# Patient Record
Sex: FEMALE | Race: WHITE | NOT HISPANIC OR LATINO
[De-identification: names, ages, dates, MRNs, and addresses within clinical notes are randomized per-mention and may not be internally consistent; named-entity substitution may affect disease eponyms.]

---

## 2017-03-12 ENCOUNTER — TRANSCRIPTION ENCOUNTER (OUTPATIENT)
Age: 62
End: 2017-03-12

## 2017-08-04 ENCOUNTER — CHART COPY (OUTPATIENT)
Age: 62
End: 2017-08-04

## 2017-10-31 ENCOUNTER — RESULT REVIEW (OUTPATIENT)
Age: 62
End: 2017-10-31

## 2018-03-16 ENCOUNTER — APPOINTMENT (OUTPATIENT)
Dept: ORTHOPEDIC SURGERY | Facility: CLINIC | Age: 63
End: 2018-03-16
Payer: COMMERCIAL

## 2018-03-16 VITALS
SYSTOLIC BLOOD PRESSURE: 106 MMHG | HEIGHT: 63.5 IN | HEART RATE: 76 BPM | WEIGHT: 120 LBS | DIASTOLIC BLOOD PRESSURE: 67 MMHG | BODY MASS INDEX: 21 KG/M2

## 2018-03-16 PROCEDURE — 99204 OFFICE O/P NEW MOD 45 MIN: CPT

## 2018-04-09 ENCOUNTER — APPOINTMENT (OUTPATIENT)
Dept: CT IMAGING | Facility: IMAGING CENTER | Age: 63
End: 2018-04-09

## 2018-04-16 ENCOUNTER — APPOINTMENT (OUTPATIENT)
Dept: ORTHOPEDIC SURGERY | Facility: CLINIC | Age: 63
End: 2018-04-16
Payer: COMMERCIAL

## 2018-04-16 PROCEDURE — 99214 OFFICE O/P EST MOD 30 MIN: CPT | Mod: 25

## 2018-04-16 PROCEDURE — 20552 NJX 1/MLT TRIGGER POINT 1/2: CPT

## 2018-05-25 ENCOUNTER — APPOINTMENT (OUTPATIENT)
Dept: ORTHOPEDIC SURGERY | Facility: CLINIC | Age: 63
End: 2018-05-25

## 2018-07-05 ENCOUNTER — APPOINTMENT (OUTPATIENT)
Dept: MAMMOGRAPHY | Facility: HOSPITAL | Age: 63
End: 2018-07-05
Payer: COMMERCIAL

## 2018-07-05 ENCOUNTER — OUTPATIENT (OUTPATIENT)
Dept: OUTPATIENT SERVICES | Facility: HOSPITAL | Age: 63
LOS: 1 days | End: 2018-07-05
Payer: COMMERCIAL

## 2018-07-05 ENCOUNTER — APPOINTMENT (OUTPATIENT)
Dept: ULTRASOUND IMAGING | Facility: HOSPITAL | Age: 63
End: 2018-07-05
Payer: COMMERCIAL

## 2018-07-05 DIAGNOSIS — Z98.89 OTHER SPECIFIED POSTPROCEDURAL STATES: Chronic | ICD-10-CM

## 2018-07-05 DIAGNOSIS — Z00.8 ENCOUNTER FOR OTHER GENERAL EXAMINATION: ICD-10-CM

## 2018-07-05 PROCEDURE — 76641 ULTRASOUND BREAST COMPLETE: CPT | Mod: 26

## 2018-07-05 PROCEDURE — 76641 ULTRASOUND BREAST COMPLETE: CPT

## 2018-07-05 PROCEDURE — 77063 BREAST TOMOSYNTHESIS BI: CPT | Mod: 26

## 2018-07-05 PROCEDURE — 77063 BREAST TOMOSYNTHESIS BI: CPT

## 2018-07-05 PROCEDURE — 77067 SCR MAMMO BI INCL CAD: CPT

## 2018-07-05 PROCEDURE — 77067 SCR MAMMO BI INCL CAD: CPT | Mod: 26

## 2018-08-01 ENCOUNTER — APPOINTMENT (OUTPATIENT)
Dept: ORTHOPEDIC SURGERY | Facility: CLINIC | Age: 63
End: 2018-08-01
Payer: COMMERCIAL

## 2018-08-01 VITALS — DIASTOLIC BLOOD PRESSURE: 66 MMHG | HEIGHT: 63.5 IN | HEART RATE: 88 BPM | SYSTOLIC BLOOD PRESSURE: 101 MMHG

## 2018-08-01 DIAGNOSIS — M54.5 LOW BACK PAIN: ICD-10-CM

## 2018-08-01 PROCEDURE — 99213 OFFICE O/P EST LOW 20 MIN: CPT

## 2018-08-28 ENCOUNTER — APPOINTMENT (OUTPATIENT)
Dept: ORTHOPEDIC SURGERY | Facility: CLINIC | Age: 63
End: 2018-08-28
Payer: COMMERCIAL

## 2018-08-28 VITALS
BODY MASS INDEX: 21 KG/M2 | HEIGHT: 63.5 IN | SYSTOLIC BLOOD PRESSURE: 111 MMHG | HEART RATE: 66 BPM | WEIGHT: 120 LBS | DIASTOLIC BLOOD PRESSURE: 77 MMHG

## 2018-08-28 PROCEDURE — 99214 OFFICE O/P EST MOD 30 MIN: CPT

## 2018-09-06 ENCOUNTER — APPOINTMENT (OUTPATIENT)
Dept: MRI IMAGING | Facility: HOSPITAL | Age: 63
End: 2018-09-06

## 2018-09-17 ENCOUNTER — OUTPATIENT (OUTPATIENT)
Dept: OUTPATIENT SERVICES | Facility: HOSPITAL | Age: 63
LOS: 1 days | End: 2018-09-17
Payer: COMMERCIAL

## 2018-09-17 ENCOUNTER — APPOINTMENT (OUTPATIENT)
Dept: MRI IMAGING | Facility: HOSPITAL | Age: 63
End: 2018-09-17
Payer: COMMERCIAL

## 2018-09-17 DIAGNOSIS — Z98.89 OTHER SPECIFIED POSTPROCEDURAL STATES: Chronic | ICD-10-CM

## 2018-09-17 DIAGNOSIS — Z00.8 ENCOUNTER FOR OTHER GENERAL EXAMINATION: ICD-10-CM

## 2018-09-17 PROCEDURE — 73721 MRI JNT OF LWR EXTRE W/O DYE: CPT | Mod: 26,RT

## 2018-09-17 PROCEDURE — 73721 MRI JNT OF LWR EXTRE W/O DYE: CPT

## 2018-09-26 ENCOUNTER — APPOINTMENT (OUTPATIENT)
Dept: ORTHOPEDIC SURGERY | Facility: CLINIC | Age: 63
End: 2018-09-26

## 2018-10-05 ENCOUNTER — OUTPATIENT (OUTPATIENT)
Dept: OUTPATIENT SERVICES | Facility: HOSPITAL | Age: 63
LOS: 1 days | End: 2018-10-05
Payer: COMMERCIAL

## 2018-10-05 DIAGNOSIS — J45.909 UNSPECIFIED ASTHMA, UNCOMPLICATED: ICD-10-CM

## 2018-10-05 DIAGNOSIS — Z98.89 OTHER SPECIFIED POSTPROCEDURAL STATES: Chronic | ICD-10-CM

## 2018-10-05 PROCEDURE — 94726 PLETHYSMOGRAPHY LUNG VOLUMES: CPT

## 2018-10-05 PROCEDURE — 94060 EVALUATION OF WHEEZING: CPT

## 2018-10-05 PROCEDURE — 94729 DIFFUSING CAPACITY: CPT

## 2018-10-05 PROCEDURE — 94729 DIFFUSING CAPACITY: CPT | Mod: 26

## 2018-10-05 PROCEDURE — 94726 PLETHYSMOGRAPHY LUNG VOLUMES: CPT | Mod: 26

## 2018-10-05 PROCEDURE — 94060 EVALUATION OF WHEEZING: CPT | Mod: 26

## 2018-10-10 ENCOUNTER — OUTPATIENT (OUTPATIENT)
Dept: OUTPATIENT SERVICES | Facility: HOSPITAL | Age: 63
LOS: 1 days | End: 2018-10-10
Payer: COMMERCIAL

## 2018-10-10 ENCOUNTER — APPOINTMENT (OUTPATIENT)
Dept: RADIOLOGY | Facility: HOSPITAL | Age: 63
End: 2018-10-10
Payer: COMMERCIAL

## 2018-10-10 DIAGNOSIS — Z98.89 OTHER SPECIFIED POSTPROCEDURAL STATES: Chronic | ICD-10-CM

## 2018-10-10 DIAGNOSIS — Z00.8 ENCOUNTER FOR OTHER GENERAL EXAMINATION: ICD-10-CM

## 2018-10-10 PROCEDURE — 71046 X-RAY EXAM CHEST 2 VIEWS: CPT | Mod: 26

## 2018-10-10 PROCEDURE — 71046 X-RAY EXAM CHEST 2 VIEWS: CPT

## 2018-10-15 ENCOUNTER — OUTPATIENT (OUTPATIENT)
Dept: OUTPATIENT SERVICES | Facility: HOSPITAL | Age: 63
LOS: 1 days | End: 2018-10-15
Payer: COMMERCIAL

## 2018-10-15 ENCOUNTER — APPOINTMENT (OUTPATIENT)
Dept: CT IMAGING | Facility: HOSPITAL | Age: 63
End: 2018-10-15
Payer: COMMERCIAL

## 2018-10-15 DIAGNOSIS — Z00.8 ENCOUNTER FOR OTHER GENERAL EXAMINATION: ICD-10-CM

## 2018-10-15 DIAGNOSIS — Z98.89 OTHER SPECIFIED POSTPROCEDURAL STATES: Chronic | ICD-10-CM

## 2018-10-15 PROCEDURE — 71250 CT THORAX DX C-: CPT

## 2018-10-15 PROCEDURE — 71250 CT THORAX DX C-: CPT | Mod: 26

## 2019-01-24 ENCOUNTER — APPOINTMENT (OUTPATIENT)
Dept: ORTHOPEDIC SURGERY | Facility: CLINIC | Age: 64
End: 2019-01-24
Payer: COMMERCIAL

## 2019-01-24 VITALS
WEIGHT: 120 LBS | HEIGHT: 63.5 IN | HEART RATE: 46 BPM | BODY MASS INDEX: 21 KG/M2 | DIASTOLIC BLOOD PRESSURE: 76 MMHG | SYSTOLIC BLOOD PRESSURE: 109 MMHG

## 2019-01-24 PROCEDURE — 99214 OFFICE O/P EST MOD 30 MIN: CPT

## 2019-01-24 NOTE — HISTORY OF PRESENT ILLNESS
[de-identified] : Patient is here for right hip pain for several years. She had history in early 2000's for a labral tear. She states that she did not receive relief from this. She has done multiple courses of PT without relief, the last one being in Summer 2018. She cannot take NSAIDs for her pain so instead she takes Black Cherry pills and she has also tried Voltaren gel. She denies any new symptoms but states that her condition has gradually worsened. She is still an active person and went skiing last weekend. Denies N/T/R. \par \par She is also dealing with chronic back and knee pain on her right side as well that is being treated by different physicians. Patient also states she has intermittent right knee pain for the last several years. She will occasionally feel this pain when skiing, it is primarily localized over the medial joint line. She describes an achy pain also when caring for her grandchildren. No numbness/tingling, no radiation of pain, no locking/buckling/giving way. She has had hyaluronic acid into the knee as before, and is wondering if she is a candidate for a repeat series at this time as she has not had any in the last 2 years.

## 2019-01-24 NOTE — DISCUSSION/SUMMARY
[de-identified] : HA R knee and R hip\par Discussed findings of today's exam and possible causes of patient's pain.  Educated patient on their most probable diagnosis of right hip osteoarthritis, and right knee osteoarthritis.  Reviewed possible courses of treatment, and we collaboratively decided best course of treatment at this time will include conservative management. Patient has chronic pain in her right knee and right hip, no sudden/acute change in her pain, we discussed various treatment options and she would like to proceed with insurance authorization for hyaluronic acid.  Patient appreciates and agrees with current plan.\par \par This note was generated using dragon medical dictation software.  A reasonable effort has been made for proofreading its contents, but typos may still remain.  If there are any questions or points of clarification needed please notify my office.\par \par Recommendation: Trial of Viscosupplementation. Will obtain preauthorization prior to injection therapy.\par \par Followup: Once approved for injection therapy.\par \par \par **NB: Medication was Issued under circumstances where the provider (Dr. Dalton Stewart) reasonably determined that it would be impractical for the patient to obtain substances prescribed by electronic prescription (e-prescribe) given need for pre-authorization, and such delay would adversely impact the patient's medical condition. An exception letter will be mailed to the [place] [employer] during the authorization process.**\par

## 2019-01-24 NOTE — PHYSICAL EXAM
[de-identified] : Constitutional: Well-nourished, well-developed, No acute distress\par Respiratory:  Good respiratory effort, no SOB\par Lymphatic: No regional lymphadenopathy, no lymphedema\par Psychiatric: Pleasant and normal affect, alert and oriented x3\par Skin: Clean dry and intact B/L UE/LE\par Musculoskeletal: normal except where as noted in regional exam\par \par \par Left Hip:\par \par APPEARANCE: no marked deformities, no swelling or malalignment\par POSITIVE TENDERNESS: none\par NONTENDER greater trochanter, TFL, gluteal region, ischium/proximal hamstring region, hip flexor region, sartorius and pubic symphysis. \par ROM full, painless all planes. \par RESISTIVE TESTING: painless resisted ER/IR/SLR/abduction/adduction. \par SPECIAL TESTS: painless loaded flexion & scouring\par PULSES: 2+ DP/PT pulses\par Neuro:  NL sensation of thigh and lower extremity, DTRs 2+/4 patella and achilles\par \par \par B/L Ankles: No asymmetry, malalignment, or swelling, Full ROM, 5/5 strength in DF/PF/Inv/Ev, Joints stable\par BIOMECHANICAL EXAM: no marked leg length discrepancy, mild hip abductor weakness b/l, no marked foot pronation, tight hams and ITB b/l.  Normal gait and station\par \par Right Hip:\par \par APPEARANCE: no marked deformities, no swelling or malalignment\par POSITIVE TENDERNESS: Hip flexor region, sartorius, proximal rectus femoris\par NONTENDER: greater trochanter, TFL, gluteal region, ischium/proximal hamstring region, and pubic symphysis. \par ROM: Limited in all planes due to pain. \par RESISTIVE TESTING: MMT 4+/5 and mild pain with hip flexion, painless resisted ER/IR/SLR/abduction/adduction. \par SPECIAL TESTS: + FADIR, neg CAREN, mild pain with loaded flexion & scouring\par NEURO: Normal sensation of LE, DTRs 2+/4 patella and achilles\par PULSES: 2+ DP/PT pulses\par \par Bilateral Knees:\par APPEARANCE: no marked deformities, no swelling or malalignment\par POSITIVE TENDERNESS:  + crepitus of the anterior knee, and tenderness of patellar retinaculum\par NONTENDER: jt lines b/l, patellar & quadriceps tendons, MCL/LCL, ITB at the lateral femoral condyle & Gerdy's tubercle, pes bursa. \par ROM: full & painless, although some discomfort in deep knee flexion\par RESISTIVE TESTING: + discomfort with knee ext from deep knee flexion (stretched position), painless knee flexion. \par SPECIAL TESTS: stable v/v stress. painless grind. neg Lachman's. neg ant/post drawer. neg Carl's. neg Thessaly test. neg Na's & Malacrae's\par NEURO: Normal sensation of LE, DTRs 2+/4 patella and achilles\par PULSES: 2+ DP/PT pulses\par \par  [de-identified] : I reviewed and clinically correlated the following outside imaging studies,\par EXAM: MR HIP RT \par PROCEDURE DATE: 09/17/2018 \par INTERPRETATION: EXAMINATION: MRI of the right hip without contrast \par \par CLINICAL INFORMATION: Right hip pain \par \par TECHNIQUE: Multiplanar, multisequential MR imaging was performed. \par \par Comparison is made to a prior right hip MRI from 6/26 2013. \par \par FINDINGS: There is no fracture or osteonecrosis about the right hip. There \par is a physiologic amount of right hip joint fluid. There is blunting and \par diminution of the anterosuperior acetabular labrum, consistent with prior \par labral debridement. There is mild degeneration of the superior labrum. The \par remainder of the labrum is intact without a well-defined/fluid-filled tear. \par There is mild chondral wear and chondral fissuring of the anterosuperior \par acetabulum (series 5, image 12). There is mild spurring of the lateral rim \par of the acetabulum. Articular cartilage is otherwise preserved. There is \par suspected to have been a right anterior femoral head-neck junction \par osteoplasty. There is a mildly shallow contour of the acetabulum with \par uncovering of the lateral aspect of the femoral head, consistent with \par acetabular dysplasia. \par \par There is chronic thinning/partial tearing of the right hamstring tendons at \par their origin from the right ischial tuberosity with adjacent bony productive \par change of the the ischial tuberosity. There is no full-thickness tendon tear \par about the right hip. There is a 9 mm ovoid focus of low signal along the \par bursal margin of the anterior gluteus medius tendon at its insertion (series \par 5, image 25), possibly a small focus of calcium hydroxyapetite. There is \par trace fluid in the overlying trochanteric bursa. There is no muscle atrophy \par or edema in the imaged field-of-view. Fat planes about the right sciatic \par nerve are preserved. \par \par IMPRESSION: Anterosuperior labral debridement. No acute/fluid-filled labral \par tear is demonstrated. \par Mild chondral wear along the anterosuperior acetabulum. \par Chronic thinning/partial tearing of the right hamstring tendons at their \par origin from the right ischial tuberosity. \par Possible small focus of calcium hydroxyapatite along the bursal margin of \par the gluteus medius tendon at its insertion with trace adjacent fluid in the \par trochanteric bursa. Correlate with x-ray. \par \par \par \par

## 2019-02-21 ENCOUNTER — RESULT REVIEW (OUTPATIENT)
Age: 64
End: 2019-02-21

## 2019-02-28 ENCOUNTER — RX RENEWAL (OUTPATIENT)
Age: 64
End: 2019-02-28

## 2019-04-04 ENCOUNTER — APPOINTMENT (OUTPATIENT)
Dept: ORTHOPEDIC SURGERY | Facility: CLINIC | Age: 64
End: 2019-04-04
Payer: COMMERCIAL

## 2019-04-04 VITALS — HEART RATE: 77 BPM | SYSTOLIC BLOOD PRESSURE: 112 MMHG | DIASTOLIC BLOOD PRESSURE: 76 MMHG

## 2019-04-04 PROCEDURE — 99213 OFFICE O/P EST LOW 20 MIN: CPT | Mod: 25

## 2019-04-04 PROCEDURE — 20610 DRAIN/INJ JOINT/BURSA W/O US: CPT | Mod: 59,RT

## 2019-04-04 PROCEDURE — 20611 DRAIN/INJ JOINT/BURSA W/US: CPT | Mod: RT

## 2019-04-04 NOTE — PROCEDURE
[de-identified] : Injection: Right  knee joint.\par Indication: Osteoarthritis.\par \par A discussion was had with the patient regarding this procedure and all questions were answered. All risks, benefits and alternatives were discussed. These included but were not limited to bleeding, infection, and allergic reaction. Alcohol was used to clean the skin, and betadine was used to sterilize and prep the area in the lateral joint line aspect of the knee. Ethyl chloride spray was then used as a topical anesthetic. A 22-gauge needle was used to inject 2 cc of Euflexxa into the knee with ease. A sterile bandage was then applied. The patient tolerated the procedure well and there were no complications. \par \par Lot #:  [id number]\par Exp:  3/2/20\par _______________________\par \par Ultrasound-Guided Diagnostic/Therapeutic Injection: Right Hip Intra-articular Injection.\par \par Indication for U/S Guidance: Ensure placement within the femoroacetabular joint for diagnostic purposes, while avoiding anterior neurovascular structures\par \par Indication for Injection: Osteoarthritis.\par \par A discussion was had with the patient regarding this procedure and all questions were answered. All risks, benefits and alternatives were discussed. These included but were not limited to bleeding, infection, and allergic reaction.  A timeout was done to ensure correct side and pt agreed to the procedure.   Betadine was used to sterilize and prep the area, and alcohol was used to clean the skin in the anterior aspect of the hip joint. The femoroacetabular joint was visualized utilizing the SonSurface Tensionte II Edge, the Curvilinear 30cm 5-2 MHz transducer, sterile probe cover and sterile ultrasound gel.  The joint was visualized in the longitudinal axis and an in-plane approach was used for the injection.  Ultrasound guidance was utilized to ensure accuracy of the intra-articular injection, and avoid the femoral neurovascular structures.  A 25-gauge 1.5" needle was first used to inject 3cc of 1% lidocaine without epi into the subcutaneous tissue and muscle for local anesthesia.  Following that a 22-gauge 3" needle was used to inject2.54cc of Visco 3 into the femoroacetabular joint. An image confirming the correct location of the needle placement and infusion of the steroid at the end of the injection was saved.  A sterile bandage was then applied. The patient tolerated the procedure well and there were no complications. \par \par Lot #: [id number]\par Exp: 6/30/21\par \par  \par  \par

## 2019-04-04 NOTE — HISTORY OF PRESENT ILLNESS
[de-identified] : Patient has a history of knee osteoarthritis.  We discussed treatment options and have obtained insurance authorization to proceed with a series of hyaluronic acid injections and patient would like to proceed at this time.  No significant interval change in knee pain since last evaluated. \par \par She also has a history of hip osteoarthritis. We discussed treatment options and patient would like to proceed with hyaluronic acid injection at this time. No significant interval change in knee pain since last evaluated.

## 2019-04-04 NOTE — PHYSICAL EXAM
[de-identified] : Constitutional: Well-nourished, well-developed, No acute distress\par Respiratory:  Good respiratory effort, no SOB\par Lymphatic: No regional lymphadenopathy, no lymphedema\par Psychiatric: Pleasant and normal affect, alert and oriented x3\par Skin: Clean dry and intact B/L UE/LE\par Musculoskeletal: normal except where as noted in regional exam\par \par \par Right Hip:\par \par APPEARANCE: no marked deformities, no swelling or malalignment\par POSITIVE TENDERNESS: Hip flexor region, sartorius, proximal rectus femoris\par NONTENDER: greater trochanter, TFL, gluteal region, ischium/proximal hamstring region, and pubic symphysis. \par ROM: Limited in all planes due to pain. \par RESISTIVE TESTING: MMT 4+/5 and mild pain with hip flexion, painless resisted ER/IR/SLR/abduction/adduction. \par SPECIAL TESTS: + FADIR, neg CAREN, mild pain with loaded flexion & scouring\par NEURO: Normal sensation of LE, DTRs 2+/4 patella and achilles\par PULSES: 2+ DP/PT pulses\par \par Right Knee:\par APPEARANCE: no marked deformities, no swelling or malalignment\par POSITIVE TENDERNESS:  + crepitus of the anterior knee, and tenderness of patellar retinaculum\par NONTENDER: jt lines b/l, patellar & quadriceps tendons, MCL/LCL, ITB at the lateral femoral condyle & Gerdy's tubercle, pes bursa. \par ROM: full & painless, although some discomfort in deep knee flexion\par RESISTIVE TESTING: + discomfort with knee ext from deep knee flexion (stretched position), painless knee flexion. \par SPECIAL TESTS: stable v/v stress. painless grind. neg Lachman's. neg ant/post drawer. neg Carl's. neg Thessaly test. neg Na's & Malacrae's\par NEURO: Normal sensation of LE, DTRs 2+/4 patella and achilles\par PULSES: 2+ DP/PT pulses\par \par

## 2019-04-04 NOTE — DISCUSSION/SUMMARY
[de-identified] : The first of three Euflexxa injections was given today under sterile conditions into the Right knee joint without complication (see procedure note). The first of three Visco-3 injections was given today under sterile conditions and ultrasound guidance into the Right hip joint without complication (see procedure note). The patient was instructed on modification of activities over the next 48-72 hours. I advised the patient to ice the knee as needed for control of local irritation from the injection. I advised that some patients have immediate benefit from the initial injection therapy, however it usually takes the medication a number of weeks (~8wks) to provide significant relief of osteoarthritic symptoms. \par \par I  will see the patient back for the second injection in approximately 1 week\par \par This note was generated using dragon medical dictation software.  A reasonable effort has been made for proofreading its contents, but typos may still remain.  If there are any questions or points of clarification needed please notify my office.\par

## 2019-04-10 PROBLEM — M25.551 RIGHT HIP PAIN: Status: ACTIVE | Noted: 2018-08-01

## 2019-04-11 ENCOUNTER — APPOINTMENT (OUTPATIENT)
Dept: ORTHOPEDIC SURGERY | Facility: CLINIC | Age: 64
End: 2019-04-11
Payer: COMMERCIAL

## 2019-04-11 DIAGNOSIS — M25.551 PAIN IN RIGHT HIP: ICD-10-CM

## 2019-04-11 PROCEDURE — 99213 OFFICE O/P EST LOW 20 MIN: CPT | Mod: 25

## 2019-04-11 PROCEDURE — 20611 DRAIN/INJ JOINT/BURSA W/US: CPT | Mod: RT

## 2019-04-11 PROCEDURE — 20610 DRAIN/INJ JOINT/BURSA W/O US: CPT | Mod: 59,RT

## 2019-04-11 NOTE — DISCUSSION/SUMMARY
[de-identified] : The second of three Euflexxa injections was given today under sterile conditions into the Right knee joint without complication (see procedure note). The second of three Visco-3 injections was given today under sterile conditions and ultrasound guidance into the Right hip joint without complication (see procedure note). The patient was instructed on modification of activities over the next 48-72 hours. I advised the patient to ice the knee as needed for control of local irritation from the injection. I advised that some patients have immediate benefit from the initial injection therapy, however it usually takes the medication a number of weeks (~8wks) to provide significant relief of osteoarthritic symptoms. \par \par I  will see the patient back for the final injection in approximately 1 week\par \par This note was generated using dragon medical dictation software.  A reasonable effort has been made for proofreading its contents, but typos may still remain.  If there are any questions or points of clarification needed please notify my office.\par

## 2019-04-11 NOTE — PHYSICAL EXAM
[de-identified] : Constitutional: Well-nourished, well-developed, No acute distress\par Respiratory:  Good respiratory effort, no SOB\par Lymphatic: No regional lymphadenopathy, no lymphedema\par Psychiatric: Pleasant and normal affect, alert and oriented x3\par Skin: Clean dry and intact B/L UE/LE\par Musculoskeletal: normal except where as noted in regional exam\par \par \par Right Hip:\par \par APPEARANCE: no marked deformities, no swelling or malalignment\par POSITIVE TENDERNESS: Hip flexor region, sartorius, proximal rectus femoris\par NONTENDER: greater trochanter, TFL, gluteal region, ischium/proximal hamstring region, and pubic symphysis. \par ROM: Limited in all planes due to pain. \par RESISTIVE TESTING: MMT 4+/5 and mild pain with hip flexion, painless resisted ER/IR/SLR/abduction/adduction. \par SPECIAL TESTS: + FADIR, neg CAREN, mild pain with loaded flexion & scouring\par NEURO: Normal sensation of LE, DTRs 2+/4 patella and achilles\par PULSES: 2+ DP/PT pulses\par \par Right Knee:\par APPEARANCE: no marked deformities, no swelling or malalignment\par POSITIVE TENDERNESS:  + crepitus of the anterior knee, and tenderness of patellar retinaculum\par NONTENDER: jt lines b/l, patellar & quadriceps tendons, MCL/LCL, ITB at the lateral femoral condyle & Gerdy's tubercle, pes bursa. \par ROM: full & painless, although some discomfort in deep knee flexion\par RESISTIVE TESTING: + discomfort with knee ext from deep knee flexion (stretched position), painless knee flexion. \par SPECIAL TESTS: stable v/v stress. painless grind. neg Lachman's. neg ant/post drawer. neg Carl's. neg Thessaly test. neg Na's & Malacrae's\par NEURO: Normal sensation of LE, DTRs 2+/4 patella and achilles\par PULSES: 2+ DP/PT pulses\par \par

## 2019-04-11 NOTE — HISTORY OF PRESENT ILLNESS
[de-identified] : Patient is here today to follow-up on knee pain.  There has been some mild improvement after the first hyaluronic acid injection performed at last visit.  No adverse reaction thus far.  Patient would like to proceed with the second injection in the series at this time. \par \par Patient is also here today to follow-up on hip pain.  There has been some mild improvement after the first hyaluronic acid injection performed at last visit.  No adverse reaction thus far.  Patient would like to proceed with the second injection in the series at this time.

## 2019-04-11 NOTE — PROCEDURE
[de-identified] : Injection: Right  knee joint.\par Indication: Osteoarthritis.\par \par A discussion was had with the patient regarding this procedure and all questions were answered. All risks, benefits and alternatives were discussed. These included but were not limited to bleeding, infection, and allergic reaction. Alcohol was used to clean the skin, and betadine was used to sterilize and prep the area in the lateral joint line aspect of the knee. Ethyl chloride spray was then used as a topical anesthetic. A 22-gauge needle was used to inject 2 cc of Euflexxa into the knee with ease. A sterile bandage was then applied. The patient tolerated the procedure well and there were no complications. \par \par Lot #:  [id number]\par Exp:  3/2/20\par \par ___________________\par Ultrasound-Guided Diagnostic/Therapeutic Injection: Right Hip Intra-articular Injection.\par \par Indication for U/S Guidance: Ensure placement within the femoroacetabular joint for diagnostic purposes, while avoiding anterior neurovascular structures\par \par Indication for Injection: Osteoarthritis.\par \par A discussion was had with the patient regarding this procedure and all questions were answered. All risks, benefits and alternatives were discussed. These included but were not limited to bleeding, infection, and allergic reaction.  A timeout was done to ensure correct side and pt agreed to the procedure.   Betadine was used to sterilize and prep the area, and alcohol was used to clean the skin in the anterior aspect of the hip joint. The femoroacetabular joint was visualized utilizing the SonFilip Technologieste II Edge, the Curvilinear 30cm 5-2 MHz transducer, sterile probe cover and sterile ultrasound gel.  The joint was visualized in the longitudinal axis and an in-plane approach was used for the injection.  Ultrasound guidance was utilized to ensure accuracy of the intra-articular injection, and avoid the femoral neurovascular structures.  A 25-gauge 1.5" needle was first used to inject 3cc of 1% lidocaine without epi into the subcutaneous tissue and muscle for local anesthesia.  Following that a 22-gauge 3" needle was used to inject2.54cc of Visco 3 into the femoroacetabular joint. An image confirming the correct location of the needle placement and infusion of the steroid at the end of the injection was saved.  A sterile bandage was then applied. The patient tolerated the procedure well and there were no complications. \par \par Lot #: [id number]\par Exp: 6/30/21\par \par  \par  \par

## 2019-04-17 PROBLEM — M16.11 OSTEOARTHRITIS OF RIGHT HIP: Status: ACTIVE | Noted: 2019-01-24

## 2019-04-18 ENCOUNTER — APPOINTMENT (OUTPATIENT)
Dept: ORTHOPEDIC SURGERY | Facility: CLINIC | Age: 64
End: 2019-04-18
Payer: COMMERCIAL

## 2019-04-18 VITALS — WEIGHT: 120 LBS | HEIGHT: 63.5 IN | BODY MASS INDEX: 21 KG/M2

## 2019-04-18 DIAGNOSIS — M16.11 UNILATERAL PRIMARY OSTEOARTHRITIS, RIGHT HIP: ICD-10-CM

## 2019-04-18 PROCEDURE — 20610 DRAIN/INJ JOINT/BURSA W/O US: CPT | Mod: 59,RT

## 2019-04-18 PROCEDURE — 99213 OFFICE O/P EST LOW 20 MIN: CPT | Mod: 25

## 2019-04-18 PROCEDURE — 20611 DRAIN/INJ JOINT/BURSA W/US: CPT | Mod: RT

## 2019-04-18 NOTE — PHYSICAL EXAM
[de-identified] : Constitutional: Well-nourished, well-developed, No acute distress\par Respiratory:  Good respiratory effort, no SOB\par Lymphatic: No regional lymphadenopathy, no lymphedema\par Psychiatric: Pleasant and normal affect, alert and oriented x3\par Skin: Clean dry and intact B/L UE/LE\par Musculoskeletal: normal except where as noted in regional exam\par \par \par Right Hip:\par APPEARANCE: no marked deformities, no swelling or malalignment\par POSITIVE TENDERNESS: Hip flexor region, sartorius, proximal rectus femoris\par NONTENDER: greater trochanter, TFL, gluteal region, ischium/proximal hamstring region, and pubic symphysis. \par ROM: Limited in all planes due to pain. \par RESISTIVE TESTING: MMT 4+/5 and mild pain with hip flexion, painless resisted ER/IR/SLR/abduction/adduction. \par SPECIAL TESTS: + FADIR, neg CAREN, mild pain with loaded flexion & scouring\par NEURO: Normal sensation of LE, DTRs 2+/4 patella and achilles\par PULSES: 2+ DP/PT pulses\par \par Right Knee:\par APPEARANCE: no marked deformities, no swelling or malalignment\par POSITIVE TENDERNESS:  + crepitus of the anterior knee, and tenderness of patellar retinaculum\par NONTENDER: jt lines b/l, patellar & quadriceps tendons, MCL/LCL, ITB at the lateral femoral condyle & Gerdy's tubercle, pes bursa. \par ROM: full & painless, although some discomfort in deep knee flexion\par RESISTIVE TESTING: + discomfort with knee ext from deep knee flexion (stretched position), painless knee flexion. \par SPECIAL TESTS: stable v/v stress. painless grind. neg Lachman's. neg ant/post drawer. neg Carl's. neg Thessaly test. neg Na's & Malacrae's\par NEURO: Normal sensation of LE, DTRs 2+/4 patella and achilles\par PULSES: 2+ DP/PT pulses\par \par

## 2019-04-18 NOTE — DISCUSSION/SUMMARY
[de-identified] : The third of three Euflexxa injections was given today under sterile conditions into the Right knee joint without complication (see procedure note). The third of three Visco-3 injections was given today under sterile conditions and ultrasound guidance into the Right hip joint without complication (see procedure note). The patient was instructed on modification of activities over the next 48-72 hours. I advised the patient to ice the knee as needed for control of local irritation from the injection. I advised that some patients have immediate benefit from the initial injection therapy, however it usually takes the medication a number of weeks (~8wks) to provide significant relief of osteoarthritic symptoms. \par \par I  will see the patient back for the second injection in approximately 1 week\par \par This note was generated using dragon medical dictation software.  A reasonable effort has been made for proofreading its contents, but typos may still remain.  If there are any questions or points of clarification needed please notify my office.\par

## 2019-04-18 NOTE — PROCEDURE
[de-identified] : Injection: Right  knee joint.\par Indication: Osteoarthritis.\par \par A discussion was had with the patient regarding this procedure and all questions were answered. All risks, benefits and alternatives were discussed. These included but were not limited to bleeding, infection, and allergic reaction. Alcohol was used to clean the skin, and betadine was used to sterilize and prep the area in the lateral joint line aspect of the knee. Ethyl chloride spray was then used as a topical anesthetic. A 22-gauge needle was used to inject 2 cc of Euflexxa into the knee with ease. A sterile bandage was then applied. The patient tolerated the procedure well and there were no complications. \par \par Lot #:  [id number]\par Exp:  3/2/20\par \par \par Ultrasound-Guided Diagnostic/Therapeutic Injection: Right Hip Intra-articular Injection.\par \par Indication for U/S Guidance: Ensure placement within the femoroacetabular joint for diagnostic purposes, while avoiding anterior neurovascular structures\par \par Indication for Injection: Osteoarthritis.\par \par A discussion was had with the patient regarding this procedure and all questions were answered. All risks, benefits and alternatives were discussed. These included but were not limited to bleeding, infection, and allergic reaction.  A timeout was done to ensure correct side and pt agreed to the procedure.   Betadine was used to sterilize and prep the area, and alcohol was used to clean the skin in the anterior aspect of the hip joint. The femoroacetabular joint was visualized utilizing the Sonosite II Edge, the Curvilinear 30cm 5-2 MHz transducer, sterile probe cover and sterile ultrasound gel.  The joint was visualized in the longitudinal axis and an in-plane approach was used for the injection.  Ultrasound guidance was utilized to ensure accuracy of the intra-articular injection, and avoid the femoral neurovascular structures.  A 25-gauge 1.5" needle was first used to inject 3cc of 1% lidocaine without epi into the subcutaneous tissue and muscle for local anesthesia.  Following that a 22-gauge 3" needle was used to inject2.54cc of Visco 3 into the femoroacetabular joint. An image confirming the correct location of the needle placement and infusion of the steroid at the end of the injection was saved.  A sterile bandage was then applied. The patient tolerated the procedure well and there were no complications. \par \par Lot #: [id number]\par Exp: 6/30/21\par \par  \par  \par

## 2019-04-18 NOTE — HISTORY OF PRESENT ILLNESS
[de-identified] : Patient is here today to follow-up on knee pain.  There has been some mild improvement after the second hyaluronic acid injection performed at last visit.  No adverse reaction thus far.  Patient would like to proceed with the final injection in the series at this time. \par \par Patient is also here today to follow-up on hip pain.  There has been some mild improvement after the second hyaluronic acid injection performed at last visit.  No adverse reaction thus far.  Patient would like to proceed with the final injection in the series at this time.

## 2019-05-24 ENCOUNTER — CLINICAL ADVICE (OUTPATIENT)
Age: 64
End: 2019-05-24

## 2019-05-24 ENCOUNTER — RX RENEWAL (OUTPATIENT)
Age: 64
End: 2019-05-24

## 2019-08-14 ENCOUNTER — APPOINTMENT (OUTPATIENT)
Dept: ORTHOPEDIC SURGERY | Facility: CLINIC | Age: 64
End: 2019-08-14
Payer: COMMERCIAL

## 2019-08-14 VITALS
DIASTOLIC BLOOD PRESSURE: 72 MMHG | BODY MASS INDEX: 21 KG/M2 | HEART RATE: 69 BPM | SYSTOLIC BLOOD PRESSURE: 104 MMHG | HEIGHT: 63.5 IN | WEIGHT: 120 LBS

## 2019-08-14 PROCEDURE — 99214 OFFICE O/P EST MOD 30 MIN: CPT

## 2019-12-23 ENCOUNTER — APPOINTMENT (OUTPATIENT)
Dept: RADIOLOGY | Facility: HOSPITAL | Age: 64
End: 2019-12-23

## 2019-12-23 ENCOUNTER — APPOINTMENT (OUTPATIENT)
Dept: MRI IMAGING | Facility: HOSPITAL | Age: 64
End: 2019-12-23

## 2020-06-16 ENCOUNTER — APPOINTMENT (OUTPATIENT)
Dept: ORTHOPEDIC SURGERY | Facility: CLINIC | Age: 65
End: 2020-06-16
Payer: COMMERCIAL

## 2020-06-16 DIAGNOSIS — M25.531 PAIN IN RIGHT WRIST: ICD-10-CM

## 2020-06-16 DIAGNOSIS — M77.8 OTHER ENTHESOPATHIES, NOT ELSEWHERE CLASSIFIED: ICD-10-CM

## 2020-06-16 PROCEDURE — 99214 OFFICE O/P EST MOD 30 MIN: CPT

## 2020-06-17 NOTE — ADDENDUM
[FreeTextEntry1] : I, Joyce Roy wrote this note acting as a scribe for Dr. Morris Persaud on Jun 16, 2020.

## 2020-06-17 NOTE — CONSULT LETTER
[Dear  ___] : Dear  [unfilled], [Consult Letter:] : I had the pleasure of evaluating your patient, [unfilled]. [Please see my note below.] : Please see my note below. [Consult Closing:] : Thank you very much for allowing me to participate in the care of this patient.  If you have any questions, please do not hesitate to contact me. [Sincerely,] : Sincerely, [FreeTextEntry3] : Morris Persaud MD  [FreeTextEntry2] : JEYSON WALL,GERA BLANK

## 2020-06-17 NOTE — DISCUSSION/SUMMARY
[FreeTextEntry1] : The underlying pathophysiology was reviewed with the patient. Treatment options were discussed including; observation, NSAIDS, analgesics, bracing, injection(s), physical therapy, and surgical intervention. \par \par The patient denied x-rays today. \par \par The patient wishes to proceed with hand therapy. A script was given. \par She was advised to continue with the copper wrist braces. \par Topical analgesics as needed. \par Tylenol, as tolerated, were advised for pain and symptom management. \par Follow up as needed.

## 2020-06-17 NOTE — END OF VISIT
[FreeTextEntry3] : I, Morris Persaud MD, ordering physician, have read and attest that all the information, medical decision making and discharge instructions within are true and accurate.

## 2020-06-17 NOTE — HISTORY OF PRESENT ILLNESS
[FreeTextEntry1] : Patient is a 64 year old female who presents with a history of bilateral wrist pain, R>L. Her symptoms developed sometime in 2016 and initially were only present in the right wrist, however she recently developed pain in the left as well. Her pain is localized to the dorsal and ulnar aspect of the right wrist, and the ulnar aspect of the left wrist. 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 Her pain is worse with increased activity. An MRI was performed in 2016 and revealed chronic degenerative thinning and near full-thickness central perforation of the intra-articular disc of the TFCC and moderate chondral thinning at the radiocarpal joint. She has previously been seen for this by Dr. Harrington and Dr. Serrano who both diagnosed her with osteoarthritis and advised that she wear wrist braces. She finds relief with copper compression braces. There has been no other treatment to date.

## 2020-06-17 NOTE — PHYSICAL EXAM
[Normal] : Alert and in no acute distress [de-identified] : MRI of the right wrist on 11/29/2016 revealed chronic degenerative thinning and near full-thickness central perforation of the intra-articular disc of the TFCC with fibrocystic changes. Mild to moderate chondral thinning at the radiocarpal joint.\par \par The patient is denying to have new x-rays taken today.  [de-identified] : Patient is WDWN, alert, and in no acute distress. Breathing is unlabored. She is grossly oriented to person, place, and time. \par \par Right Wrist: Tenderness to palpation along the SL interval space. No swelling or deformities. The ROM is full and painless in all planes with no crepitus. There is no joint instability on provocative testing. \par Strength: extension, flexion, ulnar deviation and radial deviation 5/5.

## 2020-07-16 ENCOUNTER — RESULT REVIEW (OUTPATIENT)
Age: 65
End: 2020-07-16

## 2020-09-29 ENCOUNTER — APPOINTMENT (OUTPATIENT)
Dept: PULMONOLOGY | Facility: CLINIC | Age: 65
End: 2020-09-29
Payer: COMMERCIAL

## 2020-09-29 VITALS
DIASTOLIC BLOOD PRESSURE: 64 MMHG | WEIGHT: 117 LBS | BODY MASS INDEX: 20.47 KG/M2 | HEART RATE: 73 BPM | SYSTOLIC BLOOD PRESSURE: 94 MMHG | RESPIRATION RATE: 16 BRPM | HEIGHT: 63.5 IN | TEMPERATURE: 97.8 F

## 2020-09-29 DIAGNOSIS — Z87.891 PERSONAL HISTORY OF NICOTINE DEPENDENCE: ICD-10-CM

## 2020-09-29 PROCEDURE — 99203 OFFICE O/P NEW LOW 30 MIN: CPT

## 2020-09-29 NOTE — PHYSICAL EXAM
[No Acute Distress] : no acute distress [No Deformities] : no deformities [Normal Oropharynx] : normal oropharynx [Normal Appearance] : normal appearance [No Neck Mass] : no neck mass [Normal Rate/Rhythm] : normal rate/rhythm [Normal S1, S2] : normal s1, s2 [No Resp Distress] : no resp distress [Clear to Auscultation Bilaterally] : clear to auscultation bilaterally [Normal Gait] : normal gait [No Clubbing] : no clubbing [No Edema] : no edema [Normal Color/ Pigmentation] : normal color/ pigmentation [No Focal Deficits] : no focal deficits [No Motor Deficits] : no motor deficits [Oriented x3] : oriented x3 [Normal Affect] : normal affect

## 2020-09-29 NOTE — HISTORY OF PRESENT ILLNESS
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 Patient had been following with Dr. Meza. She reports a history of recurrent respiratory infections with multiple courses of abx. She has been taking nebulized glycopyrrolate daily. She reports a chronic cough with occasional green mucous.She denies any respiratory infections in the past 8 months. PFTs from 9/2019 do not show any obstructive ventilatory defect. There is mild hypinflation and air trapping evident. CT from 2018 showed RML and lingula consolidations with TIB opacities. She denies fevers, weight loss, hemoptysis. Her main concerns are poor sleep and anxiety about catching COVID-19. She has chronic anxiety and follows with psychiatry. She reports her anxiety inhibits her sleeping. Her cough is worse when lying flat. She also reports acid reflux. \par

## 2020-09-29 NOTE — ASSESSMENT
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 Patient with mild bronchiectasis on imaging without evidence of obstruction on PFTs 9/2019. She has known IgA deficiency with multiple prior respiratory infections which is likely contributing. Her CT from 2018 is suspicious for RATNA disease but clinically she does not have significant disease and IgA deficiency is not associated with RATNA generally. She should continue her glycopyrolate nebulizer daily. She was counselled about potential complications of bronchiectasis and informed to call clinic if she develops evidence of infection. She will follow up in 6 months or if she develops symptoms. \par PFTs normal volumes, flows, mild reduction in diffusion

## 2020-10-20 ENCOUNTER — APPOINTMENT (OUTPATIENT)
Dept: RHEUMATOLOGY | Facility: CLINIC | Age: 65
End: 2020-10-20
Payer: COMMERCIAL

## 2020-10-20 VITALS
WEIGHT: 116 LBS | OXYGEN SATURATION: 96 % | SYSTOLIC BLOOD PRESSURE: 117 MMHG | HEART RATE: 84 BPM | DIASTOLIC BLOOD PRESSURE: 69 MMHG | BODY MASS INDEX: 20.3 KG/M2 | HEIGHT: 63.5 IN

## 2020-10-20 DIAGNOSIS — M25.541 PAIN IN JOINTS OF RIGHT HAND: ICD-10-CM

## 2020-10-20 DIAGNOSIS — M25.542 PAIN IN JOINTS OF RIGHT HAND: ICD-10-CM

## 2020-10-20 PROCEDURE — 99205 OFFICE O/P NEW HI 60 MIN: CPT | Mod: 25

## 2020-10-20 PROCEDURE — 99072 ADDL SUPL MATRL&STAF TM PHE: CPT

## 2020-10-22 ENCOUNTER — LABORATORY RESULT (OUTPATIENT)
Age: 65
End: 2020-10-22

## 2020-10-22 LAB — LUPUS ANTICOAGULANT CASCADE REFLEX: NORMAL

## 2020-10-23 LAB
ALBUMIN SERPL ELPH-MCNC: 4.9 G/DL
ALP BLD-CCNC: 66 U/L
ALT SERPL-CCNC: 17 U/L
ANION GAP SERPL CALC-SCNC: 17 MMOL/L
AST SERPL-CCNC: 21 U/L
BILIRUB SERPL-MCNC: 0.6 MG/DL
BUN SERPL-MCNC: 13 MG/DL
CALCIUM SERPL-MCNC: 9.8 MG/DL
CENTROMERE IGG SER-ACNC: <0.2 CD:130001892
CHLORIDE SERPL-SCNC: 100 MMOL/L
CO2 SERPL-SCNC: 22 MMOL/L
CREAT SERPL-MCNC: 0.66 MG/DL
CRP SERPL-MCNC: <0.1 MG/DL
ENA RNP AB SER IA-ACNC: <0.2 AL
ENA SM AB SER IA-ACNC: <0.2 AL
ENA SS-A AB SER IA-ACNC: <0.2 AL
ENA SS-B AB SER IA-ACNC: <0.2 AL
ERYTHROCYTE [SEDIMENTATION RATE] IN BLOOD BY WESTERGREN METHOD: 10 MM/HR
POTASSIUM SERPL-SCNC: 4.4 MMOL/L
PROT SERPL-MCNC: 6.8 G/DL
RHEUMATOID FACT SER QL: <10 IU/ML
SODIUM SERPL-SCNC: 139 MMOL/L

## 2020-10-23 NOTE — PHYSICAL EXAM
[General Appearance - Alert] : alert [General Appearance - In No Acute Distress] : in no acute distress [General Appearance - Well Nourished] : well nourished [General Appearance - Well Developed] : well developed [General Appearance - Well-Appearing] : healthy appearing [Sclera] : the sclera and conjunctiva were normal [Outer Ear] : the ears and nose were normal in appearance [Neck Appearance] : the appearance of the neck was normal [Neck Cervical Mass (___cm)] : no neck mass was observed [Jugular Venous Distention Increased] : there was no jugular-venous distention [Thyroid Diffuse Enlargement] : the thyroid was not enlarged [Thyroid Nodule] : there were no palpable thyroid nodules [Auscultation Breath Sounds / Voice Sounds] : lungs were clear to auscultation bilaterally [Heart Rate And Rhythm] : heart rate was normal and rhythm regular [Heart Sounds] : normal S1 and S2 [Heart Sounds Gallop] : no gallops [Murmurs] : no murmurs [Heart Sounds Pericardial Friction Rub] : no pericardial rub [Edema] : there was no peripheral edema [FreeTextEntry1] : no RP, no LR [Cervical Lymph Nodes Enlarged Posterior Bilaterally] : posterior cervical [Cervical Lymph Nodes Enlarged Anterior Bilaterally] : anterior cervical [Supraclavicular Lymph Nodes Enlarged Bilaterally] : supraclavicular [No CVA Tenderness] : no ~M costovertebral angle tenderness [No Spinal Tenderness] : no spinal tenderness [Abnormal Walk] : normal gait [Nail Clubbing] : no clubbing  or cyanosis of the fingernails [Musculoskeletal - Swelling] : no joint swelling seen [Motor Tone] : muscle strength and tone were normal [Skin Color & Pigmentation] : normal skin color and pigmentation [Skin Turgor] : normal skin turgor [] : no rash [Motor Exam] : the motor exam was normal [No Focal Deficits] : no focal deficits [Oriented To Time, Place, And Person] : oriented to person, place, and time [Impaired Insight] : insight and judgment were intact [Affect] : the affect was normal [Mood] : the mood was normal [Memory Recent] : recent memory was not impaired [Memory Remote] : remote memory was not impaired

## 2020-10-23 NOTE — HISTORY OF PRESENT ILLNESS
[FreeTextEntry1] : 64 yo PMHX bronchiectasis, IgA def, frequent infections, SLE, SS here for evaluations of polyarthralgias\par \par Has had a series of symptoms since her 20's and has seen multiple (>5 - chato, katelin barr, pauline, goldberg, esthela )  rheumatologists over the years.  She has received mixed autoimmune diagnosis and is here for further opinion/guidance.\par \par Dx. SLE age 40.  based on fever, polyarthralgias, weight loss, low wbc,anemia and positive markers at the time.   \par - currently no fever, weight changes, oral ulcers, RP, frothy urine, respiratory symptoms, AMS, swelling of joints\par \par DX OA.has been told in multiple joints, hands, knees, ankles, back, sp stenosis\par - current polyarthralgias are worse in the afternoon, in the same distribution of her OA,\par - has had VS in the knees\par - meloxicam prn - but results in severe gerd\par \par DX PIDD/IGA deficiency.  got sick 2018 and was told IgA deficient.\par - since that time gets chronic cough and fevers and lots of sinnopulmonary infections.  \par - non-smoking - \par - mass in right lung - 2x3 cm - dx bronchiectasis - IgA deficiency\par \par For all the above symptoms went to integrative doctor - bg roman - \par - began vitam supplements and markedly changed diet to improve her IgA levels\par - however he  and since then she is unsure if wants to pursue the holistic approach any longer\par \par Rheum ROS\par - unclear if RP.  used to go skiing and believes had some episodes of whtening of the fingertips.   [place] into the lodge and resolved. not blue or red. no ulcers or breakdown.  occurred when used to ski  \par - dry eyes cries with tears - gtt\par - mouth dry - drinks lots of water - able to swallow crackers \par - on meds to strengthen bladder\par - osteoporosis on actonel - saw pavan barraza\par - trouble sleeping - psychaitric nurse practitioner -  [Weight Loss] : no weight loss [Malaise] : no malaise [Fever] : no fever [Malar Facial Rash] : no malar facial rash [Skin Lesions] : no lesions [Oral Ulcers] : no oral ulcers [Dry Mouth] : dry mouth [Dysphonia] : no dysphonia [Dysphagia] : no dysphagia [Arthralgias] : arthralgias [Morning Stiffness] : no morning stiffness [Muscle Cramping] : no muscle cramping [Visual Changes] : no visual changes [Eye Pain] : no eye pain [Eye Redness] : no eye redness [Dry Eyes] : dry eyes

## 2020-10-23 NOTE — ASSESSMENT
[FreeTextEntry1] : 64 yo PMHX bronchiectasis, IgA def, frequent infections, SLE, SS here for evaluations of polyarthralgias\par \par #OA - likely etiology of polyarthralgias - non-inflammatory, chronic.  \par - hands, spinal stenosis, knees s/p VS\par - s/p euflexxa knees, spinal stenosis - patient to retrieve xrays for my review\par - tyleonol for now\par - no nsaid 2/2 severe GERD\par \par #UCTD/SS/. SLE age 40.  based on fever, polyarthralgias, weight loss, low wbc,anemia and positive markers at the time.   \par - currently no fever, weight changes, oral ulcers, RP, frothy urine, respiratory symptoms, AMS, swelling of joints\par \par # PIDD/IGA deficiency.  bronchiectasis  got sick October 2018 and was told IgA deficient.\par - since that time gets chronic cough and fevers and lots of sinnopulmonary infections.  \par - non-smoking - \par - mass in right lung - 2x3 cm - dx bronchiectasis - IgA deficiency\par - check cells, full ab and protective ab

## 2020-10-27 LAB
ALBUMIN MFR SERPL ELPH: 63 %
ALBUMIN SERPL-MCNC: 4.3 G/DL
ALBUMIN/GLOB SERPL: 1.7 RATIO
ALPHA1 GLOB MFR SERPL ELPH: 3.9 %
ALPHA1 GLOB SERPL ELPH-MCNC: 0.3 G/DL
ALPHA2 GLOB MFR SERPL ELPH: 9.6 %
ALPHA2 GLOB SERPL ELPH-MCNC: 0.7 G/DL
ANA SER IF-ACNC: NEGATIVE
B-GLOBULIN MFR SERPL ELPH: 8.8 %
B-GLOBULIN SERPL ELPH-MCNC: 0.6 G/DL
BASOPHILS # BLD AUTO: 0 K/UL
BASOPHILS NFR BLD AUTO: 0 %
C DIPHTHERIAE AB SER QL: <0.1 IU/ML
C TETANI IGG SER-ACNC: 0.2 IU/ML
C3 SERPL-MCNC: 115 MG/DL
C4 SERPL-MCNC: 16 MG/DL
CD16+CD56+ CELLS # BLD: 93 /UL
CD16+CD56+ CELLS NFR BLD: 9 %
CD19 CELLS NFR BLD: 99 /UL
CD3 CELLS # BLD: 781 /UL
CD3 CELLS NFR BLD: 80 %
CD3+CD4+ CELLS # BLD: 433 /UL
CD3+CD4+ CELLS NFR BLD: 44 %
CD3+CD4+ CELLS/CD3+CD8+ CLL SPEC: 1.41 RATIO
CD3+CD8+ CELLS # SPEC: 307 /UL
CD3+CD8+ CELLS NFR BLD: 32 %
CELLS.CD3-CD19+/CELLS IN BLOOD: 10 %
CH50 SERPL-MCNC: 66 U/ML
DEPRECATED KAPPA LC FREE/LAMBDA SER: 0.86 RATIO
DSDNA AB SER-ACNC: 22 IU/ML
ENA SCL70 IGG SER IA-ACNC: <0.2 AL
EOSINOPHIL # BLD AUTO: 0 K/UL
EOSINOPHIL NFR BLD AUTO: 0 %
GAMMA GLOB FLD ELPH-MCNC: 1 G/DL
GAMMA GLOB MFR SERPL ELPH: 14.7 %
HCT VFR BLD CALC: 39.1 %
HGB BLD-MCNC: 13.3 G/DL
IGA SER QL IEP: <2 MG/DL
IGG SER QL IEP: 1062 MG/DL
IGM SER QL IEP: 82 MG/DL
IMM GRANULOCYTES NFR BLD AUTO: 0 %
INTERPRETATION SERPL IEP-IMP: NORMAL
KAPPA LC CSF-MCNC: 1.08 MG/DL
KAPPA LC SERPL-MCNC: 0.93 MG/DL
LYMPHOCYTES # BLD AUTO: 1.06 K/UL
LYMPHOCYTES NFR BLD AUTO: 29.1 %
M PROTEIN SPEC IFE-MCNC: NORMAL
MAN DIFF?: NORMAL
MCHC RBC-ENTMCNC: 29.7 PG
MCHC RBC-ENTMCNC: 34 GM/DL
MCV RBC AUTO: 87.3 FL
MONOCYTES # BLD AUTO: 0.26 K/UL
MONOCYTES NFR BLD AUTO: 7.1 %
NEUTROPHILS # BLD AUTO: 2.32 K/UL
NEUTROPHILS NFR BLD AUTO: 63.8 %
PLATELET # BLD AUTO: 207 K/UL
PROT SERPL-MCNC: 6.8 G/DL
PROT SERPL-MCNC: 6.8 G/DL
RBC # BLD: 4.48 M/UL
RBC # FLD: 12.3 %
RNA POLYMERASE III IGG: 4 UNITS
WBC # FLD AUTO: 3.64 K/UL

## 2020-10-30 DIAGNOSIS — D72.819 DECREASED WHITE BLOOD CELL COUNT, UNSPECIFIED: ICD-10-CM

## 2020-10-30 LAB
DEPRECATED S PNEUM 1 IGG SER-MCNC: 10.6 MCG/ML
DEPRECATED S PNEUM12 AB SER-ACNC: 1.5 MCG/ML
DEPRECATED S PNEUM14 AB SER-ACNC: 5.9 MCG/ML
DEPRECATED S PNEUM17 IGG SER IA-MCNC: 35.1 MCG/ML
DEPRECATED S PNEUM18 IGG SER IA-MCNC: 1.1 MCG/ML
DEPRECATED S PNEUM19 IGG SER-MCNC: 10.3 MCG/ML
DEPRECATED S PNEUM19 IGG SER-MCNC: 9.4 MCG/ML
DEPRECATED S PNEUM2 IGG SER-MCNC: 2.9 MCG/ML
DEPRECATED S PNEUM20 IGG SER-MCNC: 6.4 MCG/ML
DEPRECATED S PNEUM22 IGG SER-MCNC: 33.9 MCG/ML
DEPRECATED S PNEUM23 AB SER-ACNC: 62.2 MCG/ML
DEPRECATED S PNEUM3 AB SER-ACNC: 8 MCG/ML
DEPRECATED S PNEUM34 IGG SER-MCNC: 21.9 MCG/ML
DEPRECATED S PNEUM4 AB SER-ACNC: 6.9 MCG/ML
DEPRECATED S PNEUM5 IGG SER-MCNC: 11.4 MCG/ML
DEPRECATED S PNEUM6 IGG SER-MCNC: 25.3 MCG/ML
DEPRECATED S PNEUM7 IGG SER-ACNC: 26.8 MCG/ML
DEPRECATED S PNEUM8 AB SER-ACNC: 7.6 MCG/ML
DEPRECATED S PNEUM9 AB SER-ACNC: NORMAL
DEPRECATED S PNEUM9 IGG SER-MCNC: 16.6 MCG/ML
STREPTOCOCCUS PNEUMONIAE SEROTYPE 11A: 2.9 MCG/ML
STREPTOCOCCUS PNEUMONIAE SEROTYPE 15B: 7.7 MCG/ML
STREPTOCOCCUS PNEUMONIAE SEROTYPE 33F: 7.9 MCG/ML

## 2020-11-17 ENCOUNTER — APPOINTMENT (OUTPATIENT)
Dept: RHEUMATOLOGY | Facility: CLINIC | Age: 65
End: 2020-11-17

## 2021-01-07 ENCOUNTER — TRANSCRIPTION ENCOUNTER (OUTPATIENT)
Age: 66
End: 2021-01-07

## 2021-01-11 ENCOUNTER — APPOINTMENT (OUTPATIENT)
Dept: RHEUMATOLOGY | Facility: CLINIC | Age: 66
End: 2021-01-11

## 2021-01-12 ENCOUNTER — NON-APPOINTMENT (OUTPATIENT)
Age: 66
End: 2021-01-12

## 2021-01-28 ENCOUNTER — APPOINTMENT (OUTPATIENT)
Dept: OPHTHALMOLOGY | Facility: CLINIC | Age: 66
End: 2021-01-28

## 2021-02-03 ENCOUNTER — NON-APPOINTMENT (OUTPATIENT)
Age: 66
End: 2021-02-03

## 2021-02-03 ENCOUNTER — APPOINTMENT (OUTPATIENT)
Dept: OPHTHALMOLOGY | Facility: CLINIC | Age: 66
End: 2021-02-03
Payer: COMMERCIAL

## 2021-02-03 PROCEDURE — 95060 OPH MUCOUS MEMBRANE TESTS: CPT | Mod: LT

## 2021-02-03 PROCEDURE — 99072 ADDL SUPL MATRL&STAF TM PHE: CPT

## 2021-02-03 PROCEDURE — 92004 COMPRE OPH EXAM NEW PT 1/>: CPT

## 2021-03-01 ENCOUNTER — APPOINTMENT (OUTPATIENT)
Dept: OPHTHALMOLOGY | Facility: CLINIC | Age: 66
End: 2021-03-01

## 2021-03-11 ENCOUNTER — APPOINTMENT (OUTPATIENT)
Dept: PSYCHIATRY | Facility: CLINIC | Age: 66
End: 2021-03-11
Payer: COMMERCIAL

## 2021-03-11 PROCEDURE — 99072 ADDL SUPL MATRL&STAF TM PHE: CPT

## 2021-03-11 PROCEDURE — 90792 PSYCH DIAG EVAL W/MED SRVCS: CPT

## 2021-03-22 ENCOUNTER — APPOINTMENT (OUTPATIENT)
Dept: OPHTHALMOLOGY | Facility: CLINIC | Age: 66
End: 2021-03-22
Payer: SELF-PAY

## 2021-03-22 ENCOUNTER — NON-APPOINTMENT (OUTPATIENT)
Age: 66
End: 2021-03-22

## 2021-03-22 PROCEDURE — 92310D: CUSTOM

## 2021-03-24 LAB — CANCER AG125 SERPL-ACNC: 40 U/ML

## 2021-03-25 ENCOUNTER — APPOINTMENT (OUTPATIENT)
Dept: OBGYN | Facility: CLINIC | Age: 66
End: 2021-03-25

## 2021-03-25 DIAGNOSIS — Z12.39 ENCOUNTER FOR OTHER SCREENING FOR MALIGNANT NEOPLASM OF BREAST: ICD-10-CM

## 2021-03-29 ENCOUNTER — APPOINTMENT (OUTPATIENT)
Dept: OPHTHALMOLOGY | Facility: CLINIC | Age: 66
End: 2021-03-29
Payer: COMMERCIAL

## 2021-03-29 ENCOUNTER — NON-APPOINTMENT (OUTPATIENT)
Age: 66
End: 2021-03-29

## 2021-03-29 PROCEDURE — 92012 INTRM OPH EXAM EST PATIENT: CPT

## 2021-03-29 PROCEDURE — 99072 ADDL SUPL MATRL&STAF TM PHE: CPT

## 2021-04-12 ENCOUNTER — OUTPATIENT (OUTPATIENT)
Dept: OUTPATIENT SERVICES | Facility: HOSPITAL | Age: 66
LOS: 1 days | End: 2021-04-12
Payer: COMMERCIAL

## 2021-04-12 ENCOUNTER — APPOINTMENT (OUTPATIENT)
Dept: ULTRASOUND IMAGING | Facility: HOSPITAL | Age: 66
End: 2021-04-12
Payer: COMMERCIAL

## 2021-04-12 ENCOUNTER — APPOINTMENT (OUTPATIENT)
Dept: MAMMOGRAPHY | Facility: HOSPITAL | Age: 66
End: 2021-04-12
Payer: COMMERCIAL

## 2021-04-12 ENCOUNTER — RESULT REVIEW (OUTPATIENT)
Age: 66
End: 2021-04-12

## 2021-04-12 DIAGNOSIS — Z00.8 ENCOUNTER FOR OTHER GENERAL EXAMINATION: ICD-10-CM

## 2021-04-12 DIAGNOSIS — Z98.89 OTHER SPECIFIED POSTPROCEDURAL STATES: Chronic | ICD-10-CM

## 2021-04-12 PROBLEM — Z12.39 BREAST CANCER SCREENING: Status: ACTIVE | Noted: 2021-04-12

## 2021-04-12 PROCEDURE — 76641 ULTRASOUND BREAST COMPLETE: CPT

## 2021-04-12 PROCEDURE — 76641 ULTRASOUND BREAST COMPLETE: CPT | Mod: 26,50

## 2021-04-19 ENCOUNTER — APPOINTMENT (OUTPATIENT)
Dept: PSYCHIATRY | Facility: CLINIC | Age: 66
End: 2021-04-19
Payer: COMMERCIAL

## 2021-04-19 PROCEDURE — 99072 ADDL SUPL MATRL&STAF TM PHE: CPT

## 2021-04-19 PROCEDURE — 99214 OFFICE O/P EST MOD 30 MIN: CPT

## 2021-04-19 RX ORDER — DOXEPIN HYDROCHLORIDE 10 MG/1
10 CAPSULE ORAL
Qty: 30 | Refills: 0 | Status: DISCONTINUED | COMMUNITY
Start: 2021-03-11 | End: 2021-04-19

## 2021-04-21 ENCOUNTER — APPOINTMENT (OUTPATIENT)
Dept: OPHTHALMOLOGY | Facility: CLINIC | Age: 66
End: 2021-04-21

## 2021-05-10 ENCOUNTER — APPOINTMENT (OUTPATIENT)
Dept: OPHTHALMOLOGY | Facility: CLINIC | Age: 66
End: 2021-05-10

## 2021-05-12 ENCOUNTER — APPOINTMENT (OUTPATIENT)
Dept: CT IMAGING | Facility: HOSPITAL | Age: 66
End: 2021-05-12

## 2021-05-19 ENCOUNTER — NON-APPOINTMENT (OUTPATIENT)
Age: 66
End: 2021-05-19

## 2021-05-19 ENCOUNTER — APPOINTMENT (OUTPATIENT)
Dept: OPHTHALMOLOGY | Facility: CLINIC | Age: 66
End: 2021-05-19
Payer: COMMERCIAL

## 2021-05-19 PROCEDURE — 00009N: CUSTOM | Mod: NC

## 2021-05-24 ENCOUNTER — APPOINTMENT (OUTPATIENT)
Dept: OPHTHALMOLOGY | Facility: CLINIC | Age: 66
End: 2021-05-24

## 2021-06-04 ENCOUNTER — APPOINTMENT (OUTPATIENT)
Dept: PSYCHIATRY | Facility: CLINIC | Age: 66
End: 2021-06-04
Payer: COMMERCIAL

## 2021-06-04 PROCEDURE — 99072 ADDL SUPL MATRL&STAF TM PHE: CPT

## 2021-06-04 PROCEDURE — 99214 OFFICE O/P EST MOD 30 MIN: CPT

## 2021-06-07 ENCOUNTER — APPOINTMENT (OUTPATIENT)
Dept: OPHTHALMOLOGY | Facility: CLINIC | Age: 66
End: 2021-06-07

## 2021-06-08 ENCOUNTER — RX CHANGE (OUTPATIENT)
Age: 66
End: 2021-06-08

## 2021-06-29 ENCOUNTER — NON-APPOINTMENT (OUTPATIENT)
Age: 66
End: 2021-06-29

## 2021-06-29 ENCOUNTER — APPOINTMENT (OUTPATIENT)
Dept: CARDIOLOGY | Facility: CLINIC | Age: 66
End: 2021-06-29
Payer: COMMERCIAL

## 2021-06-29 ENCOUNTER — APPOINTMENT (OUTPATIENT)
Dept: PSYCHIATRY | Facility: CLINIC | Age: 66
End: 2021-06-29
Payer: COMMERCIAL

## 2021-06-29 VITALS — SYSTOLIC BLOOD PRESSURE: 100 MMHG | HEART RATE: 72 BPM | DIASTOLIC BLOOD PRESSURE: 80 MMHG

## 2021-06-29 VITALS
TEMPERATURE: 97.1 F | RESPIRATION RATE: 16 BRPM | DIASTOLIC BLOOD PRESSURE: 86 MMHG | SYSTOLIC BLOOD PRESSURE: 127 MMHG | WEIGHT: 113 LBS | BODY MASS INDEX: 20.02 KG/M2 | HEART RATE: 84 BPM | HEIGHT: 63 IN | OXYGEN SATURATION: 100 %

## 2021-06-29 DIAGNOSIS — I31.3 PERICARDIAL EFFUSION (NONINFLAMMATORY): ICD-10-CM

## 2021-06-29 PROCEDURE — 99204 OFFICE O/P NEW MOD 45 MIN: CPT

## 2021-06-29 PROCEDURE — 93000 ELECTROCARDIOGRAM COMPLETE: CPT

## 2021-06-29 PROCEDURE — 99072 ADDL SUPL MATRL&STAF TM PHE: CPT

## 2021-06-29 PROCEDURE — 99214 OFFICE O/P EST MOD 30 MIN: CPT

## 2021-06-29 PROCEDURE — 93306 TTE W/DOPPLER COMPLETE: CPT

## 2021-06-29 NOTE — ASSESSMENT
[FreeTextEntry1] : The patient was referred to the echocardiography laboratory. Echocardiogram revealed normal left ventricular systolic function and no evidence of significant valvular stenosis or insufficiency.No pericardial effusion noted\par \par \par In summary, the patient is a 65-year-old woman with no known history of heart disease. She has no pericardial effusion by echo.\par \par I have explained to her that the trace pericardial effusion identified on CT scan is a normal variant.\par \par All questions answered to her satisfaction

## 2021-06-29 NOTE — REASON FOR VISIT
[Symptom and Test Evaluation] : symptom and test evaluation [FreeTextEntry1] : This is a 65-year-old woman with a history of bronchiectasis and hyperlipidemia. The patient was undergoing CT scan for her bronchiectasis and was found to have a trace pericardial effusion. She was referred for evaluation for this. The patient has a history of IgA protein deficiency. She also has a chronic cough and spinal stenosis. She may have occasional nonexertional chest pain which is momentary. The patient has no history of diabetes hypertension smoking or alcohol use. Family history significant for father who  from amyloid angiopathy her mother  from Alzheimer's disease. She has 2 sisters who she believes are in good health. The patient has no prior history of MI or CHF. There was no significant atherosclerosis noted on the CT scan.

## 2021-07-08 ENCOUNTER — RX RENEWAL (OUTPATIENT)
Age: 66
End: 2021-07-08

## 2021-08-24 ENCOUNTER — APPOINTMENT (OUTPATIENT)
Dept: PSYCHIATRY | Facility: CLINIC | Age: 66
End: 2021-08-24
Payer: COMMERCIAL

## 2021-08-24 PROCEDURE — 99214 OFFICE O/P EST MOD 30 MIN: CPT

## 2021-09-10 ENCOUNTER — APPOINTMENT (OUTPATIENT)
Dept: PSYCHIATRY | Facility: CLINIC | Age: 66
End: 2021-09-10
Payer: COMMERCIAL

## 2021-09-10 DIAGNOSIS — Z01.812 ENCOUNTER FOR PREPROCEDURAL LABORATORY EXAMINATION: ICD-10-CM

## 2021-09-10 PROCEDURE — 99214 OFFICE O/P EST MOD 30 MIN: CPT

## 2021-09-20 ENCOUNTER — APPOINTMENT (OUTPATIENT)
Dept: OPHTHALMOLOGY | Facility: CLINIC | Age: 66
End: 2021-09-20
Payer: COMMERCIAL

## 2021-09-20 ENCOUNTER — NON-APPOINTMENT (OUTPATIENT)
Age: 66
End: 2021-09-20

## 2021-09-20 LAB — SARS-COV-2 N GENE NPH QL NAA+PROBE: NORMAL

## 2021-09-20 PROCEDURE — 92012 INTRM OPH EXAM EST PATIENT: CPT | Mod: 25

## 2021-09-20 PROCEDURE — 68761 CLOSE TEAR DUCT OPENING: CPT | Mod: E2,E4

## 2021-09-21 ENCOUNTER — APPOINTMENT (OUTPATIENT)
Dept: PULMONOLOGY | Facility: CLINIC | Age: 66
End: 2021-09-21
Payer: COMMERCIAL

## 2021-09-21 VITALS
RESPIRATION RATE: 16 BRPM | DIASTOLIC BLOOD PRESSURE: 60 MMHG | HEART RATE: 86 BPM | TEMPERATURE: 97.1 F | HEIGHT: 63 IN | OXYGEN SATURATION: 98 % | WEIGHT: 112 LBS | SYSTOLIC BLOOD PRESSURE: 112 MMHG | BODY MASS INDEX: 19.84 KG/M2

## 2021-09-21 DIAGNOSIS — Z83.3 FAMILY HISTORY OF DIABETES MELLITUS: ICD-10-CM

## 2021-09-21 DIAGNOSIS — Z82.0 FAMILY HISTORY OF EPILEPSY AND OTHER DISEASES OF THE NERVOUS SYSTEM: ICD-10-CM

## 2021-09-21 DIAGNOSIS — Z83.2 FAMILY HISTORY OF DISEASES OF THE BLOOD AND BLOOD-FORMING ORGANS AND CERTAIN DISORDERS INVOLVING THE IMMUNE MECHANISM: ICD-10-CM

## 2021-09-21 DIAGNOSIS — Z82.3 FAMILY HISTORY OF STROKE: ICD-10-CM

## 2021-09-21 DIAGNOSIS — Z78.9 OTHER SPECIFIED HEALTH STATUS: ICD-10-CM

## 2021-09-21 PROCEDURE — 99214 OFFICE O/P EST MOD 30 MIN: CPT | Mod: 25

## 2021-09-21 PROCEDURE — 99204 OFFICE O/P NEW MOD 45 MIN: CPT | Mod: 25

## 2021-09-21 PROCEDURE — 94618 PULMONARY STRESS TESTING: CPT

## 2021-09-21 NOTE — ADDENDUM
[FreeTextEntry1] : Documented by Dexter Vicente acting as a scribe for Dr. Moisés Rosales on (09/21/2021).\par \par All medical record entries made by the Scribe were at my, Dr. Moisés Rosales's, direction and personally dictated by me on (09/21/2021). I have reviewed the chart and agree that the record accurately reflects my personal performance of the history, physical exam, assessment and plan. I have also personally directed, reviewed, and agree with the discharge instructions.\par

## 2021-09-21 NOTE — PROCEDURE
[FreeTextEntry1] : CT scan (6.4.2021) shows scattered foci of mucoid impaction, atelectasis, and tree in bud opacities inferiorly in the right upper lobe, laterally in the right middle lobe and in \par \par sleep study shows (2.2021) mild sleep apnea and heavy snoring with the low saturation of 87% \par \par \par prior pft (April 28.2021) fev 2.31 99%, lung volumes were normal, mild hyperinflation. diffusion was low normal 15.97 74% \par \par Patient refused Xray \par \par 6 minute walk test reveals a low saturation of 98% with moderate evidence of dyspnea or fatigue; walked 489 meters\par  \par \par -Images and procedures reviewed in detail and discussed with patient.

## 2021-09-21 NOTE — ASSESSMENT
[FreeTextEntry1] : Ms. DESAI is a 65 year female with a history of ?lupus,IgA deficiency, bronchiectasis, GERD, anxiety, spinal stenosis, arthritis, Raynaud's phenomenon, who now comes in for an initial pulmonary evaluation. Her chief complaint is sob/ bronchiectasis/ mild RADS/ allergies/ GERD/ courtney \par \par The patient's SOB is felt to be multifactorial:\par -poor mechanics of breathing\par -out of shape/overweight\par -Pulmonary\par -Cardiac\par \par Problem 1: Bronchiectasis (mild reactive airways/ mild asthma) ?RATNA \par -candidate for the Vest \par -Recommended getting Aerobika \par - Sputum AFB x3 evaluation ?RATNA \par -Complete strep pneumonia titers, quantitative immunoglobulins, cystic fibrosis panel, IgG subclasses \par - Asthma is believed to be caused by inherited (genetic) and environmental factor, but its exact cause is unknown. asthma may be triggered by allergens, lung infections, or irritants in the air. Asthma triggers are different for each person \par \par Problem 2: Mild RADS \par - Add Stiolto 2 Puffs BID \par -Lonhala nebulizer therapy \par - Inhaler technique reviewed as well as oral hygiene technique reviewed with patient. Avoidance of cold air, extremes of temperature, rescue inhaler should be used before exercise. Order of medication reviewed with patient. Recommended use of a cool mist humidifier in the bedroom. \par \par Problem 3:Allergies \par -complete blood work for allergens \par -Add Xlear 1 BID  \par -Environmental measures for allergies were encouraged including mattress and pillow cover, air purifier, and environmental controls. \par \par Problem 4:GERD\par -add Pepcid 40 mg QHS\par -Recommend DGL \par -Recommend Manuca honey \par -Rule of 2s: avoid eating too much, eating too late, eating too spicy, eating two hours before bed.\par - Things to avoid including overeating, spicy foods, tight clothing, eating within two hours of bed, this list is not all inclusive.\par - For treatments of reflux, possible options discussed including diet control, H2 blockers, PPIs, as well as coating motility agents discussed as treatment options. Timing of meals and proximity of last meal to sleep were discussed. If symptoms persist, a formal gastrointestinal evaluation is needed. \par \par Problem 5: COURTNEY\par - (Failed CPAP) \par -recommend Oxyaid, provent, AveoTSD \par -Sleep apnea is associated with adverse clinical consequences which can affect most organ systems. Cardiovascular disease risk includes arrhythmias, atrial fibrillation, hypertension, coronary artery disease, and stroke. Metabolic disorders include diabetes type 2, non-alcoholic fatty liver disease. Mood disorder especially depression; and cognitive decline especially in the elderly. Associations with chronic reflux/Florian’s esophagus some but not all inclusive. \par -Reasons include arousal consistent with hypopnea; respiratory events most prominent in REM sleep or supine position; therefore sleep staging and body position are important for accurate diagnosis and estimation of AHI. \par \par Problem 6: Abnormal CT \par -s/p CT in 6/2021; next CT 6/2022 \par -pt  refused xray at the current time \par - CAT scans are the only radiological modality to identify abnormality to identify abnormalities w/in the lings with regards to nodules/masses/lymph nodes. Risks, benefits were reviewed in detail. The guidelines for abnormalities include follow up CT scans at various intervals which could range from 6 weeks to 1 year intervals. If there is a change for the worse then considerations for a biopsy will be considered if you are a candidate. Second opinion evaluation with thoracic surgeon or an interventional radiologist could be offered, \par  \par Problem :Cardiac\par -Recommend cardiac follow up evaluation with cardiologist if needed \par \par Problem :overweight/out of shape\par - Weight loss, exercise and diet control were discussed and are highly encouraged. Treatment options were given such as aqua therapy, and contacting a nutritionist. Recommended to use the elliptical, stationary bike, less use of treadmill. Mindful eating was explained to the patient. Obesity is associated with worsening asthma, SOB, and potential for cardiac disease, diabetes, and other underlying medical conditions.\par \par Problem : Poor mechanics of breathing\par - Proper breathing techniques were reviewed with an emphasis on exhalation. Patient instructed to breath in for 1 second and out for four seconds. Patient was encouraged not to talk while walking.\par \par Problem : Health Maintenance\par -s/p flu shot\par -recommended strep pneumonia vaccines: Prevnar-13 vaccine, follow by Pneumo vaccine 23 one year following\par -recommended early intervention for URIs\par -recommended regular osteoporosis evaluations\par -recommended early dermatological evaluations\par -recommended after the age of 50 to the age of 70, colonoscopy every 5 years\par \par f/u in 6-8 weeks\par pt is encouraged to call or fax the office with any questions or concerns.\par

## 2021-09-21 NOTE — PHYSICAL EXAM
[No Acute Distress] : no acute distress [Normal Oropharynx] : normal oropharynx [III] : Mallampati Class: III [Normal Appearance] : normal appearance [No Neck Mass] : no neck mass [Normal Rate/Rhythm] : normal rate/rhythm [Normal S1, S2] : normal s1, s2 [No Murmurs] : no murmurs [No Resp Distress] : no resp distress [No Abnormalities] : no abnormalities [Clear to Auscultation Bilaterally] : clear to auscultation bilaterally [Benign] : benign [Normal Gait] : normal gait [No Clubbing] : no clubbing [No Cyanosis] : no cyanosis [No Edema] : no edema [FROM] : FROM [Normal Color/ Pigmentation] : normal color/ pigmentation [No Focal Deficits] : no focal deficits [Oriented x3] : oriented x3 [Normal Affect] : normal affect [TextBox_68] : I:E 1:3; Clear

## 2021-09-21 NOTE — REASON FOR VISIT
[Initial] : an initial visit [TextBox_44] : sob, bronchiectasis, RADS, allergy, abnormal CT chest, osas

## 2021-09-21 NOTE — HISTORY OF PRESENT ILLNESS
[TextBox_4] : Ms. DESAI is a 65 year female with a history of ?lupus,IgA deficiency, bronchiectasis, GERD, anxiety, spinal stenosis, arthritis, raynaud's phenomenon, who now comes in for an initial pulmonary evaluation. Her chief complaint is\par -she notes catching colds very easily \par -she notes getting sick in 2018 and was coughing very badly \par -she notes visiting a pulmonologist who told her she had a mass at 2.3 cm in her right lung \par -she notes visiting a doctor (Dr. Arie DON) told her she had a bronchiectasis\par -she notes she used to exercise and run \par -uses molahana inhaler  \par -she notes minimal chest pressure and pains \par -she notes PND \par -she notes taking hydraxazine \par -she notes getting heartburn and reflux \par -she notes she was previously diagnosed with sleep apnea \par - patient denies any headaches, nausea, vomiting, fever, chills, sweats, palpitations, coughing, wheezing, fatigue, diarrhea, constipation, dysphagia, myalgias, dizziness, leg swelling, leg pain, itchy eyes, itchy ears, heartburn, reflux or sour taste in the mouth

## 2021-09-27 ENCOUNTER — RX RENEWAL (OUTPATIENT)
Age: 66
End: 2021-09-27

## 2021-09-27 ENCOUNTER — LABORATORY RESULT (OUTPATIENT)
Age: 66
End: 2021-09-27

## 2021-09-27 LAB
BASOPHILS # BLD AUTO: 0 K/UL
BASOPHILS NFR BLD AUTO: 0 %
EOSINOPHIL # BLD AUTO: 0 K/UL
EOSINOPHIL NFR BLD AUTO: 0 %
HCT VFR BLD CALC: 44.4 %
HGB BLD-MCNC: 14.7 G/DL
IMM GRANULOCYTES NFR BLD AUTO: 0.2 %
LYMPHOCYTES # BLD AUTO: 1.5 K/UL
LYMPHOCYTES NFR BLD AUTO: 30.1 %
MAN DIFF?: NORMAL
MCHC RBC-ENTMCNC: 29.6 PG
MCHC RBC-ENTMCNC: 33.1 GM/DL
MCV RBC AUTO: 89.3 FL
MONOCYTES # BLD AUTO: 0.38 K/UL
MONOCYTES NFR BLD AUTO: 7.6 %
NEUTROPHILS # BLD AUTO: 3.09 K/UL
NEUTROPHILS NFR BLD AUTO: 62.1 %
PLATELET # BLD AUTO: 246 K/UL
RBC # BLD: 4.97 M/UL
RBC # FLD: 13.2 %
WBC # FLD AUTO: 4.98 K/UL

## 2021-09-28 ENCOUNTER — TRANSCRIPTION ENCOUNTER (OUTPATIENT)
Age: 66
End: 2021-09-28

## 2021-09-28 LAB
24R-OH-CALCIDIOL SERPL-MCNC: 67.6 PG/ML
25(OH)D3 SERPL-MCNC: 40 NG/ML
DEPRECATED KAPPA LC FREE/LAMBDA SER: 0.65 RATIO
IGA SER QL IEP: 20 MG/DL
IGG SER QL IEP: 1087 MG/DL
IGM SER QL IEP: 83 MG/DL
KAPPA LC CSF-MCNC: 1.13 MG/DL
KAPPA LC SERPL-MCNC: 0.74 MG/DL

## 2021-09-29 ENCOUNTER — RX RENEWAL (OUTPATIENT)
Age: 66
End: 2021-09-29

## 2021-09-29 LAB
A ALTERNATA IGE QN: <0.1 KUA/L
A FUMIGATUS IGE QN: <0.1 KUA/L
C ALBICANS IGE QN: <0.1 KUA/L
C HERBARUM IGE QN: <0.1 KUA/L
CAT DANDER IGE QN: <0.1 KUA/L
CLAM IGE QN: <0.1 KUA/L
CODFISH IGE QN: <0.1 KUA/L
COMMON RAGWEED IGE QN: <0.1 KUA/L
CORN IGE QN: <0.1 KUA/L
COW MILK IGE QN: <0.1 KUA/L
D FARINAE IGE QN: <0.1 KUA/L
D PTERONYSS IGE QN: <0.1 KUA/L
DEPRECATED A ALTERNATA IGE RAST QL: 0
DEPRECATED A FUMIGATUS IGE RAST QL: 0
DEPRECATED C ALBICANS IGE RAST QL: 0
DEPRECATED C HERBARUM IGE RAST QL: 0
DEPRECATED CAT DANDER IGE RAST QL: 0
DEPRECATED CLAM IGE RAST QL: 0
DEPRECATED CODFISH IGE RAST QL: 0
DEPRECATED COMMON RAGWEED IGE RAST QL: 0
DEPRECATED CORN IGE RAST QL: 0
DEPRECATED COW MILK IGE RAST QL: 0
DEPRECATED D FARINAE IGE RAST QL: 0
DEPRECATED D PTERONYSS IGE RAST QL: 0
DEPRECATED DOG DANDER IGE RAST QL: 0
DEPRECATED EGG WHITE IGE RAST QL: 0
DEPRECATED M RACEMOSUS IGE RAST QL: 0
DEPRECATED PEANUT IGE RAST QL: 0
DEPRECATED ROACH IGE RAST QL: 0
DEPRECATED SCALLOP IGE RAST QL: <0.1 KUA/L
DEPRECATED SESAME SEED IGE RAST QL: 0
DEPRECATED SHRIMP IGE RAST QL: 0
DEPRECATED SOYBEAN IGE RAST QL: 0
DEPRECATED TIMOTHY IGE RAST QL: 0
DEPRECATED WALNUT IGE RAST QL: 0
DEPRECATED WHEAT IGE RAST QL: 0
DEPRECATED WHITE OAK IGE RAST QL: 0
DOG DANDER IGE QN: <0.1 KUA/L
EGG WHITE IGE QN: <0.1 KUA/L
IGG SUBSET TOTAL IGG: 1035 MG/DL
IGG1 SER-MCNC: 472 MG/DL
IGG2 SER-MCNC: 447 MG/DL
IGG3 SER-MCNC: 42 MG/DL
IGG4 SER-MCNC: 0 MG/DL
M RACEMOSUS IGE QN: <0.1 KUA/L
PEANUT IGE QN: <0.1 KUA/L
ROACH IGE QN: <0.1 KUA/L
SCALLOP IGE QN: 0
SCALLOP IGE QN: <0.1 KUA/L
SESAME SEED IGE QN: <0.1 KUA/L
SOYBEAN IGE QN: <0.1 KUA/L
TIMOTHY IGE QN: <0.1 KUA/L
TOTAL IGE SMQN RAST: <2 KU/L
WALNUT IGE QN: <0.1 KUA/L
WHEAT IGE QN: <0.1 KUA/L
WHITE OAK IGE QN: <0.1 KUA/L

## 2021-10-04 LAB
CFTR MUT TESTED BLD/T: NEGATIVE
DEPRECATED S PNEUM 1 IGG SER-MCNC: 13.2 MCG/ML
DEPRECATED S PNEUM12 AB SER-ACNC: 0.9 MCG/ML
DEPRECATED S PNEUM14 AB SER-ACNC: 2.9 MCG/ML
DEPRECATED S PNEUM17 IGG SER IA-MCNC: 35.2 MCG/ML
DEPRECATED S PNEUM18 IGG SER IA-MCNC: 0.9 MCG/ML
DEPRECATED S PNEUM19 IGG SER-MCNC: 10 MCG/ML
DEPRECATED S PNEUM19 IGG SER-MCNC: 6.2 MCG/ML
DEPRECATED S PNEUM2 IGG SER-MCNC: 2 MCG/ML
DEPRECATED S PNEUM20 IGG SER-MCNC: 5.9 MCG/ML
DEPRECATED S PNEUM22 IGG SER-MCNC: 46.8 MCG/ML
DEPRECATED S PNEUM23 AB SER-ACNC: 58 MCG/ML
DEPRECATED S PNEUM3 AB SER-ACNC: 8 MCG/ML
DEPRECATED S PNEUM34 IGG SER-MCNC: 25.2 MCG/ML
DEPRECATED S PNEUM4 AB SER-ACNC: 7.1 MCG/ML
DEPRECATED S PNEUM5 IGG SER-MCNC: 7.8 MCG/ML
DEPRECATED S PNEUM6 IGG SER-MCNC: 17.6 MCG/ML
DEPRECATED S PNEUM7 IGG SER-ACNC: 25.3 MCG/ML
DEPRECATED S PNEUM8 AB SER-ACNC: 8.2 MCG/ML
DEPRECATED S PNEUM9 AB SER-ACNC: NORMAL MCG/ML
DEPRECATED S PNEUM9 IGG SER-MCNC: 20.2 MCG/ML
STREPTOCOCCUS PNEUMONIAE SEROTYPE 11A: 2.6 MCG/ML
STREPTOCOCCUS PNEUMONIAE SEROTYPE 15B: 7.1 MCG/ML
STREPTOCOCCUS PNEUMONIAE SEROTYPE 33F: 10.4 MCG/ML

## 2021-10-08 ENCOUNTER — NON-APPOINTMENT (OUTPATIENT)
Age: 66
End: 2021-10-08

## 2021-10-13 ENCOUNTER — APPOINTMENT (OUTPATIENT)
Dept: PSYCHIATRY | Facility: CLINIC | Age: 66
End: 2021-10-13
Payer: COMMERCIAL

## 2021-10-13 PROCEDURE — 99213 OFFICE O/P EST LOW 20 MIN: CPT

## 2021-10-18 ENCOUNTER — APPOINTMENT (OUTPATIENT)
Dept: ORTHOPEDIC SURGERY | Facility: CLINIC | Age: 66
End: 2021-10-18
Payer: COMMERCIAL

## 2021-10-18 VITALS
SYSTOLIC BLOOD PRESSURE: 101 MMHG | HEIGHT: 63.5 IN | BODY MASS INDEX: 19.6 KG/M2 | DIASTOLIC BLOOD PRESSURE: 67 MMHG | HEART RATE: 77 BPM | WEIGHT: 112 LBS

## 2021-10-18 DIAGNOSIS — M79.644 PAIN IN RIGHT FINGER(S): ICD-10-CM

## 2021-10-18 PROCEDURE — 99214 OFFICE O/P EST MOD 30 MIN: CPT

## 2021-10-20 ENCOUNTER — APPOINTMENT (OUTPATIENT)
Dept: PSYCHIATRY | Facility: CLINIC | Age: 66
End: 2021-10-20

## 2021-11-12 ENCOUNTER — APPOINTMENT (OUTPATIENT)
Dept: OBGYN | Facility: CLINIC | Age: 66
End: 2021-11-12
Payer: COMMERCIAL

## 2021-11-12 VITALS
SYSTOLIC BLOOD PRESSURE: 124 MMHG | BODY MASS INDEX: 19.6 KG/M2 | HEIGHT: 63.5 IN | WEIGHT: 112 LBS | DIASTOLIC BLOOD PRESSURE: 80 MMHG

## 2021-11-12 DIAGNOSIS — Z87.39 PERSONAL HISTORY OF OTHER DISEASES OF THE MUSCULOSKELETAL SYSTEM AND CONNECTIVE TISSUE: ICD-10-CM

## 2021-11-12 DIAGNOSIS — Z00.00 ENCOUNTER FOR GENERAL ADULT MEDICAL EXAMINATION W/OUT ABNORMAL FINDINGS: ICD-10-CM

## 2021-11-12 PROCEDURE — 99397 PER PM REEVAL EST PAT 65+ YR: CPT

## 2021-11-12 PROCEDURE — 82270 OCCULT BLOOD FECES: CPT

## 2021-11-15 LAB — HPV HIGH+LOW RISK DNA PNL CVX: NOT DETECTED

## 2021-11-16 ENCOUNTER — APPOINTMENT (OUTPATIENT)
Dept: OPHTHALMOLOGY | Facility: CLINIC | Age: 66
End: 2021-11-16
Payer: COMMERCIAL

## 2021-11-16 ENCOUNTER — NON-APPOINTMENT (OUTPATIENT)
Age: 66
End: 2021-11-16

## 2021-11-16 PROCEDURE — 68761 CLOSE TEAR DUCT OPENING: CPT | Mod: E2,E4

## 2021-11-17 LAB — CYTOLOGY CVX/VAG DOC THIN PREP: ABNORMAL

## 2021-12-03 LAB — SARS-COV-2 N GENE NPH QL NAA+PROBE: NOT DETECTED

## 2021-12-06 ENCOUNTER — APPOINTMENT (OUTPATIENT)
Dept: PULMONOLOGY | Facility: CLINIC | Age: 66
End: 2021-12-06
Payer: COMMERCIAL

## 2021-12-06 VITALS
WEIGHT: 110 LBS | RESPIRATION RATE: 16 BRPM | HEART RATE: 81 BPM | BODY MASS INDEX: 19.25 KG/M2 | SYSTOLIC BLOOD PRESSURE: 98 MMHG | DIASTOLIC BLOOD PRESSURE: 60 MMHG | OXYGEN SATURATION: 98 % | HEIGHT: 63.5 IN | TEMPERATURE: 97.2 F

## 2021-12-06 PROCEDURE — 94727 GAS DIL/WSHOT DETER LNG VOL: CPT

## 2021-12-06 PROCEDURE — 99214 OFFICE O/P EST MOD 30 MIN: CPT | Mod: 25

## 2021-12-06 PROCEDURE — 95012 NITRIC OXIDE EXP GAS DETER: CPT

## 2021-12-06 PROCEDURE — ZZZZZ: CPT

## 2021-12-06 PROCEDURE — 94729 DIFFUSING CAPACITY: CPT

## 2021-12-06 PROCEDURE — 94010 BREATHING CAPACITY TEST: CPT

## 2021-12-06 NOTE — PROCEDURE
[FreeTextEntry1] : Full PFT revealed normal flows, with a FEV1 of 2.55L,which is 113% of predicted,  normal lung volumes, and normal diffusion of 16.2 , which is 99% of predicted with a normal flow volume loop \par \par FENO was 10 ; a normal value being less than 25\par Fractional exhaled nitric oxide (FENO) is regarded as a simple, noninvasive method for assessing eosinophilic airway inflammation. Produced by a variety of cells within the lung, nitric oxide (NO) concentrations are generally low in healthy individuals. However, high concentrations of NO appear to be involved in nonspecific host defense mechanisms and chronic inflammatory diseases such as asthma. The American Thoracic Society (ATS) therefore has recommended using FENO to aid in the diagnosis and monitoring of eosinophilic airway inflammation and asthma, and for identifying steroid responsive individuals whose chronic respiratory symptoms may be caused by airway inflammation. \par

## 2021-12-06 NOTE — REASON FOR VISIT
[Follow-Up] : a follow-up visit [TextBox_44] : sob, bronchiectasis, RADS, allergy, abnormal CT chest, osas

## 2021-12-06 NOTE — HISTORY OF PRESENT ILLNESS
[TextBox_4] : Ms. DESAI is a 66 year female with a history of ?lupus,IgA deficiency, bronchiectasis, GERD, anxiety, spinal stenosis, arthritis, raynaud's phenomenon, who now comes in for an initial pulmonary evaluation. Her chief complaint is\par \par -she notes difficulty using Aerobika device \par -she notes using hydroxyzine for a cold and was fatigued \par -she notes trouble staying asleep \par -she notes use of pepcid for heartburn\par -she notes having chronic heartburn and stomach issues that is controlled with Levaquin, hydroxyzine, and hyoscyamine \par -she notes going to [facility] \par -she denies having her entire mouth piece \par -she notes weight is stable \par -she notes seeing Dr Oswald\par \par -denies any visual issues, headaches, nausea, vomiting, chills, sweats, chest pain, chest pressure, diarrhea, constipation, dysphagia, dizziness, leg swelling, leg pain, itchy eyes, itchy ears, reflux, or sour taste in the mouth.

## 2021-12-06 NOTE — ASSESSMENT
[FreeTextEntry1] : Ms. DESAI is a 66 year female with a history of ?lupus,IgA deficiency, bronchiectasis, GERD, anxiety, spinal stenosis, arthritis, Raynaud's phenomenon, who now comes in for an initial pulmonary evaluation. Her chief complaint is sob/ bronchiectasis/ mild RADS/ allergies/ GERD/ courtney- #1 issue is sleep \par \par The patient's SOB is felt to be multifactorial:\par -poor mechanics of breathing\par -out of shape/overweight\par -Pulmonary\par -Cardiac\par \par Problem 1A: immune deficiency\par -recommended Dr Howard for f/u for low IgA and IGG4\par -Complete blood work to include: quantitative immunoglobulins, IgG subsets, strep pneumonia titers\par -Due to the fact that this pt has had more infections than would be expected and immunological blood work is indicated this would include: IgG subclasses, quantitative immunoglobulins, Strep pneumoniae titers as well as Vitamin D levels. Based on this blood work we will be able to decide where the pt needs additional pneumococcal vaccine either Prevnar 13 or  pneumovax. Immunology evaluation will also be potentially indicated. \par \par Problem 1: Bronchiectasis (mild reactive airways/ mild asthma) ?RATNA -stable \par -candidate for the Vest \par -Recommended getting Aerobika \par - Sputum AFB x3 evaluation ?RATNA \par -Complete strep pneumonia titers, quantitative immunoglobulins, cystic fibrosis panel, IgG subclasses \par - Asthma is believed to be caused by inherited (genetic) and environmental factor, but its exact cause is unknown. asthma may be triggered by allergens, lung infections, or irritants in the air. Asthma triggers are different for each person \par \par Problem 2: Mild RADS-stable \par - continue Stiolto 2 Puffs QD\par - Inhaler technique reviewed as well as oral hygiene technique reviewed with patient. Avoidance of cold air, extremes of temperature, rescue inhaler should be used before exercise. Order of medication reviewed with patient. Recommended use of a cool mist humidifier in the bedroom. \par \par Problem 3:Allergies \par -s/p Blood work to include: asthma panel, food IgE panel, IgE level, eosinophil level, vitamin D level  (WNL)\par -continue Xlear 1 BID  \par -Environmental measures for allergies were encouraged including mattress and pillow cover, air purifier, and environmental controls. \par \par Problem 4:GERD\par -continue Pepcid 40 mg QHS\par -Recommend DGL \par -Recommend Manuka honey \par -Rule of 2s: avoid eating too much, eating too late, eating too spicy, eating two hours before bed.\par - Things to avoid including overeating, spicy foods, tight clothing, eating within two hours of bed, this list is not all inclusive.\par - For treatments of reflux, possible options discussed including diet control, H2 blockers, PPIs, as well as coating motility agents discussed as treatment options. Timing of meals and proximity of last meal to sleep were discussed. If symptoms persist, a formal gastrointestinal evaluation is needed. \par \par Problem 5: COURTNEY\par -recommend Kunal Lynn \par - (Failed CPAP) \par -recommend Oxyaid, provent, AveoTSD \par -Sleep apnea is associated with adverse clinical consequences which can affect most organ systems. Cardiovascular disease risk includes arrhythmias, atrial fibrillation, hypertension, coronary artery disease, and stroke. Metabolic disorders include diabetes type 2, non-alcoholic fatty liver disease. Mood disorder especially depression; and cognitive decline especially in the elderly. Associations with chronic reflux/Florian’s esophagus some but not all inclusive. \par -Reasons include arousal consistent with hypopnea; respiratory events most prominent in REM sleep or supine position; therefore sleep staging and body position are important for accurate diagnosis and estimation of AHI. \par \par Problem 6: Abnormal CT \par -s/p CT in 6/2021; next CT 6/2022 \par -pt  refused xray at the current time \par - CAT scans are the only radiological modality to identify abnormality to identify abnormalities w/in the lings with regards to nodules/masses/lymph nodes. Risks, benefits were reviewed in detail. The guidelines for abnormalities include follow up CT scans at various intervals which could range from 6 weeks to 1 year intervals. If there is a change for the worse then considerations for a biopsy will be considered if you are a candidate. Second opinion evaluation with thoracic surgeon or an interventional radiologist could be offered, \par  \par Problem 7:Cardiac\par -Recommend cardiac follow up evaluation with cardiologist if needed \par \par Problem 8: out of shape\par - Weight loss, exercise and diet control were discussed and are highly encouraged. Treatment options were given such as aqua therapy, and contacting a nutritionist. Recommended to use the elliptical, stationary bike, less use of treadmill. Mindful eating was explained to the patient. Obesity is associated with worsening asthma, SOB, and potential for cardiac disease, diabetes, and other underlying medical conditions.\par \par Problem 9: Poor mechanics of breathing\par - Proper breathing techniques were reviewed with an emphasis on exhalation. Patient instructed to breath in for 1 second and out for four seconds. Patient was encouraged not to talk while walking.\par \par Problem 10: Health Maintenance\par -s/p flu shot\par -recommended strep pneumonia vaccines: Prevnar-13 vaccine, follow by Pneumo vaccine 23 one year following\par -recommended early intervention for URIs\par -recommended regular osteoporosis evaluations\par -recommended early dermatological evaluations\par -recommended after the age of 50 to the age of 70, colonoscopy every 5 years\par \par f/u in 6-8 weeks\par pt is encouraged to call or fax the office with any questions or concerns.\par

## 2021-12-06 NOTE — ADDENDUM
[FreeTextEntry1] : Documented by Baron Small acting as a scribe for Dr. Moisés Rosales on 12/06/2021 \par \par All medical record entries made by the Scribe were at my, Dr. Moisés Rosales's, direction and personally dictated by me on 12/06/2021 . I have reviewed the chart and agree that the record accurately reflects my personal performance of the history, physical exam, assessment and plan. I have also personally directed, reviewed, and agree with the discharge instructions

## 2021-12-07 ENCOUNTER — RX RENEWAL (OUTPATIENT)
Age: 66
End: 2021-12-07

## 2021-12-07 ENCOUNTER — APPOINTMENT (OUTPATIENT)
Dept: PSYCHIATRY | Facility: CLINIC | Age: 66
End: 2021-12-07
Payer: COMMERCIAL

## 2021-12-07 PROCEDURE — 99214 OFFICE O/P EST MOD 30 MIN: CPT | Mod: 95

## 2021-12-08 ENCOUNTER — NON-APPOINTMENT (OUTPATIENT)
Age: 66
End: 2021-12-08

## 2022-01-07 ENCOUNTER — OUTPATIENT (OUTPATIENT)
Dept: OUTPATIENT SERVICES | Facility: HOSPITAL | Age: 67
LOS: 1 days | End: 2022-01-07
Payer: COMMERCIAL

## 2022-01-07 ENCOUNTER — APPOINTMENT (OUTPATIENT)
Dept: MRI IMAGING | Facility: HOSPITAL | Age: 67
End: 2022-01-07
Payer: COMMERCIAL

## 2022-01-07 DIAGNOSIS — Z98.89 OTHER SPECIFIED POSTPROCEDURAL STATES: Chronic | ICD-10-CM

## 2022-01-07 DIAGNOSIS — M17.11 UNILATERAL PRIMARY OSTEOARTHRITIS, RIGHT KNEE: ICD-10-CM

## 2022-01-07 PROCEDURE — 73721 MRI JNT OF LWR EXTRE W/O DYE: CPT

## 2022-01-07 PROCEDURE — 73721 MRI JNT OF LWR EXTRE W/O DYE: CPT | Mod: 26,RT

## 2022-01-18 ENCOUNTER — APPOINTMENT (OUTPATIENT)
Dept: OBGYN | Facility: CLINIC | Age: 67
End: 2022-01-18
Payer: COMMERCIAL

## 2022-01-18 ENCOUNTER — ASOB RESULT (OUTPATIENT)
Age: 67
End: 2022-01-18

## 2022-01-18 PROCEDURE — 76830 TRANSVAGINAL US NON-OB: CPT

## 2022-02-01 ENCOUNTER — APPOINTMENT (OUTPATIENT)
Dept: PSYCHIATRY | Facility: CLINIC | Age: 67
End: 2022-02-01
Payer: COMMERCIAL

## 2022-02-01 PROCEDURE — 99214 OFFICE O/P EST MOD 30 MIN: CPT | Mod: 95

## 2022-02-07 ENCOUNTER — APPOINTMENT (OUTPATIENT)
Dept: PLASTIC SURGERY | Facility: CLINIC | Age: 67
End: 2022-02-07
Payer: COMMERCIAL

## 2022-02-07 PROCEDURE — 99203 OFFICE O/P NEW LOW 30 MIN: CPT

## 2022-02-10 NOTE — REASON FOR VISIT
[Consultation] : a consultation visit [FreeTextEntry1] : Pateient present to the office with sleep apna. Sleep study taken 1 year ago with Dr. HODA Fuentes. Snoring, Patient has tried Z-Derrick last spring. Patient has also tried mouth wiring with rubber bands in stalled by a dentist.

## 2022-02-16 ENCOUNTER — APPOINTMENT (OUTPATIENT)
Dept: OPHTHALMOLOGY | Facility: CLINIC | Age: 67
End: 2022-02-16

## 2022-03-30 ENCOUNTER — APPOINTMENT (OUTPATIENT)
Dept: PSYCHIATRY | Facility: CLINIC | Age: 67
End: 2022-03-30
Payer: COMMERCIAL

## 2022-03-30 PROCEDURE — 99214 OFFICE O/P EST MOD 30 MIN: CPT

## 2022-04-11 ENCOUNTER — NON-APPOINTMENT (OUTPATIENT)
Age: 67
End: 2022-04-11

## 2022-04-11 ENCOUNTER — APPOINTMENT (OUTPATIENT)
Dept: PULMONOLOGY | Facility: CLINIC | Age: 67
End: 2022-04-11
Payer: COMMERCIAL

## 2022-04-11 VITALS
HEART RATE: 76 BPM | RESPIRATION RATE: 16 BRPM | WEIGHT: 110 LBS | OXYGEN SATURATION: 98 % | SYSTOLIC BLOOD PRESSURE: 101 MMHG | HEIGHT: 63.5 IN | TEMPERATURE: 97.3 F | DIASTOLIC BLOOD PRESSURE: 60 MMHG | BODY MASS INDEX: 19.25 KG/M2

## 2022-04-11 VITALS
DIASTOLIC BLOOD PRESSURE: 60 MMHG | WEIGHT: 110 LBS | OXYGEN SATURATION: 98 % | TEMPERATURE: 97.3 F | HEIGHT: 63.5 IN | SYSTOLIC BLOOD PRESSURE: 101 MMHG | HEART RATE: 76 BPM | RESPIRATION RATE: 16 BRPM | BODY MASS INDEX: 19.25 KG/M2

## 2022-04-11 PROCEDURE — 94010 BREATHING CAPACITY TEST: CPT

## 2022-04-11 PROCEDURE — 95012 NITRIC OXIDE EXP GAS DETER: CPT

## 2022-04-11 PROCEDURE — 99214 OFFICE O/P EST MOD 30 MIN: CPT | Mod: 25

## 2022-04-11 NOTE — PROCEDURE
[FreeTextEntry1] : PFT reveals normal flows, with an FEV1 of  2.53L, which is 109% of predicted, with a normal flow volume loop

## 2022-04-11 NOTE — ADDENDUM
[FreeTextEntry1] : Documented by Delonte Lantigua acting as a scribe for Dr. Moisés Rosales on (04/11/2022).\par \par All medical record record entries made by the Scribe were at my, Dr. Moisés Rosales's, direction and personally dictated by me on (04/11/2022). I have reviewed the chart and agree that the record accurately reflects my personal performance of the history, physical exam, assessment and plan. I have personally directed, reviewed, and agree with the discharge instructions.

## 2022-04-11 NOTE — ASSESSMENT
[FreeTextEntry1] : Ms. DESAI is a 66 year female with a history of ?lupus,IgA deficiency, bronchiectasis, GERD, anxiety, spinal stenosis, arthritis, Raynaud's phenomenon, who now comes in for a follow up pulmonary evaluation. Her chief complaint is sob/ bronchiectasis/ mild RADS/ allergies/ GERD/ courtney- #1 issue is reflux  \par \par The patient's SOB is felt to be multifactorial:\par -poor mechanics of breathing\par -out of shape/overweight\par -Pulmonary\par -Cardiac\par \par Problem 1A: immune deficiency\par -recommended Dr Howard for f/u for low IgA and IGG4\par -Complete blood work to include: quantitative immunoglobulins, IgG subsets, strep pneumonia titers\par -Due to the fact that this pt has had more infections than would be expected and immunological blood work is indicated this would include: IgG subclasses, quantitative immunoglobulins, Strep pneumoniae titers as well as Vitamin D levels. Based on this blood work we will be able to decide where the pt needs additional pneumococcal vaccine either Prevnar 13 or  pneumovax. Immunology evaluation will also be potentially indicated. \par \par Problem 1: Bronchiectasis (mild reactive airways/ mild asthma) ?RATNA -stable \par -candidate for the Vest \par -Recommended getting Aerobika \par - Sputum AFB x3 evaluation ?RATNA \par -Complete strep pneumonia titers, quantitative immunoglobulins, cystic fibrosis panel, IgG subclasses \par - Asthma is believed to be caused by inherited (genetic) and environmental factor, but its exact cause is unknown. asthma may be triggered by allergens, lung infections, or irritants in the air. Asthma triggers are different for each person \par \par Problem 2: Mild RADS-stable \par - continue Stiolto 2 Puffs QD\par - Inhaler technique reviewed as well as oral hygiene technique reviewed with patient. Avoidance of cold air, extremes of temperature, rescue inhaler should be used before exercise. Order of medication reviewed with patient. Recommended use of a cool mist humidifier in the bedroom. \par \par Problem 3:Allergies \par -s/p Blood work to include: asthma panel, food IgE panel, IgE level, eosinophil level, vitamin D level  (WNL)\par -continue Xlear 1 BID  \par -Environmental measures for allergies were encouraged including mattress and pillow cover, air purifier, and environmental controls. \par \par Problem 4:GERD\par -continue Pepcid 40 mg QHS upto BID\par -Recommend DGL \par -Recommend Manuka honey \par -Rule of 2s: avoid eating too much, eating too late, eating too spicy, eating two hours before bed.\par - Things to avoid including overeating, spicy foods, tight clothing, eating within two hours of bed, this list is not all inclusive.\par - For treatments of reflux, possible options discussed including diet control, H2 blockers, PPIs, as well as coating motility agents discussed as treatment options. Timing of meals and proximity of last meal to sleep were discussed. If symptoms persist, a formal gastrointestinal evaluation is needed. \par \par Problem 5: COURTNEY\par -recommend Kunal Lynn \par - (Failed CPAP) \par -recommend Oxyaid, provent, AveoTSD; "Excite" \par -Sleep apnea is associated with adverse clinical consequences which can affect most organ systems. Cardiovascular disease risk includes arrhythmias, atrial fibrillation, hypertension, coronary artery disease, and stroke. Metabolic disorders include diabetes type 2, non-alcoholic fatty liver disease. Mood disorder especially depression; and cognitive decline especially in the elderly. Associations with chronic reflux/Florian’s esophagus some but not all inclusive. \par -Reasons include arousal consistent with hypopnea; respiratory events most prominent in REM sleep or supine position; therefore sleep staging and body position are important for accurate diagnosis and estimation of AHI. \par \par Problem 6: Abnormal CT \par -s/p CT in 6/2021; next CT 6/2022 \par -pt  refused xray at the current time \par - CAT scans are the only radiological modality to identify abnormality to identify abnormalities w/in the lings with regards to nodules/masses/lymph nodes. Risks, benefits were reviewed in detail. The guidelines for abnormalities include follow up CT scans at various intervals which could range from 6 weeks to 1 year intervals. If there is a change for the worse then considerations for a biopsy will be considered if you are a candidate. Second opinion evaluation with thoracic surgeon or an interventional radiologist could be offered, \par  \par Problem 7:Cardiac\par -Recommend cardiac follow up evaluation with cardiologist if needed \par \par Problem 8: out of shape\par - Weight loss, exercise and diet control were discussed and are highly encouraged. Treatment options were given such as aqua therapy, and contacting a nutritionist. Recommended to use the elliptical, stationary bike, less use of treadmill. Mindful eating was explained to the patient. Obesity is associated with worsening asthma, SOB, and potential for cardiac disease, diabetes, and other underlying medical conditions.\par \par Problem 9: Poor mechanics of breathing\par - Recommended Wim Hof and Buteyko techniques \par - Proper breathing techniques were reviewed with an emphasis on exhalation. Patient instructed to breath in for 1 second and out for four seconds. Patient was encouraged not to talk while walking.\par \par Problem 10: Health Maintenance\par -s/p flu shot\par -recommended strep pneumonia vaccines: Prevnar-13 vaccine, follow by Pneumo vaccine 23 one year following\par -recommended early intervention for URIs\par -recommended regular osteoporosis evaluations\par -recommended early dermatological evaluations\par -recommended after the age of 50 to the age of 70, colonoscopy every 5 years\par \par f/u in 6-8 weeks\par pt is encouraged to call or fax the office with any questions or concerns.\par

## 2022-04-11 NOTE — HISTORY OF PRESENT ILLNESS
[TextBox_4] : Ms. DESAI is a 66 year female with a history of ?lupus,IgA deficiency, bronchiectasis, GERD, anxiety, spinal stenosis, arthritis, raynaud's phenomenon, who now comes in for a follow up pulmonary evaluation. Her chief complaint is\par - she notes feeling well in general\par - she notes having couple of exposure (her granddaughter)\par - she notes bringing up sputum \par - she notes going to ENT and was told she has GERD\par - she notes being on famotidine\par - she notes weight is stable\par - she notes bowels are regular \par - she denies any visual issues \par - she notes going to [place] with her family on Saturday \par \par \par - She denies any headaches, nausea, vomiting, fever, chills, sweats, chest pain, chest pressure, palpitations, SOB, coughing, wheezing, fatigue, diarrhea, constipation, dysphagia, myalgias, dizziness, leg swelling, leg pain, itchy eyes, itchy ears, heartburn, reflux, or sour taste in the mouth

## 2022-04-25 DIAGNOSIS — Z11.59 ENCOUNTER FOR SCREENING FOR OTHER VIRAL DISEASES: ICD-10-CM

## 2022-04-27 ENCOUNTER — NON-APPOINTMENT (OUTPATIENT)
Age: 67
End: 2022-04-27

## 2022-04-27 LAB
HMPV RNA SPEC QL NAA+PROBE: DETECTED
RAPID RVP RESULT: DETECTED
SARS-COV-2 RNA PNL RESP NAA+PROBE: NOT DETECTED

## 2022-05-02 ENCOUNTER — APPOINTMENT (OUTPATIENT)
Dept: PLASTIC SURGERY | Facility: CLINIC | Age: 67
End: 2022-05-02

## 2022-05-02 PROCEDURE — XXXXX: CPT | Mod: 1L

## 2022-05-06 ENCOUNTER — RESULT REVIEW (OUTPATIENT)
Age: 67
End: 2022-05-06

## 2022-05-06 ENCOUNTER — RX RENEWAL (OUTPATIENT)
Age: 67
End: 2022-05-06

## 2022-05-06 ENCOUNTER — APPOINTMENT (OUTPATIENT)
Dept: ULTRASOUND IMAGING | Facility: HOSPITAL | Age: 67
End: 2022-05-06
Payer: COMMERCIAL

## 2022-05-06 ENCOUNTER — APPOINTMENT (OUTPATIENT)
Dept: MAMMOGRAPHY | Facility: HOSPITAL | Age: 67
End: 2022-05-06
Payer: COMMERCIAL

## 2022-05-06 ENCOUNTER — OUTPATIENT (OUTPATIENT)
Dept: OUTPATIENT SERVICES | Facility: HOSPITAL | Age: 67
LOS: 1 days | End: 2022-05-06
Payer: COMMERCIAL

## 2022-05-06 DIAGNOSIS — Z98.89 OTHER SPECIFIED POSTPROCEDURAL STATES: Chronic | ICD-10-CM

## 2022-05-06 DIAGNOSIS — Z12.39 ENCOUNTER FOR OTHER SCREENING FOR MALIGNANT NEOPLASM OF BREAST: ICD-10-CM

## 2022-05-06 PROCEDURE — 77067 SCR MAMMO BI INCL CAD: CPT | Mod: 26

## 2022-05-06 PROCEDURE — 77063 BREAST TOMOSYNTHESIS BI: CPT | Mod: 26

## 2022-05-06 PROCEDURE — 76641 ULTRASOUND BREAST COMPLETE: CPT | Mod: 26,50

## 2022-05-06 PROCEDURE — 76641 ULTRASOUND BREAST COMPLETE: CPT

## 2022-05-06 PROCEDURE — 77067 SCR MAMMO BI INCL CAD: CPT

## 2022-05-06 PROCEDURE — 77063 BREAST TOMOSYNTHESIS BI: CPT

## 2022-05-11 ENCOUNTER — APPOINTMENT (OUTPATIENT)
Dept: PSYCHIATRY | Facility: CLINIC | Age: 67
End: 2022-05-11
Payer: COMMERCIAL

## 2022-05-11 PROCEDURE — 99214 OFFICE O/P EST MOD 30 MIN: CPT

## 2022-07-06 ENCOUNTER — APPOINTMENT (OUTPATIENT)
Dept: PSYCHIATRY | Facility: CLINIC | Age: 67
End: 2022-07-06

## 2022-07-06 PROCEDURE — 99213 OFFICE O/P EST LOW 20 MIN: CPT

## 2022-07-10 ENCOUNTER — RX RENEWAL (OUTPATIENT)
Age: 67
End: 2022-07-10

## 2022-07-14 ENCOUNTER — NON-APPOINTMENT (OUTPATIENT)
Age: 67
End: 2022-07-14

## 2022-07-25 ENCOUNTER — APPOINTMENT (OUTPATIENT)
Dept: PLASTIC SURGERY | Facility: CLINIC | Age: 67
End: 2022-07-25

## 2022-07-25 VITALS
WEIGHT: 110 LBS | DIASTOLIC BLOOD PRESSURE: 71 MMHG | HEART RATE: 79 BPM | BODY MASS INDEX: 19.25 KG/M2 | OXYGEN SATURATION: 98 % | SYSTOLIC BLOOD PRESSURE: 105 MMHG | HEIGHT: 63.5 IN | TEMPERATURE: 97.7 F

## 2022-07-25 PROCEDURE — 99213 OFFICE O/P EST LOW 20 MIN: CPT

## 2022-07-25 RX ORDER — CLOTRIMAZOLE AND BETAMETHASONE DIPROPIONATE 10; .5 MG/G; MG/G
1-0.05 CREAM TOPICAL
Qty: 15 | Refills: 0 | Status: COMPLETED | COMMUNITY
Start: 2022-02-15

## 2022-07-25 RX ORDER — AZELAIC ACID 0.15 G/G
15 AEROSOL, FOAM TOPICAL
Qty: 50 | Refills: 0 | Status: COMPLETED | COMMUNITY
Start: 2022-06-25

## 2022-07-25 RX ORDER — CLOBETASOL PROPIONATE 0.5 MG/G
0.05 OINTMENT TOPICAL
Qty: 15 | Refills: 0 | Status: COMPLETED | COMMUNITY
Start: 2022-05-13

## 2022-07-25 RX ORDER — TIOTROPIUM BROMIDE INHALATION SPRAY 3.12 UG/1
2.5 SPRAY, METERED RESPIRATORY (INHALATION)
Qty: 12 | Refills: 0 | Status: COMPLETED | COMMUNITY
Start: 2021-06-16

## 2022-07-25 RX ORDER — MOMETASONE FUROATE 1 MG/ML
0.1 SOLUTION TOPICAL
Qty: 30 | Refills: 0 | Status: COMPLETED | COMMUNITY
Start: 2022-01-25

## 2022-07-25 RX ORDER — ATORVASTATIN CALCIUM 10 MG/1
10 TABLET, FILM COATED ORAL
Qty: 30 | Refills: 0 | Status: COMPLETED | COMMUNITY
Start: 2022-01-19

## 2022-07-25 RX ORDER — TOBRAMYCIN AND DEXAMETHASONE 3; 1 MG/ML; MG/ML
0.3-0.1 SUSPENSION/ DROPS OPHTHALMIC
Qty: 5 | Refills: 0 | Status: COMPLETED | COMMUNITY
Start: 2022-05-09

## 2022-08-09 ENCOUNTER — OUTPATIENT (OUTPATIENT)
Dept: OUTPATIENT SERVICES | Facility: HOSPITAL | Age: 67
LOS: 1 days | End: 2022-08-09
Payer: COMMERCIAL

## 2022-08-09 ENCOUNTER — APPOINTMENT (OUTPATIENT)
Dept: ULTRASOUND IMAGING | Facility: HOSPITAL | Age: 67
End: 2022-08-09

## 2022-08-09 ENCOUNTER — RESULT REVIEW (OUTPATIENT)
Age: 67
End: 2022-08-09

## 2022-08-09 DIAGNOSIS — D49.89 NEOPLASM OF UNSPECIFIED BEHAVIOR OF OTHER SPECIFIED SITES: ICD-10-CM

## 2022-08-09 DIAGNOSIS — Z98.89 OTHER SPECIFIED POSTPROCEDURAL STATES: Chronic | ICD-10-CM

## 2022-08-09 PROCEDURE — 76882 US LMTD JT/FCL EVL NVASC XTR: CPT

## 2022-08-09 PROCEDURE — 76882 US LMTD JT/FCL EVL NVASC XTR: CPT | Mod: 26,RT

## 2022-08-11 ENCOUNTER — APPOINTMENT (OUTPATIENT)
Dept: PSYCHIATRY | Facility: CLINIC | Age: 67
End: 2022-08-11

## 2022-08-11 PROCEDURE — 99214 OFFICE O/P EST MOD 30 MIN: CPT

## 2022-09-15 ENCOUNTER — OUTPATIENT (OUTPATIENT)
Dept: OUTPATIENT SERVICES | Facility: HOSPITAL | Age: 67
LOS: 1 days | End: 2022-09-15
Payer: COMMERCIAL

## 2022-09-15 VITALS
SYSTOLIC BLOOD PRESSURE: 109 MMHG | HEART RATE: 74 BPM | HEIGHT: 64 IN | RESPIRATION RATE: 16 BRPM | WEIGHT: 110.67 LBS | OXYGEN SATURATION: 100 % | DIASTOLIC BLOOD PRESSURE: 73 MMHG | TEMPERATURE: 98 F

## 2022-09-15 DIAGNOSIS — Z98.89 OTHER SPECIFIED POSTPROCEDURAL STATES: Chronic | ICD-10-CM

## 2022-09-15 DIAGNOSIS — R22.2 LOCALIZED SWELLING, MASS AND LUMP, TRUNK: ICD-10-CM

## 2022-09-15 DIAGNOSIS — F41.9 ANXIETY DISORDER, UNSPECIFIED: ICD-10-CM

## 2022-09-15 DIAGNOSIS — E78.5 HYPERLIPIDEMIA, UNSPECIFIED: ICD-10-CM

## 2022-09-15 DIAGNOSIS — Z01.818 ENCOUNTER FOR OTHER PREPROCEDURAL EXAMINATION: ICD-10-CM

## 2022-09-15 LAB
ANION GAP SERPL CALC-SCNC: 5 MMOL/L — SIGNIFICANT CHANGE UP (ref 5–17)
BUN SERPL-MCNC: 13 MG/DL — SIGNIFICANT CHANGE UP (ref 7–23)
CALCIUM SERPL-MCNC: 9.6 MG/DL — SIGNIFICANT CHANGE UP (ref 8.4–10.5)
CHLORIDE SERPL-SCNC: 102 MMOL/L — SIGNIFICANT CHANGE UP (ref 96–108)
CO2 SERPL-SCNC: 29 MMOL/L — SIGNIFICANT CHANGE UP (ref 22–31)
CREAT SERPL-MCNC: 0.6 MG/DL — SIGNIFICANT CHANGE UP (ref 0.5–1.3)
EGFR: 100 ML/MIN/1.73M2 — SIGNIFICANT CHANGE UP
GLUCOSE SERPL-MCNC: 85 MG/DL — SIGNIFICANT CHANGE UP (ref 70–99)
HCT VFR BLD CALC: 43 % — SIGNIFICANT CHANGE UP (ref 34.5–45)
HGB BLD-MCNC: 14.1 G/DL — SIGNIFICANT CHANGE UP (ref 11.5–15.5)
MCHC RBC-ENTMCNC: 28.3 PG — SIGNIFICANT CHANGE UP (ref 27–34)
MCHC RBC-ENTMCNC: 32.8 GM/DL — SIGNIFICANT CHANGE UP (ref 32–36)
MCV RBC AUTO: 86.3 FL — SIGNIFICANT CHANGE UP (ref 80–100)
NRBC # BLD: 0 /100 WBCS — SIGNIFICANT CHANGE UP (ref 0–0)
PLATELET # BLD AUTO: 231 K/UL — SIGNIFICANT CHANGE UP (ref 150–400)
POTASSIUM SERPL-MCNC: 5.4 MMOL/L — HIGH (ref 3.5–5.3)
POTASSIUM SERPL-SCNC: 5.4 MMOL/L — HIGH (ref 3.5–5.3)
RBC # BLD: 4.98 M/UL — SIGNIFICANT CHANGE UP (ref 3.8–5.2)
RBC # FLD: 13.8 % — SIGNIFICANT CHANGE UP (ref 10.3–14.5)
SODIUM SERPL-SCNC: 136 MMOL/L — SIGNIFICANT CHANGE UP (ref 135–145)
WBC # BLD: 3.95 K/UL — SIGNIFICANT CHANGE UP (ref 3.8–10.5)
WBC # FLD AUTO: 3.95 K/UL — SIGNIFICANT CHANGE UP (ref 3.8–10.5)

## 2022-09-15 PROCEDURE — 93010 ELECTROCARDIOGRAM REPORT: CPT | Mod: NC

## 2022-09-15 NOTE — H&P PST ADULT - NSICDXPASTSURGICALHX_GEN_ALL_CORE_FT
PAST SURGICAL HISTORY:  Lower extremity tendon tear hip 2002    S/P trigger finger release right thumb

## 2022-09-15 NOTE — H&P PST ADULT - PROBLEM SELECTOR PLAN 1
Pre-op instructions given. Pt verbalized understanding  Chlorhexidine wash instructions given  Pending: M/C    Pending: Covid pcr test/results

## 2022-09-15 NOTE — H&P PST ADULT - HISTORY OF PRESENT ILLNESS
65yo female with medical h/o Bronchiectasia, HDL + Anxiety, reports today for PST for scheduled for Excision of Right Back Neoplasm on 9/21/2021

## 2022-09-15 NOTE — H&P PST ADULT - NSANTHOSAYNRD_GEN_A_CORE
neck 12.25inches/No. GERONIMO screening performed.  STOP BANG Legend: 0-2 = LOW Risk; 3-4 = INTERMEDIATE Risk; 5-8 = HIGH Risk

## 2022-09-15 NOTE — H&P PST ADULT - NSICDXPASTMEDICALHX_GEN_ALL_CORE_FT
PAST MEDICAL HISTORY:  ADHD (attention deficit hyperactivity disorder)     Anxiety     Bronchiectasis     Depression     Mass on back     Sprain rotator cuff right shoulder

## 2022-09-15 NOTE — H&P PST ADULT - FALL HARM RISK - UNIVERSAL INTERVENTIONS
Bed in lowest position, wheels locked, appropriate side rails in place/Call bell, personal items and telephone in reach/Instruct patient to call for assistance before getting out of bed or chair/Non-slip footwear when patient is out of bed/[place] to call system/Physically safe environment - no spills, clutter or unnecessary equipment/Purposeful Proactive Rounding/Room/bathroom lighting operational, light cord in reach

## 2022-09-17 PROCEDURE — 80048 BASIC METABOLIC PNL TOTAL CA: CPT

## 2022-09-17 PROCEDURE — 85027 COMPLETE CBC AUTOMATED: CPT

## 2022-09-17 PROCEDURE — G0463: CPT

## 2022-09-17 PROCEDURE — U0003: CPT

## 2022-09-17 PROCEDURE — U0005: CPT

## 2022-09-17 PROCEDURE — 93005 ELECTROCARDIOGRAM TRACING: CPT

## 2022-09-17 PROCEDURE — 36415 COLL VENOUS BLD VENIPUNCTURE: CPT

## 2022-09-20 ENCOUNTER — TRANSCRIPTION ENCOUNTER (OUTPATIENT)
Age: 67
End: 2022-09-20

## 2022-09-20 ENCOUNTER — APPOINTMENT (OUTPATIENT)
Dept: PULMONOLOGY | Facility: CLINIC | Age: 67
End: 2022-09-20

## 2022-09-20 VITALS
DIASTOLIC BLOOD PRESSURE: 64 MMHG | BODY MASS INDEX: 19.25 KG/M2 | HEIGHT: 63.5 IN | OXYGEN SATURATION: 95 % | WEIGHT: 110 LBS | HEART RATE: 89 BPM | SYSTOLIC BLOOD PRESSURE: 110 MMHG | RESPIRATION RATE: 16 BRPM | TEMPERATURE: 97.2 F

## 2022-09-20 DIAGNOSIS — Z01.811 ENCOUNTER FOR PREPROCEDURAL RESPIRATORY EXAMINATION: ICD-10-CM

## 2022-09-20 PROBLEM — R22.2 LOCALIZED SWELLING, MASS AND LUMP, TRUNK: Chronic | Status: ACTIVE | Noted: 2022-09-15

## 2022-09-20 PROBLEM — J47.9 BRONCHIECTASIS, UNCOMPLICATED: Chronic | Status: ACTIVE | Noted: 2022-09-15

## 2022-09-20 PROCEDURE — 94729 DIFFUSING CAPACITY: CPT

## 2022-09-20 PROCEDURE — 95012 NITRIC OXIDE EXP GAS DETER: CPT

## 2022-09-20 PROCEDURE — 94010 BREATHING CAPACITY TEST: CPT

## 2022-09-20 PROCEDURE — 94727 GAS DIL/WSHOT DETER LNG VOL: CPT

## 2022-09-20 PROCEDURE — 99214 OFFICE O/P EST MOD 30 MIN: CPT | Mod: 25

## 2022-09-20 PROCEDURE — 94618 PULMONARY STRESS TESTING: CPT

## 2022-09-20 NOTE — PROCEDURE
[FreeTextEntry1] : 6 minute walk test reveals a low saturation of 97% with slight dyspnea or fatigue; walked 423.8 meters\par \par FULL PFTs reveals normal flows; FEV1 was  2.33L which is 102% of predicted; normal lung volumes; normal diffusion of 16.4, which is 101% of predicted; normal flow volume loop \par \par FENO was 21; a normal value being less than 25\par Fractional exhaled nitric oxide (FENO) is regarded as a simple, noninvasive method for assessing eosinophilic airway inflammation. Produced by a variety of cells within the lung, nitric oxide (NO) concentrations are generally low in healthy individuals. However, high concentrations of NO appear to be involved in nonspecific host defense mechanisms and chronic inflammatory diseases such as asthma. The American Thoracic Society (ATS) therefore has recommended using FENO to aid in the diagnosis and monitoring of eosinophilic airway inflammation and asthma, and for identifying steroid responsive individuals whose chronic respiratory symptoms may be caused by airway inflammation.

## 2022-09-20 NOTE — HISTORY OF PRESENT ILLNESS
[TextBox_4] : Ms. DESAI is a 66 year female with a history of ?lupus, IgA deficiency, bronchiectasis, GERD, anxiety, spinal stenosis, arthritis, raynaud's phenomenon, who now comes in for a follow up pulmonary evaluation. Her chief complaint is\par \par -she notes awaiting lipoma surgery\par -she notes energy levels are good \par -she notes weight is stable \par -she notes bowels are good and regular \par -she notes increased fatigue from walking due to cold\par -she notes coughing up mucus this morning\par -she notes very little coughing\par -she denies exercising \par \par -she denies any headaches, nausea, vomiting, fever, chills, sweats, chest pain, chest pressure, wheezing, palpitations, constipation, diarrhea, dizziness, dysphagia, heartburn, reflux, itchy eyes, itchy ears, leg swelling, leg pain, arthralgias, myalgias, or sour taste in the mouth.

## 2022-09-20 NOTE — ADDENDUM
[FreeTextEntry1] : Documented by MADHAVI Peralta acting as a scribe for Dr. Moisés Rosales on 09/20/2022 .\par All medical record entries made by the Scribe were at my, Dr. Moisés Rosales's, direction and personally dictated by me on 09/20/2022. I have reviewed the chart and agree that the record accurately reflects my personal performance of the history, physical exam, assessment and plan. I have also personally directed, reviewed, and agree with the discharge instructions.

## 2022-09-20 NOTE — ASSESSMENT
[FreeTextEntry1] : Ms. DESAI is a 66 year female with a history of ?lupus,IgA deficiency, bronchiectasis, GERD, anxiety, spinal stenosis, arthritis, Raynaud's phenomenon, who now comes in for a follow up pulmonary evaluation. Her chief complaint is sob/ bronchiectasis/ mild RADS/ allergies/ GERD/ geronimo- preop for lipoma surgery\par \par **********************PRE-OP CLEARANCE for lipoma surgery (Shiela)***************************\par \par \par The patient's SOB is felt to be multifactorial:\par -poor mechanics of breathing\par -out of shape\par -Pulmonary\par -Cardiac\par \par Problem 1A: immune deficiency\par -recommended Dr Dallas for f/u for low IgA and IgG4- quiet\par -Complete blood work to include: quantitative immunoglobulins, IgG subsets, strep pneumonia titers\par -Due to the fact that this pt has had more infections than would be expected and immunological blood work is indicated this would include: IgG subclasses, quantitative immunoglobulins, Strep pneumoniae titers as well as Vitamin D levels. Based on this blood work we will be able to decide where the pt needs additional pneumococcal vaccine either Prevnar 13 or  pneumovax. Immunology evaluation will also be potentially indicated. \par \par Problem 1: Bronchiectasis (mild reactive airways/ mild asthma) ?ARTNA -stable - quiet\par -candidate for the Vest \par -Recommended getting Aerobika \par - Sputum AFB x3 evaluation ?RATNA \par -Complete strep pneumonia titers, quantitative immunoglobulins, cystic fibrosis panel, IgG subclasses \par - Asthma is believed to be caused by inherited (genetic) and environmental factor, but its exact cause is unknown. asthma may be triggered by allergens, lung infections, or irritants in the air. Asthma triggers are different for each person \par \par Probem 1B: PRE-OP CLEARANCE for lipoma surgery (Shiela)\par -at this point in time there are no absolute pulmonary contraindications to go forward with the planned procedure\par -at the time of surgery she should have optimal pain control, incentive spirometry, early ambulation, \par \par Problem 2: Mild RADS-stable \par - continue Stiolto 2 Puffs QD\par - Inhaler technique reviewed as well as oral hygiene technique reviewed with patient. Avoidance of cold air, extremes of temperature, rescue inhaler should be used before exercise. Order of medication reviewed with patient. Recommended use of a cool mist humidifier in the bedroom. \par \par Problem 3:Allergies \par -s/p Blood work to include: asthma panel, food IgE panel, IgE level, eosinophil level, vitamin D level  (WNL)\par -continue Xlear 1 BID  \par -Environmental measures for allergies were encouraged including mattress and pillow cover, air purifier, and environmental controls. \par \par Problem 4:GERD\par -continue Pepcid 40 mg QHS upto BID\par -Recommend DGL \par -Recommend Manuka honey \par -Rule of 2s: avoid eating too much, eating too late, eating too spicy, eating two hours before bed.\par - Things to avoid including overeating, spicy foods, tight clothing, eating within two hours of bed, this list is not all inclusive.\par - For treatments of reflux, possible options discussed including diet control, H2 blockers, PPIs, as well as coating motility agents discussed as treatment options. Timing of meals and proximity of last meal to sleep were discussed. If symptoms persist, a formal gastrointestinal evaluation is needed. \par \par Problem 5: GERONIMO\par -recommend Kunal Lynn \par - (Failed CPAP) \par -recommend Oxyaid, provent, AveoTSD; "Excite" \par -Sleep apnea is associated with adverse clinical consequences which can affect most organ systems. Cardiovascular disease risk includes arrhythmias, atrial fibrillation, hypertension, coronary artery disease, and stroke. Metabolic disorders include diabetes type 2, non-alcoholic fatty liver disease. Mood disorder especially depression; and cognitive decline especially in the elderly. Associations with chronic reflux/Florian’s esophagus some but not all inclusive. \par -Reasons include arousal consistent with hypopnea; respiratory events most prominent in REM sleep or supine position; therefore sleep staging and body position are important for accurate diagnosis and estimation of AHI. \par \par Problem 6: Abnormal CT \par -s/p CT in 6/2021; next CT 6/2022 (overdue)\par -pt  refused xray at the current time \par - CAT scans are the only radiological modality to identify abnormality to identify abnormalities w/in the lings with regards to nodules/masses/lymph nodes. Risks, benefits were reviewed in detail. The guidelines for abnormalities include follow up CT scans at various intervals which could range from 6 weeks to 1 year intervals. If there is a change for the worse then considerations for a biopsy will be considered if you are a candidate. Second opinion evaluation with thoracic surgeon or an interventional radiologist could be offered, \par  \par Problem 7:Cardiac\par -Recommend cardiac follow up evaluation with cardiologist if needed \par \par Problem 8: out of shape\par -recommended [employer] Stretches on YouTube \par -recommended "10-Day Detox Diet" by Dr. Gregorio Deshpande \par - Weight loss, exercise and diet control were discussed and are highly encouraged. Treatment options were given such as aqua therapy, and contacting a nutritionist. Recommended to use the elliptical, stationary bike, less use of treadmill. Mindful eating was explained to the patient. Obesity is associated with worsening asthma, SOB, and potential for cardiac disease, diabetes, and other underlying medical conditions.\par \par Problem 9: Poor mechanics of breathing\par - Recommended Wim Hof and Buteyko techniques \par - Proper breathing techniques were reviewed with an emphasis on exhalation. Patient instructed to breath in for 1 second and out for four seconds. Patient was encouraged not to talk while walking.\par \par Problem 10: Health Maintenance\par -s/p COVID-19 vaccine x3 - pending updated booster\par -recommended [employer] Stretches on YouTube \par -recommended solemender\par -s/p flu shot\par -recommended strep pneumonia vaccines: Prevnar-13 vaccine, follow by Pneumo vaccine 23 one year following (completed)\par -recommended early intervention for URIs\par -recommended regular osteoporosis evaluations\par -recommended early dermatological evaluations\par -recommended after the age of 50 to the age of 70, colonoscopy every 5 years\par \par f/u in 6-8 weeks\par pt is encouraged to call or fax the office with any questions or concerns.\par

## 2022-09-21 ENCOUNTER — RESULT REVIEW (OUTPATIENT)
Age: 67
End: 2022-09-21

## 2022-09-21 ENCOUNTER — APPOINTMENT (OUTPATIENT)
Dept: PLASTIC SURGERY | Facility: HOSPITAL | Age: 67
End: 2022-09-21

## 2022-09-21 ENCOUNTER — TRANSCRIPTION ENCOUNTER (OUTPATIENT)
Age: 67
End: 2022-09-21

## 2022-09-21 ENCOUNTER — OUTPATIENT (OUTPATIENT)
Dept: OUTPATIENT SERVICES | Facility: HOSPITAL | Age: 67
LOS: 1 days | End: 2022-09-21
Payer: COMMERCIAL

## 2022-09-21 VITALS
TEMPERATURE: 98 F | HEART RATE: 88 BPM | HEIGHT: 64 IN | SYSTOLIC BLOOD PRESSURE: 107 MMHG | OXYGEN SATURATION: 99 % | RESPIRATION RATE: 14 BRPM | WEIGHT: 110.67 LBS | DIASTOLIC BLOOD PRESSURE: 72 MMHG

## 2022-09-21 VITALS
SYSTOLIC BLOOD PRESSURE: 99 MMHG | TEMPERATURE: 98 F | RESPIRATION RATE: 16 BRPM | HEART RATE: 79 BPM | DIASTOLIC BLOOD PRESSURE: 64 MMHG | OXYGEN SATURATION: 95 %

## 2022-09-21 DIAGNOSIS — Z98.89 OTHER SPECIFIED POSTPROCEDURAL STATES: Chronic | ICD-10-CM

## 2022-09-21 DIAGNOSIS — R22.2 LOCALIZED SWELLING, MASS AND LUMP, TRUNK: ICD-10-CM

## 2022-09-21 PROCEDURE — 21932 EXC BACK TUM DEEP < 5 CM: CPT

## 2022-09-21 PROCEDURE — 88302 TISSUE EXAM BY PATHOLOGIST: CPT

## 2022-09-21 PROCEDURE — 21932 EXC BACK TUM DEEP < 5 CM: CPT | Mod: AS

## 2022-09-21 PROCEDURE — 14000 TIS TRNFR TRUNK 10 SQ CM/<: CPT

## 2022-09-21 PROCEDURE — 88302 TISSUE EXAM BY PATHOLOGIST: CPT | Mod: 26

## 2022-09-21 RX ORDER — FAMOTIDINE 10 MG/ML
1 INJECTION INTRAVENOUS
Qty: 0 | Refills: 0 | DISCHARGE

## 2022-09-21 RX ORDER — LEVOMILNACIPRAN HYDROCHLORIDE 40 MG/1
1 CAPSULE, EXTENDED RELEASE ORAL
Qty: 0 | Refills: 0 | DISCHARGE

## 2022-09-21 RX ORDER — ATORVASTATIN CALCIUM 80 MG/1
1 TABLET, FILM COATED ORAL
Qty: 0 | Refills: 0 | DISCHARGE

## 2022-09-21 RX ORDER — TIOTROPIUM BROMIDE 18 UG/1
2 CAPSULE ORAL; RESPIRATORY (INHALATION)
Qty: 0 | Refills: 0 | DISCHARGE

## 2022-09-21 RX ORDER — HYDROXYZINE HCL 10 MG
1 TABLET ORAL
Qty: 0 | Refills: 0 | DISCHARGE

## 2022-09-21 RX ORDER — SODIUM CHLORIDE 9 MG/ML
1000 INJECTION, SOLUTION INTRAVENOUS
Refills: 0 | Status: DISCONTINUED | OUTPATIENT
Start: 2022-09-21 | End: 2022-09-21

## 2022-09-21 RX ORDER — MULTIVIT-MIN/FERROUS GLUCONATE 9 MG/15 ML
1 LIQUID (ML) ORAL
Qty: 0 | Refills: 0 | DISCHARGE

## 2022-09-21 RX ORDER — OXYCODONE HYDROCHLORIDE 5 MG/1
5 TABLET ORAL EVERY 6 HOURS
Refills: 0 | Status: DISCONTINUED | OUTPATIENT
Start: 2022-09-21 | End: 2022-09-21

## 2022-09-21 RX ADMIN — SODIUM CHLORIDE 50 MILLILITER(S): 9 INJECTION, SOLUTION INTRAVENOUS at 06:33

## 2022-09-21 NOTE — ASU DISCHARGE PLAN (ADULT/PEDIATRIC) - NURSING INSTRUCTIONS
Empty NIKKO drain and record amount, wash hands before and after emptying drain.  Keep incision clean and dry. Follow up with Dr Kuo as instructed.

## 2022-09-21 NOTE — ASU DISCHARGE PLAN (ADULT/PEDIATRIC) - CARE PROVIDER_API CALL
Donavan Kuo; TORSTEN)  Otolaryngology; Plastic Surgery  [address], [address]  [place] [address]  Phone: [phone]  Fax: [phone]  Follow Up Time:

## 2022-09-21 NOTE — ASU DISCHARGE PLAN (ADULT/PEDIATRIC) - CALL YOUR DOCTOR IF YOU HAVE ANY OF THE FOLLOWING:
Bleeding that does not stop/Swelling that gets worse/Pain not relieved by Medications/Wound/Surgical Site with redness, or foul smelling discharge or pus Bleeding that does not stop/Swelling that gets worse/Pain not relieved by Medications/Fever greater than (need to indicate Fahrenheit or Celsius)/Wound/Surgical Site with redness, or foul smelling discharge or pus/Inability to tolerate liquids or foods

## 2022-09-21 NOTE — ASU DISCHARGE PLAN (ADULT/PEDIATRIC) - MEDICATION INSTRUCTIONS
Percocet pain  medication was sent to your [facility] if needed for severe pain.  Otherwise you may take over-the-counter pain medication (Tylenol)

## 2022-09-21 NOTE — ASU PATIENT PROFILE, ADULT - FALL HARM RISK - UNIVERSAL INTERVENTIONS
Bed in lowest position, wheels locked, appropriate side rails in place/Call bell, personal items and telephone in reach/Instruct patient to call for assistance before getting out of bed or chair/Non-slip footwear when patient is out of bed/[place] to call system/Physically safe environment - no spills, clutter or unnecessary equipment/Purposeful Proactive Rounding/Room/bathroom lighting operational, light cord in reach

## 2022-09-21 NOTE — ASU DISCHARGE PLAN (ADULT/PEDIATRIC) - NS MD DC FALL RISK RISK
For information on Fall & Injury Prevention, visit: [email or web address] OR  [email or web address] OR  https://www.cdc.gov/steadi/patient.html

## 2022-09-22 LAB — SARS-COV-2 N GENE NPH QL NAA+PROBE: NOT DETECTED

## 2022-09-23 ENCOUNTER — APPOINTMENT (OUTPATIENT)
Dept: PLASTIC SURGERY | Facility: CLINIC | Age: 67
End: 2022-09-23

## 2022-09-23 DIAGNOSIS — D49.89 NEOPLASM OF UNSPECIFIED BEHAVIOR OF OTHER SPECIFIED SITES: ICD-10-CM

## 2022-09-23 LAB — SURGICAL PATHOLOGY STUDY: SIGNIFICANT CHANGE UP

## 2022-09-23 PROCEDURE — 99024 POSTOP FOLLOW-UP VISIT: CPT

## 2022-09-28 ENCOUNTER — APPOINTMENT (OUTPATIENT)
Dept: PSYCHIATRY | Facility: CLINIC | Age: 67
End: 2022-09-28

## 2022-09-28 PROCEDURE — 99214 OFFICE O/P EST MOD 30 MIN: CPT

## 2022-09-29 ENCOUNTER — APPOINTMENT (OUTPATIENT)
Dept: PLASTIC SURGERY | Facility: CLINIC | Age: 67
End: 2022-09-29

## 2022-09-29 VITALS
TEMPERATURE: 97.2 F | HEIGHT: 63.5 IN | HEART RATE: 89 BPM | WEIGHT: 110 LBS | OXYGEN SATURATION: 97 % | DIASTOLIC BLOOD PRESSURE: 70 MMHG | BODY MASS INDEX: 19.25 KG/M2 | SYSTOLIC BLOOD PRESSURE: 111 MMHG

## 2022-09-29 PROCEDURE — 99024 POSTOP FOLLOW-UP VISIT: CPT

## 2022-09-29 NOTE — ASSESSMENT
[FreeTextEntry1] : All medical record entries were at my, Dr. Donavan Kuo, direction and personally dictated by me. I have reviewed the chart and agree that the record accurately reflects my personal performance of the history, physical exam, assessment and plan.\par

## 2022-10-03 ENCOUNTER — APPOINTMENT (OUTPATIENT)
Dept: PLASTIC SURGERY | Facility: CLINIC | Age: 67
End: 2022-10-03

## 2022-10-03 VITALS
SYSTOLIC BLOOD PRESSURE: 105 MMHG | HEIGHT: 63 IN | DIASTOLIC BLOOD PRESSURE: 69 MMHG | TEMPERATURE: 97 F | OXYGEN SATURATION: 99 % | WEIGHT: 110 LBS | HEART RATE: 73 BPM | BODY MASS INDEX: 19.49 KG/M2

## 2022-10-03 PROCEDURE — 99024 POSTOP FOLLOW-UP VISIT: CPT

## 2022-10-06 ENCOUNTER — APPOINTMENT (OUTPATIENT)
Dept: PLASTIC SURGERY | Facility: CLINIC | Age: 67
End: 2022-10-06

## 2022-10-06 VITALS
OXYGEN SATURATION: 99 % | BODY MASS INDEX: 19.49 KG/M2 | TEMPERATURE: 98.1 F | SYSTOLIC BLOOD PRESSURE: 108 MMHG | WEIGHT: 110 LBS | HEART RATE: 73 BPM | HEIGHT: 63 IN | DIASTOLIC BLOOD PRESSURE: 72 MMHG

## 2022-10-06 PROCEDURE — 99024 POSTOP FOLLOW-UP VISIT: CPT

## 2022-10-10 ENCOUNTER — APPOINTMENT (OUTPATIENT)
Dept: PLASTIC SURGERY | Facility: CLINIC | Age: 67
End: 2022-10-10

## 2022-10-10 VITALS
SYSTOLIC BLOOD PRESSURE: 113 MMHG | HEART RATE: 89 BPM | WEIGHT: 110 LBS | TEMPERATURE: 98.2 F | DIASTOLIC BLOOD PRESSURE: 75 MMHG | HEIGHT: 63 IN | BODY MASS INDEX: 19.49 KG/M2 | OXYGEN SATURATION: 99 %

## 2022-10-10 PROCEDURE — 99024 POSTOP FOLLOW-UP VISIT: CPT

## 2022-10-14 ENCOUNTER — APPOINTMENT (OUTPATIENT)
Dept: PLASTIC SURGERY | Facility: CLINIC | Age: 67
End: 2022-10-14

## 2022-10-14 PROCEDURE — 99024 POSTOP FOLLOW-UP VISIT: CPT

## 2022-10-20 LAB
HPIV1 RNA SPEC QL NAA+PROBE: DETECTED
RAPID RVP RESULT: DETECTED
RV+EV RNA SPEC QL NAA+PROBE: DETECTED
SARS-COV-2 RNA PNL RESP NAA+PROBE: NOT DETECTED

## 2022-10-21 ENCOUNTER — NON-APPOINTMENT (OUTPATIENT)
Age: 67
End: 2022-10-21

## 2022-10-27 ENCOUNTER — RX RENEWAL (OUTPATIENT)
Age: 67
End: 2022-10-27

## 2022-11-07 ENCOUNTER — APPOINTMENT (OUTPATIENT)
Dept: ORTHOPEDIC SURGERY | Facility: CLINIC | Age: 67
End: 2022-11-07

## 2022-11-07 ENCOUNTER — APPOINTMENT (OUTPATIENT)
Dept: PSYCHIATRY | Facility: CLINIC | Age: 67
End: 2022-11-07

## 2022-11-07 PROCEDURE — 99214 OFFICE O/P EST MOD 30 MIN: CPT

## 2022-11-09 ENCOUNTER — NON-APPOINTMENT (OUTPATIENT)
Age: 67
End: 2022-11-09

## 2022-11-09 ENCOUNTER — APPOINTMENT (OUTPATIENT)
Dept: PULMONOLOGY | Facility: CLINIC | Age: 67
End: 2022-11-09

## 2022-11-09 VITALS
HEART RATE: 75 BPM | SYSTOLIC BLOOD PRESSURE: 110 MMHG | BODY MASS INDEX: 19.49 KG/M2 | RESPIRATION RATE: 16 BRPM | WEIGHT: 110 LBS | HEIGHT: 63 IN | DIASTOLIC BLOOD PRESSURE: 65 MMHG | OXYGEN SATURATION: 97 % | TEMPERATURE: 98.1 F

## 2022-11-09 PROCEDURE — 94010 BREATHING CAPACITY TEST: CPT

## 2022-11-09 PROCEDURE — 95012 NITRIC OXIDE EXP GAS DETER: CPT

## 2022-11-09 PROCEDURE — 99214 OFFICE O/P EST MOD 30 MIN: CPT | Mod: 25

## 2022-11-09 NOTE — HISTORY OF PRESENT ILLNESS
[TextBox_4] : Ms. DESAI is a 67 year female with a history of ?lupus, IgA deficiency, bronchiectasis, GERD, anxiety, spinal stenosis, arthritis, raynaud's phenomenon, who now comes in for a follow up pulmonary evaluation. Her chief complaint is\par \par -she notes cough persists\par -she notes cough is intermittently productive (in morning)\par -she notes globus pharyngis\par -she notes PND\par -she notes bowels are regular \par -she notes her senses of smell and taste are stable \par -she notes exercising (Pilates)\par -she notes lumbago\par \par -she denies any headaches, nausea, vomiting, fever, chills, sweats, chest pain, chest pressure, wheezing, palpitations, constipation, diarrhea, dizziness, dysphagia, heartburn, reflux, itchy eyes, itchy ears, leg swelling, leg pain, arthralgias, myalgias, or sour taste in the mouth.

## 2022-11-09 NOTE — HISTORY OF PRESENT ILLNESS
[FreeTextEntry1] : Pt seen and evaluated today, and reports that she "hardly has " her grand daughter, per pt her grand child was "pulled" from her private school. \par Pt reports that her grandchild fell and injured her thumb, and had diarrhea on the day she had her. \par Son who is the father of her grandchild under minds her. ( pt was sued for custody for her grand daughter last year). \par Pt hopes to understand the pathology around her. \par Pt reports her grand child in her new school has been acting out , as well. \par Positive feedback given for not engaging in harmful behavior, pt reports she has been in [place] for over a decade. \par \par \par Pt reports having para influenza virus and some other non viral covid infection. Pt had used Nimisha seltzer when ill. Sleep hygiene reviewed. \par Sleep is fair, but will have middle insomnia wakes and cannot go back to sleep, she has not used her new rx of Alprazolam.

## 2022-11-09 NOTE — ASSESSMENT
[FreeTextEntry1] : Ms. DESAI is a 67 year female with a history of ?lupus,IgA deficiency, bronchiectasis, GERD, anxiety, spinal stenosis, arthritis, Raynaud's phenomenon, who now comes in for a follow up pulmonary evaluation. Her chief complaint is sob/ bronchiectasis/ mild RADS/ allergies/ GERD/ geronimo- s/p lipoma surgery 9/2022- mild mucus production- s/p rhino enterovirus/ paraflu infection\par \par \par The patient's SOB is felt to be multifactorial:\par -poor mechanics of breathing\par -out of shape\par -Pulmonary\par -Cardiac\par \par Problem 1A: immune deficiency\par -recommended Dr Dallas for f/u for low IgA and IgG4- quiet\par -Complete blood work to include: quantitative immunoglobulins, IgG subsets, strep pneumonia titers\par -Due to the fact that this pt has had more infections than would be expected and immunological blood work is indicated this would include: IgG subclasses, quantitative immunoglobulins, Strep pneumoniae titers as well as Vitamin D levels. Based on this blood work we will be able to decide where the pt needs additional pneumococcal vaccine either Prevnar 13 or  pneumovax. Immunology evaluation will also be potentially indicated. \par \par Problem 1: Bronchiectasis (mild reactive airways/ mild asthma) ?RATNA -stable - quiet\par -candidate for the Vest \par -Recommended getting Aerobika \par - Sputum AFB x3 evaluation ?RATNA \par -Complete strep pneumonia titers, quantitative immunoglobulins, cystic fibrosis panel, IgG subclasses \par - Asthma is believed to be caused by inherited (genetic) and environmental factor, but its exact cause is unknown. asthma may be triggered by allergens, lung infections, or irritants in the air. Asthma triggers are different for each person \par \par Problem 2: Mild RADS-flair \par -add Symbicort 160 2 inhalations BID \par - Inhaler technique reviewed as well as oral hygiene technique reviewed with patient. Avoidance of cold air, extremes of temperature, rescue inhaler should be used before exercise. Order of medication reviewed with patient. Recommended use of a cool mist humidifier in the bedroom. \par \par Problem 3:Allergies \par -s/p Blood work to include: asthma panel, food IgE panel, IgE level, eosinophil level, vitamin D level  (WNL)\par -continue Xlear 1 BID  \par -Environmental measures for allergies were encouraged including mattress and pillow cover, air purifier, and environmental controls. \par \par Problem 4:GERD\par -continue Pepcid 40 mg QHS upto BID\par -Recommend DGL \par -Recommend Manuka honey \par -Rule of 2s: avoid eating too much, eating too late, eating too spicy, eating two hours before bed.\par - Things to avoid including overeating, spicy foods, tight clothing, eating within two hours of bed, this list is not all inclusive.\par - For treatments of reflux, possible options discussed including diet control, H2 blockers, PPIs, as well as coating motility agents discussed as treatment options. Timing of meals and proximity of last meal to sleep were discussed. If symptoms persist, a formal gastrointestinal evaluation is needed. \par \par Problem 5: GERONIMO\par -recommend Kunal Lynn \par - (Failed CPAP) \par -recommend Oxyaid, provent, AveoTSD; "Excite" \par -Sleep apnea is associated with adverse clinical consequences which can affect most organ systems. Cardiovascular disease risk includes arrhythmias, atrial fibrillation, hypertension, coronary artery disease, and stroke. Metabolic disorders include diabetes type 2, non-alcoholic fatty liver disease. Mood disorder especially depression; and cognitive decline especially in the elderly. Associations with chronic reflux/Florian’s esophagus some but not all inclusive. \par -Reasons include arousal consistent with hypopnea; respiratory events most prominent in REM sleep or supine position; therefore sleep staging and body position are important for accurate diagnosis and estimation of AHI. \par \par Problem 6: Abnormal CT \par -s/p CT in 6/2021; next CT 6/2022 (overdue)\par -pt  refused xray at the current time \par - CAT scans are the only radiological modality to identify abnormality to identify abnormalities w/in the lings with regards to nodules/masses/lymph nodes. Risks, benefits were reviewed in detail. The guidelines for abnormalities include follow up CT scans at various intervals which could range from 6 weeks to 1 year intervals. If there is a change for the worse then considerations for a biopsy will be considered if you are a candidate. Second opinion evaluation with thoracic surgeon or an interventional radiologist could be offered, \par  \par Problem 7:Cardiac\par -Recommend cardiac follow up evaluation with cardiologist if needed \par \par Problem 8: out of shape\par -recommended [employer] Stretches on YouTube \par -recommended "10-Day Detox Diet" by Dr. Gregorio Deshpande \par - Weight loss, exercise and diet control were discussed and are highly encouraged. Treatment options were given such as aqua therapy, and contacting a nutritionist. Recommended to use the elliptical, stationary bike, less use of treadmill. Mindful eating was explained to the patient. Obesity is associated with worsening asthma, SOB, and potential for cardiac disease, diabetes, and other underlying medical conditions.\par \par Problem 9: Poor mechanics of breathing\par - Recommended Wim Hof and Buteyko techniques \par - Proper breathing techniques were reviewed with an emphasis on exhalation. Patient instructed to breath in for 1 second and out for four seconds. Patient was encouraged not to talk while walking.\par \par Problem 10: Health Maintenance\par -s/p COVID-19 vaccine x3 - pending updated booster\par -recommended [employer] Stretches on YouTube \par -recommended solemender\par -s/p flu shot\par -recommended strep pneumonia vaccines: Prevnar-13 vaccine, follow by Pneumo vaccine 23 one year following (completed)\par -recommended early intervention for URIs\par -recommended regular osteoporosis evaluations\par -recommended early dermatological evaluations\par -recommended after the age of 50 to the age of 70, colonoscopy every 5 years\par \par f/u in 6-8 weeks\par pt is encouraged to call or fax the office with any questions or concerns.\par

## 2022-11-09 NOTE — ADDENDUM
[FreeTextEntry1] : Documented by MADHAVI Peralta acting as a scribe for Dr. Moisés Rosales on 11/09/2022 .\par All medical record entries made by the Scribe were at my, Dr. Moisés Rosales's, direction and personally dictated by me on 11/09/2022. I have reviewed the chart and agree that the record accurately reflects my personal performance of the history, physical exam, assessment and plan. I have also personally directed, reviewed, and agree with the discharge instructions.

## 2022-11-09 NOTE — PROCEDURE
[FreeTextEntry1] : PFT reveals normal flows, with an FEV1 of  2.10L, which is 92% of predicted, with a normal flow volume loop. \par \par FENO was 13; a normal value being less than 25\par Fractional exhaled nitric oxide (FENO) is regarded as a simple, noninvasive method for assessing eosinophilic airway inflammation. Produced by a variety of cells within the lung, nitric oxide (NO) concentrations are generally low in healthy individuals. However, high concentrations of NO appear to be involved in nonspecific host defense mechanisms and chronic inflammatory diseases such as asthma. The American Thoracic Society (ATS) therefore has recommended using FENO to aid in the diagnosis and monitoring of eosinophilic airway inflammation and asthma, and for identifying steroid responsive individuals whose chronic respiratory symptoms may be caused by airway inflammation.

## 2022-11-09 NOTE — DISCUSSION/SUMMARY
[FreeTextEntry1] : Emotional support given regarding her psychosocial circumstances, and how to cope with the various personalities that affect her relationship with her grand daughter in particular.

## 2022-11-11 ENCOUNTER — APPOINTMENT (OUTPATIENT)
Dept: ORTHOPEDIC SURGERY | Facility: CLINIC | Age: 67
End: 2022-11-11

## 2022-11-11 PROCEDURE — 99214 OFFICE O/P EST MOD 30 MIN: CPT

## 2022-11-11 PROCEDURE — 73562 X-RAY EXAM OF KNEE 3: CPT | Mod: RT

## 2022-11-23 ENCOUNTER — APPOINTMENT (OUTPATIENT)
Dept: MRI IMAGING | Facility: HOSPITAL | Age: 67
End: 2022-11-23

## 2022-11-23 ENCOUNTER — NON-APPOINTMENT (OUTPATIENT)
Age: 67
End: 2022-11-23

## 2022-11-25 ENCOUNTER — NON-APPOINTMENT (OUTPATIENT)
Age: 67
End: 2022-11-25

## 2022-11-25 DIAGNOSIS — J11.1 INFLUENZA DUE TO UNIDENTIFIED INFLUENZA VIRUS WITH OTHER RESPIRATORY MANIFESTATIONS: ICD-10-CM

## 2022-11-28 ENCOUNTER — NON-APPOINTMENT (OUTPATIENT)
Age: 67
End: 2022-11-28

## 2022-11-28 ENCOUNTER — APPOINTMENT (OUTPATIENT)
Dept: PLASTIC SURGERY | Facility: CLINIC | Age: 67
End: 2022-11-28

## 2022-11-28 LAB
FLUAV H1 2009 PAND RNA SPEC QL NAA+PROBE: DETECTED
RAPID RVP RESULT: DETECTED
SARS-COV-2 RNA PNL RESP NAA+PROBE: NOT DETECTED

## 2022-11-30 ENCOUNTER — APPOINTMENT (OUTPATIENT)
Dept: MRI IMAGING | Facility: HOSPITAL | Age: 67
End: 2022-11-30

## 2022-11-30 ENCOUNTER — OUTPATIENT (OUTPATIENT)
Dept: OUTPATIENT SERVICES | Facility: HOSPITAL | Age: 67
LOS: 1 days | End: 2022-11-30
Payer: COMMERCIAL

## 2022-11-30 DIAGNOSIS — M51.36 OTHER INTERVERTEBRAL DISC DEGENERATION, LUMBAR REGION: ICD-10-CM
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
## 2022-11-30 DIAGNOSIS — M99.83 OTHER BIOMECHANICAL LESIONS OF LUMBAR REGION: ICD-10-CM
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
## 2022-11-30 DIAGNOSIS — Z98.89 OTHER SPECIFIED POSTPROCEDURAL STATES: Chronic | ICD-10-CM

## 2022-11-30 DIAGNOSIS — M48.061 SPINAL STENOSIS, LUMBAR REGION WITHOUT NEUROGENIC CLAUDICATION: ICD-10-CM

## 2022-11-30 DIAGNOSIS — S39.012A STRAIN OF MUSCLE, FASCIA AND TENDON OF LOWER BACK, INITIAL ENCOUNTER: ICD-10-CM

## 2022-11-30 DIAGNOSIS — Z00.8 ENCOUNTER FOR OTHER GENERAL EXAMINATION: ICD-10-CM

## 2022-11-30 PROCEDURE — 72148 MRI LUMBAR SPINE W/O DYE: CPT

## 2022-11-30 PROCEDURE — 72148 MRI LUMBAR SPINE W/O DYE: CPT | Mod: 26

## 2022-12-02 ENCOUNTER — APPOINTMENT (OUTPATIENT)
Dept: PLASTIC SURGERY | Facility: CLINIC | Age: 67
End: 2022-12-02

## 2022-12-02 VITALS
HEIGHT: 63 IN | OXYGEN SATURATION: 97 % | TEMPERATURE: 97.1 F | HEART RATE: 81 BPM | BODY MASS INDEX: 19.49 KG/M2 | WEIGHT: 110 LBS

## 2022-12-02 DIAGNOSIS — D17.1 BENIGN LIPOMATOUS NEOPLASM OF SKIN AND SUBCUTANEOUS TISSUE OF TRUNK: ICD-10-CM

## 2022-12-02 PROCEDURE — 99024 POSTOP FOLLOW-UP VISIT: CPT

## 2022-12-09 ENCOUNTER — APPOINTMENT (OUTPATIENT)
Dept: ORTHOPEDIC SURGERY | Facility: CLINIC | Age: 67
End: 2022-12-09

## 2022-12-09 PROCEDURE — 20611 DRAIN/INJ JOINT/BURSA W/US: CPT | Mod: RT

## 2022-12-09 PROCEDURE — 99214 OFFICE O/P EST MOD 30 MIN: CPT | Mod: 25

## 2022-12-13 ENCOUNTER — NON-APPOINTMENT (OUTPATIENT)
Age: 67
End: 2022-12-13

## 2022-12-14 ENCOUNTER — APPOINTMENT (OUTPATIENT)
Dept: ORTHOPEDIC SURGERY | Facility: CLINIC | Age: 67
End: 2022-12-14

## 2022-12-14 PROCEDURE — 99214 OFFICE O/P EST MOD 30 MIN: CPT

## 2022-12-15 ENCOUNTER — RX RENEWAL (OUTPATIENT)
Age: 67
End: 2022-12-15

## 2022-12-28 ENCOUNTER — APPOINTMENT (OUTPATIENT)
Dept: PULMONOLOGY | Facility: CLINIC | Age: 67
End: 2022-12-28

## 2022-12-28 PROCEDURE — 99214 OFFICE O/P EST MOD 30 MIN: CPT | Mod: 95

## 2022-12-28 NOTE — REASON FOR VISIT
[Home] : at home, [unfilled] , at the time of the visit. [Medical Office: ([facility])___] : at the medical office located in  [Patient] : the patient [Follow-Up] : a follow-up visit [Asthma] : asthma [Cough] : cough

## 2022-12-29 NOTE — HISTORY OF PRESENT ILLNESS
[TextBox_4] : Ms. DESAI is a 67 year female with a history of ?lupus, IgA deficiency, bronchiectasis, GERD, anxiety, spinal stenosis, arthritis, raynaud's phenomenon, who now presents via video for sick visit. \par \par Her chief complaint is sick visit. \par \par Patient reports she went to [place] on Sunday. She reports slight dry cough, nasal congestion, PND, runny nose since Sunday. Patient she is currently taking OTC vitamins, karly seltzer cold & flu and Symbicort inhaler. She reports she is feeling better today than yesterday. Patient states she doesn't have rescue inhaler. \par \par Patient denies fever, chills, SOB @ rest or exertion, wheezing.\par

## 2022-12-29 NOTE — REVIEW OF SYSTEMS
[Nasal Congestion] : nasal congestion [Postnasal Drip] : postnasal drip [Cough] : cough [Negative] : Psychiatric [Dry Eyes] : no dry eyes [Ear Disturbance] : no ear disturbance [Epistaxis] : no epistaxis [Sore Throat] : no sore throat [Eye Irritation] : no eye irritation [Dry Mouth] : no dry mouth [Sinus Problems] : no sinus problems [Mouth Ulcers] : no mouth ulcers [Poor Dentition] : no poor dentition [Edentulous] : no edentulous [Hemoptysis] : no hemoptysis [Chest Tightness] : no chest tightness [Frequent URIs] : no frequent URIs [Sputum] : no sputum [Dyspnea] : no dyspnea [Pleuritic Pain] : no pleuritic pain [Wheezing] : no wheezing [A.M. Dry Mouth] : no a.m. dry mouth [SOB on Exertion] : no sob on exertion

## 2022-12-29 NOTE — ASSESSMENT
[FreeTextEntry1] : Ms. DESAI is a 67 year female with a history of ?lupus, IgA deficiency, bronchiectasis, GERD, anxiety, spinal stenosis, arthritis, raynaud's phenomenon, who now presents via video for sick visit. \par \par Her chief complaint is sick visit. \par \par 1. Cough\par -r/t viral infection vs Asthma exacerbation\par -advised patient to go urgent care for RVP\par -add Benzonatate 200 mg TID\par \par 2. Asthma/RAD\par -add albuterol inhaler 2 puffs Q6H\par -continue Symbicort 160 2 puffs BID: rinse & gargle after use\par \par 3. Allergies \par -continue saline nasal spray\par \par Patient to follow up with Dr. Rosales as scheduled.\par Patient to call with further questions and concerns.\par Patient verbalizes understanding of care plan and is agreeable.\par

## 2023-01-03 RX ORDER — ALBUTEROL SULFATE 90 UG/1
108 (90 BASE) INHALANT RESPIRATORY (INHALATION)
Qty: 1 | Refills: 2 | Status: DISCONTINUED | COMMUNITY
Start: 2022-12-28 | End: 2023-01-03

## 2023-01-04 ENCOUNTER — APPOINTMENT (OUTPATIENT)
Dept: PULMONOLOGY | Facility: CLINIC | Age: 68
End: 2023-01-04
Payer: COMMERCIAL

## 2023-01-04 LAB
CORONAVIRUS (229E,HKU1,NL63,OC43): DETECTED
RAPID RVP RESULT: DETECTED
SARS-COV-2 RNA PNL RESP NAA+PROBE: NOT DETECTED

## 2023-01-04 PROCEDURE — 99441: CPT | Mod: 95

## 2023-01-18 ENCOUNTER — RX RENEWAL (OUTPATIENT)
Age: 68
End: 2023-01-18

## 2023-01-23 ENCOUNTER — NON-APPOINTMENT (OUTPATIENT)
Age: 68
End: 2023-01-23

## 2023-01-25 ENCOUNTER — OUTPATIENT (OUTPATIENT)
Dept: OUTPATIENT SERVICES | Facility: HOSPITAL | Age: 68
LOS: 1 days | End: 2023-01-25
Payer: COMMERCIAL

## 2023-01-25 ENCOUNTER — APPOINTMENT (OUTPATIENT)
Dept: SLEEP CENTER | Facility: CLINIC | Age: 68
End: 2023-01-25
Payer: COMMERCIAL

## 2023-01-25 DIAGNOSIS — Z98.89 OTHER SPECIFIED POSTPROCEDURAL STATES: Chronic | ICD-10-CM

## 2023-01-25 PROCEDURE — 95800 SLP STDY UNATTENDED: CPT | Mod: 26

## 2023-01-25 PROCEDURE — 95800 SLP STDY UNATTENDED: CPT

## 2023-01-27 ENCOUNTER — APPOINTMENT (OUTPATIENT)
Dept: PSYCHIATRY | Facility: CLINIC | Age: 68
End: 2023-01-27
Payer: COMMERCIAL

## 2023-01-27 PROCEDURE — 99214 OFFICE O/P EST MOD 30 MIN: CPT

## 2023-01-30 ENCOUNTER — NON-APPOINTMENT (OUTPATIENT)
Age: 68
End: 2023-01-30

## 2023-01-30 NOTE — DISCUSSION/SUMMARY
[FreeTextEntry1] : Pt has continued complicated family dynamics. Pt not consistent with bedtime "cocktail" \par Would continue current management. Issues not primarily psychopharmacological.

## 2023-01-30 NOTE — HISTORY OF PRESENT ILLNESS
[FreeTextEntry1] : Pt reports she had a sleep study, continues to have issues with poor sleep. \par Sleep study results pending. Pt reports that she has a sponsor in [place] and has a [employer] hannah on her phone, \par as well as working the steps. Pt does sleep better when she does not watch the news at night. Pt has tried to take Melatonin and while it still isn't 100%, she likes the midnight brand. She reports she had back pain , had epidural and Gabapentin.\par Pt reports that her grandchild she only sees twice per month, ice skating, swimming, and other activities are not going to happen. \par Pt grandchild not enrolled in [employer] and she is worried about her social skills suffering.

## 2023-01-31 ENCOUNTER — APPOINTMENT (OUTPATIENT)
Dept: PEDIATRIC ALLERGY IMMUNOLOGY | Facility: CLINIC | Age: 68
End: 2023-01-31

## 2023-01-31 ENCOUNTER — APPOINTMENT (OUTPATIENT)
Dept: PULMONOLOGY | Facility: CLINIC | Age: 68
End: 2023-01-31
Payer: COMMERCIAL

## 2023-01-31 PROCEDURE — 99443: CPT

## 2023-02-10 ENCOUNTER — RX RENEWAL (OUTPATIENT)
Age: 68
End: 2023-02-10

## 2023-02-27 ENCOUNTER — APPOINTMENT (OUTPATIENT)
Dept: ORTHOPEDIC SURGERY | Facility: CLINIC | Age: 68
End: 2023-02-27
Payer: COMMERCIAL

## 2023-02-27 ENCOUNTER — NON-APPOINTMENT (OUTPATIENT)
Age: 68
End: 2023-02-27

## 2023-02-27 VITALS
WEIGHT: 108 LBS | DIASTOLIC BLOOD PRESSURE: 78 MMHG | HEART RATE: 84 BPM | SYSTOLIC BLOOD PRESSURE: 134 MMHG | HEIGHT: 63.5 IN | BODY MASS INDEX: 18.9 KG/M2

## 2023-02-27 DIAGNOSIS — M51.36 OTHER INTERVERTEBRAL DISC DEGENERATION, LUMBAR REGION: ICD-10-CM

## 2023-02-27 PROCEDURE — 99203 OFFICE O/P NEW LOW 30 MIN: CPT

## 2023-02-28 NOTE — HISTORY OF PRESENT ILLNESS
[Pain Location] : pain [] : right & left leg [Lumbar] : lumbar region [Worsening] : worsening [5] : a current pain level of 5/10 [10] : a maximum pain level of 10/10 [Standing] : standing [Daily] : ~He/She~ states the symptoms seem to be occuring daily [Prolonged Standing] : worsened by prolonged standing [Walking] : worsened by walking [Exercise Regimen] : relieved by exercise regimen [Rest] : relieved by rest [de-identified] : Patient is a 66 yo female with complaints of long standing history of back pain with right leg numbness, and recently has been feeling intermittently some left leg numbness with long time standing. \par Patient has received an CHELLE on 12/16/2022 with temporary relief of pain for a couple of days. Patient is currently taking gabapentin 100 mg qhs.\par Patient has been referred to me by Dr Schwarz [NSAIDs] : not relieved by nonsteroidal anti-inflammatory drugs

## 2023-02-28 NOTE — DISCUSSION/SUMMARY
[Medication Risks Reviewed] : Medication risks reviewed [Surgical risks reviewed] : Surgical risks reviewed [PRN] : PRN [de-identified] : Discussion of decompressive laminectomy vs. laminectomy and TLIF.\par Maintaining her activities except for running/jogging.\par \par \par I, Dr. Glenroy Rankin, personally performed the evaluation and management (E/M) services for this new patient.  That E/M includes conducting the initial examination, assessing all conditions, and establishing a plan of care.  Today, my ACP, Shilpa Mckee, was here to observe my evaluation and management services for this patient to be followed going forward.

## 2023-02-28 NOTE — PHYSICAL EXAM
[LE] : 5/5 motor strength in bilateral lower extremities [Knee] : patellar 2+ and symmetric bilaterally [Ankle] : ankle 2+ and symmetric bilaterally [Normal RLE] : Right Lower Extremity: No scars, rashes, lesions, ulcers, skin intact [Normal LLE] : Left Lower Extremity: No scars, rashes, lesions, ulcers, skin intact [Normal] : Gait: normal [Poor Appearance] : well-appearing [Acute Distress] : not in acute distress [Obese] : not obese [de-identified] : Heel and toe walk is intact\par Negative tension sign of bilateral LE [de-identified] : MRI of the lumbar spine done on November 30, 2022\par 1.  Multilevel degenerative disc disease is increased from prior study.\par 2.  L4-5 demonstrates severe spinal canal stenosis.\par 3.  Variable neuroforaminal stenosis at multiple levels.

## 2023-03-07 ENCOUNTER — APPOINTMENT (OUTPATIENT)
Dept: PULMONOLOGY | Facility: CLINIC | Age: 68
End: 2023-03-07
Payer: COMMERCIAL

## 2023-03-07 ENCOUNTER — NON-APPOINTMENT (OUTPATIENT)
Age: 68
End: 2023-03-07

## 2023-03-07 VITALS
DIASTOLIC BLOOD PRESSURE: 65 MMHG | RESPIRATION RATE: 16 BRPM | TEMPERATURE: 96.9 F | HEIGHT: 63.5 IN | OXYGEN SATURATION: 98 % | WEIGHT: 108 LBS | SYSTOLIC BLOOD PRESSURE: 120 MMHG | HEART RATE: 81 BPM | BODY MASS INDEX: 18.9 KG/M2

## 2023-03-07 DIAGNOSIS — G47.33 OBSTRUCTIVE SLEEP APNEA (ADULT) (PEDIATRIC): ICD-10-CM

## 2023-03-07 PROCEDURE — 94010 BREATHING CAPACITY TEST: CPT

## 2023-03-07 PROCEDURE — 99214 OFFICE O/P EST MOD 30 MIN: CPT | Mod: 25

## 2023-03-07 PROCEDURE — 95012 NITRIC OXIDE EXP GAS DETER: CPT

## 2023-03-07 RX ORDER — AZITHROMYCIN 500 MG/1
500 TABLET, FILM COATED ORAL
Qty: 7 | Refills: 0 | Status: DISCONTINUED | COMMUNITY
Start: 2022-04-28 | End: 2023-03-07

## 2023-03-07 NOTE — ADDENDUM
[FreeTextEntry1] : Documented by MADHAVI Peralta acting as a scribe for Dr. Moisés Rosales on 03/07/2023 .\par \par All medical record entries made by the Scribe were at my, Dr. Moisés Rosales's, direction and personally dictated by me on 03/07/2023. I have reviewed the chart and agree that the record accurately reflects my personal performance of the history, physical exam, assessment and plan. I have also personally directed, reviewed, and agree with the discharge instructions.

## 2023-03-07 NOTE — HISTORY OF PRESENT ILLNESS
[TextBox_4] : Ms. DESAI is a 67 year female with a history of ?lupus, IgA deficiency, bronchiectasis, GERD, anxiety, spinal stenosis, arthritis, raynaud's phenomenon, who now comes in for a follow up pulmonary evaluation. Her chief complaint is\par \par -she notes illness around Jason time which turned into a sinus infection\par -she notes IgG deficiency according to Dr. Kendall's PA\par -she notes using mupirocin in her nostrils as per Dr. Barron (immunologist)\par -she notes quality of breathing can be improved\par -she notes exercising \par -she notes GERD is controlled with Pepcid\par \par \par -patient denies any headaches, nausea, vomiting, fever, chills, sweats, chest pain, chest pressure, palpitations, coughing, wheezing, fatigue, diarrhea, constipation, dysphagia, myalgias, dizziness, leg swelling, leg pain, itchy eyes, itchy ears, or sour taste in the mouth

## 2023-03-07 NOTE — REASON FOR VISIT
[Follow-Up] : a follow-up visit [TextBox_44] : sob, bronchiectasis, RADS, allergy, abnormal CT chest, osas, immunodeficiency

## 2023-03-07 NOTE — PROCEDURE
[FreeTextEntry1] : Sleep study (1.25.2023) revealed sleep apnea with an AHI/THO of 7.9, and a low oxygen saturation of 88%\par \par PFT revealed normal flows, with a FEV1 of 2.15L, which is 94% of predicted, with a normal flow volume loop\par \par FENO was 15; a normal value being less than 25\par Fractional exhaled nitric oxide (FENO) is regarded as a simple, noninvasive method for assessing eosinophilic airway inflammation. Produced by a variety of cells within the lung, nitric oxide (NO) concentrations are generally low in healthy individuals. However, high concentrations of NO appear to be involved in nonspecific host defense mechanisms and chronic inflammatory diseases such as asthma. The American Thoracic Society (ATS) therefore has recommended using FENO to aid in the diagnosis and monitoring of eosinophilic airway inflammation and asthma, and for identifying steroid responsive individuals whose chronic respiratory symptoms may be caused by airway inflammation.

## 2023-03-07 NOTE — ASSESSMENT
[FreeTextEntry1] : Ms. DESAI is a 67 year female with a history of ?lupus,IgA deficiency, bronchiectasis, GERD, anxiety, spinal stenosis, arthritis, Raynaud's phenomenon, who now comes in for a follow up pulmonary evaluation. Her chief complaint is sob/ bronchiectasis/ mild RADS/ allergies/ GERD/ geronimo- s/p lipoma surgery 9/2022- mild mucus production- s/p rhino enterovirus/ parainfluenza infection- IgG/ IgA deficiency; mild GERONIMO- awaiting Rx\par \par \par The patient's SOB is felt to be multifactorial:\par -poor mechanics of breathing\par -out of shape\par -Pulmonary\par -Cardiac\par \par Problem 1A: immunodeficiency\par -recommended Dr Barron for f/u for low IgA and IgG4- quiet\par -Complete blood work to include: quantitative immunoglobulins, IgG subsets, strep pneumonia titers\par -Due to the fact that this pt has had more infections than would be expected and immunological blood work is indicated this would include: IgG subclasses, quantitative immunoglobulins, Strep pneumoniae titers as well as Vitamin D levels. Based on this blood work we will be able to decide where the pt needs additional pneumococcal vaccine either Prevnar 13 or  pneumovax. Immunology evaluation will also be potentially indicated. \par \par Problem 1: Bronchiectasis (mild reactive airways/ mild asthma) ?RATNA -stable - quiet\par -candidate for the Vest \par -Recommended getting Aerobika \par - Sputum AFB x3 evaluation ?RATNA \par -Complete strep pneumonia titers, quantitative immunoglobulins, cystic fibrosis panel, IgG subclasses \par -Seen on the CT of the chest or chest X-ray signifies damaged bronchial tubes focal or diffuse which can be sites of recurrent infections. These areas can be colonized by various organisms including bacteria (Haemophilus influenzae, Pseudomonas species, etc.), as well as acid fast bacilli (mycobacterial disease inclusive of TB/RATNA etc.) Sputum either for bacteria culture/sensitivity or AFB culture and sensitivity will need to be sent if the patient has sputum- 3 specimens on consecutive days will need to be dropped at the laboratory- if the patient can produce sputum. \par \par Problem 2: Mild RADS- quiet\par -add Symbicort 160 2 inhalations BID \par - Asthma is believed to be caused by inherited (genetic) and environmental factor, but its exact cause is unknown. asthma may be triggered by allergens, lung infections, or irritants in the air. Asthma triggers are different for each person \par - Inhaler technique reviewed as well as oral hygiene technique reviewed with patient. Avoidance of cold air, extremes of temperature, rescue inhaler should be used before exercise. Order of medication reviewed with patient. Recommended use of a cool mist humidifier in the bedroom. \par \par Problem 3:Allergies \par -s/p Blood work to include: asthma panel, food IgE panel, IgE level, eosinophil level, vitamin D level  (WNL)\par -continue Xlear 1 BID \par -add Qnasl 1 sniff BID  \par -Environmental measures for allergies were encouraged including mattress and pillow cover, air purifier, and environmental controls. \par \par Problem 4:GERD\par -continue Pepcid 40 mg QHS up to BID\par -Recommend DGL \par -Recommend Manuka honey \par -Rule of 2s: avoid eating too much, eating too late, eating too spicy, eating two hours before bed.\par - Things to avoid including overeating, spicy foods, tight clothing, eating within two hours of bed, this list is not all inclusive.\par - For treatments of reflux, possible options discussed including diet control, H2 blockers, PPIs, as well as coating motility agents discussed as treatment options. Timing of meals and proximity of last meal to sleep were discussed. If symptoms persist, a formal gastrointestinal evaluation is needed. \par \par Problem 5: GERONIMO\par -recommend Kunal Lynn \par - (Failed CPAP) \par -recommend Oxyaid, provent, AveoTSD;\par -recommended eXciteOSA therapy device (3/2023)\par -Sleep apnea is associated with adverse clinical consequences which can affect most organ systems. Cardiovascular disease risk includes arrhythmias, atrial fibrillation, hypertension, coronary artery disease, and stroke. Metabolic disorders include diabetes type 2, non-alcoholic fatty liver disease. Mood disorder especially depression; and cognitive decline especially in the elderly. Associations with chronic reflux/Florian’s esophagus some but not all inclusive. \par -Reasons include arousal consistent with hypopnea; respiratory events most prominent in REM sleep or supine position; therefore sleep staging and body position are important for accurate diagnosis and estimation of AHI. \par \par Problem 6: Abnormal CT \par -s/p CT in 6/2021; next CT 6/2022 (overdue)- rescripted \par -pt  refused xray at the current time \par - CAT scans are the only radiological modality to identify abnormality to identify abnormalities w/in the lings with regards to nodules/masses/lymph nodes. Risks, benefits were reviewed in detail. The guidelines for abnormalities include follow up CT scans at various intervals which could range from 6 weeks to 1 year intervals. If there is a change for the worse then considerations for a biopsy will be considered if you are a candidate. Second opinion evaluation with thoracic surgeon or an interventional radiologist could be offered, \par  \par Problem 7:Cardiac\par -Recommend cardiac follow up evaluation with cardiologist if needed \par \par Problem 8: out of shape\par -recommended [employer] Stretches on YouTube \par -recommended "10-Day Detox Diet" by Dr. Gregorio Deshpande \par - Weight loss, exercise and diet control were discussed and are highly encouraged. Treatment options were given such as aqua therapy, and contacting a nutritionist. Recommended to use the elliptical, stationary bike, less use of treadmill. Mindful eating was explained to the patient. Obesity is associated with worsening asthma, SOB, and potential for cardiac disease, diabetes, and other underlying medical conditions.\par \par Problem 9: Poor mechanics of breathing\par - Recommended Wim Hof and Buteyko techniques \par - Proper breathing techniques were reviewed with an emphasis on exhalation. Patient instructed to breath in for 1 second and out for four seconds. Patient was encouraged not to talk while walking.\par \par Problem 10: Health Maintenance\par -s/p COVID-19 vaccine x3 - pending updated booster\par -recommended [employer] Stretches on YouTube \par -recommended solemender\par -s/p flu shot\par -recommended strep pneumonia vaccines: Prevnar-13 vaccine, follow by Pneumo vaccine 23 one year following (completed)\par -recommended early intervention for URIs\par -recommended regular osteoporosis evaluations\par -recommended early dermatological evaluations\par -recommended after the age of 50 to the age of 70, colonoscopy every 5 years\par \par f/u in 6-8 weeks\par pt is encouraged to call or fax the office with any questions or concerns.\par

## 2023-03-08 ENCOUNTER — APPOINTMENT (OUTPATIENT)
Dept: PSYCHIATRY | Facility: CLINIC | Age: 68
End: 2023-03-08
Payer: COMMERCIAL

## 2023-03-08 PROCEDURE — 99214 OFFICE O/P EST MOD 30 MIN: CPT

## 2023-03-08 NOTE — HISTORY OF PRESENT ILLNESS
[FreeTextEntry1] : Pt is anxious about her back, pt took 100 mg of Gabapentin, pt reports she took a half of Trazodone. \par Feels she felt "foggy", she typically gets 5 hours of sleep. ( pt better on 12.5 mg takes quarter of a pill). \par Pt with continued psychosocial stress about her grand child.\par Pt has her every other weekend, but generally fills in at the last minute. \par \par Pt discussed her boundaries with her acquaintances and cleaning staff. \par Pt does have issues with complicated family dynamics.

## 2023-03-08 NOTE — DISCUSSION/SUMMARY
[FreeTextEntry1] : Pt clinically at  baseline, has curiosity and is insightful, benefits from this level of care to problem solve practically. \par focus is on consistency of messaging and discipline with her grand daughter.

## 2023-04-04 ENCOUNTER — APPOINTMENT (OUTPATIENT)
Dept: PSYCHIATRY | Facility: CLINIC | Age: 68
End: 2023-04-04
Payer: COMMERCIAL

## 2023-04-04 PROCEDURE — 99214 OFFICE O/P EST MOD 30 MIN: CPT

## 2023-04-07 NOTE — HISTORY OF PRESENT ILLNESS
[FreeTextEntry1] : Discussed the potential of having a safe space. \par Pt has tendency to get distracted by other issues involving her family. \par She would like to have the ability to have her grand child for [employer] services and Friday night. \par Pt willing to acknowledge her role in a complex dysfunctional family.

## 2023-04-07 NOTE — PLAN
[No] : No [Medication education provided] : Medication education provided. [Rationale for medication choices, possible risks/precautions, benefits, alternative treatment choices, and consequences of non-treatment discussed] : Rationale for medication choices, possible risks/precautions, benefits, alternative treatment choices, and consequences of non-treatment discussed with patient/family/caregiver  [FreeTextEntry4] : Discussed follow through with sleep apnea.  [FreeTextEntry5] : Pt continues to struggle with family issues , which affects outcome. \par Pt discussed use of Trazodone consistently. \par

## 2023-04-17 ENCOUNTER — OUTPATIENT (OUTPATIENT)
Dept: OUTPATIENT SERVICES | Facility: HOSPITAL | Age: 68
LOS: 1 days | End: 2023-04-17
Payer: COMMERCIAL

## 2023-04-17 ENCOUNTER — APPOINTMENT (OUTPATIENT)
Dept: ULTRASOUND IMAGING | Facility: HOSPITAL | Age: 68
End: 2023-04-17
Payer: COMMERCIAL

## 2023-04-17 DIAGNOSIS — R10.31 RIGHT LOWER QUADRANT PAIN: ICD-10-CM

## 2023-04-17 DIAGNOSIS — Z98.89 OTHER SPECIFIED POSTPROCEDURAL STATES: Chronic | ICD-10-CM

## 2023-04-17 PROCEDURE — 76856 US EXAM PELVIC COMPLETE: CPT | Mod: 26

## 2023-04-17 PROCEDURE — 76856 US EXAM PELVIC COMPLETE: CPT

## 2023-05-04 ENCOUNTER — APPOINTMENT (OUTPATIENT)
Dept: NEUROSURGERY | Facility: CLINIC | Age: 68
End: 2023-05-04
Payer: COMMERCIAL

## 2023-05-04 ENCOUNTER — NON-APPOINTMENT (OUTPATIENT)
Age: 68
End: 2023-05-04

## 2023-05-04 VITALS
WEIGHT: 108 LBS | HEIGHT: 63.5 IN | DIASTOLIC BLOOD PRESSURE: 68 MMHG | BODY MASS INDEX: 18.9 KG/M2 | OXYGEN SATURATION: 82 % | SYSTOLIC BLOOD PRESSURE: 101 MMHG | HEART RATE: 85 BPM

## 2023-05-04 PROCEDURE — 99204 OFFICE O/P NEW MOD 45 MIN: CPT

## 2023-05-05 NOTE — HISTORY OF PRESENT ILLNESS
[> 3 months] : more  than 3 months [FreeTextEntry1] : back pain [de-identified] : This is a very pleasant 67 year old female who presents today for her 6th opinion regarding back pain. Her pain. She states that her back pain is primarily on her right side to her buttocks with numbness and tingling in her leg.\par She states that she used to be a runner and she first noticed the pain 28 years ago. During this time she has had physical therapy, epidurals, facet injections and cold laser therapy. The pain has never really gotten better. \par \par Lifting aggravates  this pain and the tingling. Walking or sitting excessively will also aggravate it.

## 2023-05-05 NOTE — REASON FOR VISIT
[New Patient Visit] : a new patient visit [Referred By: _________] : Patient was referred by PATY [FreeTextEntry1] : back pain

## 2023-05-05 NOTE — ASSESSMENT
[FreeTextEntry1] : Impression:\par \par Very pleasant 67 year old female Here today c/o low back pain that radiates down her left leg with numbness and tingling. She is here for consult regarding a surgical opinion.\par \par \par Discussion;\par \par Surgery recommendation was a result of shared decision making. The patient has tried conservative treatment and failed therapy. A long discussion occurred regarding all surgical and non-surgical options. \par \par After detailed imaging was reviewed and the patient was examined, surgical intervention was discussed with the patient to halt progression of symptoms. The potential risks and benefits of surgical intervention as well as alternative treatment options were outlined in great detail. Adequate time was allowed for the patient to ask questions. The patient stated an understanding to all answers given. \par \par At this time she has a lot going on. She will take care of these things and plan for surgery in the fall. She will call the office when she is ready to schedule.\par \par Plan:\par \par 1) Sx: L2-5 posterior decompression and instrumented fusion\par \par 2) Patient will call when she is ready to schedule

## 2023-05-05 NOTE — PHYSICAL EXAM
[General Appearance - Alert] : alert [General Appearance - In No Acute Distress] : in no acute distress [Oriented To Time, Place, And Person] : oriented to person, place, and time [Impaired Insight] : insight and judgment were intact [Affect] : the affect was normal [Person] : oriented to person [Place] : oriented to place [Time] : oriented to time [Short Term Intact] : short term memory intact [Remote Intact] : remote memory intact [Span Intact] : the attention span was normal [Concentration Intact] : normal concentrating ability [Fluency] : fluency intact [Comprehension] : comprehension intact [Current Events] : adequate knowledge of current events [Past History] : adequate knowledge of personal past history [Vocabulary] : adequate range of vocabulary [Cranial Nerves Optic (II)] : visual acuity intact bilaterally,  pupils equal round and reactive to light [Cranial Nerves Oculomotor (III)] : extraocular motion intact [Cranial Nerves Trigeminal (V)] : facial sensation intact symmetrically [Cranial Nerves Facial (VII)] : face symmetrical [Cranial Nerves Vestibulocochlear (VIII)] : hearing was intact bilaterally [Cranial Nerves Glossopharyngeal (IX)] : tongue and palate midline [Cranial Nerves Accessory (XI - Cranial And Spinal)] : head turning and shoulder shrug symmetric [Cranial Nerves Hypoglossal (XII)] : there was no tongue deviation with protrusion [Motor Strength] : muscle strength was normal in all four extremities [No Muscle Atrophy] : normal bulk in all four extremities [Sensation Tactile Decrease] : light touch was intact [Balance] : balance was intact [2+] : Patella left 2+ [No Visual Abnormalities] : no visible abnormailities [Normal] : normal [Sclera] : the sclera and conjunctiva were normal [PERRL With Normal Accommodation] : pupils were equal in size, round, reactive to light, with normal accommodation [Extraocular Movements] : extraocular movements were intact [Outer Ear] : the ears and nose were normal in appearance [Oropharynx] : the oropharynx was normal [Neck Appearance] : the appearance of the neck was normal [Neck Cervical Mass (___cm)] : no neck mass was observed [Jugular Venous Distention Increased] : there was no jugular-venous distention [Thyroid Diffuse Enlargement] : the thyroid was not enlarged [Thyroid Nodule] : there were no palpable thyroid nodules [Auscultation Breath Sounds / Voice Sounds] : lungs were clear to auscultation bilaterally [Heart Rate And Rhythm] : heart rate was normal and rhythm regular [Heart Sounds] : normal S1 and S2 [Heart Sounds Gallop] : no gallops [Murmurs] : no murmurs [Heart Sounds Pericardial Friction Rub] : no pericardial rub [No CVA Tenderness] : no ~M costovertebral angle tenderness [No Spinal Tenderness] : no spinal tenderness [Abnormal Walk] : normal gait [Nail Clubbing] : no clubbing  or cyanosis of the fingernails [Musculoskeletal - Swelling] : no joint swelling seen [Skin Color & Pigmentation] : normal skin color and pigmentation [Motor Tone] : muscle strength and tone were normal [Skin Turgor] : normal skin turgor [] : no rash [Past-pointing] : there was no past-pointing [Tremor] : no tremor present [Able to toe walk] : the patient was not able to toe walk [Able to heel walk] : the patient was not able to heel walk

## 2023-05-09 ENCOUNTER — APPOINTMENT (OUTPATIENT)
Dept: PSYCHIATRY | Facility: CLINIC | Age: 68
End: 2023-05-09

## 2023-05-10 LAB
RAPID RVP RESULT: NOT DETECTED
SARS-COV-2 RNA PNL RESP NAA+PROBE: NOT DETECTED

## 2023-05-17 ENCOUNTER — APPOINTMENT (OUTPATIENT)
Dept: PSYCHIATRY | Facility: CLINIC | Age: 68
End: 2023-05-17
Payer: COMMERCIAL

## 2023-05-17 PROCEDURE — 99214 OFFICE O/P EST MOD 30 MIN: CPT

## 2023-05-17 RX ORDER — TRAZODONE HYDROCHLORIDE 50 MG/1
50 TABLET ORAL
Qty: 15 | Refills: 1 | Status: DISCONTINUED | COMMUNITY
Start: 2021-04-19 | End: 2023-05-17

## 2023-05-17 RX ORDER — LORAZEPAM 0.5 MG/1
0.5 TABLET ORAL DAILY
Qty: 30 | Refills: 0 | Status: DISCONTINUED | COMMUNITY
Start: 2022-08-12 | End: 2023-05-17

## 2023-05-17 RX ORDER — HYDROXYZINE HYDROCHLORIDE 10 MG/1
10 TABLET ORAL
Qty: 30 | Refills: 0 | Status: DISCONTINUED | COMMUNITY
Start: 2022-09-16 | End: 2023-05-17

## 2023-05-17 NOTE — HISTORY OF PRESENT ILLNESS
[FreeTextEntry1] : Pt continues to state her grandchild is not having the social life she would want, the school life she would like, as well not being able to learn about her [employer] and other values. She is not included as well in her cousins lives. \par Pt reports that she is going to court about her grand child. \par She is feeling she is worried about reprisals from both her grandchild's mother as well as the paternal grandmother. \par Pt reports she is now suing for full custody of her grandchild, who may be going under precocious puberty which is another stress.

## 2023-05-17 NOTE — REASON FOR VISIT
[Patient] : Patient [FreeTextEntry1] : Pt seen and evaluated today for on going issues with grandparenting.

## 2023-05-17 NOTE — DISCUSSION/SUMMARY
[FreeTextEntry1] : Pt reports she would want to be prescribed Belsomra- writer today suggests that she follow up with her pulmonologist. \par Pt reports that she has interrupted sleep, will wake up after a few hours. \par \par Pt encouraged to comply with Gabapentin for neuropathy for spinal stenosis. \par Pt is fearful that she will not stay healthy enough for her grand daughter to have her.  [Potential impact of patient’s physical health conditions on psychiatric care?] : Potential impact of patient’s physical health conditions on psychiatric care: No [Does patient require any additional health services or referrals?] : Does patient require any additional health services or referrals: No

## 2023-05-21 ENCOUNTER — RX RENEWAL (OUTPATIENT)
Age: 68
End: 2023-05-21

## 2023-06-14 ENCOUNTER — APPOINTMENT (OUTPATIENT)
Dept: PSYCHIATRY | Facility: CLINIC | Age: 68
End: 2023-06-14
Payer: COMMERCIAL

## 2023-06-14 PROCEDURE — 99213 OFFICE O/P EST LOW 20 MIN: CPT

## 2023-06-14 RX ORDER — RAMELTEON 8 MG/1
8 TABLET ORAL
Qty: 30 | Refills: 1 | Status: DISCONTINUED | COMMUNITY
Start: 2023-05-17 | End: 2023-06-14

## 2023-06-15 NOTE — HISTORY OF PRESENT ILLNESS
[FreeTextEntry1] : Working on current legal issues, and writer is attempting to define what are reachable goals. \par 1). Pt wants full weekends. \par 2) Would want her daughter in law to not expose her grandchild to marijuana. \par 3). Feels that she can tolerate the toxic/codependent relationship with her spouse. \par 4). Pt would like boundaries between her son , grand daughter, and spouse. \par \par Pt wants essentially to "save" her grand daughter, encouraged to live a life outside of her grandchild.

## 2023-06-15 NOTE — PHYSICAL EXAM
[Well groomed] : well groomed [Cooperative] : cooperative [Euthymic] : euthymic [Full] : full [Clear] : clear [Linear/Goal Directed] : linear/goal directed [None] : none [None Reported] : none reported [Average] : average [WNL] : within normal limits [de-identified] : Pt advised not to take - THC, discussed what she can take for sleep, that will avoid vertigo.

## 2023-06-15 NOTE — PLAN
[No] : No [Medication education provided] : Medication education provided. [Rationale for medication choices, possible risks/precautions, benefits, alternative treatment choices, and consequences of non-treatment discussed] : Rationale for medication choices, possible risks/precautions, benefits, alternative treatment choices, and consequences of non-treatment discussed with patient/family/caregiver  [FreeTextEntry4] : Pt  to continue current medications, if stronger medication needed for sleep would refer for CBT-i.\par  [FreeTextEntry5] : Pt clinically stable, benefits from this level of care but remains perseverative about her psychosocial stresses, encouraged to travel and find enjoyment.

## 2023-06-20 ENCOUNTER — APPOINTMENT (OUTPATIENT)
Dept: PULMONOLOGY | Facility: CLINIC | Age: 68
End: 2023-06-20
Payer: COMMERCIAL

## 2023-06-20 VITALS
HEART RATE: 89 BPM | HEIGHT: 63.5 IN | TEMPERATURE: 97.1 F | BODY MASS INDEX: 18.9 KG/M2 | DIASTOLIC BLOOD PRESSURE: 72 MMHG | WEIGHT: 108 LBS | OXYGEN SATURATION: 97 % | SYSTOLIC BLOOD PRESSURE: 108 MMHG | RESPIRATION RATE: 16 BRPM

## 2023-06-20 PROCEDURE — 94727 GAS DIL/WSHOT DETER LNG VOL: CPT

## 2023-06-20 PROCEDURE — 94729 DIFFUSING CAPACITY: CPT

## 2023-06-20 PROCEDURE — 95012 NITRIC OXIDE EXP GAS DETER: CPT

## 2023-06-20 PROCEDURE — 99214 OFFICE O/P EST MOD 30 MIN: CPT | Mod: 25

## 2023-06-20 PROCEDURE — 94010 BREATHING CAPACITY TEST: CPT

## 2023-06-20 NOTE — ADDENDUM
[FreeTextEntry1] : Documented by Farzana Gould acting as a scribe for Dr. Moisés Rosales on 06/20/2023 \par \par All medical record entries made by the Scribe were at my, Dr. Moisés Rosales's, direction and personally dictated by me on 06/20/2023 . I have reviewed the chart and agree that the record accurately reflects my personal performance of the history, physical exam, assessment and plan. I have also personally directed, reviewed, and agree with the discharge instructions.

## 2023-06-20 NOTE — PROCEDURE
[FreeTextEntry1] : FULL PFTs reveals normal flows; FEV1 was 2.32 L which is 104% of predicted; normal lung volumes; normal diffusion of 14.8, which is 94% of predicted; normal flow volume loop ;PFT's were performed for the evaluation of Asthma and SOB\par \par FENO was 11; normal value being less than 25\par Fractional exhaled nitric oxide (FENO) is regarded as a simple, noninvasive method for assessing eosinophilic airway inflammation. Produced by a variety of cells within the lung, nitric oxide (NO) concentrations are generally low in healthy individuals. However, high concentrations of NO appear to be involved in nonspecific host defense mechanisms and chronic inflammatory diseases such as asthma. The American Thoracic Society (ATS) therefore has recommended using FENO to aid in the diagnosis and monitoring of eosinophilic airway inflammation and asthma, and for identifying steroid responsive individuals whose chronic respiratory symptoms may be airway inflammation.

## 2023-06-20 NOTE — ASSESSMENT
[FreeTextEntry1] : Ms. DESAI is a 67 year female with a history of ?lupus,IgA deficiency, bronchiectasis, GERD, anxiety, spinal stenosis, arthritis, Raynaud's phenomenon, who now comes in for a follow up pulmonary evaluation. Her chief complaint is sob/ bronchiectasis/ mild RADS/ allergies/ GERD/ geronimo- s/p lipoma surgery 9/2022- mild mucus production- s/p rhino enterovirus/ parainfluenza infection- IgG/ IgA deficiency; mild GERONIMO- awaiting Rx (still) poor immunity (frequent infection)\par \par The patient's SOB is felt to be multifactorial:\par -poor mechanics of breathing\par -out of shape\par -Pulmonary\par -Cardiac\par \par Problem 1A: immunodeficiency \par - recommended SaNOtize (anti-viral nasal spray) \par -recommended Dr Barron for f/u for low IgA and IgG4- quiet\par -Complete blood work to include: quantitative immunoglobulins, IgG subsets, strep pneumonia titers\par -Due to the fact that this pt has had more infections than would be expected and immunological blood work is indicated this would include: IgG subclasses, quantitative immunoglobulins, Strep pneumoniae titers as well as Vitamin D levels. Based on this blood work we will be able to decide where the pt needs additional pneumococcal vaccine either Prevnar 13 or pneumovax. Immunology evaluation will also be potentially indicated. \par \par Problem 1: Bronchiectasis (mild reactive airways/ mild asthma) ?RATNA -stable - quiet\par -candidate for the Vest \par -Recommended getting Aerobika \par - Sputum AFB x3 evaluation ?RATNA \par -Complete strep pneumonia titers, quantitative immunoglobulins, cystic fibrosis panel, IgG subclasses \par -Seen on the CT of the chest or chest X-ray signifies damaged bronchial tubes focal or diffuse which can be sites of recurrent infections. These areas can be colonized by various organisms including bacteria (Haemophilus influenzae, Pseudomonas species, etc.), as well as acid fast bacilli (mycobacterial disease inclusive of TB/RATNA etc.) Sputum either for bacteria culture/sensitivity or AFB culture and sensitivity will need to be sent if the patient has sputum- 3 specimens on consecutive days will need to be dropped at the laboratory- if the patient can produce sputum. \par \par Problem 2: Mild RADS- quiet\par -add Symbicort 160 2 inhalations BID \par - Asthma is believed to be caused by inherited (genetic) and environmental factor, but its exact cause is unknown. asthma may be triggered by allergens, lung infections, or irritants in the air. Asthma triggers are different for each person \par - Inhaler technique reviewed as well as oral hygiene technique reviewed with patient. Avoidance of cold air, extremes of temperature, rescue inhaler should be used before exercise. Order of medication reviewed with patient. Recommended use of a cool mist humidifier in the bedroom. \par \par Problem 3:Allergies \par -s/p Blood work to include: asthma panel, food IgE panel, IgE level, eosinophil level, vitamin D level  (WNL)\par -continue Xlear 1 BID \par -add Qnasl 1 sniff BID  \par -Environmental measures for allergies were encouraged including mattress and pillow cover, air purifier, and environmental controls. \par \par Problem 4:GERD\par -continue Pepcid 40 mg QHS up to BID\par -Recommend DGL \par -Recommend Manuka honey \par -Rule of 2s: avoid eating too much, eating too late, eating too spicy, eating two hours before bed.\par - Things to avoid including overeating, spicy foods, tight clothing, eating within two hours of bed, this list is not all inclusive.\par - For treatments of reflux, possible options discussed including diet control, H2 blockers, PPIs, as well as coating motility agents discussed as treatment options. Timing of meals and proximity of last meal to sleep were discussed. If symptoms persist, a formal gastrointestinal evaluation is needed. \par \par Problem 5: GERONIMO\par -recommend Kunal Lynn \par - (Failed CPAP) \par -recommend Oxyaid, provent, AveoTSD;\par -recommended eXciteOSA therapy device (3/2023) \par - recommended INAP\par -Sleep apnea is associated with adverse clinical consequences which can affect most organ systems. Cardiovascular disease risk includes arrhythmias, atrial fibrillation, hypertension, coronary artery disease, and stroke. Metabolic disorders include diabetes type 2, non-alcoholic fatty liver disease. Mood disorder especially depression; and cognitive decline especially in the elderly. Associations with chronic reflux/Florian’s esophagus some but not all inclusive. \par -Reasons include arousal consistent with hypopnea; respiratory events most prominent in REM sleep or supine position; therefore sleep staging and body position are important for accurate diagnosis and estimation of AHI. \par \par Problem 6: Abnormal CT \par -s/p CT in 6/2021; next CT 6/2022 (overdue)- rescripted (NC w/ script)\par -pt  refused xray at the current time \par - CAT scans are the only radiological modality to identify abnormality to identify abnormalities w/in the lings with regards to nodules/masses/lymph nodes. Risks, benefits were reviewed in detail. The guidelines for abnormalities include follow up CT scans at various intervals which could range from 6 weeks to 1 year intervals. If there is a change for the worse then considerations for a biopsy will be considered if you are a candidate. Second opinion evaluation with thoracic surgeon or an interventional radiologist could be offered, \par  \par Problem 7:Cardiac\par -Recommend cardiac follow up evaluation with cardiologist if needed \par \par Problem 8: out of shape\par -recommended [employer] Stretches on YouTube \par -recommended "10-Day Detox Diet" by Dr. Gregorio Deshpande \par - Weight loss, exercise and diet control were discussed and are highly encouraged. Treatment options were given such as aqua therapy, and contacting a nutritionist. Recommended to use the elliptical, stationary bike, less use of treadmill. Mindful eating was explained to the patient. Obesity is associated with worsening asthma, SOB, and potential for cardiac disease, diabetes, and other underlying medical conditions.\par \par Problem 9: Poor mechanics of breathing\par - Recommended Wim Hof and Buteyko techniques \par - Proper breathing techniques were reviewed with an emphasis on exhalation. Patient instructed to breath in for 1 second and out for four seconds. Patient was encouraged not to talk while walking.\par \par Problem 10: Health Maintenance\par - ortho - Yola and Anders\par -s/p COVID-19 vaccine x3 - pending updated booster\par -recommended [employer] Stretches on YouTube \par -recommended solemender\par -s/p flu shot\par -recommended strep pneumonia vaccines: Prevnar-13 vaccine, follow by Pneumo vaccine 23 one year following (completed)\par -recommended early intervention for URIs\par -recommended regular osteoporosis evaluations\par -recommended early dermatological evaluations\par -recommended after the age of 50 to the age of 70, colonoscopy every 5 years\par \par f/u in 6-8 weeks\par pt is encouraged to call or fax the office with any questions or concerns.\par

## 2023-06-20 NOTE — HISTORY OF PRESENT ILLNESS
[TextBox_4] : Ms. DESAI is a 67 year female with a history of ?lupus, IgA deficiency, bronchiectasis, GERD, anxiety, spinal stenosis, arthritis, raynaud's phenomenon, who now comes in for a follow up pulmonary evaluation. Her chief complaint is\par \par - she notes she is having GI issues (Dr. Sherman)\par - she notes taking Famotidine and Pepcid at night for her chronic reflux \par - she notes she hydrates a lot, results in nocturia\par - appetite is good\par - she notes after her bronchiectasis taking a deep breath is hard\par - she notes having 2 colds (URI) in the past couple months, but seems to be fighting it all year round \par - she notes she always has trouble sleeping\par - she notes her breathing has been good \par - she notes last November she had a great amount of back pain due to severe spinal stenosis (surgery recommended by ortho)\par \par -She denies any visual issues, headaches, nausea, vomiting, fever, chills, sweats, chest pains, chest pressure, diarrhea, constipation, dysphagia, myalgia, dizziness, leg swelling, leg pain, itchy eyes, itchy ears, heartburn, or sour taste in the mouth.

## 2023-06-29 ENCOUNTER — RX RENEWAL (OUTPATIENT)
Age: 68
End: 2023-06-29

## 2023-07-11 ENCOUNTER — APPOINTMENT (OUTPATIENT)
Dept: PULMONOLOGY | Facility: CLINIC | Age: 68
End: 2023-07-11

## 2023-07-19 ENCOUNTER — NON-APPOINTMENT (OUTPATIENT)
Age: 68
End: 2023-07-19

## 2023-07-21 ENCOUNTER — APPOINTMENT (OUTPATIENT)
Dept: PSYCHIATRY | Facility: CLINIC | Age: 68
End: 2023-07-21
Payer: COMMERCIAL

## 2023-07-21 PROCEDURE — 99214 OFFICE O/P EST MOD 30 MIN: CPT

## 2023-07-23 NOTE — HISTORY OF PRESENT ILLNESS
[FreeTextEntry1] : Pt did hear from her sponsor- and had gotten good advice about how to cope with her home life and chaos. \par Pt states she was advised to read the book- " I hate you . don't  leave me", which may help with borderline personality disorder. \par Pt reports that she has a lot of restrictions in terms of care of her grand daughter. Pt is worried that there will be reprisals for returning to court. \par She reports her middle child has had alcohol counseling.

## 2023-07-23 NOTE — PLAN
[No] : No [FreeTextEntry4] : Meeting treatment goals, comes in general to problem solve and ventilate regarding her family dynamics and complicated interpersonal relationships with nuclear family/ and now grand daughter.  [FreeTextEntry5] : Continue Fetzima encouraged to utilize Gabapentin given spinal  cord findings / sleep and anxiety sx.

## 2023-08-03 ENCOUNTER — APPOINTMENT (OUTPATIENT)
Dept: ENDOCRINOLOGY | Facility: CLINIC | Age: 68
End: 2023-08-03
Payer: COMMERCIAL

## 2023-08-03 VITALS
HEIGHT: 63.5 IN | HEART RATE: 84 BPM | DIASTOLIC BLOOD PRESSURE: 80 MMHG | OXYGEN SATURATION: 96 % | WEIGHT: 105 LBS | SYSTOLIC BLOOD PRESSURE: 110 MMHG | BODY MASS INDEX: 18.38 KG/M2

## 2023-08-03 DIAGNOSIS — Z84.89 FAMILY HISTORY OF OTHER SPECIFIED CONDITIONS: ICD-10-CM

## 2023-08-03 PROCEDURE — 99204 OFFICE O/P NEW MOD 45 MIN: CPT

## 2023-08-04 PROBLEM — Z84.89 FAMILY HISTORY OF HIP FRACTURE: Status: ACTIVE | Noted: 2023-08-04

## 2023-08-04 RX ORDER — DICLOFENAC SODIUM AND MISOPROSTOL 50; 200 MG/1; UG/1
50-0.2 TABLET, DELAYED RELEASE ORAL
Qty: 60 | Refills: 3 | Status: DISCONTINUED | COMMUNITY
Start: 2022-12-09 | End: 2023-08-04

## 2023-08-04 RX ORDER — LEVOCETIRIZINE DIHYDROCHLORIDE 5 MG/1
5 TABLET ORAL
Qty: 1 | Refills: 1 | Status: DISCONTINUED | COMMUNITY
Start: 2022-04-25 | End: 2023-08-04

## 2023-08-04 RX ORDER — IPRATROPIUM BROMIDE 42 UG/1
0.06 SPRAY NASAL
Qty: 15 | Refills: 0 | Status: DISCONTINUED | COMMUNITY
Start: 2023-01-30 | End: 2023-08-04

## 2023-08-04 RX ORDER — HYALURONATE SODIUM, STABILIZED 88 MG/4 ML
88 SYRINGE (ML) INTRAARTICULAR
Qty: 1 | Refills: 0 | Status: DISCONTINUED | OUTPATIENT
Start: 2019-01-24 | End: 2023-08-04

## 2023-08-04 RX ORDER — GLYCOPYRROLATE 0.2 MG/ML
INJECTION INTRAMUSCULAR; INTRAVENOUS
Refills: 0 | Status: DISCONTINUED | COMMUNITY
End: 2023-08-04

## 2023-08-04 RX ORDER — HYALURONATE SODIUM, STABILIZED 88 MG/4 ML
88 SYRINGE (ML) INTRAARTICULAR
Qty: 1 | Refills: 0 | Status: DISCONTINUED | COMMUNITY
Start: 2022-11-11 | End: 2023-08-04

## 2023-08-04 RX ORDER — MUPIROCIN 20 MG/G
2 OINTMENT TOPICAL
Qty: 22 | Refills: 0 | Status: DISCONTINUED | COMMUNITY
Start: 2021-10-12 | End: 2023-08-04

## 2023-08-04 RX ORDER — HYDROCODONE BITARTRATE AND ACETAMINOPHEN 5; 325 MG/1; MG/1
5-325 TABLET ORAL
Qty: 20 | Refills: 0 | Status: DISCONTINUED | COMMUNITY
Start: 2021-10-12 | End: 2023-08-04

## 2023-08-04 RX ORDER — BENZONATATE 200 MG/1
200 CAPSULE ORAL
Qty: 90 | Refills: 1 | Status: DISCONTINUED | COMMUNITY
Start: 2022-04-25 | End: 2023-08-04

## 2023-08-04 RX ORDER — CHROMIUM 200 MCG
TABLET ORAL
Refills: 0 | Status: ACTIVE | COMMUNITY

## 2023-08-04 RX ORDER — ALBUTEROL SULFATE 90 UG/1
108 (90 BASE) POWDER, METERED RESPIRATORY (INHALATION)
Qty: 1 | Refills: 3 | Status: DISCONTINUED | COMMUNITY
Start: 2023-01-03 | End: 2023-08-04

## 2023-08-04 RX ORDER — OSELTAMIVIR PHOSPHATE 75 MG/1
75 CAPSULE ORAL TWICE DAILY
Qty: 1 | Refills: 0 | Status: DISCONTINUED | COMMUNITY
Start: 2022-11-25 | End: 2023-08-04

## 2023-08-04 RX ORDER — DIAZEPAM 5 MG/1
5 TABLET ORAL
Qty: 4 | Refills: 0 | Status: DISCONTINUED | COMMUNITY
Start: 2021-10-12 | End: 2023-08-04

## 2023-08-04 RX ORDER — HYALURONATE SODIUM 20 MG/2 ML
20 SYRINGE (ML) INTRAARTICULAR
Qty: 1 | Refills: 0 | Status: DISCONTINUED | OUTPATIENT
Start: 2019-01-24 | End: 2023-08-04

## 2023-08-04 RX ORDER — UBIDECARENONE 100 MG
CAPSULE ORAL
Refills: 0 | Status: ACTIVE | COMMUNITY

## 2023-08-04 RX ORDER — BECLOMETHASONE DIPROPIONATE 80 UG/1
80 AEROSOL, METERED NASAL
Qty: 3 | Refills: 1 | Status: DISCONTINUED | COMMUNITY
Start: 2023-03-07 | End: 2023-08-04

## 2023-08-04 RX ORDER — SUCRALFATE 1 G/10ML
1 SUSPENSION ORAL
Qty: 10 | Refills: 0 | Status: DISCONTINUED | COMMUNITY
Start: 2022-04-06 | End: 2023-08-04

## 2023-08-04 RX ORDER — ONDANSETRON 4 MG/1
4 TABLET, ORALLY DISINTEGRATING ORAL
Qty: 20 | Refills: 0 | Status: DISCONTINUED | COMMUNITY
Start: 2021-10-12 | End: 2023-08-04

## 2023-08-04 RX ORDER — OXYCODONE AND ACETAMINOPHEN 5; 325 MG/1; MG/1
5-325 TABLET ORAL
Qty: 15 | Refills: 0 | Status: DISCONTINUED | COMMUNITY
Start: 2022-09-18 | End: 2023-08-04

## 2023-08-04 RX ORDER — AZELASTINE HYDROCHLORIDE 137 UG/1
0.1 SPRAY, METERED NASAL TWICE DAILY
Qty: 1 | Refills: 3 | Status: DISCONTINUED | COMMUNITY
Start: 2023-01-04 | End: 2023-08-04

## 2023-08-04 RX ORDER — GLYCOPYRROLATE 25 UG/ML
25 SOLUTION RESPIRATORY (INHALATION)
Qty: 1 | Refills: 3 | Status: DISCONTINUED | COMMUNITY
Start: 2020-10-14 | End: 2023-08-04

## 2023-08-04 RX ORDER — MUCUS CLEARING DEVICE
EACH MISCELLANEOUS
Qty: 1 | Refills: 0 | Status: DISCONTINUED | OUTPATIENT
Start: 2021-09-21 | End: 2023-08-04

## 2023-08-04 RX ORDER — OMEGA-3S/DHA/EPA/FISH OIL 300-1000MG
CAPSULE ORAL
Refills: 0 | Status: ACTIVE | COMMUNITY

## 2023-08-04 RX ORDER — AZITHROMYCIN 500 MG/1
500 TABLET, FILM COATED ORAL
Qty: 5 | Refills: 0 | Status: DISCONTINUED | COMMUNITY
Start: 2023-05-10 | End: 2023-08-04

## 2023-08-04 RX ORDER — ACYCLOVIR 400 MG/1
400 TABLET ORAL
Qty: 21 | Refills: 0 | Status: DISCONTINUED | COMMUNITY
Start: 2021-10-12 | End: 2023-08-04

## 2023-08-04 RX ORDER — CEPHALEXIN 500 MG/1
500 CAPSULE ORAL
Qty: 21 | Refills: 0 | Status: DISCONTINUED | COMMUNITY
Start: 2021-10-12 | End: 2023-08-04

## 2023-08-04 RX ORDER — ATORVASTATIN CALCIUM 10 MG/1
10 TABLET, FILM COATED ORAL
Refills: 0 | Status: ACTIVE | COMMUNITY

## 2023-08-04 RX ORDER — GABAPENTIN 100 MG/1
100 CAPSULE ORAL
Qty: 30 | Refills: 1 | Status: DISCONTINUED | COMMUNITY
Start: 2023-07-21 | End: 2023-08-04

## 2023-08-04 RX ORDER — BUDESONIDE AND FORMOTEROL FUMARATE DIHYDRATE 160; 4.5 UG/1; UG/1
160-4.5 AEROSOL RESPIRATORY (INHALATION) TWICE DAILY
Qty: 30.6 | Refills: 1 | Status: DISCONTINUED | COMMUNITY
Start: 2022-06-24 | End: 2023-08-04

## 2023-08-04 RX ORDER — TIOTROPIUM BROMIDE AND OLODATEROL 3.124; 2.736 UG/1; UG/1
2.5-2.5 SPRAY, METERED RESPIRATORY (INHALATION) DAILY
Qty: 1 | Refills: 5 | Status: DISCONTINUED | COMMUNITY
Start: 2021-09-21 | End: 2023-08-04

## 2023-08-04 RX ORDER — HYOSCYAMINE SULFATE 0.12 MG/1
0.12 TABLET SUBLINGUAL
Refills: 0 | Status: DISCONTINUED | COMMUNITY
Start: 2021-10-13 | End: 2023-08-04

## 2023-08-04 RX ORDER — ASCORBIC ACID 500 MG
TABLET ORAL
Refills: 0 | Status: ACTIVE | COMMUNITY

## 2023-08-04 RX ORDER — HYALURONATE SODIUM 10 MG/ML
25 SYRINGE (ML) INTRAARTICULAR
Qty: 1 | Refills: 0 | Status: DISCONTINUED | COMMUNITY
Start: 2019-02-28 | End: 2023-08-04

## 2023-08-08 LAB
COLLAGEN NTX UR-SCNC: 28 NMOL BCE
COLLAGEN NTX/CREAT UR-SRTO: 11
CREAT UR-MCNC: 28.3 MG/DL
INTERPRETIVE GUIDE:: NORMAL

## 2023-08-09 ENCOUNTER — APPOINTMENT (OUTPATIENT)
Dept: PSYCHIATRY | Facility: CLINIC | Age: 68
End: 2023-08-09
Payer: COMMERCIAL

## 2023-08-09 PROCEDURE — 99214 OFFICE O/P EST MOD 30 MIN: CPT

## 2023-08-09 NOTE — HISTORY OF PRESENT ILLNESS
[No Known Substance Use] : no known substance use [No] : no [de-identified] : Pt reports that she continues to have interpersonal conflict with her granddaughter's family.  She has been looking for a therapist for the family - as per pt it is court mandated.  Pt reports that she had 6 sessions with a therapist and has a financial dispute with her.  Pt reports she is supposed to get back surgery for spinal stenosis- but pt is ambivalent about it, she also has osteoporosis, and  Pt has limited visitation with her granddaughter, pt wants to be able to broach topic of new therapist with her granddaughter's mother.  Grand daughter was ill during her time with her. Pt reports that she has fears of traveling and worries about dying young.  Pt states she took half a Belsomra- and per pt it did not work. Encouraged to take Zolpidem- for traveling and to try it before going overseas.

## 2023-08-09 NOTE — DISCUSSION/SUMMARY
[FreeTextEntry1] : Pt reports she has a lot of medications for sleep, but generally is afraid to use anything other than Alprazolam.  Pt has a brief trial of Zolpidem, Gabapentin Valium for back spasm and spinal stenosis.

## 2023-08-09 NOTE — PHYSICAL EXAM
[Well groomed] : well groomed [Average] : average [WNL] : within normal limits [Accelerated] : accelerated [Cooperative] : cooperative [Full] : full

## 2023-08-09 NOTE — CURRENT PSYCHIATRIC SYMPTOMS
[Anhedonia] : no anhedonia [Guilt] : not feeling guilty [Decreased Concentration] : decreased concentrating ability [Hyperphagia] : no hyperphagia [Insomnia] : insomnia [Hypersomnia] : no ~T hypersomnia [Psychomotor Agitation] : no agitation [Psychomotor Retardation] : no psychomotor retardation [Anorexia] : no anorexia [Euphoria] : no euphoria [Highly Irritable] : no high irritability [Increased Activity] : increased activity [Distractibility] : distractibility [Talkativeness] : talkativeness [Grandeur] : no feelings of grandeur [Buying Sprees] : no buying sprees [Hypersexuality] : denied hypersexuality [Dec Need For Sleep] : no decreased need for sleep [Delusions] : no ~T delusions [Hallucination Visual] : no visual hallucinations [Hallucination Auditory] : no auditory hallucinations [Hallucination Tactile] : no tactile hallucinations [Thought Disorder] : ~T a thought disorder was not noted [Excessive Worry] : excessive worry [Ruminations] : no rumination disorder [Obsessions] : no obsessions [Re-experiencing] : re-experiencing [Restlessness] : no restlessness [Hypochondriasis] : no hypochondriasis [Panic] : panic  [Recent Onset] : no recent onset of confusion [Fluctuating Course] : no fluctuating course [Reversed sleep-wake] : no reversed sleep- wake [Inattentive] : not nattentive [de-identified] : n/a [de-identified] : n/a

## 2023-08-10 NOTE — REASON FOR VISIT
[Consultation] : a consultation visit [Osteoporosis] : osteoporosis [FreeTextEntry2] : Deloris Cordero NP/ Dr Del Toro

## 2023-08-10 NOTE — ASSESSMENT
[FreeTextEntry1] : 67-year-old female with mildly low bone density referred for management of osteoporosis.  The patient may need spine surgery.  She is currently on medication for osteoporosis Prolia with questionable decrease in single vertebral body L1.  Patient advised that use of single vertebral bodies is statistically less reliable.  Hip bone density appears stable and moderate. Family history of hip fracture in mother but no other unusual risk factors for osteoporosis. I requested prior medical records be sent for review for better comparison of bone density over time.2.5 osteoporosis prior report -2.5 Check collagen cross-links to assess efficacy of anti-resorptive Rx. Pt advised that this may not be covered by insurance.  I have reviewed the current data concerning need for use of anabolic therapy in preparation for spinal surgery.  The patient appears to have only borderline low bone mass and anabolic therapy 3 standard for that purpose.  With if we do consider anabolic therapy Evenity would be the most likely choice.  Previous studies of PTH analog Forteo may not be considered after Prolia was not effective.  The patient has no specific contraindications to Evenity such as cardiovascular disease.  I have deferred any formal recommendations pending review of prior evaluation by Dr. Null as well as current biochemical marker of bone turnover. Recommended calcium 500 mg per day, vitamin D. 1000 units per day, in addition to dietary intake.   Addendum Ca 9.9 PTH 81

## 2023-08-10 NOTE — CONSULT LETTER
[Consult Letter:] : I had the pleasure of evaluating your patient, [unfilled]. [FreeTextEntry2] : Janie Cordero NP

## 2023-08-10 NOTE — HISTORY OF PRESENT ILLNESS
[FreeTextEntry1] : 67-year-old female referred for management of osteoporosis.  The patient may need future cervical spine surgery and was referred for management of low bone mass.  Patient has been told of low bone density osteoporosis or osteopenia for many years.  She had been treated in the past by Dr. Mandy Null.  She had tried Actonel in the past and did not tolerate due to upper GI symptoms.  She has received 2 doses of Prolia which she appears to be tolerating.  Submitted bone density June 6, 2023  from Dr. Null spine only -2.5.  This report is decreased versus 2022.  L2-3 and 4 excluded due to arthritis.   Femoral neck -2.2  Total hip -1.8  Proximal radius not performed She has had a fracture of the foot but no classic osteoporosis related fractures. Prior medical history is notable for bronchiectasis. History of maternal hip fracture. Pt has no Hx of kidney stones or calcium issues. No Hx of anorexia nervosa, premature menopause, amenorrhea during reproductive years. No h/o celiac disease, malabsorption, nutritional deficiencies. . No ulcers or bleeding. No other  unusual risks for osteoporosis such as   OI. No Hx of RT. No chronic prednisone use. No Hx of Paget's disease. No Hx of DVT or PE. Up to date with routine care.

## 2023-08-24 ENCOUNTER — APPOINTMENT (OUTPATIENT)
Dept: PULMONOLOGY | Facility: CLINIC | Age: 68
End: 2023-08-24
Payer: COMMERCIAL

## 2023-08-24 PROCEDURE — 99213 OFFICE O/P EST LOW 20 MIN: CPT | Mod: 95

## 2023-08-29 NOTE — REASON FOR VISIT
[Home] : at home, [unfilled] , at the time of the visit. [Medical Office: ([facility])___] : at the medical office located in  [Patient] : the patient [Follow-Up] : a follow-up visit [Bronchiectasis] : bronchiectasis [TextBox_44] : RAD,

## 2023-08-29 NOTE — HISTORY OF PRESENT ILLNESS
[TextBox_4] : Ms. DESAI is a 67 year female with a history of ?lupus, IgA deficiency, bronchiectasis, GERD, anxiety, spinal stenosis, arthritis, Raynaud's phenomenon, who presents via video for an acute visit.   Her chief complaint is URI.   Patient reports last Friday she experienced cold like symptoms. She states she took Mucinex with benefit. Patient states she feels better today. She had COVID test home which was negative. Patient states she is complaint with levalbuterol and Symbicort inhaler.  Patient states she is experiencing PND but is not using any nasal spray.  She states she has Xhance and azelastine nasal spray.   Patient denies fever, chills, SOB @ rest or exertion, cough, wheezing, or heartburn/reflux.

## 2023-08-29 NOTE — ASSESSMENT
[FreeTextEntry1] : Ms. DESAI is a 67 year female with a history of ?lupus, IgA deficiency, bronchiectasis, GERD, anxiety, spinal stenosis, arthritis, Raynaud's phenomenon, who presents via video for an acute visit.   1. PND -restart Xhance 1 spray BID -restart Azelastine nasal spray 2 spray BID  2. Mild RADS -add levalbuterol inhaler 2 puffs Q6H -continue Symbicort 160 2 inhalations BID  3.GERD -continue Pepcid 40 mg QHS up to BID  RX for COVID test and RVP in chart. Recommended patient get RVP done at [facility] urgent care if any other symptoms develop.   Patient to follow up with Dr. Rosales in 10/17/2023. Patient to call with further questions and concerns. Patient verbalizes understanding of care plan and is agreeable.

## 2023-09-04 LAB — BACTERIA SPT CULT: NORMAL

## 2023-09-05 ENCOUNTER — APPOINTMENT (OUTPATIENT)
Dept: PSYCHIATRY | Facility: CLINIC | Age: 68
End: 2023-09-05
Payer: COMMERCIAL

## 2023-09-05 LAB
M PNEUMO IGG SER IA-ACNC: POSITIVE
M PNEUMO IGG SER QL IA: 1.19 INDEX
M PNEUMO IGM SER QL IA: 0.53 INDEX
MYCOPLASMA AG SPEC QL: NEGATIVE

## 2023-09-05 PROCEDURE — 99214 OFFICE O/P EST MOD 30 MIN: CPT

## 2023-09-05 RX ORDER — FAMOTIDINE 40 MG/1
40 TABLET, FILM COATED ORAL
Qty: 90 | Refills: 0 | Status: ACTIVE | COMMUNITY
Start: 2021-09-21 | End: 1900-01-01

## 2023-09-06 NOTE — HISTORY OF PRESENT ILLNESS
[FreeTextEntry1] : Pt continues to have conflict as the grandmother of an elementary aged child, pt grandchild unfortunately in the middle of significant family dysfunction. Pt comes into office primarily to review her interactions and to try and follow advice regarding limit setting and boundaries.  Pt states she has now found an individual who therapist who is working on her doctorate. Pt upset about cost of previous family therapist- as a lot of money as per pt was charged with little change in terms of outcome. Pt denied mood symptoms other than anxiety and some sleep, and somatic complaints related to that with limited sx panic attacks, fevers, GI upset. Positive feedback given to pt to getting literature on personality disorders and slowly reading it, pt has tendency to seek advice from too many sources, pod Manish banegas, friends, and in part pt insightful and able to see this as part of an intergenerational pattern of conflict and complicated family dynamics.

## 2023-09-06 NOTE — DISCUSSION/SUMMARY
[FreeTextEntry1] : Discussed use of two benzodiazepines, as per pt she takes Valium sparingly and only for back spasms.   I stop checked. A	N	N	B	07/17/2023	07/17/2023	diazepam 5 mg tablet	30	30	GreenSloan MD	[id number]	Insurance	[facility] [id number] A	N	N	B	05/15/2023	05/16/2023	diazepam 5 mg tablet	30	30	Sloan Schaefer MD	[id number]	Insurance	[facility] [id number]

## 2023-09-06 NOTE — PHYSICAL EXAM
[Well groomed] : well groomed [Appears younger than age] : appears younger than age [Cooperative] : cooperative [Euthymic] : euthymic [Full] : full [Clear] : clear [Circumstantial] : circumstantial [None] : none [None Reported] : none reported [Average] : average [WNL] : within normal limits [FreeTextEntry1] : Thin female dressed casually. [FreeTextEntry7] : Has catastrophic thoughts ( was upset about ear rings her grand daughter wore to school as they "be pulled and rip her ear lobe") and that she wore the sneakers she bought her but without socks.  [de-identified] : Pt advised not to take - THC, discussed what she can take for sleep, that will avoid vertigo.

## 2023-09-07 ENCOUNTER — APPOINTMENT (OUTPATIENT)
Dept: NEUROLOGY | Facility: CLINIC | Age: 68
End: 2023-09-07

## 2023-09-08 LAB
B PERT IGG SER-ACNC: <0.95 INDEX
B PERT IGM SER-ACNC: <1 INDEX

## 2023-09-29 ENCOUNTER — APPOINTMENT (OUTPATIENT)
Dept: PULMONOLOGY | Facility: CLINIC | Age: 68
End: 2023-09-29
Payer: COMMERCIAL

## 2023-09-29 VITALS
RESPIRATION RATE: 16 BRPM | SYSTOLIC BLOOD PRESSURE: 106 MMHG | OXYGEN SATURATION: 92 % | WEIGHT: 105 LBS | HEIGHT: 63.5 IN | BODY MASS INDEX: 18.38 KG/M2 | HEART RATE: 57 BPM | TEMPERATURE: 97.1 F | DIASTOLIC BLOOD PRESSURE: 70 MMHG

## 2023-09-29 PROCEDURE — 99214 OFFICE O/P EST MOD 30 MIN: CPT

## 2023-10-02 ENCOUNTER — APPOINTMENT (OUTPATIENT)
Dept: PSYCHIATRY | Facility: CLINIC | Age: 68
End: 2023-10-02
Payer: COMMERCIAL

## 2023-10-02 PROCEDURE — 99214 OFFICE O/P EST MOD 30 MIN: CPT

## 2023-10-12 ENCOUNTER — APPOINTMENT (OUTPATIENT)
Dept: MRI IMAGING | Facility: HOSPITAL | Age: 68
End: 2023-10-12
Payer: COMMERCIAL

## 2023-10-12 ENCOUNTER — OUTPATIENT (OUTPATIENT)
Dept: OUTPATIENT SERVICES | Facility: HOSPITAL | Age: 68
LOS: 1 days | End: 2023-10-12
Payer: COMMERCIAL

## 2023-10-12 DIAGNOSIS — Z00.8 ENCOUNTER FOR OTHER GENERAL EXAMINATION: ICD-10-CM

## 2023-10-12 DIAGNOSIS — Z98.89 OTHER SPECIFIED POSTPROCEDURAL STATES: Chronic | ICD-10-CM

## 2023-10-12 PROCEDURE — 73721 MRI JNT OF LWR EXTRE W/O DYE: CPT

## 2023-10-12 PROCEDURE — 73721 MRI JNT OF LWR EXTRE W/O DYE: CPT | Mod: 26,RT

## 2023-10-13 RX ORDER — AZITHROMYCIN 500 MG/1
500 TABLET, FILM COATED ORAL DAILY
Qty: 5 | Refills: 0 | Status: DISCONTINUED | COMMUNITY
Start: 2023-09-01 | End: 2023-10-13

## 2023-10-17 ENCOUNTER — APPOINTMENT (OUTPATIENT)
Dept: PULMONOLOGY | Facility: CLINIC | Age: 68
End: 2023-10-17
Payer: COMMERCIAL

## 2023-10-17 VITALS
WEIGHT: 107 LBS | RESPIRATION RATE: 16 BRPM | TEMPERATURE: 97.2 F | HEART RATE: 80 BPM | DIASTOLIC BLOOD PRESSURE: 70 MMHG | BODY MASS INDEX: 18.72 KG/M2 | OXYGEN SATURATION: 95 % | HEIGHT: 63.5 IN | SYSTOLIC BLOOD PRESSURE: 110 MMHG

## 2023-10-17 PROCEDURE — 95012 NITRIC OXIDE EXP GAS DETER: CPT

## 2023-10-17 PROCEDURE — 94640 AIRWAY INHALATION TREATMENT: CPT | Mod: 59

## 2023-10-17 PROCEDURE — 99214 OFFICE O/P EST MOD 30 MIN: CPT | Mod: 25

## 2023-10-17 PROCEDURE — 94010 BREATHING CAPACITY TEST: CPT

## 2023-10-19 ENCOUNTER — APPOINTMENT (OUTPATIENT)
Dept: PULMONOLOGY | Facility: CLINIC | Age: 68
End: 2023-10-19

## 2023-10-24 RX ORDER — DENOSUMAB 60 MG/ML
60 INJECTION SUBCUTANEOUS
Qty: 1 | Refills: 1 | Status: ACTIVE | COMMUNITY
Start: 2023-10-24

## 2023-11-06 ENCOUNTER — APPOINTMENT (OUTPATIENT)
Dept: PSYCHIATRY | Facility: CLINIC | Age: 68
End: 2023-11-06
Payer: COMMERCIAL

## 2023-11-06 PROCEDURE — 99214 OFFICE O/P EST MOD 30 MIN: CPT

## 2023-11-20 ENCOUNTER — APPOINTMENT (OUTPATIENT)
Dept: ENDOCRINOLOGY | Facility: CLINIC | Age: 68
End: 2023-11-20
Payer: COMMERCIAL

## 2023-11-20 PROCEDURE — 96401 CHEMO ANTI-NEOPL SQ/IM: CPT

## 2023-11-20 RX ORDER — DENOSUMAB 60 MG/ML
60 INJECTION SUBCUTANEOUS
Qty: 1 | Refills: 0 | Status: COMPLETED | OUTPATIENT
Start: 2023-11-20

## 2023-11-20 RX ADMIN — DENOSUMAB 60 MG/ML: 60 INJECTION SUBCUTANEOUS at 00:00

## 2023-11-21 ENCOUNTER — APPOINTMENT (OUTPATIENT)
Dept: OBGYN | Facility: CLINIC | Age: 68
End: 2023-11-21
Payer: COMMERCIAL

## 2023-11-21 VITALS
SYSTOLIC BLOOD PRESSURE: 119 MMHG | BODY MASS INDEX: 19.14 KG/M2 | WEIGHT: 108 LBS | DIASTOLIC BLOOD PRESSURE: 75 MMHG | HEIGHT: 63 IN

## 2023-11-21 DIAGNOSIS — R92.2 INCONCLUSIVE MAMMOGRAM: ICD-10-CM

## 2023-11-21 DIAGNOSIS — Z01.411 ENCOUNTER FOR GYNECOLOGICAL EXAMINATION (GENERAL) (ROUTINE) WITH ABNORMAL FINDINGS: ICD-10-CM

## 2023-11-21 DIAGNOSIS — N90.5 ATROPHY OF VULVA: ICD-10-CM

## 2023-11-21 DIAGNOSIS — Z87.42 PERSONAL HISTORY OF OTHER DISEASES OF THE FEMALE GENITAL TRACT: ICD-10-CM

## 2023-11-21 DIAGNOSIS — R92.30 INCONCLUSIVE MAMMOGRAM: ICD-10-CM

## 2023-11-21 DIAGNOSIS — Z86.19 PERSONAL HISTORY OF OTHER INFECTIOUS AND PARASITIC DISEASES: ICD-10-CM

## 2023-11-21 PROCEDURE — 99214 OFFICE O/P EST MOD 30 MIN: CPT | Mod: 25

## 2023-11-21 PROCEDURE — 99397 PER PM REEVAL EST PAT 65+ YR: CPT

## 2023-11-21 PROCEDURE — 82270 OCCULT BLOOD FECES: CPT

## 2023-11-21 RX ORDER — VALACYCLOVIR 500 MG/1
500 TABLET, FILM COATED ORAL
Qty: 90 | Refills: 2 | Status: ACTIVE | COMMUNITY
Start: 2019-02-21 | End: 1900-01-01

## 2023-11-26 LAB
CYTOLOGY CVX/VAG DOC THIN PREP: ABNORMAL
HPV HIGH+LOW RISK DNA PNL CVX: NOT DETECTED

## 2023-11-27 ENCOUNTER — NON-APPOINTMENT (OUTPATIENT)
Age: 68
End: 2023-11-27

## 2023-11-30 ENCOUNTER — TRANSCRIPTION ENCOUNTER (OUTPATIENT)
Age: 68
End: 2023-11-30

## 2023-11-30 ENCOUNTER — APPOINTMENT (OUTPATIENT)
Dept: PULMONOLOGY | Facility: CLINIC | Age: 68
End: 2023-11-30
Payer: COMMERCIAL

## 2023-11-30 VITALS
WEIGHT: 108 LBS | SYSTOLIC BLOOD PRESSURE: 116 MMHG | DIASTOLIC BLOOD PRESSURE: 62 MMHG | RESPIRATION RATE: 16 BRPM | BODY MASS INDEX: 19.14 KG/M2 | HEIGHT: 63 IN | OXYGEN SATURATION: 94 % | TEMPERATURE: 97.3 F | HEART RATE: 109 BPM

## 2023-11-30 DIAGNOSIS — R06.02 SHORTNESS OF BREATH: ICD-10-CM

## 2023-11-30 DIAGNOSIS — R09.82 POSTNASAL DRIP: ICD-10-CM

## 2023-11-30 DIAGNOSIS — J30.9 ALLERGIC RHINITIS, UNSPECIFIED: ICD-10-CM

## 2023-11-30 DIAGNOSIS — K21.9 GASTRO-ESOPHAGEAL REFLUX DISEASE W/OUT ESOPHAGITIS: ICD-10-CM

## 2023-11-30 LAB
DEPRECATED KAPPA LC FREE/LAMBDA SER: 0.81 RATIO
IGA SER QL IEP: <2 MG/DL
IGG SER QL IEP: 987 MG/DL
IGG SER QL IEP: 987 MG/DL
IGG1 SER-MCNC: 514 MG/DL
IGG2 SER-MCNC: 340 MG/DL
IGG3 SER-MCNC: 39.8 MG/DL
IGG4 SER-MCNC: 0.6 MG/DL
IGM SER QL IEP: 129 MG/DL
KAPPA LC CSF-MCNC: 1.06 MG/DL
KAPPA LC SERPL-MCNC: 0.86 MG/DL
M PNEUMO IGM SER QL IA: 1.35 INDEX
MYCOPLASMA AG SPEC QL: POSITIVE

## 2023-11-30 PROCEDURE — 99214 OFFICE O/P EST MOD 30 MIN: CPT | Mod: 25

## 2023-11-30 PROCEDURE — 95012 NITRIC OXIDE EXP GAS DETER: CPT

## 2023-11-30 PROCEDURE — 94727 GAS DIL/WSHOT DETER LNG VOL: CPT

## 2023-11-30 PROCEDURE — 94729 DIFFUSING CAPACITY: CPT

## 2023-11-30 PROCEDURE — 94010 BREATHING CAPACITY TEST: CPT

## 2023-12-01 ENCOUNTER — EMERGENCY (EMERGENCY)
Facility: HOSPITAL | Age: 68
LOS: 1 days | Discharge: HOME | End: 2023-12-01
Attending: STUDENT IN AN ORGANIZED HEALTH CARE EDUCATION/TRAINING PROGRAM | Admitting: STUDENT IN AN ORGANIZED HEALTH CARE EDUCATION/TRAINING PROGRAM
Payer: COMMERCIAL

## 2023-12-01 VITALS
WEIGHT: 98.99 LBS | DIASTOLIC BLOOD PRESSURE: 74 MMHG | HEART RATE: 114 BPM | TEMPERATURE: 101 F | RESPIRATION RATE: 18 BRPM | SYSTOLIC BLOOD PRESSURE: 110 MMHG | HEIGHT: 63 IN | OXYGEN SATURATION: 95 %

## 2023-12-01 VITALS
TEMPERATURE: 99 F | DIASTOLIC BLOOD PRESSURE: 69 MMHG | HEART RATE: 95 BPM | OXYGEN SATURATION: 96 % | RESPIRATION RATE: 15 BRPM | SYSTOLIC BLOOD PRESSURE: 113 MMHG

## 2023-12-01 DIAGNOSIS — Z98.89 OTHER SPECIFIED POSTPROCEDURAL STATES: Chronic | ICD-10-CM

## 2023-12-01 LAB
ALBUMIN SERPL ELPH-MCNC: 3 G/DL — LOW (ref 3.3–5)
ALBUMIN SERPL ELPH-MCNC: 3 G/DL — LOW (ref 3.3–5)
ALP SERPL-CCNC: 84 U/L — SIGNIFICANT CHANGE UP (ref 40–120)
ALP SERPL-CCNC: 84 U/L — SIGNIFICANT CHANGE UP (ref 40–120)
ALT FLD-CCNC: 28 U/L — SIGNIFICANT CHANGE UP (ref 10–45)
ALT FLD-CCNC: 28 U/L — SIGNIFICANT CHANGE UP (ref 10–45)
ANION GAP SERPL CALC-SCNC: 7 MMOL/L — SIGNIFICANT CHANGE UP (ref 5–17)
ANION GAP SERPL CALC-SCNC: 7 MMOL/L — SIGNIFICANT CHANGE UP (ref 5–17)
APPEARANCE UR: CLEAR — SIGNIFICANT CHANGE UP
APPEARANCE UR: CLEAR — SIGNIFICANT CHANGE UP
APTT BLD: 33.7 SEC — SIGNIFICANT CHANGE UP (ref 24.5–35.6)
APTT BLD: 33.7 SEC — SIGNIFICANT CHANGE UP (ref 24.5–35.6)
AST SERPL-CCNC: 22 U/L — SIGNIFICANT CHANGE UP (ref 10–40)
AST SERPL-CCNC: 22 U/L — SIGNIFICANT CHANGE UP (ref 10–40)
BACTERIA # UR AUTO: ABNORMAL /HPF
BACTERIA # UR AUTO: ABNORMAL /HPF
BASOPHILS # BLD AUTO: 0.01 K/UL — SIGNIFICANT CHANGE UP (ref 0–0.2)
BASOPHILS # BLD AUTO: 0.01 K/UL — SIGNIFICANT CHANGE UP (ref 0–0.2)
BASOPHILS NFR BLD AUTO: 0.1 % — SIGNIFICANT CHANGE UP (ref 0–2)
BASOPHILS NFR BLD AUTO: 0.1 % — SIGNIFICANT CHANGE UP (ref 0–2)
BILIRUB SERPL-MCNC: 1 MG/DL — SIGNIFICANT CHANGE UP (ref 0.2–1.2)
BILIRUB SERPL-MCNC: 1 MG/DL — SIGNIFICANT CHANGE UP (ref 0.2–1.2)
BILIRUB UR-MCNC: NEGATIVE — SIGNIFICANT CHANGE UP
BILIRUB UR-MCNC: NEGATIVE — SIGNIFICANT CHANGE UP
BUN SERPL-MCNC: 10 MG/DL — SIGNIFICANT CHANGE UP (ref 7–23)
BUN SERPL-MCNC: 10 MG/DL — SIGNIFICANT CHANGE UP (ref 7–23)
CALCIUM SERPL-MCNC: 8.8 MG/DL — SIGNIFICANT CHANGE UP (ref 8.4–10.5)
CALCIUM SERPL-MCNC: 8.8 MG/DL — SIGNIFICANT CHANGE UP (ref 8.4–10.5)
CHLORIDE SERPL-SCNC: 98 MMOL/L — SIGNIFICANT CHANGE UP (ref 96–108)
CHLORIDE SERPL-SCNC: 98 MMOL/L — SIGNIFICANT CHANGE UP (ref 96–108)
CO2 SERPL-SCNC: 26 MMOL/L — SIGNIFICANT CHANGE UP (ref 22–31)
CO2 SERPL-SCNC: 26 MMOL/L — SIGNIFICANT CHANGE UP (ref 22–31)
COLOR SPEC: SIGNIFICANT CHANGE UP
COLOR SPEC: SIGNIFICANT CHANGE UP
CREAT SERPL-MCNC: 0.71 MG/DL — SIGNIFICANT CHANGE UP (ref 0.5–1.3)
CREAT SERPL-MCNC: 0.71 MG/DL — SIGNIFICANT CHANGE UP (ref 0.5–1.3)
DIFF PNL FLD: NEGATIVE — SIGNIFICANT CHANGE UP
DIFF PNL FLD: NEGATIVE — SIGNIFICANT CHANGE UP
EGFR: 94 ML/MIN/1.73M2 — SIGNIFICANT CHANGE UP
EGFR: 94 ML/MIN/1.73M2 — SIGNIFICANT CHANGE UP
EOSINOPHIL # BLD AUTO: 0 K/UL — SIGNIFICANT CHANGE UP (ref 0–0.5)
EOSINOPHIL # BLD AUTO: 0 K/UL — SIGNIFICANT CHANGE UP (ref 0–0.5)
EOSINOPHIL NFR BLD AUTO: 0 % — SIGNIFICANT CHANGE UP (ref 0–6)
EOSINOPHIL NFR BLD AUTO: 0 % — SIGNIFICANT CHANGE UP (ref 0–6)
EPI CELLS # UR: 3 — SIGNIFICANT CHANGE UP
EPI CELLS # UR: 3 — SIGNIFICANT CHANGE UP
GLUCOSE SERPL-MCNC: 111 MG/DL — HIGH (ref 70–99)
GLUCOSE SERPL-MCNC: 111 MG/DL — HIGH (ref 70–99)
GLUCOSE UR QL: NEGATIVE MG/DL — SIGNIFICANT CHANGE UP
GLUCOSE UR QL: NEGATIVE MG/DL — SIGNIFICANT CHANGE UP
GRAN CASTS # UR COMP ASSIST: PRESENT
GRAN CASTS # UR COMP ASSIST: PRESENT
HCT VFR BLD CALC: 33.7 % — LOW (ref 34.5–45)
HCT VFR BLD CALC: 33.7 % — LOW (ref 34.5–45)
HGB BLD-MCNC: 11 G/DL — LOW (ref 11.5–15.5)
HGB BLD-MCNC: 11 G/DL — LOW (ref 11.5–15.5)
IMM GRANULOCYTES NFR BLD AUTO: 0.5 % — SIGNIFICANT CHANGE UP (ref 0–0.9)
IMM GRANULOCYTES NFR BLD AUTO: 0.5 % — SIGNIFICANT CHANGE UP (ref 0–0.9)
INR BLD: 1.12 RATIO — SIGNIFICANT CHANGE UP (ref 0.85–1.18)
INR BLD: 1.12 RATIO — SIGNIFICANT CHANGE UP (ref 0.85–1.18)
KETONES UR-MCNC: ABNORMAL MG/DL
KETONES UR-MCNC: ABNORMAL MG/DL
LACTATE SERPL-SCNC: 0.8 MMOL/L — SIGNIFICANT CHANGE UP (ref 0.7–2)
LACTATE SERPL-SCNC: 0.8 MMOL/L — SIGNIFICANT CHANGE UP (ref 0.7–2)
LEUKOCYTE ESTERASE UR-ACNC: ABNORMAL
LEUKOCYTE ESTERASE UR-ACNC: ABNORMAL
LIDOCAIN IGE QN: 23 U/L — SIGNIFICANT CHANGE UP (ref 16–77)
LIDOCAIN IGE QN: 23 U/L — SIGNIFICANT CHANGE UP (ref 16–77)
LYMPHOCYTES # BLD AUTO: 0.84 K/UL — LOW (ref 1–3.3)
LYMPHOCYTES # BLD AUTO: 0.84 K/UL — LOW (ref 1–3.3)
LYMPHOCYTES # BLD AUTO: 6.8 % — LOW (ref 13–44)
LYMPHOCYTES # BLD AUTO: 6.8 % — LOW (ref 13–44)
MCHC RBC-ENTMCNC: 27.5 PG — SIGNIFICANT CHANGE UP (ref 27–34)
MCHC RBC-ENTMCNC: 27.5 PG — SIGNIFICANT CHANGE UP (ref 27–34)
MCHC RBC-ENTMCNC: 32.6 GM/DL — SIGNIFICANT CHANGE UP (ref 32–36)
MCHC RBC-ENTMCNC: 32.6 GM/DL — SIGNIFICANT CHANGE UP (ref 32–36)
MCV RBC AUTO: 84.3 FL — SIGNIFICANT CHANGE UP (ref 80–100)
MCV RBC AUTO: 84.3 FL — SIGNIFICANT CHANGE UP (ref 80–100)
MONOCYTES # BLD AUTO: 0.48 K/UL — SIGNIFICANT CHANGE UP (ref 0–0.9)
MONOCYTES # BLD AUTO: 0.48 K/UL — SIGNIFICANT CHANGE UP (ref 0–0.9)
MONOCYTES NFR BLD AUTO: 3.9 % — SIGNIFICANT CHANGE UP (ref 2–14)
MONOCYTES NFR BLD AUTO: 3.9 % — SIGNIFICANT CHANGE UP (ref 2–14)
NEUTROPHILS # BLD AUTO: 10.98 K/UL — HIGH (ref 1.8–7.4)
NEUTROPHILS # BLD AUTO: 10.98 K/UL — HIGH (ref 1.8–7.4)
NEUTROPHILS NFR BLD AUTO: 88.7 % — HIGH (ref 43–77)
NEUTROPHILS NFR BLD AUTO: 88.7 % — HIGH (ref 43–77)
NITRITE UR-MCNC: NEGATIVE — SIGNIFICANT CHANGE UP
NITRITE UR-MCNC: NEGATIVE — SIGNIFICANT CHANGE UP
NRBC # BLD: 0 /100 WBCS — SIGNIFICANT CHANGE UP (ref 0–0)
NRBC # BLD: 0 /100 WBCS — SIGNIFICANT CHANGE UP (ref 0–0)
PH UR: 8 — SIGNIFICANT CHANGE UP (ref 5–8)
PH UR: 8 — SIGNIFICANT CHANGE UP (ref 5–8)
PLATELET # BLD AUTO: 260 K/UL — SIGNIFICANT CHANGE UP (ref 150–400)
PLATELET # BLD AUTO: 260 K/UL — SIGNIFICANT CHANGE UP (ref 150–400)
POTASSIUM SERPL-MCNC: 4.1 MMOL/L — SIGNIFICANT CHANGE UP (ref 3.5–5.3)
POTASSIUM SERPL-MCNC: 4.1 MMOL/L — SIGNIFICANT CHANGE UP (ref 3.5–5.3)
POTASSIUM SERPL-SCNC: 4.1 MMOL/L — SIGNIFICANT CHANGE UP (ref 3.5–5.3)
POTASSIUM SERPL-SCNC: 4.1 MMOL/L — SIGNIFICANT CHANGE UP (ref 3.5–5.3)
PROT SERPL-MCNC: 7.2 G/DL — SIGNIFICANT CHANGE UP (ref 6–8.3)
PROT SERPL-MCNC: 7.2 G/DL — SIGNIFICANT CHANGE UP (ref 6–8.3)
PROT UR-MCNC: 30 MG/DL
PROT UR-MCNC: 30 MG/DL
PROTHROM AB SERPL-ACNC: 13.1 SEC — HIGH (ref 9.5–13)
PROTHROM AB SERPL-ACNC: 13.1 SEC — HIGH (ref 9.5–13)
RAPID RVP RESULT: DETECTED
RAPID RVP RESULT: DETECTED
RBC # BLD: 4 M/UL — SIGNIFICANT CHANGE UP (ref 3.8–5.2)
RBC # BLD: 4 M/UL — SIGNIFICANT CHANGE UP (ref 3.8–5.2)
RBC # FLD: 14.6 % — HIGH (ref 10.3–14.5)
RBC # FLD: 14.6 % — HIGH (ref 10.3–14.5)
RBC CASTS # UR COMP ASSIST: 2 /HPF — SIGNIFICANT CHANGE UP (ref 0–4)
RBC CASTS # UR COMP ASSIST: 2 /HPF — SIGNIFICANT CHANGE UP (ref 0–4)
RV+EV RNA SPEC QL NAA+PROBE: DETECTED
RV+EV RNA SPEC QL NAA+PROBE: DETECTED
SARS-COV-2 RNA SPEC QL NAA+PROBE: SIGNIFICANT CHANGE UP
SARS-COV-2 RNA SPEC QL NAA+PROBE: SIGNIFICANT CHANGE UP
SODIUM SERPL-SCNC: 131 MMOL/L — LOW (ref 135–145)
SODIUM SERPL-SCNC: 131 MMOL/L — LOW (ref 135–145)
SP GR SPEC: 1.01 — SIGNIFICANT CHANGE UP (ref 1–1.03)
SP GR SPEC: 1.01 — SIGNIFICANT CHANGE UP (ref 1–1.03)
TROPONIN I, HIGH SENSITIVITY RESULT: 4.1 NG/L — SIGNIFICANT CHANGE UP
TROPONIN I, HIGH SENSITIVITY RESULT: 4.1 NG/L — SIGNIFICANT CHANGE UP
UROBILINOGEN FLD QL: 0.2 MG/DL — SIGNIFICANT CHANGE UP (ref 0.2–1)
UROBILINOGEN FLD QL: 0.2 MG/DL — SIGNIFICANT CHANGE UP (ref 0.2–1)
WBC # BLD: 12.37 K/UL — HIGH (ref 3.8–10.5)
WBC # BLD: 12.37 K/UL — HIGH (ref 3.8–10.5)
WBC # FLD AUTO: 12.37 K/UL — HIGH (ref 3.8–10.5)
WBC # FLD AUTO: 12.37 K/UL — HIGH (ref 3.8–10.5)
WBC UR QL: 4 /HPF — SIGNIFICANT CHANGE UP (ref 0–5)
WBC UR QL: 4 /HPF — SIGNIFICANT CHANGE UP (ref 0–5)

## 2023-12-01 PROCEDURE — 84484 ASSAY OF TROPONIN QUANT: CPT

## 2023-12-01 PROCEDURE — 99284 EMERGENCY DEPT VISIT MOD MDM: CPT | Mod: 25

## 2023-12-01 PROCEDURE — 85730 THROMBOPLASTIN TIME PARTIAL: CPT

## 2023-12-01 PROCEDURE — 71250 CT THORAX DX C-: CPT | Mod: MA

## 2023-12-01 PROCEDURE — 94640 AIRWAY INHALATION TREATMENT: CPT

## 2023-12-01 PROCEDURE — 87040 BLOOD CULTURE FOR BACTERIA: CPT

## 2023-12-01 PROCEDURE — 99285 EMERGENCY DEPT VISIT HI MDM: CPT

## 2023-12-01 PROCEDURE — 83690 ASSAY OF LIPASE: CPT

## 2023-12-01 PROCEDURE — 96374 THER/PROPH/DIAG INJ IV PUSH: CPT

## 2023-12-01 PROCEDURE — 80053 COMPREHEN METABOLIC PANEL: CPT

## 2023-12-01 PROCEDURE — 85025 COMPLETE CBC W/AUTO DIFF WBC: CPT

## 2023-12-01 PROCEDURE — 36415 COLL VENOUS BLD VENIPUNCTURE: CPT

## 2023-12-01 PROCEDURE — 96361 HYDRATE IV INFUSION ADD-ON: CPT

## 2023-12-01 PROCEDURE — 0225U NFCT DS DNA&RNA 21 SARSCOV2: CPT

## 2023-12-01 PROCEDURE — 87086 URINE CULTURE/COLONY COUNT: CPT

## 2023-12-01 PROCEDURE — 83605 ASSAY OF LACTIC ACID: CPT

## 2023-12-01 PROCEDURE — 71250 CT THORAX DX C-: CPT | Mod: 26,MA

## 2023-12-01 PROCEDURE — 81001 URINALYSIS AUTO W/SCOPE: CPT

## 2023-12-01 PROCEDURE — 85610 PROTHROMBIN TIME: CPT

## 2023-12-01 PROCEDURE — 96375 TX/PRO/DX INJ NEW DRUG ADDON: CPT

## 2023-12-01 RX ORDER — DEXAMETHASONE 0.5 MG/5ML
10 ELIXIR ORAL ONCE
Refills: 0 | Status: COMPLETED | OUTPATIENT
Start: 2023-12-01 | End: 2023-12-01

## 2023-12-01 RX ORDER — SODIUM CHLORIDE 9 MG/ML
1000 INJECTION INTRAMUSCULAR; INTRAVENOUS; SUBCUTANEOUS ONCE
Refills: 0 | Status: COMPLETED | OUTPATIENT
Start: 2023-12-01 | End: 2023-12-01

## 2023-12-01 RX ORDER — CEFEPIME 1 G/1
1000 INJECTION, POWDER, FOR SOLUTION INTRAMUSCULAR; INTRAVENOUS ONCE
Refills: 0 | Status: DISCONTINUED | OUTPATIENT
Start: 2023-12-01 | End: 2023-12-01

## 2023-12-01 RX ORDER — VANCOMYCIN HCL 1 G
1000 VIAL (EA) INTRAVENOUS ONCE
Refills: 0 | Status: DISCONTINUED | OUTPATIENT
Start: 2023-12-01 | End: 2023-12-01

## 2023-12-01 RX ORDER — ACETAMINOPHEN 500 MG
650 TABLET ORAL ONCE
Refills: 0 | Status: COMPLETED | OUTPATIENT
Start: 2023-12-01 | End: 2023-12-01

## 2023-12-01 RX ORDER — LEVOFLOXACIN 5 MG/ML
1 INJECTION, SOLUTION INTRAVENOUS
Qty: 7 | Refills: 0
Start: 2023-12-01 | End: 2023-12-07

## 2023-12-01 RX ORDER — SODIUM CHLORIDE 9 MG/ML
1600 INJECTION INTRAMUSCULAR; INTRAVENOUS; SUBCUTANEOUS ONCE
Refills: 0 | Status: DISCONTINUED | OUTPATIENT
Start: 2023-12-01 | End: 2023-12-01

## 2023-12-01 RX ORDER — IPRATROPIUM/ALBUTEROL SULFATE 18-103MCG
3 AEROSOL WITH ADAPTER (GRAM) INHALATION ONCE
Refills: 0 | Status: COMPLETED | OUTPATIENT
Start: 2023-12-01 | End: 2023-12-01

## 2023-12-01 RX ADMIN — Medication 3 MILLILITER(S): at 17:35

## 2023-12-01 RX ADMIN — SODIUM CHLORIDE 1000 MILLILITER(S): 9 INJECTION INTRAMUSCULAR; INTRAVENOUS; SUBCUTANEOUS at 14:17

## 2023-12-01 RX ADMIN — Medication 100 MILLIGRAM(S): at 17:36

## 2023-12-01 RX ADMIN — Medication 650 MILLIGRAM(S): at 14:24

## 2023-12-01 RX ADMIN — Medication 102 MILLIGRAM(S): at 17:35

## 2023-12-01 RX ADMIN — Medication 650 MILLIGRAM(S): at 14:54

## 2023-12-01 RX ADMIN — SODIUM CHLORIDE 1000 MILLILITER(S): 9 INJECTION INTRAMUSCULAR; INTRAVENOUS; SUBCUTANEOUS at 15:30

## 2023-12-01 RX ADMIN — Medication 0.5 MILLIGRAM(S): at 16:49

## 2023-12-01 NOTE — ED PROVIDER NOTE - PHYSICAL EXAMINATION
VITAL SIGNS: I have reviewed nursing notes and confirm.   GEN: Well-developed; well-nourished; in no acute distress. Speaking full sentences.  SKIN: Warm, pink, no rash, no diaphoresis, no cyanosis, well perfused.   HEAD: Normocephalic; atraumatic. No scalp lacerations, no abrasions.  NECK: Supple; non tender.   EYES: Pupils 3mm equal, round, reactive to light and accomodation, conjunctiva and sclera clear. Extra-ocular movements intact bilaterally.  ENT: No nasal discharge; airway clear. Trachea is midline. Normal dentition.  CV: RRR. S1, S2 normal; no murmurs, gallops, or rubs. Capillary refill < 2 seconds throughout. Distal pulses intact 2+ throughout.  RESP: CTA bilaterally. No wheezes, rales, or rhonchi.   ABD: Normal bowel sounds, soft, non-distended, non-tender, no rebound, no guarding, no rigidity, no hepatosplenomegaly.  MSK: Normal range of motion and movement of all 4 extremities. No apparent joint or muscular pain throughout.    NEURO: Alert & oriented x 3,  Gait: Fluid. Normal speech and coordination.

## 2023-12-01 NOTE — ED PROVIDER NOTE - NSFOLLOWUPINSTRUCTIONS_ED_ALL_ED_FT
Pneumonia    Pneumonia is an infection of the lungs. Pneumonia may be caused by bacteria, viruses, or funguses. Symptoms include coughing, fever, chest pain when breathing deeply or coughing, shortness of breath, fatigue, or muscle aches. Pneumonia can be diagnosed with a medical history and physical exam, as well as other tests which may include a chest X-ray. If you were prescribed an antibiotic medicine, take it as told by your health care provider and do not stop taking the antibiotic even if you start to feel better. Do not use tobacco products, including cigarettes, chewing tobacco, and e-cigarettes.    SEEK IMMEDIATE MEDICAL CARE IF YOU HAVE ANY OF THE FOLLOWING SYMPTOMS: worsening shortness of breath, worsening chest pain, coughing up blood, change in mental status, lightheadedness/dizziness.     Take acetaminophen 650 mg orally every 6-8 hours for pain control as needed. Please do not exceed 4,000 mg of acetaminophen during a 24 hours period. Acetaminophen can be found in many over-the-counter cold medications as well as opioid medications that may be given for pain.    Take ibuprofen (also known as MOTRIN or ADVIL) 400 mg orally every 6-8 hours for pain control as needed with food to avoid an upset stomach. Ibuprofen can be found in many over-the-counter medications. Please do not take ibuprofen if you have a bleeding disorder, stomach or gastrointestinal ulcer, or liver disease.    If needed, you can alternate these medications so that you can take one medication every 3 hours. For example, at noon take ibuprofen, then at 3PM take acetaminophen, then at 6PM take ibuprofen.

## 2023-12-01 NOTE — ED PROVIDER NOTE - PATIENT PORTAL LINK FT
You can access the FollowMyHealth Patient Portal offered by [facility] by registering at the following website: [email or web address]. By joining [facility]’s FollowMyHealth portal, you will also be able to view your health information using other applications (apps) compatible with our system.

## 2023-12-01 NOTE — ED ADULT NURSE NOTE - NSICDXPASTMEDICALHX_GEN_ALL_CORE_FT
PAST MEDICAL HISTORY:  ADHD (attention deficit hyperactivity disorder)     Anxiety     Bronchiectasis     Depression     Mass on back     Spinal stenosis     Sprain rotator cuff right shoulder

## 2023-12-01 NOTE — ED PROVIDER NOTE - CLINICAL SUMMARY MEDICAL DECISION MAKING FREE TEXT BOX
68F PMHx of bronchiectasis, anxiety presenting with cough x 3 months. Describes chronic ongoing nonproductive cough over the past 3 months. Treated w/ 3 different antibiotics, last cefdinir finished yesterday for PNA. Prescribed by her immunologist/pulmonologist. Is due to follow up with hematology for IgA deficiency workup. Otherwise, a/w fevers. Patient is clinically well appearing, saturating >95%, speaking full sentences.   - CBC, CMP, CXR, RVP  - Supplemental oxygen as needed  - CT showing consolidation, entero/rhinovirus (+).   - DC home with Abx levofloxacin and f/u heme/pulm/immuno.

## 2023-12-01 NOTE — ED PROVIDER NOTE - OBJECTIVE STATEMENT
68F PMHx of bronchiectasis, anxiety presenting with cough x 3 months. Describes chronic ongoing nonproductive cough over the past 3 months. Treated w/ 3 different antibiotics, last cefdinir finished yesterday for PNA. Prescribed by her immunologist/pulmonologist. Is due to follow up with hematology for IgA deficiency workup. Otherwise, a/w fevers.

## 2023-12-01 NOTE — ED ADULT NURSE NOTE - NSFALLUNIVINTERV_ED_ALL_ED
Bed/Stretcher in lowest position, wheels locked, appropriate side rails in place/Call bell, personal items and telephone in reach/Instruct patient to call for assistance before getting out of bed/chair/stretcher/Non-slip footwear applied when patient is off stretcher/[place] to call system/Physically safe environment - no spills, clutter or unnecessary equipment/Purposeful proactive rounding/Room/bathroom lighting operational, light cord in reach

## 2023-12-02 LAB
B PERT IGG SER-ACNC: <0.95 INDEX
B PERT IGM SER-ACNC: 1.4 INDEX

## 2023-12-03 LAB
CULTURE RESULTS: SIGNIFICANT CHANGE UP
CULTURE RESULTS: SIGNIFICANT CHANGE UP
SPECIMEN SOURCE: SIGNIFICANT CHANGE UP
SPECIMEN SOURCE: SIGNIFICANT CHANGE UP

## 2023-12-04 ENCOUNTER — APPOINTMENT (OUTPATIENT)
Dept: PSYCHIATRY | Facility: CLINIC | Age: 68
End: 2023-12-04

## 2023-12-04 DIAGNOSIS — J18.9 PNEUMONIA, UNSPECIFIED ORGANISM: ICD-10-CM

## 2023-12-04 PROBLEM — M48.00 SPINAL STENOSIS, SITE UNSPECIFIED: Chronic | Status: ACTIVE | Noted: 2023-12-01

## 2023-12-04 NOTE — CHART NOTE - NSCHARTNOTEFT_GEN_A_CORE
68-year-old female presenting to the ED on 12/1 for pneumonia. SW made a courtesy call and left message with patient. Contact info was provided for further assistance if needed. As per [facility], pt has a scheduled Pulm appt on 12/6 with Dr. Harvey Madera. 68-year-old female presenting to the ED on 12/1 for pneumonia. SW made a courtesy call and left message with patient. Contact info was provided for further assistance if needed. As per [facility], pt has a scheduled Pulm appt on 12/6 with Dr. Harvey Madera.

## 2023-12-05 LAB
DEPRECATED S PNEUM 1 IGG SER-MCNC: 0.5 MCG/ML
DEPRECATED S PNEUM12 AB SER-ACNC: 0.5 MCG/ML
DEPRECATED S PNEUM14 AB SER-ACNC: 1.1 MCG/ML
DEPRECATED S PNEUM17 IGG SER IA-MCNC: 2.1 MCG/ML
DEPRECATED S PNEUM18 IGG SER IA-MCNC: 0.5 MCG/ML
DEPRECATED S PNEUM19 IGG SER-MCNC: 2.1 MCG/ML
DEPRECATED S PNEUM19 IGG SER-MCNC: 2.3 MCG/ML
DEPRECATED S PNEUM2 IGG SER-MCNC: 1 MCG/ML
DEPRECATED S PNEUM20 IGG SER-MCNC: 4 MCG/ML
DEPRECATED S PNEUM22 IGG SER-MCNC: 2.5 MCG/ML
DEPRECATED S PNEUM23 AB SER-ACNC: 1.1 MCG/ML
DEPRECATED S PNEUM3 AB SER-ACNC: 0.5 MCG/ML
DEPRECATED S PNEUM34 IGG SER-MCNC: 4.6 MCG/ML
DEPRECATED S PNEUM4 AB SER-ACNC: 1.2 MCG/ML
DEPRECATED S PNEUM5 IGG SER-MCNC: 0.4 MCG/ML
DEPRECATED S PNEUM6 IGG SER-MCNC: 0.4 MCG/ML
DEPRECATED S PNEUM7 IGG SER-ACNC: 1.4 MCG/ML
DEPRECATED S PNEUM8 AB SER-ACNC: 2.1 MCG/ML
DEPRECATED S PNEUM9 AB SER-ACNC: 1.4 MCG/ML
DEPRECATED S PNEUM9 IGG SER-MCNC: 0.7 MCG/ML
IMMUNOLOGIST REVIEW: NORMAL
M PNEUMO IGG SER IA-ACNC: ABNORMAL
M PNEUMO IGG SER QL IA: 0.99 INDEX
STREPTOCOCCUS PNEUMONIAE SEROTYPE 11A: 0.6 MCG/ML
STREPTOCOCCUS PNEUMONIAE SEROTYPE 15B: 3.9 MCG/ML
STREPTOCOCCUS PNEUMONIAE SEROTYPE 33F: 6.4 MCG/ML

## 2023-12-06 ENCOUNTER — NON-APPOINTMENT (OUTPATIENT)
Age: 68
End: 2023-12-06

## 2023-12-06 ENCOUNTER — APPOINTMENT (OUTPATIENT)
Dept: PULMONOLOGY | Facility: CLINIC | Age: 68
End: 2023-12-06
Payer: COMMERCIAL

## 2023-12-06 ENCOUNTER — RX RENEWAL (OUTPATIENT)
Age: 68
End: 2023-12-06

## 2023-12-06 VITALS
DIASTOLIC BLOOD PRESSURE: 63 MMHG | HEART RATE: 87 BPM | SYSTOLIC BLOOD PRESSURE: 89 MMHG | OXYGEN SATURATION: 96 % | RESPIRATION RATE: 16 BRPM

## 2023-12-06 DIAGNOSIS — G47.33 OBSTRUCTIVE SLEEP APNEA (ADULT) (PEDIATRIC): ICD-10-CM

## 2023-12-06 DIAGNOSIS — I73.00 RAYNAUD'S SYNDROME W/OUT GANGRENE: ICD-10-CM

## 2023-12-06 DIAGNOSIS — J45.909 UNSPECIFIED ASTHMA, UNCOMPLICATED: ICD-10-CM

## 2023-12-06 LAB
CULTURE RESULTS: SIGNIFICANT CHANGE UP
SPECIMEN SOURCE: SIGNIFICANT CHANGE UP

## 2023-12-06 PROCEDURE — 99204 OFFICE O/P NEW MOD 45 MIN: CPT

## 2023-12-08 ENCOUNTER — APPOINTMENT (OUTPATIENT)
Dept: ORTHOPEDIC SURGERY | Facility: CLINIC | Age: 68
End: 2023-12-08
Payer: COMMERCIAL

## 2023-12-08 DIAGNOSIS — M48.07 SPINAL STENOSIS, LUMBOSACRAL REGION: ICD-10-CM

## 2023-12-08 DIAGNOSIS — M17.11 UNILATERAL PRIMARY OSTEOARTHRITIS, RIGHT KNEE: ICD-10-CM

## 2023-12-08 DIAGNOSIS — S92.911A UNSPECIFIED FRACTURE OF RIGHT TOE(S), INITIAL ENCOUNTER FOR CLOSED FRACTURE: ICD-10-CM

## 2023-12-08 PROCEDURE — 99214 OFFICE O/P EST MOD 30 MIN: CPT

## 2023-12-21 ENCOUNTER — APPOINTMENT (OUTPATIENT)
Dept: PSYCHIATRY | Facility: CLINIC | Age: 68
End: 2023-12-21
Payer: COMMERCIAL

## 2023-12-21 PROCEDURE — 99214 OFFICE O/P EST MOD 30 MIN: CPT

## 2023-12-21 NOTE — PHYSICAL EXAM
[Well groomed] : well groomed [Appears younger than age] : appears younger than age [Cooperative] : cooperative [Euthymic] : euthymic [Full] : full [Clear] : clear [Circumstantial] : circumstantial [None] : none [None Reported] : none reported [Average] : average [WNL] : within normal limits [FreeTextEntry1] : Thin female dressed casually. [de-identified] : Pt advised not to take - THC, discussed what she can take for sleep, that will avoid vertigo.

## 2023-12-21 NOTE — HISTORY OF PRESENT ILLNESS
[FreeTextEntry1] : Pt had several trials of antibiotics, and she is concerned about getting ill.  Pt has IGA deficiency and pt reports taking acidophilus. Pt reports being ill for the past 3 months.  Pt with seasonal holiday stress. Pt reports her mother was depressed for the most part for her childhood.  Pt reports continued stress with her nuclear family. Pt has another son who is also in conflict with her.

## 2023-12-21 NOTE — PLAN
[No] : No [Medication education provided] : Medication education provided. [Rationale for medication choices, possible risks/precautions, benefits, alternative treatment choices, and consequences of non-treatment discussed] : Rationale for medication choices, possible risks/precautions, benefits, alternative treatment choices, and consequences of non-treatment discussed with patient/family/caregiver  [FreeTextEntry4] : Meeting treatment goals- but remains very much affected by her family of origin and her nuclear family as well as intergenerational patterns repeating in her grandchild.  [FreeTextEntry5] : Continue Fetzima and alprazolam, will consider an EMDR referral.  Referred to " This is your brain on food".

## 2024-01-08 ENCOUNTER — APPOINTMENT (OUTPATIENT)
Dept: ORTHOPEDIC SURGERY | Facility: CLINIC | Age: 69
End: 2024-01-08

## 2024-01-09 ENCOUNTER — APPOINTMENT (OUTPATIENT)
Dept: PULMONOLOGY | Facility: CLINIC | Age: 69
End: 2024-01-09
Payer: COMMERCIAL

## 2024-01-09 VITALS — DIASTOLIC BLOOD PRESSURE: 80 MMHG | OXYGEN SATURATION: 100 % | HEART RATE: 82 BPM | SYSTOLIC BLOOD PRESSURE: 125 MMHG

## 2024-01-09 DIAGNOSIS — D84.9 IMMUNODEFICIENCY, UNSPECIFIED: ICD-10-CM

## 2024-01-09 DIAGNOSIS — J47.9 BRONCHIECTASIS, UNCOMPLICATED: ICD-10-CM

## 2024-01-09 PROCEDURE — 99204 OFFICE O/P NEW MOD 45 MIN: CPT | Mod: 25

## 2024-01-09 PROCEDURE — 94010 BREATHING CAPACITY TEST: CPT

## 2024-01-09 PROCEDURE — 99214 OFFICE O/P EST MOD 30 MIN: CPT | Mod: 25

## 2024-01-09 PROCEDURE — 71046 X-RAY EXAM CHEST 2 VIEWS: CPT

## 2024-01-09 PROCEDURE — 95012 NITRIC OXIDE EXP GAS DETER: CPT

## 2024-01-11 PROBLEM — J47.9 BRONCHIECTASIS: Status: ACTIVE | Noted: 2020-09-29

## 2024-01-11 PROBLEM — D84.9 IMMUNODEFICIENCY: Status: ACTIVE | Noted: 2023-11-30

## 2024-01-11 NOTE — ASSESSMENT
[FreeTextEntry1] : Bronchiectasis with immune deficiency.  Recovering from recent pneumonia with radiographic resolution.  I had a very zahida discussion with the patient regarding my approach of bronchiectasis especially in a patient with immune deficiency.  The patient raise concern regarding minimizing radiology.  She is concerned about x-ray exposure.  I gave her my perspective that I am concerned about under diagnosis.  She was ill for several months and no chest x-ray was done I do not know who is the decision that was but a lower threshold for radiology is probably reasonable considering her problems.  Will need to coordinate with immunology whether any specific treatment for her immune deficiency is possible  Also need to be concerned about an underlying sinopulmonary syndrome and whether more aggressive therapy regarding her sinuses would be appropriate  Finally, need to sort out relative components of asthma versus bronchiectasis.  If the patient does not clearly have asthma I am not sure that inhaled steroids would be beneficial to her

## 2024-01-11 NOTE — HISTORY OF PRESENT ILLNESS
[Never] : never [TextBox_4] : Hx of bronchiectasis  2018 antibiotics several times a year sinus infections also  Gets frequent colds.  In August of this year was sick with sinus issues and was coughing for several months now she is feeling better.  She has a history of bronchiectasis reportedly both IgA and IgG deficient. Recently had prolonged illness and was found to have pneumonia by chest CT.

## 2024-01-31 ENCOUNTER — APPOINTMENT (OUTPATIENT)
Dept: PULMONOLOGY | Facility: CLINIC | Age: 69
End: 2024-01-31
Payer: COMMERCIAL

## 2024-01-31 DIAGNOSIS — U07.1 COVID-19: ICD-10-CM

## 2024-01-31 PROCEDURE — 99213 OFFICE O/P EST LOW 20 MIN: CPT

## 2024-01-31 NOTE — PLAN
[FreeTextEntry1] : Advised to stop atorvastatin for 7 days and to start Paxlovid reviewed medication and dosing.  Patient concerned about potential issues with side effects and I advised her to contact me should she develop side effects on the medication.

## 2024-01-31 NOTE — HISTORY OF PRESENT ILLNESS
[Home] : at home, [unfilled] , at the time of the visit. [Medical Office: ([facility])___] : at the medical office located in  [Verbal consent obtained from patient] : the patient, [unfilled] [FreeTextEntry1] : Telehealth visit for COVID management tested positive for COVID experiencing cough upper airway congestion.  Currently not reporting lower respiratory symptoms does have history of IgA deficiency and bronchiectasis.  Reviewed medications.  Currently not taking amitriptyline.  Taking small dose of gabapentin.

## 2024-02-06 ENCOUNTER — NON-APPOINTMENT (OUTPATIENT)
Age: 69
End: 2024-02-06

## 2024-02-07 ENCOUNTER — NON-APPOINTMENT (OUTPATIENT)
Age: 69
End: 2024-02-07

## 2024-02-12 ENCOUNTER — APPOINTMENT (OUTPATIENT)
Dept: PSYCHIATRY | Facility: CLINIC | Age: 69
End: 2024-02-12
Payer: COMMERCIAL

## 2024-02-12 ENCOUNTER — APPOINTMENT (OUTPATIENT)
Dept: PULMONOLOGY | Facility: CLINIC | Age: 69
End: 2024-02-12

## 2024-02-12 ENCOUNTER — ASOB RESULT (OUTPATIENT)
Age: 69
End: 2024-02-12

## 2024-02-12 ENCOUNTER — APPOINTMENT (OUTPATIENT)
Dept: OBGYN | Facility: CLINIC | Age: 69
End: 2024-02-12
Payer: COMMERCIAL

## 2024-02-12 PROCEDURE — 76830 TRANSVAGINAL US NON-OB: CPT

## 2024-02-12 PROCEDURE — 99215 OFFICE O/P EST HI 40 MIN: CPT

## 2024-02-12 NOTE — PHYSICAL EXAM
[Well groomed] : well groomed [Appears younger than age] : appears younger than age [Cooperative] : cooperative [Euthymic] : euthymic [Full] : full [Clear] : clear [Circumstantial] : circumstantial [None] : none [None Reported] : none reported [Average] : average [WNL] : within normal limits [FreeTextEntry1] : Thin female dressed casually. [de-identified] : Pt advised not to take - THC, discussed what she can take for sleep, that will avoid vertigo.

## 2024-02-12 NOTE — PLAN
[No] : No [Medication education provided] : Medication education provided. [Rationale for medication choices, possible risks/precautions, benefits, alternative treatment choices, and consequences of non-treatment discussed] : Rationale for medication choices, possible risks/precautions, benefits, alternative treatment choices, and consequences of non-treatment discussed with patient/family/caregiver  [FreeTextEntry4] : Meeting some goals or care and is cognizant of her ADHD.  [FreeTextEntry5] : Encouraged to keep her goals simple.  Encouraged to better communicate knowledge.  Pt reports chronic insomnia due to worry. Pt also reports taking Gabapentin.  Pt still at times takes Hydroxyzine which may help more with sleep but causes dry eye.  Pt at times toggles between medication, in the past took Belsomra, which she states helps the most.

## 2024-02-12 NOTE — HISTORY OF PRESENT ILLNESS
[FreeTextEntry1] : Pt is happy about the potential of her granddaughter having friends, things are less tense in her home since the OOP and her son is out of the country. She is getting along better with her spouse. Pt reports that her granddaughter now has glasses. Pt has taken some classes on sleep hygiene.

## 2024-02-14 ENCOUNTER — APPOINTMENT (OUTPATIENT)
Dept: PULMONOLOGY | Facility: CLINIC | Age: 69
End: 2024-02-14

## 2024-03-11 ENCOUNTER — APPOINTMENT (OUTPATIENT)
Dept: PSYCHIATRY | Facility: CLINIC | Age: 69
End: 2024-03-11
Payer: COMMERCIAL

## 2024-03-11 DIAGNOSIS — F39 UNSPECIFIED MOOD [AFFECTIVE] DISORDER: ICD-10-CM

## 2024-03-11 PROCEDURE — 99214 OFFICE O/P EST MOD 30 MIN: CPT

## 2024-03-12 NOTE — PHYSICAL EXAM
[Well groomed] : well groomed [Appears younger than age] : appears younger than age [Cooperative] : cooperative [Euthymic] : euthymic [Full] : full [Clear] : clear [Circumstantial] : circumstantial [None] : none [None Reported] : none reported [Average] : average [WNL] : within normal limits [FreeTextEntry1] : Thin female dressed casually. [de-identified] : Pt advised not to take - THC, discussed what she can take for sleep, that will avoid vertigo.

## 2024-03-12 NOTE — PLAN
[Medication education provided] : Medication education provided. [No] : No [Rationale for medication choices, possible risks/precautions, benefits, alternative treatment choices, and consequences of non-treatment discussed] : Rationale for medication choices, possible risks/precautions, benefits, alternative treatment choices, and consequences of non-treatment discussed with patient/family/caregiver  [FreeTextEntry4] : Benefits from this level of care. Discussed issues stemming from returning to court in August, encouraged finding common ground.  [FreeTextEntry5] : Continue Fetzima Start Trazodone  Monitor effects of sleep. Encourage consistency in mood, behavior, problem solving and diplomatic approach to life stresses.

## 2024-03-12 NOTE — HISTORY OF PRESENT ILLNESS
[FreeTextEntry1] : Pt willing to consider and try Trazodone as an alternative.  Was advised not to try multiple agents, and to give one course of action a reasonable time of onset and consistency in dosing and to avoid combination of several medications due to interactions and difficulty then distinguishing what is actually of benefit. Her primary concern remains her children and her shared custody of her grandchild, which is fraught with significant cultural and Church differences, as well as SES being part of the difficult factors to negotiate, emotional support given, encouragement to travel and attend to her self care.  Pt very active in [employer].

## 2024-04-08 ENCOUNTER — NON-APPOINTMENT (OUTPATIENT)
Age: 69
End: 2024-04-08

## 2024-04-08 ENCOUNTER — APPOINTMENT (OUTPATIENT)
Dept: PSYCHIATRY | Facility: CLINIC | Age: 69
End: 2024-04-08
Payer: COMMERCIAL

## 2024-04-08 PROCEDURE — 99214 OFFICE O/P EST MOD 30 MIN: CPT

## 2024-04-08 NOTE — HISTORY OF PRESENT ILLNESS
[FreeTextEntry1] : Pt attempted to have her granddaughter treated for myopia with contact lenses- but as per pt she continues to get threatened with court proceedings which have made her anxious, she had started with Trazodone and felt she could not sleep because she had heart palpitations.  She was concerned she was going into a fib, she also tried passionflower, lemon balm, pt reports that Vistaril causes too much eye dryness.  The only positive is that she was able to have a sleep over for her granddaughter as well as working on her relationship with her daughter.  Pt reports having a panic attack before court dates, and pt reports she is working on herself through individual therapy, [employer], and podcasts, and meditation. Pt used to enjoy skiing, she will be traveling to [place].

## 2024-04-08 NOTE — PLAN
[No] : No [Medication education provided] : Medication education provided. [Rationale for medication choices, possible risks/precautions, benefits, alternative treatment choices, and consequences of non-treatment discussed] : Rationale for medication choices, possible risks/precautions, benefits, alternative treatment choices, and consequences of non-treatment discussed with patient/family/caregiver  [FreeTextEntry4] : Meeting goals of self-care but has chronic on-going stress and insomnia and panic sx related to family conflicts.  [FreeTextEntry5] : Continue Fetzima 20 mg.  Trazodone 50 mg, pt has the ability to titrate up if necessary.  Stop Vistaril secondary to contributing to eye dryness.

## 2024-04-08 NOTE — DISCUSSION/SUMMARY
[FreeTextEntry1] : Pt states she can fall asleep for 4 hours but does not stay asleep.  Should pt not be able to sleep- advised to take Belsomra all by itself.  Pt taking the full dose of Trazodone. Discontinue Vistaril.  Reiterated her hopes for herself and granddaughter's childhood.

## 2024-04-08 NOTE — PHYSICAL EXAM
[Well groomed] : well groomed [Appears younger than age] : appears younger than age [Cooperative] : cooperative [Euthymic] : euthymic [Full] : full [Clear] : clear [Circumstantial] : circumstantial [None] : none [None Reported] : none reported [Average] : average [WNL] : within normal limits [FreeTextEntry1] : Thin female dressed casually. [de-identified] : Pt advised not to take - THC, discussed what she can take for sleep, that will avoid vertigo.

## 2024-04-09 ENCOUNTER — APPOINTMENT (OUTPATIENT)
Dept: ORTHOPEDIC SURGERY | Facility: CLINIC | Age: 69
End: 2024-04-09

## 2024-04-10 ENCOUNTER — APPOINTMENT (OUTPATIENT)
Dept: PULMONOLOGY | Facility: CLINIC | Age: 69
End: 2024-04-10
Payer: COMMERCIAL

## 2024-04-10 VITALS
HEART RATE: 91 BPM | BODY MASS INDEX: 19.14 KG/M2 | SYSTOLIC BLOOD PRESSURE: 110 MMHG | HEIGHT: 63 IN | OXYGEN SATURATION: 97 % | WEIGHT: 108 LBS | DIASTOLIC BLOOD PRESSURE: 70 MMHG

## 2024-04-10 DIAGNOSIS — D80.2 SELECTIVE DEFICIENCY OF IMMUNOGLOBULIN A [IGA]: ICD-10-CM

## 2024-04-10 DIAGNOSIS — J45.909 UNSPECIFIED ASTHMA, UNCOMPLICATED: ICD-10-CM

## 2024-04-10 DIAGNOSIS — R05.9 COUGH, UNSPECIFIED: ICD-10-CM

## 2024-04-10 PROCEDURE — 99213 OFFICE O/P EST LOW 20 MIN: CPT | Mod: 25

## 2024-04-10 PROCEDURE — 94060 EVALUATION OF WHEEZING: CPT

## 2024-04-10 PROCEDURE — 94010 BREATHING CAPACITY TEST: CPT

## 2024-04-10 NOTE — ASSESSMENT
[FreeTextEntry1] : Ongoing URI symptoms likely secondary to sinus disease.  Does have asthmatic component.  May use albuterol or lev albuterol to relieve symptoms do not think patient needs maintenance inhaler at this time Restart Xhance

## 2024-04-10 NOTE — HISTORY OF PRESENT ILLNESS
[TextBox_4] : Increased cough and congestion noted sinus symptoms.  Seen by physician recommended decongestants as she is planning to fly to [place].  Antibiotics on standby.  History of IgA deficiency.

## 2024-05-13 ENCOUNTER — APPOINTMENT (OUTPATIENT)
Dept: PSYCHIATRY | Facility: CLINIC | Age: 69
End: 2024-05-13
Payer: COMMERCIAL

## 2024-05-13 PROCEDURE — 99214 OFFICE O/P EST MOD 30 MIN: CPT

## 2024-05-13 RX ORDER — ZOLPIDEM TARTRATE 5 MG/1
5 TABLET ORAL
Qty: 30 | Refills: 0 | Status: DISCONTINUED | COMMUNITY
Start: 2022-09-16 | End: 2024-05-13

## 2024-05-13 NOTE — SOCIAL HISTORY
[FreeTextEntry1] : Pt is estranged from a sister who is substance involved. Daughter has type I diabetes.  Pt reports she is active in [employer]. Father was a salesman- but also alcohol involved.

## 2024-05-13 NOTE — PLAN
[No] : No [Medication education provided] : Medication education provided. [Rationale for medication choices, possible risks/precautions, benefits, alternative treatment choices, and consequences of non-treatment discussed] : Rationale for medication choices, possible risks/precautions, benefits, alternative treatment choices, and consequences of non-treatment discussed with patient/family/caregiver  [FreeTextEntry4] : Meeting goals of care.  [FreeTextEntry5] : Pt reports she wakes every three hours.  Belvannessa - discussed that her internist prescribed it.  Pt does not take it when she has her granddaughter, and reports that she takes Melatonin, Gabapentin 100 mg for neuropathy.  Pt will take Alprazolam as it doesn't have daytime sedation when babysitting. Pt will take Trazodone and monitor.

## 2024-05-13 NOTE — PHYSICAL EXAM
[Well groomed] : well groomed [Appears younger than age] : appears younger than age [Cooperative] : cooperative [Euthymic] : euthymic [Full] : full [Clear] : clear [Circumstantial] : circumstantial [None] : none [None Reported] : none reported [Average] : average [WNL] : within normal limits [FreeTextEntry1] : Thin female dressed casually. [de-identified] : Pt advised not to take - THC, discussed what she can take for sleep, that will avoid vertigo.

## 2024-05-21 ENCOUNTER — NON-APPOINTMENT (OUTPATIENT)
Age: 69
End: 2024-05-21

## 2024-05-21 ENCOUNTER — RESULT REVIEW (OUTPATIENT)
Age: 69
End: 2024-05-21

## 2024-05-21 ENCOUNTER — OUTPATIENT (OUTPATIENT)
Dept: OUTPATIENT SERVICES | Facility: HOSPITAL | Age: 69
LOS: 1 days | End: 2024-05-21
Payer: COMMERCIAL

## 2024-05-21 ENCOUNTER — APPOINTMENT (OUTPATIENT)
Dept: MAMMOGRAPHY | Facility: HOSPITAL | Age: 69
End: 2024-05-21
Payer: COMMERCIAL

## 2024-05-21 ENCOUNTER — APPOINTMENT (OUTPATIENT)
Dept: ULTRASOUND IMAGING | Facility: HOSPITAL | Age: 69
End: 2024-05-21
Payer: COMMERCIAL

## 2024-05-21 DIAGNOSIS — Z98.89 OTHER SPECIFIED POSTPROCEDURAL STATES: Chronic | ICD-10-CM

## 2024-05-21 DIAGNOSIS — R92.30 DENSE BREASTS, UNSPECIFIED: ICD-10-CM

## 2024-05-21 PROCEDURE — 77063 BREAST TOMOSYNTHESIS BI: CPT | Mod: 26

## 2024-05-21 PROCEDURE — 76641 ULTRASOUND BREAST COMPLETE: CPT

## 2024-05-21 PROCEDURE — 77067 SCR MAMMO BI INCL CAD: CPT | Mod: 26

## 2024-05-21 PROCEDURE — 76641 ULTRASOUND BREAST COMPLETE: CPT | Mod: 26,50

## 2024-05-21 PROCEDURE — 77067 SCR MAMMO BI INCL CAD: CPT

## 2024-05-21 PROCEDURE — 77063 BREAST TOMOSYNTHESIS BI: CPT

## 2024-05-22 ENCOUNTER — APPOINTMENT (OUTPATIENT)
Dept: NEUROLOGY | Facility: CLINIC | Age: 69
End: 2024-05-22
Payer: COMMERCIAL

## 2024-05-22 VITALS
SYSTOLIC BLOOD PRESSURE: 103 MMHG | HEART RATE: 77 BPM | HEIGHT: 63 IN | BODY MASS INDEX: 19.14 KG/M2 | OXYGEN SATURATION: 98 % | TEMPERATURE: 97.2 F | WEIGHT: 108 LBS | DIASTOLIC BLOOD PRESSURE: 62 MMHG

## 2024-05-22 VITALS
SYSTOLIC BLOOD PRESSURE: 103 MMHG | WEIGHT: 108 LBS | TEMPERATURE: 97.2 F | HEIGHT: 63 IN | DIASTOLIC BLOOD PRESSURE: 62 MMHG | HEART RATE: 77 BPM | OXYGEN SATURATION: 98 % | BODY MASS INDEX: 19.14 KG/M2

## 2024-05-22 PROCEDURE — G2211 COMPLEX E/M VISIT ADD ON: CPT

## 2024-05-22 PROCEDURE — 99204 OFFICE O/P NEW MOD 45 MIN: CPT

## 2024-05-22 RX ORDER — LEVALBUTEROL TARTRATE 45 UG/1
45 AEROSOL, METERED ORAL
Qty: 1 | Refills: 4 | Status: COMPLETED | COMMUNITY
Start: 2024-04-10 | End: 2024-05-22

## 2024-05-22 RX ORDER — ALBUTEROL SULFATE 90 UG/1
108 (90 BASE) INHALANT RESPIRATORY (INHALATION)
Qty: 1 | Refills: 3 | Status: COMPLETED | COMMUNITY
Start: 2024-01-09 | End: 2024-05-22

## 2024-05-22 RX ORDER — ESTRADIOL 0.1 MG/G
0.1 CREAM VAGINAL
Qty: 1 | Refills: 1 | Status: COMPLETED | COMMUNITY
Start: 2023-11-21 | End: 2024-05-22

## 2024-05-22 RX ORDER — LEVALBUTEROL TARTRATE 45 UG/1
45 AEROSOL, METERED ORAL EVERY 6 HOURS
Qty: 1 | Refills: 2 | Status: COMPLETED | COMMUNITY
Start: 2023-08-24 | End: 2024-05-22

## 2024-05-22 RX ORDER — PROMETHAZINE HYDROCHLORIDE AND DEXTROMETHORPHAN HYDROBROMIDE ORAL SOLUTION 15; 6.25 MG/5ML; MG/5ML
6.25-15 SOLUTION ORAL
Qty: 1 | Refills: 0 | Status: COMPLETED | COMMUNITY
Start: 2023-09-29 | End: 2024-05-22

## 2024-05-22 RX ORDER — CEFUROXIME AXETIL 500 MG/1
500 TABLET ORAL
Qty: 14 | Refills: 0 | Status: COMPLETED | COMMUNITY
Start: 2023-12-04 | End: 2024-05-22

## 2024-05-22 RX ORDER — SODIUM CHLORIDE FOR INHALATION 3 %
3 VIAL, NEBULIZER (ML) INHALATION
Qty: 1 | Refills: 0 | Status: COMPLETED | COMMUNITY
Start: 2024-01-09 | End: 2024-05-22

## 2024-05-22 RX ORDER — AMITRIPTYLINE HYDROCHLORIDE 10 MG/1
10 TABLET, FILM COATED ORAL 3 TIMES DAILY
Qty: 90 | Refills: 1 | Status: COMPLETED | COMMUNITY
Start: 2023-11-30 | End: 2024-05-22

## 2024-05-22 RX ORDER — AZITHROMYCIN 500 MG/1
500 TABLET, FILM COATED ORAL DAILY
Qty: 7 | Refills: 0 | Status: COMPLETED | COMMUNITY
Start: 2023-12-04 | End: 2024-05-22

## 2024-05-22 RX ORDER — FLUTICASONE PROPIONATE 93 UG/1
93 SPRAY, METERED NASAL
Qty: 1 | Refills: 0 | Status: COMPLETED | COMMUNITY
Start: 2024-04-10 | End: 2024-05-22

## 2024-05-22 RX ORDER — AZELASTINE HYDROCHLORIDE 137 UG/1
0.1 SPRAY, METERED NASAL TWICE DAILY
Qty: 1 | Refills: 1 | Status: COMPLETED | COMMUNITY
Start: 2023-09-29 | End: 2024-05-22

## 2024-05-22 RX ORDER — ESTRADIOL 10 UG/1
10 TABLET, FILM COATED VAGINAL
Qty: 24 | Refills: 2 | Status: COMPLETED | COMMUNITY
Start: 2023-11-21 | End: 2024-05-22

## 2024-05-22 RX ORDER — TRAZODONE HYDROCHLORIDE 50 MG/1
50 TABLET ORAL
Qty: 15 | Refills: 1 | Status: COMPLETED | COMMUNITY
Start: 2024-03-11 | End: 2024-05-22

## 2024-05-22 RX ORDER — LEVALBUTEROL HYDROCHLORIDE 0.63 MG/3ML
0.63 SOLUTION RESPIRATORY (INHALATION)
Qty: 120 | Refills: 2 | Status: COMPLETED | COMMUNITY
Start: 2023-10-17 | End: 2024-05-22

## 2024-05-22 NOTE — PHYSICAL EXAM
[FreeTextEntry1] : Head:  Normocephalic Neck: Supple nontender.  Spine: Nontender negative straight leg raising.  Mental Status:  Alert Oriented X3 Speech normal and no aphasia or dysarthria.  Cranial Nerves:  PERRL, Visual Fields full  EOMI no diplopia no ptosis no nystagmus, V through XII intact.  Motor:  No drift, normal strength tone and coordination and no focal atrophy. No abnormal movements. No dysmetria.  Normal rapid alternating movements.   DTRs: Symmetric and 2+.  Plantars flexor.  No Clonus.  Sensory:  Normal testing with pin light touch and vibration and  Joint position sense.   Gait:  Normal including tandem walking heel toe walking and Rhomberg.  Negative Tinel's sign and Phalen's left wrist.

## 2024-05-22 NOTE — HISTORY OF PRESENT ILLNESS
[FreeTextEntry1] : This patient is seen for an office consultation.  She is a 68-year-old right-handed female with past history of lumbar spinal stenosis at L4-5 osteoporosis anxiety insomnia hyperlipidemia and multiple orthopedic issues.  In this setting she is had paresthesia over the left temple occurring intermittently for approximately the last 1 year.  She also describes paresthesia radiating throughout the left upper extremity predominantly to the left thumb and index finger occurring intermittently also for approximately the last 1 year.  The temporal paresthesia has basically resolved.  She has chronic multifocal pain.  She occasionally has left-sided neck pain.  She denies motor weakness or pain in the left upper extremity.  She has followed with chiropractic.  She is using an inversion table at home.  She follows with neurosurgery occasionally for her lumbar stenosis which has been in a partial remission and at this juncture she is being treated conservatively.  She takes chronic gabapentin 100 mg 1 capsule at bedtime.  She also receives atorvastatin Fetzima Prolia Xanax and trazodone more recently Belsomra.

## 2024-05-22 NOTE — REASON FOR VISIT
[Initial Evaluation] : an initial evaluation [FreeTextEntry1] : Paresthesia left upper extremity and left temple

## 2024-05-29 ENCOUNTER — APPOINTMENT (OUTPATIENT)
Dept: NEUROLOGY | Facility: CLINIC | Age: 69
End: 2024-05-29
Payer: COMMERCIAL

## 2024-05-29 VITALS
TEMPERATURE: 97.3 F | HEART RATE: 65 BPM | SYSTOLIC BLOOD PRESSURE: 109 MMHG | DIASTOLIC BLOOD PRESSURE: 75 MMHG | WEIGHT: 108 LBS | HEIGHT: 63 IN | OXYGEN SATURATION: 98 % | BODY MASS INDEX: 19.14 KG/M2

## 2024-05-29 VITALS
DIASTOLIC BLOOD PRESSURE: 75 MMHG | HEART RATE: 65 BPM | OXYGEN SATURATION: 98 % | HEIGHT: 63 IN | TEMPERATURE: 97.3 F | BODY MASS INDEX: 19.14 KG/M2 | WEIGHT: 108 LBS | SYSTOLIC BLOOD PRESSURE: 109 MMHG

## 2024-05-29 DIAGNOSIS — M54.12 RADICULOPATHY, CERVICAL REGION: ICD-10-CM

## 2024-05-29 DIAGNOSIS — R20.2 PARESTHESIA OF SKIN: ICD-10-CM

## 2024-05-29 PROCEDURE — 95886 MUSC TEST DONE W/N TEST COMP: CPT

## 2024-05-29 PROCEDURE — 95910 NRV CNDJ TEST 7-8 STUDIES: CPT

## 2024-05-29 PROCEDURE — 99214 OFFICE O/P EST MOD 30 MIN: CPT | Mod: 25

## 2024-05-29 NOTE — PROCEDURE
[FreeTextEntry1] : Electrodiagnostic examination was performed of the left upper extremity including cervical paraspinal muscles including EMG and nerve conduction testing.  The study is essentially normal other than an isolated finding of a decreased left median motor nerve amplitude reduced 2.0 V.  The remainder of the testing is intact and there is no evidence of cervical radiculopathy, and the study is not consistent with carpal tunnel syndrome.  Please see the accompanying report.  Suggest clinical correlation.

## 2024-05-29 NOTE — ASSESSMENT
[FreeTextEntry1] : Impression: This 68-year-old female patient with anxiety lumbar spinal stenosis insomnia hyperlipidemia osteoporosis presents with a 1 year history of intermittent paresthesia localizing to the left temporal forehead which has moderated and also left upper extremity as detailed above.  Patient also has chronic multifocal pain including left cervical pain.  Neurological exam is been unremarkable.  Today's electrodiagnostic examination of the left upper extremity and cervical paraspinal muscles is essentially negative for cervical radiculopathy and carpal tunnel syndrome.  Recommendations: Conservative management.  Await cervical MRI.  Office follow-up as needed.

## 2024-05-29 NOTE — REASON FOR VISIT
[Follow-Up: _____] : a [unfilled] follow-up visit [FreeTextEntry1] : Office follow-up and electrodiagnostic exam for left upper extremity paresthesia and also left temporal intermittent paresthesia

## 2024-05-29 NOTE — HISTORY OF PRESENT ILLNESS
[FreeTextEntry1] : Patient is seen for an office visit and electrodiagnostic exam.  She has a past history of lumbar spinal stenosis osteoporosis anxiety insomnia hyperlipidemia and multiple orthopedic issues.  She has a history for 1 year of intermittent paresthesia of the left temple which has moderated.  She also has paresthesia left upper extremity predominating left thumb and index finger occurring intermittently.  She has left-sided neck pain.  She denies motor weakness or any other cranial cervical or left upper extremity complaints.  She takes chronic gabapentin 100 mg 1 capsule at bedtime and her other medications have been reviewed.

## 2024-06-07 ENCOUNTER — APPOINTMENT (OUTPATIENT)
Dept: ENDOCRINOLOGY | Facility: CLINIC | Age: 69
End: 2024-06-07
Payer: COMMERCIAL

## 2024-06-07 VITALS
HEART RATE: 82 BPM | WEIGHT: 105 LBS | DIASTOLIC BLOOD PRESSURE: 72 MMHG | OXYGEN SATURATION: 97 % | BODY MASS INDEX: 17.49 KG/M2 | SYSTOLIC BLOOD PRESSURE: 110 MMHG | HEIGHT: 65 IN

## 2024-06-07 DIAGNOSIS — M81.0 AGE-RELATED OSTEOPOROSIS W/OUT CURRENT PATHOLOGICAL FRACTURE: ICD-10-CM

## 2024-06-07 PROCEDURE — 96401 CHEMO ANTI-NEOPL SQ/IM: CPT

## 2024-06-07 PROCEDURE — 99213 OFFICE O/P EST LOW 20 MIN: CPT | Mod: 25

## 2024-06-07 RX ORDER — DENOSUMAB 60 MG/ML
60 INJECTION SUBCUTANEOUS
Qty: 1 | Refills: 0 | Status: COMPLETED | OUTPATIENT
Start: 2024-06-07

## 2024-06-07 RX ADMIN — DENOSUMAB 60 MG/ML: 60 INJECTION SUBCUTANEOUS at 00:00

## 2024-06-08 NOTE — HISTORY OF PRESENT ILLNESS
[FreeTextEntry1] :  Pt returns for a follow-up visit for osteoporosis. Pt had a stress fracture along the right toe. No major surgeries, hospitalizations, fractures or changes in medication. Up to date with dentist. Pt is going to need a tooth pulled but doesn't know when. Pt started Prolia 11/2023, tolerating well.   The patient may need future cervical spine surgery and was referred for management of low bone mass.  Patient has been told of low bone density osteoporosis or osteopenia for many years.  She had been treated in the past by Dr. Mandy Null.  She had tried Actonel in the past and did not tolerate due to upper GI symptoms.  She has received 2 doses of Prolia which she appears to be tolerating.  Submitted bone density June 6, 2023 from Dr. Null  Spine only -2.5.  This report is decreased versus 2022.  L2-3 and 4 excluded due to arthritis.   Femoral neck -2.2, osteopenia Total hip -1.8, osteopenia Proximal radius not performed  She has had a fracture of the foot but no classic osteoporosis related fractures. Prior medical history is notable for bronchiectasis. History of maternal hip fracture. Pt has no Hx of kidney stones or calcium issues. No Hx of anorexia nervosa, premature menopause, amenorrhea during reproductive years. No h/o celiac disease, malabsorption, nutritional deficiencies. . No ulcers or bleeding. No other  unusual risks for osteoporosis such as   OI. No Hx of RT. No chronic prednisone use. No Hx of Paget's disease. No Hx of DVT or PE. Up to date with routine care.

## 2024-06-08 NOTE — ASSESSMENT
[Denosumab Therapy] : Risks  and benefits of denosumab therapy were discussed with the patient including eczema, cellulitis, osteonecrosis of the jaw and atypical femur fractures [FreeTextEntry1] : 67 y/o female with mildly low bone density referred for management of osteoporosis.   The patient may need spine surgery.  She is currently on medication for osteoporosis Prolia with questionable decrease in single vertebral body L1. Pt started Prolia 11/2023, tolerating wellPatient advised that use of single vertebral bodies is statistically less reliable.  Hip bone density appears stable and moderate. Family history of hip fracture in mother but no other unusual risk factors for osteoporosis. I requested prior medical records be sent for review for better comparison of bone density over time.2.5 osteoporosis prior report -2.5. Continue Prolia from [facility].   I have reviewed the current data concerning need for use of anabolic therapy in preparation for spinal surgery. The patient appears to have only borderline low bone mass and anabolic therapy 3 standard for that purpose. With if we do consider anabolic therapy Evenity would be the most likely choice. Previous studies of PTH analog Forteo may not be considered after Prolia was not effective. The patient has no specific contraindications to Evenity such as cardiovascular disease. This was discussed but pt will consider surgery after having the next Bone Mineral Density (BMD) test to assess if therapy is effective.  Recommended calcium 500 mg per day, vitamin D. 1000 units per day, in addition to dietary intake.   F/u in 6 months and repeat BMD.

## 2024-06-08 NOTE — END OF VISIT
[FreeTextEntry3] :  This note was written by Shantel Castellanos on (June 07,2024) acting as a medical scribe for Dr. Jhaveri This note was authored by the medical scribe for me. I have reviewed, edited, and revised the note as needed. I am in agreement with the exam findings, imaging findings, and treatment plan.  Girma Jhaveri MD

## 2024-06-10 ENCOUNTER — APPOINTMENT (OUTPATIENT)
Dept: PSYCHIATRY | Facility: CLINIC | Age: 69
End: 2024-06-10
Payer: COMMERCIAL

## 2024-06-10 DIAGNOSIS — F41.9 ANXIETY DISORDER, UNSPECIFIED: ICD-10-CM

## 2024-06-10 DIAGNOSIS — F41.1 GENERALIZED ANXIETY DISORDER: ICD-10-CM

## 2024-06-10 DIAGNOSIS — F51.05 ANXIETY DISORDER, UNSPECIFIED: ICD-10-CM

## 2024-06-10 PROCEDURE — 99214 OFFICE O/P EST MOD 30 MIN: CPT

## 2024-06-10 RX ORDER — LEVOMILNACIPRAN HYDROCHLORIDE 20 MG/1
20 CAPSULE, EXTENDED RELEASE ORAL
Qty: 90 | Refills: 1 | Status: ACTIVE | COMMUNITY
Start: 2021-06-04

## 2024-06-10 RX ORDER — GABAPENTIN 100 MG/1
100 CAPSULE ORAL 3 TIMES DAILY
Qty: 90 | Refills: 1 | Status: ACTIVE | COMMUNITY
Start: 2023-08-09

## 2024-06-10 RX ORDER — ALPRAZOLAM 0.25 MG/1
0.25 TABLET ORAL DAILY
Qty: 30 | Refills: 0 | Status: ACTIVE | COMMUNITY
Start: 2021-03-11

## 2024-06-10 NOTE — RESULTS/DATA
[FreeTextEntry1] : GERD Osteoporosis Back pain  Pt reports she requires surgery but that her osteoporosis affects dental issues and back surgery.  Pt has taken Prolia.

## 2024-06-10 NOTE — HISTORY OF PRESENT ILLNESS
[FreeTextEntry1] : Pt reports that her granddaughter was awarded therapy.  Pt reports that she does take her granddaughter to gymnastics class.  Pt reports this causes her stress comparing one set of grandchildren to her granddaughter who has so much controversy surrounding her parenting. Pt reports she listens to [employer] online, pt reports she is also taking meditation as well.  Pt working on listening and managing very complicated family dynamics.  Positive feedback given regarding her being able to set limits and set boundaries. Rest of psychiatric ROS unremarkable or as documented below.

## 2024-06-10 NOTE — PLAN
[No] : No [Medication education provided] : Medication education provided. [Rationale for medication choices, possible risks/precautions, benefits, alternative treatment choices, and consequences of non-treatment discussed] : Rationale for medication choices, possible risks/precautions, benefits, alternative treatment choices, and consequences of non-treatment discussed with patient/family/caregiver  [FreeTextEntry4] : Pt insight is improving and will continue current management.  [FreeTextEntry5] : Pt to continue Fetzima 20 mg Gabapentin 100 mg melatonin 1 mg at bedtime Alprazolam 0.125 mg (pt may even take 1/4 of the tablet).

## 2024-06-10 NOTE — PHYSICAL EXAM
[FreeTextEntry5] : wearing a boot, for an ankle fracture.  [Well groomed] : well groomed [Appears younger than age] : appears younger than age [Cooperative] : cooperative [Euthymic] : euthymic [Full] : full [Clear] : clear [Circumstantial] : circumstantial [None] : none [None Reported] : none reported [Average] : average [WNL] : within normal limits [FreeTextEntry1] : Thin female dressed casually. [de-identified] : Pt advised not to take - THC, discussed what she can take for sleep, that will avoid vertigo.

## 2024-06-11 ENCOUNTER — APPOINTMENT (OUTPATIENT)
Dept: MRI IMAGING | Facility: CLINIC | Age: 69
End: 2024-06-11

## 2024-06-22 ENCOUNTER — OUTPATIENT (OUTPATIENT)
Dept: OUTPATIENT SERVICES | Facility: HOSPITAL | Age: 69
LOS: 1 days | End: 2024-06-22
Payer: COMMERCIAL

## 2024-06-22 ENCOUNTER — APPOINTMENT (OUTPATIENT)
Dept: MRI IMAGING | Facility: CLINIC | Age: 69
End: 2024-06-22
Payer: COMMERCIAL

## 2024-06-22 DIAGNOSIS — M54.12 RADICULOPATHY, CERVICAL REGION: ICD-10-CM

## 2024-06-22 DIAGNOSIS — Z98.89 OTHER SPECIFIED POSTPROCEDURAL STATES: Chronic | ICD-10-CM

## 2024-06-22 PROCEDURE — 72141 MRI NECK SPINE W/O DYE: CPT

## 2024-06-22 PROCEDURE — 72141 MRI NECK SPINE W/O DYE: CPT | Mod: 26

## 2024-06-27 ENCOUNTER — NON-APPOINTMENT (OUTPATIENT)
Age: 69
End: 2024-06-27

## 2024-06-27 DIAGNOSIS — M47.812 SPONDYLOSIS W/OUT MYELOPATHY OR RADICULOPATHY, CERVICAL REGION: ICD-10-CM

## 2024-07-10 ENCOUNTER — APPOINTMENT (OUTPATIENT)
Dept: PSYCHIATRY | Facility: CLINIC | Age: 69
End: 2024-07-10
Payer: COMMERCIAL

## 2024-07-10 DIAGNOSIS — F51.05 ANXIETY DISORDER, UNSPECIFIED: ICD-10-CM

## 2024-07-10 DIAGNOSIS — F41.9 ANXIETY DISORDER, UNSPECIFIED: ICD-10-CM

## 2024-07-10 PROCEDURE — 99214 OFFICE O/P EST MOD 30 MIN: CPT

## 2024-07-10 RX ORDER — TRAZODONE HYDROCHLORIDE 50 MG/1
50 TABLET ORAL
Qty: 30 | Refills: 1 | Status: ACTIVE | COMMUNITY
Start: 2024-07-10 | End: 1900-01-01

## 2024-07-19 ENCOUNTER — APPOINTMENT (OUTPATIENT)
Dept: INTERNAL MEDICINE | Facility: CLINIC | Age: 69
End: 2024-07-19

## 2024-07-19 VITALS
WEIGHT: 106 LBS | DIASTOLIC BLOOD PRESSURE: 70 MMHG | HEART RATE: 88 BPM | TEMPERATURE: 97 F | OXYGEN SATURATION: 99 % | HEIGHT: 63.5 IN | RESPIRATION RATE: 16 BRPM | SYSTOLIC BLOOD PRESSURE: 112 MMHG | BODY MASS INDEX: 18.55 KG/M2

## 2024-07-19 DIAGNOSIS — D84.9 IMMUNODEFICIENCY, UNSPECIFIED: ICD-10-CM

## 2024-07-19 DIAGNOSIS — J47.9 BRONCHIECTASIS, UNCOMPLICATED: ICD-10-CM

## 2024-07-19 DIAGNOSIS — Z87.19 PERSONAL HISTORY OF OTHER DISEASES OF THE DIGESTIVE SYSTEM: ICD-10-CM

## 2024-07-19 DIAGNOSIS — F41.1 GENERALIZED ANXIETY DISORDER: ICD-10-CM

## 2024-07-19 DIAGNOSIS — M16.11 UNILATERAL PRIMARY OSTEOARTHRITIS, RIGHT HIP: ICD-10-CM

## 2024-07-19 DIAGNOSIS — M48.07 SPINAL STENOSIS, LUMBOSACRAL REGION: ICD-10-CM

## 2024-07-19 DIAGNOSIS — M81.0 AGE-RELATED OSTEOPOROSIS W/OUT CURRENT PATHOLOGICAL FRACTURE: ICD-10-CM

## 2024-07-19 PROCEDURE — 99204 OFFICE O/P NEW MOD 45 MIN: CPT

## 2024-07-19 RX ORDER — SCOPOLAMINE 1.5 MG/1
1 PATCH, EXTENDED RELEASE TRANSDERMAL
Qty: 1 | Refills: 3 | Status: ACTIVE | COMMUNITY
Start: 2024-07-19 | End: 1900-01-01

## 2024-07-25 LAB
25(OH)D3 SERPL-MCNC: 44.4 NG/ML
ALBUMIN SERPL ELPH-MCNC: 4.6 G/DL
ALP BLD-CCNC: 41 U/L
ALT SERPL-CCNC: 20 U/L
ANION GAP SERPL CALC-SCNC: 12 MMOL/L
APPEARANCE: CLEAR
AST SERPL-CCNC: 25 U/L
BACTERIA: NEGATIVE /HPF
BASOPHILS # BLD AUTO: 0.01 K/UL
BASOPHILS NFR BLD AUTO: 0.3 %
BILIRUB SERPL-MCNC: 1 MG/DL
BILIRUBIN URINE: NEGATIVE
BLOOD URINE: NEGATIVE
BUN SERPL-MCNC: 20 MG/DL
CALCIUM SERPL-MCNC: 9 MG/DL
CAST: 0 /LPF
CHLORIDE SERPL-SCNC: 104 MMOL/L
CHOLEST SERPL-MCNC: 153 MG/DL
CO2 SERPL-SCNC: 24 MMOL/L
COLOR: YELLOW
CREAT SERPL-MCNC: 0.7 MG/DL
EGFR: 95 ML/MIN/1.73M2
EOSINOPHIL # BLD AUTO: 0 K/UL
EOSINOPHIL NFR BLD AUTO: 0 %
EPITHELIAL CELLS: 2 /HPF
ESTIMATED AVERAGE GLUCOSE: 100 MG/DL
FERRITIN SERPL-MCNC: 89 NG/ML
GLUCOSE QUALITATIVE U: NEGATIVE MG/DL
GLUCOSE SERPL-MCNC: 88 MG/DL
HBA1C MFR BLD HPLC: 5.1 %
HCT VFR BLD CALC: 37.6 %
HDLC SERPL-MCNC: 78 MG/DL
HGB BLD-MCNC: 12.4 G/DL
IMM GRANULOCYTES NFR BLD AUTO: 0 %
IRON SERPL-MCNC: 58 UG/DL
KETONES URINE: NEGATIVE MG/DL
LDLC SERPL CALC-MCNC: 65 MG/DL
LEUKOCYTE ESTERASE URINE: ABNORMAL
LYMPHOCYTES # BLD AUTO: 1.07 K/UL
LYMPHOCYTES NFR BLD AUTO: 35 %
MAGNESIUM SERPL-MCNC: 2.1 MG/DL
MAN DIFF?: NORMAL
MCHC RBC-ENTMCNC: 28.7 PG
MCHC RBC-ENTMCNC: 33 GM/DL
MCV RBC AUTO: 87 FL
MICROSCOPIC-UA: NORMAL
MONOCYTES # BLD AUTO: 0.36 K/UL
MONOCYTES NFR BLD AUTO: 11.8 %
NEUTROPHILS # BLD AUTO: 1.62 K/UL
NEUTROPHILS NFR BLD AUTO: 52.9 %
NITRITE URINE: NEGATIVE
NONHDLC SERPL-MCNC: 75 MG/DL
PH URINE: 7
PHOSPHATE SERPL-MCNC: 3.4 MG/DL
PLATELET # BLD AUTO: 170 K/UL
POTASSIUM SERPL-SCNC: 4.3 MMOL/L
PROT SERPL-MCNC: 6.6 G/DL
PROTEIN URINE: NEGATIVE MG/DL
RBC # BLD: 4.32 M/UL
RBC # FLD: 13.3 %
RED BLOOD CELLS URINE: 1 /HPF
REVIEW: NORMAL
SODIUM SERPL-SCNC: 140 MMOL/L
SPECIFIC GRAVITY URINE: 1.01
TRIGL SERPL-MCNC: 46 MG/DL
TSH SERPL-ACNC: 3.18 UIU/ML
UROBILINOGEN URINE: 0.2 MG/DL
WBC # FLD AUTO: 3.06 K/UL
WHITE BLOOD CELLS URINE: 1 /HPF

## 2024-08-20 RX ORDER — NIRMATRELVIR AND RITONAVIR 300-100 MG
20 X 150 MG & KIT ORAL
Qty: 1 | Refills: 0 | Status: ACTIVE | COMMUNITY
Start: 2024-08-20 | End: 1900-01-01

## 2024-09-03 ENCOUNTER — APPOINTMENT (OUTPATIENT)
Dept: INTERNAL MEDICINE | Facility: CLINIC | Age: 69
End: 2024-09-03

## 2024-09-03 VITALS
TEMPERATURE: 97.3 F | DIASTOLIC BLOOD PRESSURE: 70 MMHG | OXYGEN SATURATION: 95 % | BODY MASS INDEX: 18.38 KG/M2 | WEIGHT: 105 LBS | SYSTOLIC BLOOD PRESSURE: 100 MMHG | HEART RATE: 88 BPM | HEIGHT: 63.5 IN | RESPIRATION RATE: 16 BRPM

## 2024-09-03 DIAGNOSIS — S91.311A LACERATION W/OUT FOREIGN BODY, RIGHT FOOT, INITIAL ENCOUNTER: ICD-10-CM

## 2024-09-03 NOTE — HISTORY OF PRESENT ILLNESS
[FreeTextEntry8] : Most pleasant 68-year-old white female with history of spinal stenosis osteoporosis immunoglobulin deficiency complicated by bronchiectasis who unfortunately had her foot caught on a door corner on her cruise to [facility] on August 27.  The ship doctor put in for stitches but left an open area posteriorly which she has been applying a Band-Aid to.  She continues to walk and she has been getting the foot wet regularly.  No redness, no purulent drainage, the bruising and swelling is gradually subsiding.

## 2024-09-03 NOTE — ASSESSMENT
[FreeTextEntry1] : *Right dorsal lateral foot laceration.  I am not sure the skin flap is viable.  She is 7 days out from suture, needs 10 to 14 days.  For the open area I discouraged bathing, recommended Mepilex dressing.  Recommended follow-up with podiatry she already has appointment, and potentially plastics if she is going to need a skin graft versus letting the open area heal by secondary intention.  Time 20 minutes

## 2024-09-03 NOTE — PHYSICAL EXAM
[de-identified] : Dorsum of the right foot overlying the cuboid bone is an irregular laceration measuring 2 cm x 1 cm that is distally stitch with a gray skin flap more posteriorly and open area measuring roughly 1 x 1 cm with clear drainage without surrounding erythema or crepitus.  The skin flap looks gray.  Left foot shows ecchymosis that is tracked to the heads of the metatarsal.  There is also evidence of a peroneus longus and brevis partial tendon rupture.

## 2024-09-29 ENCOUNTER — NON-APPOINTMENT (OUTPATIENT)
Age: 69
End: 2024-09-29

## 2024-09-30 ENCOUNTER — EMERGENCY (EMERGENCY)
Facility: HOSPITAL | Age: 69
LOS: 1 days | Discharge: HOME | End: 2024-09-30
Attending: STUDENT IN AN ORGANIZED HEALTH CARE EDUCATION/TRAINING PROGRAM | Admitting: STUDENT IN AN ORGANIZED HEALTH CARE EDUCATION/TRAINING PROGRAM
Payer: COMMERCIAL

## 2024-09-30 VITALS
OXYGEN SATURATION: 99 % | HEART RATE: 76 BPM | WEIGHT: 104.06 LBS | TEMPERATURE: 98 F | HEIGHT: 63 IN | DIASTOLIC BLOOD PRESSURE: 59 MMHG | SYSTOLIC BLOOD PRESSURE: 96 MMHG | RESPIRATION RATE: 18 BRPM

## 2024-09-30 DIAGNOSIS — Z98.89 OTHER SPECIFIED POSTPROCEDURAL STATES: Chronic | ICD-10-CM

## 2024-09-30 LAB
ALBUMIN SERPL ELPH-MCNC: 3.8 G/DL — SIGNIFICANT CHANGE UP (ref 3.3–5)
ALP SERPL-CCNC: 58 U/L — SIGNIFICANT CHANGE UP (ref 40–120)
ALT FLD-CCNC: 30 U/L — SIGNIFICANT CHANGE UP (ref 10–45)
ANION GAP SERPL CALC-SCNC: 7 MMOL/L — SIGNIFICANT CHANGE UP (ref 5–17)
AST SERPL-CCNC: 25 U/L — SIGNIFICANT CHANGE UP (ref 10–40)
BASOPHILS # BLD AUTO: 0.01 K/UL — SIGNIFICANT CHANGE UP (ref 0–0.2)
BASOPHILS NFR BLD AUTO: 0.1 % — SIGNIFICANT CHANGE UP (ref 0–2)
BILIRUB SERPL-MCNC: 1.8 MG/DL — HIGH (ref 0.2–1.2)
BUN SERPL-MCNC: 12 MG/DL — SIGNIFICANT CHANGE UP (ref 7–23)
CALCIUM SERPL-MCNC: 8.6 MG/DL — SIGNIFICANT CHANGE UP (ref 8.4–10.5)
CHLORIDE SERPL-SCNC: 99 MMOL/L — SIGNIFICANT CHANGE UP (ref 96–108)
CO2 SERPL-SCNC: 27 MMOL/L — SIGNIFICANT CHANGE UP (ref 22–31)
CREAT SERPL-MCNC: 0.62 MG/DL — SIGNIFICANT CHANGE UP (ref 0.5–1.3)
EGFR: 97 ML/MIN/1.73M2 — SIGNIFICANT CHANGE UP
EOSINOPHIL # BLD AUTO: 0 K/UL — SIGNIFICANT CHANGE UP (ref 0–0.5)
EOSINOPHIL NFR BLD AUTO: 0 % — SIGNIFICANT CHANGE UP (ref 0–6)
GLUCOSE SERPL-MCNC: 110 MG/DL — HIGH (ref 70–99)
HCT VFR BLD CALC: 32.8 % — LOW (ref 34.5–45)
HGB BLD-MCNC: 11.4 G/DL — LOW (ref 11.5–15.5)
IMM GRANULOCYTES NFR BLD AUTO: 0.8 % — SIGNIFICANT CHANGE UP (ref 0–0.9)
LYMPHOCYTES # BLD AUTO: 0.61 K/UL — LOW (ref 1–3.3)
LYMPHOCYTES # BLD AUTO: 6.9 % — LOW (ref 13–44)
MCHC RBC-ENTMCNC: 30.1 PG — SIGNIFICANT CHANGE UP (ref 27–34)
MCHC RBC-ENTMCNC: 34.8 GM/DL — SIGNIFICANT CHANGE UP (ref 32–36)
MCV RBC AUTO: 86.5 FL — SIGNIFICANT CHANGE UP (ref 80–100)
MONOCYTES # BLD AUTO: 0.65 K/UL — SIGNIFICANT CHANGE UP (ref 0–0.9)
MONOCYTES NFR BLD AUTO: 7.3 % — SIGNIFICANT CHANGE UP (ref 2–14)
NEUTROPHILS # BLD AUTO: 7.52 K/UL — HIGH (ref 1.8–7.4)
NEUTROPHILS NFR BLD AUTO: 84.9 % — HIGH (ref 43–77)
NRBC # BLD: 0 /100 WBCS — SIGNIFICANT CHANGE UP (ref 0–0)
PLATELET # BLD AUTO: 168 K/UL — SIGNIFICANT CHANGE UP (ref 150–400)
POTASSIUM SERPL-MCNC: 4.5 MMOL/L — SIGNIFICANT CHANGE UP (ref 3.5–5.3)
POTASSIUM SERPL-SCNC: 4.5 MMOL/L — SIGNIFICANT CHANGE UP (ref 3.5–5.3)
PROT SERPL-MCNC: 6.9 G/DL — SIGNIFICANT CHANGE UP (ref 6–8.3)
RBC # BLD: 3.79 M/UL — LOW (ref 3.8–5.2)
RBC # FLD: 14 % — SIGNIFICANT CHANGE UP (ref 10.3–14.5)
SODIUM SERPL-SCNC: 133 MMOL/L — LOW (ref 135–145)
WBC # BLD: 8.86 K/UL — SIGNIFICANT CHANGE UP (ref 3.8–10.5)
WBC # FLD AUTO: 8.86 K/UL — SIGNIFICANT CHANGE UP (ref 3.8–10.5)

## 2024-09-30 PROCEDURE — 99284 EMERGENCY DEPT VISIT MOD MDM: CPT

## 2024-09-30 RX ORDER — HYDROMORPHONE HYDROCHLORIDE 2 MG/1
1 TABLET ORAL ONCE
Refills: 0 | Status: DISCONTINUED | OUTPATIENT
Start: 2024-09-30 | End: 2024-09-30

## 2024-09-30 RX ORDER — SODIUM CHLORIDE 9 MG/ML
500 INJECTION INTRAMUSCULAR; INTRAVENOUS; SUBCUTANEOUS ONCE
Refills: 0 | Status: COMPLETED | OUTPATIENT
Start: 2024-09-30 | End: 2024-09-30

## 2024-09-30 RX ORDER — SODIUM CHLORIDE 9 MG/ML
1000 INJECTION INTRAMUSCULAR; INTRAVENOUS; SUBCUTANEOUS ONCE
Refills: 0 | Status: COMPLETED | OUTPATIENT
Start: 2024-09-30 | End: 2024-09-30

## 2024-09-30 RX ORDER — DIAZEPAM 10 MG
1 TABLET ORAL
Qty: 9 | Refills: 0
Start: 2024-09-30 | End: 2024-10-02

## 2024-09-30 RX ORDER — DIAZEPAM 10 MG
2 TABLET ORAL ONCE
Refills: 0 | Status: DISCONTINUED | OUTPATIENT
Start: 2024-09-30 | End: 2024-09-30

## 2024-09-30 RX ORDER — PREDNISONE 10 MG
2 TABLET, DOSE PACK ORAL
Qty: 8 | Refills: 0
Start: 2024-09-30 | End: 2024-10-03

## 2024-09-30 RX ORDER — METHYLPREDNISOLONE 4 MG
125 TABLET ORAL ONCE
Refills: 0 | Status: COMPLETED | OUTPATIENT
Start: 2024-09-30 | End: 2024-09-30

## 2024-09-30 RX ORDER — ONDANSETRON 2 MG/ML
4 INJECTION, SOLUTION INTRAMUSCULAR; INTRAVENOUS ONCE
Refills: 0 | Status: COMPLETED | OUTPATIENT
Start: 2024-09-30 | End: 2024-09-30

## 2024-09-30 RX ORDER — SODIUM CHLORIDE 9 MG/ML
1000 INJECTION INTRAMUSCULAR; INTRAVENOUS; SUBCUTANEOUS ONCE
Refills: 0 | Status: DISCONTINUED | OUTPATIENT
Start: 2024-09-30 | End: 2024-09-30

## 2024-09-30 RX ORDER — KETOROLAC TROMETHAMINE 30 MG/ML
15 INJECTION, SOLUTION INTRAMUSCULAR ONCE
Refills: 0 | Status: DISCONTINUED | OUTPATIENT
Start: 2024-09-30 | End: 2024-09-30

## 2024-09-30 RX ADMIN — Medication 125 MILLIGRAM(S): at 19:27

## 2024-09-30 RX ADMIN — SODIUM CHLORIDE 1000 MILLILITER(S): 9 INJECTION INTRAMUSCULAR; INTRAVENOUS; SUBCUTANEOUS at 19:21

## 2024-09-30 RX ADMIN — ONDANSETRON 4 MILLIGRAM(S): 2 INJECTION, SOLUTION INTRAMUSCULAR; INTRAVENOUS at 21:55

## 2024-09-30 RX ADMIN — Medication 2 MILLIGRAM(S): at 20:40

## 2024-09-30 RX ADMIN — SODIUM CHLORIDE 500 MILLILITER(S): 9 INJECTION INTRAMUSCULAR; INTRAVENOUS; SUBCUTANEOUS at 22:33

## 2024-09-30 RX ADMIN — HYDROMORPHONE HYDROCHLORIDE 1 MILLIGRAM(S): 2 TABLET ORAL at 19:24

## 2024-09-30 RX ADMIN — HYDROMORPHONE HYDROCHLORIDE 1 MILLIGRAM(S): 2 TABLET ORAL at 22:33

## 2024-09-30 RX ADMIN — KETOROLAC TROMETHAMINE 15 MILLIGRAM(S): 30 INJECTION, SOLUTION INTRAMUSCULAR at 19:26

## 2024-09-30 NOTE — ED PROVIDER NOTE - CLINICAL SUMMARY MEDICAL DECISION MAKING FREE TEXT BOX
68-year-old female with a past medical history of bronchiectasis, spinal stenosis, sciatica presenting with acute on chronic lower back pain since yesterday.  Describes right more than the left side lower back pain that radiates down to the leg.  Was unable to ambulate secondary to the pain.  Denies any recent trauma or injuries or falls.      Otherwise, the patient denies any neck pain, headaches, chest pain, shortness of breath, n/v/d, abdominal pain, recent illness, fevers, chills, recent spinal/back surgeries or procedures, bowel or bladder incontinence, bowel or bladder retention, constipation, IV drug use, known cancer history, recent weight loss,  weakness, other subjective neurological deficits,  dysuria, hematuria, rashes.   Of note she reports a  forehead temperature fever yesterday that has since resolved and she has no fever today.    Pt p/w atraumatic low back pain with (-) fevers, chills, (-) saddle anesthesia or perianal numbness,(-) rash, (-) IVDU hx, (-) urinary or bowel incontinence/retention, or focal neurological deficits. DDx: Likely musculoskeletal back pain vs disc herniation/radiculopathy. DDX includes but is not limited to: caudia equina syndrome, vertebral compression fracture, epidural abscess, discitis, vertebral osteomyelitis, cord compression, or bone malignancy; but these are unlikely due to the HPI and exam.  - Pain control: steroids, lidocaine patch, NSAIDs, opioid PRN  -  patient had recent MRI of the lumbar spine about 3 weeks ago from her primary  pain management doctor.  She is currently on gabapentin, lidocaine patches, and Valium for pain control.  - Obtain UA/UCx to rule out pyelo urolithiasis   - CBC/CMP and IVF and pain control w/ dilaudid, toradol, solumedrol.   - re-evaluation with ambulation. 68-year-old female with a past medical history of bronchiectasis, spinal stenosis, sciatica presenting with acute on chronic lower back pain since yesterday.  Describes right more than the left side lower back pain that radiates down to the leg.  Was unable to ambulate secondary to the pain.  Denies any recent trauma or injuries or falls.      Otherwise, the patient denies any neck pain, headaches, chest pain, shortness of breath, n/v/d, abdominal pain, recent illness, fevers, chills, recent spinal/back surgeries or procedures, bowel or bladder incontinence, bowel or bladder retention, constipation, IV drug use, known cancer history, recent weight loss,  weakness, other subjective neurological deficits,  dysuria, hematuria, rashes.   Of note she reports a  forehead temperature fever yesterday that has since resolved and she has no fever today.    Pt p/w atraumatic low back pain with (-) fevers, chills, (-) saddle anesthesia or perianal numbness,(-) rash, (-) IVDU hx, (-) urinary or bowel incontinence/retention, or focal neurological deficits. DDx: Likely musculoskeletal back pain vs disc herniation/radiculopathy. DDX includes but is not limited to: caudia equina syndrome, vertebral compression fracture, epidural abscess, discitis, vertebral osteomyelitis, cord compression, or bone malignancy; but these are unlikely due to the HPI and exam.  - Pain control: steroids, lidocaine patch, NSAIDs, opioid PRN  -  patient had recent MRI of the lumbar spine about 3 weeks ago from her primary  pain management doctor.  She is currently on gabapentin, lidocaine patches, and Valium for pain control.  - Obtain UA/UCx to rule out pyelo urolithiasis   - CBC/CMP and IVF and pain control w/ Dilaudid, Toradol, solumedrol.   - re-evaluation with ambulation.    9:56 PM the patient is ambulatory with mild pain without assistance after additional Valium.  I offered her observation/admission for pain control with PT oral versus observation at home with oral pain medication.  She would like to go home.  I educated her on return precautions.  Will prescribe her Valium and prednisone for 4 days.  Otherwise given Zofran for mild nausea here in the emergency room. 68-year-old female with a past medical history of bronchiectasis, spinal stenosis, sciatica presenting with acute on chronic lower back pain since yesterday.  Describes right more than the left side lower back pain that radiates down to the leg.  Was unable to ambulate secondary to the pain.  Denies any recent trauma or injuries or falls.      Otherwise, the patient denies any neck pain, headaches, chest pain, shortness of breath, n/v/d, abdominal pain, recent illness, fevers, chills, recent spinal/back surgeries or procedures, bowel or bladder incontinence, bowel or bladder retention, constipation, IV drug use, known cancer history, recent weight loss,  weakness, other subjective neurological deficits,  dysuria, hematuria, rashes.   Of note she reports a  forehead temperature fever yesterday that has since resolved and she has no fever today.    Pt p/w atraumatic low back pain with (-) fevers, chills, (-) saddle anesthesia or perianal numbness,(-) rash, (-) IVDU hx, (-) urinary or bowel incontinence/retention, or focal neurological deficits. DDx: Likely musculoskeletal back pain vs disc herniation/radiculopathy. DDX includes but is not limited to: caudia equina syndrome, vertebral compression fracture, epidural abscess, discitis, vertebral osteomyelitis, cord compression, or bone malignancy; but these are unlikely due to the HPI and exam.  - Pain control: steroids, lidocaine patch, NSAIDs, opioid PRN  -  patient had recent MRI of the lumbar spine about 3 weeks ago from her primary  pain management doctor.  She is currently on gabapentin, lidocaine patches, and Valium for pain control.  - Obtain UA/UCx to rule out pyelo urolithiasis   - CBC/CMP and IVF and pain control w/ Dilaudid, Toradol, solumedrol.   - re-evaluation with ambulation.    9:56 PM the patient is ambulatory with mild pain without assistance after additional Valium.  I offered her observation/admission for pain control with PT oral versus observation at home with oral pain medication.  She would like to go home.  I educated her on return precautions.  Will prescribe her Valium and prednisone for 4 days.  Otherwise given Zofran for mild nausea here in the emergency room.    10:21 PM   the patient would like to get another dose of pain medication and observe for improvement before deciding on discharge versus admission.  Giving another dose of Dilaudid and fluids. 68-year-old female with a past medical history of bronchiectasis, spinal stenosis, sciatica presenting with acute on chronic lower back pain since yesterday.  Describes right more than the left side lower back pain that radiates down to the leg.  Was unable to ambulate secondary to the pain.  Denies any recent trauma or injuries or falls.      Otherwise, the patient denies any neck pain, headaches, chest pain, shortness of breath, n/v/d, abdominal pain, recent illness, fevers, chills, recent spinal/back surgeries or procedures, bowel or bladder incontinence, bowel or bladder retention, constipation, IV drug use, known cancer history, recent weight loss,  weakness, other subjective neurological deficits,  dysuria, hematuria, rashes.   Of note she reports a  forehead temperature fever yesterday that has since resolved and she has no fever today.    Pt p/w atraumatic low back pain with (-) fevers, chills, (-) saddle anesthesia or perianal numbness,(-) rash, (-) IVDU hx, (-) urinary or bowel incontinence/retention, or focal neurological deficits. DDx: Likely musculoskeletal back pain vs disc herniation/radiculopathy. DDX includes but is not limited to: caudia equina syndrome, vertebral compression fracture, epidural abscess, discitis, vertebral osteomyelitis, cord compression, or bone malignancy; but these are unlikely due to the HPI and exam.  - Pain control: steroids, lidocaine patch, NSAIDs, opioid PRN  -  patient had recent MRI of the lumbar spine about 3 weeks ago from her primary  pain management doctor.  She is currently on gabapentin, lidocaine patches, and Valium for pain control.  - Obtain UA/UCx to rule out pyelo urolithiasis   - CBC/CMP and IVF and pain control w/ Dilaudid, Toradol, solumedrol.   - re-evaluation with ambulation.    9:56 PM the patient is ambulatory with mild pain without assistance after additional Valium.  I offered her observation/admission for pain control with PT oral versus observation at home with oral pain medication.  She would like to go home.  I educated her on return precautions.  Will prescribe her Valium and prednisone for 4 days.  Otherwise given Zofran for mild nausea here in the emergency room.    10:21 PM   the patient would like to get another dose of pain medication and observe for improvement before deciding on discharge versus admission.  Giving another dose of Dilaudid and fluids.    0005 Update: Patient states she is feeling much better.  She states she was resting very comfortably.  Still has some pain but moving better now.  Wants to go home.   will pick her up.  Dr. Zepeda.

## 2024-09-30 NOTE — ED PROVIDER NOTE - PHYSICAL EXAMINATION
VITAL SIGNS: I have reviewed nursing notes and confirm.   GEN: Well-developed; well-nourished; in no acute distress. Speaking full sentences.  SKIN: Warm, pink, no rash, no diaphoresis, no cyanosis, well perfused.   HEAD: Normocephalic; atraumatic.    NECK: Supple; non tender.   EYES: Pupils 3mm equal, round, reactive to light and accomodation, conjunctiva and sclera clear. Extra-ocular movements intact bilaterally.  ENT: No nasal discharge; airway clear.    CV: RRR. S1, S2 normal; no murmurs, gallops, or rubs. Capillary refill < 2 seconds throughout. Distal pulses intact 2+ throughout. No LE pitting edema b/l  RESP: CTA bilaterally. No wheezes, rales, or rhonchi.   ABD: Normal bowel sounds, soft, non-distended, non-tender, no rebound, no guarding   MSK: Normal range of motion and movement of all 4 extremities. No apparent joint or muscular pain throughout.    BACK: No thoracolumbar midline (+) b/l R > L paralumbar ttp mild, no parathoracic ttp. No rashes or vesicles or swelling or lesions. Straight leg test (+) b/l.  NEURO: Alert & oriented x 3, Grossly unremarkable. Sensory and motor intact throughout. No focal deficits. Normal speech and coordination.

## 2024-09-30 NOTE — ED PROVIDER NOTE - PATIENT PORTAL LINK FT
You can access the FollowMyHealth Patient Portal offered by [facility] by registering at the following website: [email or web address]. By joining [facility]’s FollowMyHealth portal, you will also be able to view your health information using other applications (apps) compatible with our system. You can access the FollowMyHealth Patient Portal offered by [facility] by registering at the following website: [email or web address]. By joining [facility]’s FollowMyHealth portal, you will also be able to view your health information using other applications (apps) compatible with our system.

## 2024-09-30 NOTE — ED PROVIDER NOTE - NSFOLLOWUPINSTRUCTIONS_ED_ALL_ED_FT
Back Pain    Back pain is very common in adults. The cause of back pain is rarely dangerous and the pain often gets better over time. The cause of your back pain may not be known and may include strain of muscles or ligaments, degeneration of the spinal disks, or arthritis. Occasionally the pain may radiate down your leg(s). Over-the-counter medicines to reduce pain and inflammation are often the most helpful. Stretching and remaining active frequently helps the healing process.     SEEK IMMEDIATE MEDICAL CARE IF YOU HAVE ANY OF THE FOLLOWING SYMPTOMS: bowel or bladder control problems, unusual weakness or numbness in your arms or legs, nausea or vomiting, abdominal pain, fever, dizziness/lightheadedness.     Take acetaminophen 650 mg orally every 6-8 hours for pain control as needed. Please do not exceed 4,000 mg of acetaminophen during a 24 hours period. Acetaminophen can be found in many over-the-counter cold medications as well as opioid medications that may be given for pain.

## 2024-09-30 NOTE — ED PROVIDER NOTE - OBJECTIVE STATEMENT
68-year-old female with a past medical history of bronchiectasis, spinal stenosis, sciatica presenting with acute on chronic lower back pain since yesterday.  Describes right more than the left side lower back pain that radiates down to the leg.  Was unable to ambulate secondary to the pain.  Denies any recent trauma or injuries or falls.      Otherwise, the patient denies any neck pain, headaches, chest pain, shortness of breath, n/v/d, abdominal pain, recent illness, fevers, chills, recent spinal/back surgeries or procedures, bowel or bladder incontinence, bowel or bladder retention, constipation, IV drug use, known cancer history, recent weight loss,  weakness, other subjective neurological deficits,  dysuria, hematuria, rashes.

## 2024-09-30 NOTE — ED ADULT TRIAGE NOTE - CHIEF COMPLAINT QUOTE
Patient presents to ED with complaint of lower back pain that started this morning. patient also states she had chills and fever of 101.7 yesterday. History of spinal stenosis. Alert and oriented x 4.

## 2024-10-01 VITALS
RESPIRATION RATE: 18 BRPM | HEART RATE: 70 BPM | OXYGEN SATURATION: 99 % | DIASTOLIC BLOOD PRESSURE: 60 MMHG | TEMPERATURE: 98 F | SYSTOLIC BLOOD PRESSURE: 110 MMHG

## 2024-10-01 PROCEDURE — 96374 THER/PROPH/DIAG INJ IV PUSH: CPT

## 2024-10-01 PROCEDURE — 99284 EMERGENCY DEPT VISIT MOD MDM: CPT | Mod: 25

## 2024-10-01 PROCEDURE — 80053 COMPREHEN METABOLIC PANEL: CPT

## 2024-10-01 PROCEDURE — 96375 TX/PRO/DX INJ NEW DRUG ADDON: CPT

## 2024-10-01 PROCEDURE — 36415 COLL VENOUS BLD VENIPUNCTURE: CPT

## 2024-10-01 PROCEDURE — 96376 TX/PRO/DX INJ SAME DRUG ADON: CPT

## 2024-10-01 PROCEDURE — 85025 COMPLETE CBC W/AUTO DIFF WBC: CPT

## 2024-10-01 RX ORDER — ONDANSETRON 2 MG/ML
4 INJECTION, SOLUTION INTRAMUSCULAR; INTRAVENOUS ONCE
Refills: 0 | Status: COMPLETED | OUTPATIENT
Start: 2024-10-01 | End: 2024-10-01

## 2024-10-01 RX ORDER — HYDROMORPHONE HYDROCHLORIDE 2 MG/1
1 TABLET ORAL
Qty: 9 | Refills: 0
Start: 2024-10-01 | End: 2024-10-03

## 2024-10-01 RX ORDER — ONDANSETRON 2 MG/ML
1 INJECTION, SOLUTION INTRAMUSCULAR; INTRAVENOUS
Qty: 9 | Refills: 0
Start: 2024-10-01 | End: 2024-10-03

## 2024-10-01 RX ADMIN — ONDANSETRON 4 MILLIGRAM(S): 2 INJECTION, SOLUTION INTRAMUSCULAR; INTRAVENOUS at 00:33

## 2024-10-02 ENCOUNTER — INPATIENT (INPATIENT)
Facility: HOSPITAL | Age: 69
LOS: 8 days | Discharge: NURSING, REHAB OR LONG-TERM CARE | DRG: 552 | End: 2024-10-11
Attending: STUDENT IN AN ORGANIZED HEALTH CARE EDUCATION/TRAINING PROGRAM | Admitting: STUDENT IN AN ORGANIZED HEALTH CARE EDUCATION/TRAINING PROGRAM
Payer: COMMERCIAL

## 2024-10-02 VITALS
SYSTOLIC BLOOD PRESSURE: 140 MMHG | TEMPERATURE: 97 F | DIASTOLIC BLOOD PRESSURE: 109 MMHG | OXYGEN SATURATION: 100 % | HEIGHT: 63 IN | RESPIRATION RATE: 16 BRPM | HEART RATE: 90 BPM | WEIGHT: 100.97 LBS

## 2024-10-02 DIAGNOSIS — Z98.89 OTHER SPECIFIED POSTPROCEDURAL STATES: Chronic | ICD-10-CM

## 2024-10-02 DIAGNOSIS — M54.16 RADICULOPATHY, LUMBAR REGION: ICD-10-CM

## 2024-10-02 LAB
ALBUMIN SERPL ELPH-MCNC: 3.2 G/DL — LOW (ref 3.3–5)
ALP SERPL-CCNC: 60 U/L — SIGNIFICANT CHANGE UP (ref 40–120)
ALT FLD-CCNC: 27 U/L — SIGNIFICANT CHANGE UP (ref 10–45)
ANION GAP SERPL CALC-SCNC: 8 MMOL/L — SIGNIFICANT CHANGE UP (ref 5–17)
APPEARANCE UR: ABNORMAL
APTT BLD: 19.8 SEC — LOW (ref 24.5–35.6)
AST SERPL-CCNC: 29 U/L — SIGNIFICANT CHANGE UP (ref 10–40)
BACTERIA # UR AUTO: ABNORMAL /HPF
BASOPHILS # BLD AUTO: 0.01 K/UL — SIGNIFICANT CHANGE UP (ref 0–0.2)
BASOPHILS NFR BLD AUTO: 0.1 % — SIGNIFICANT CHANGE UP (ref 0–2)
BILIRUB SERPL-MCNC: 1 MG/DL — SIGNIFICANT CHANGE UP (ref 0.2–1.2)
BILIRUB UR-MCNC: NEGATIVE — SIGNIFICANT CHANGE UP
BUN SERPL-MCNC: 14 MG/DL — SIGNIFICANT CHANGE UP (ref 7–23)
CALCIUM SERPL-MCNC: 8.7 MG/DL — SIGNIFICANT CHANGE UP (ref 8.4–10.5)
CHLORIDE SERPL-SCNC: 105 MMOL/L — SIGNIFICANT CHANGE UP (ref 96–108)
CO2 SERPL-SCNC: 25 MMOL/L — SIGNIFICANT CHANGE UP (ref 22–31)
COLOR SPEC: YELLOW — SIGNIFICANT CHANGE UP
CREAT SERPL-MCNC: 0.64 MG/DL — SIGNIFICANT CHANGE UP (ref 0.5–1.3)
DIFF PNL FLD: NEGATIVE — SIGNIFICANT CHANGE UP
EGFR: 97 ML/MIN/1.73M2 — SIGNIFICANT CHANGE UP
EOSINOPHIL # BLD AUTO: 0 K/UL — SIGNIFICANT CHANGE UP (ref 0–0.5)
EOSINOPHIL NFR BLD AUTO: 0 % — SIGNIFICANT CHANGE UP (ref 0–6)
EPI CELLS # UR: 1 — SIGNIFICANT CHANGE UP
FLUAV AG NPH QL: SIGNIFICANT CHANGE UP
FLUBV AG NPH QL: SIGNIFICANT CHANGE UP
GLUCOSE SERPL-MCNC: 97 MG/DL — SIGNIFICANT CHANGE UP (ref 70–99)
GLUCOSE UR QL: NEGATIVE MG/DL — SIGNIFICANT CHANGE UP
HCT VFR BLD CALC: 35.2 % — SIGNIFICANT CHANGE UP (ref 34.5–45)
HGB BLD-MCNC: 12.1 G/DL — SIGNIFICANT CHANGE UP (ref 11.5–15.5)
IMM GRANULOCYTES NFR BLD AUTO: 1 % — HIGH (ref 0–0.9)
INR BLD: 0.84 RATIO — LOW (ref 0.85–1.16)
KETONES UR-MCNC: 15 MG/DL
LACTATE SERPL-SCNC: 1.4 MMOL/L — SIGNIFICANT CHANGE UP (ref 0.7–2)
LEUKOCYTE ESTERASE UR-ACNC: ABNORMAL
LYMPHOCYTES # BLD AUTO: 0.42 K/UL — LOW (ref 1–3.3)
LYMPHOCYTES # BLD AUTO: 4.6 % — LOW (ref 13–44)
MCHC RBC-ENTMCNC: 30 PG — SIGNIFICANT CHANGE UP (ref 27–34)
MCHC RBC-ENTMCNC: 34.4 GM/DL — SIGNIFICANT CHANGE UP (ref 32–36)
MCV RBC AUTO: 87.3 FL — SIGNIFICANT CHANGE UP (ref 80–100)
MONOCYTES # BLD AUTO: 0.42 K/UL — SIGNIFICANT CHANGE UP (ref 0–0.9)
MONOCYTES NFR BLD AUTO: 4.6 % — SIGNIFICANT CHANGE UP (ref 2–14)
NEUTROPHILS # BLD AUTO: 8.19 K/UL — HIGH (ref 1.8–7.4)
NEUTROPHILS NFR BLD AUTO: 89.7 % — HIGH (ref 43–77)
NITRITE UR-MCNC: NEGATIVE — SIGNIFICANT CHANGE UP
NRBC # BLD: 0 /100 WBCS — SIGNIFICANT CHANGE UP (ref 0–0)
PH UR: 7 — SIGNIFICANT CHANGE UP (ref 5–8)
PLATELET # BLD AUTO: 175 K/UL — SIGNIFICANT CHANGE UP (ref 150–400)
POTASSIUM SERPL-MCNC: 4.7 MMOL/L — SIGNIFICANT CHANGE UP (ref 3.5–5.3)
POTASSIUM SERPL-SCNC: 4.7 MMOL/L — SIGNIFICANT CHANGE UP (ref 3.5–5.3)
PROT SERPL-MCNC: 7 G/DL — SIGNIFICANT CHANGE UP (ref 6–8.3)
PROT UR-MCNC: 30 MG/DL
PROTHROM AB SERPL-ACNC: 9.9 SEC — SIGNIFICANT CHANGE UP (ref 9.9–13.4)
RBC # BLD: 4.03 M/UL — SIGNIFICANT CHANGE UP (ref 3.8–5.2)
RBC # FLD: 14 % — SIGNIFICANT CHANGE UP (ref 10.3–14.5)
RBC CASTS # UR COMP ASSIST: 1 /HPF — SIGNIFICANT CHANGE UP (ref 0–4)
RSV RNA NPH QL NAA+NON-PROBE: SIGNIFICANT CHANGE UP
SARS-COV-2 RNA SPEC QL NAA+PROBE: SIGNIFICANT CHANGE UP
SODIUM SERPL-SCNC: 138 MMOL/L — SIGNIFICANT CHANGE UP (ref 135–145)
SP GR SPEC: 1.02 — SIGNIFICANT CHANGE UP (ref 1–1.03)
UROBILINOGEN FLD QL: 0.2 MG/DL — SIGNIFICANT CHANGE UP (ref 0.2–1)
WBC # BLD: 9.13 K/UL — SIGNIFICANT CHANGE UP (ref 3.8–10.5)
WBC # FLD AUTO: 9.13 K/UL — SIGNIFICANT CHANGE UP (ref 3.8–10.5)
WBC UR QL: 10 /HPF — HIGH (ref 0–5)

## 2024-10-02 PROCEDURE — 93010 ELECTROCARDIOGRAM REPORT: CPT

## 2024-10-02 PROCEDURE — 72131 CT LUMBAR SPINE W/O DYE: CPT | Mod: 26,MC

## 2024-10-02 PROCEDURE — 99234 HOSP IP/OBS SM DT SF/LOW 45: CPT

## 2024-10-02 PROCEDURE — 99285 EMERGENCY DEPT VISIT HI MDM: CPT

## 2024-10-02 RX ORDER — BUDESONIDE, MICRONIZED 100 %
0.5 POWDER (GRAM) MISCELLANEOUS ONCE
Refills: 0 | Status: COMPLETED | OUTPATIENT
Start: 2024-10-02 | End: 2024-10-02

## 2024-10-02 RX ORDER — ONDANSETRON HCL/PF 4 MG/2 ML
4 VIAL (ML) INJECTION ONCE
Refills: 0 | Status: COMPLETED | OUTPATIENT
Start: 2024-10-02 | End: 2024-10-02

## 2024-10-02 RX ORDER — IPRATROPIUM BROMIDE AND ALBUTEROL SULFATE .5; 3 MG/3ML; MG/3ML
3 SOLUTION RESPIRATORY (INHALATION) EVERY 6 HOURS
Refills: 0 | Status: DISCONTINUED | OUTPATIENT
Start: 2024-10-02 | End: 2024-10-11

## 2024-10-02 RX ORDER — HYDROMORPHONE HYDROCHLORIDE 1 MG/ML
0.25 INJECTION, SOLUTION INTRAMUSCULAR; INTRAVENOUS; SUBCUTANEOUS EVERY 4 HOURS
Refills: 0 | Status: DISCONTINUED | OUTPATIENT
Start: 2024-10-02 | End: 2024-10-08

## 2024-10-02 RX ORDER — DIAZEPAM 10 MG/1
2 TABLET ORAL ONCE
Refills: 0 | Status: DISCONTINUED | OUTPATIENT
Start: 2024-10-02 | End: 2024-10-02

## 2024-10-02 RX ORDER — INFLUENZA VIRUS VACCINE 15; 15; 15; 15 UG/.5ML; UG/.5ML; UG/.5ML; UG/.5ML
0.5 SUSPENSION INTRAMUSCULAR ONCE
Refills: 0 | Status: DISCONTINUED | OUTPATIENT
Start: 2024-10-02 | End: 2024-10-11

## 2024-10-02 RX ORDER — HYDROMORPHONE HYDROCHLORIDE 1 MG/ML
0.5 INJECTION, SOLUTION INTRAMUSCULAR; INTRAVENOUS; SUBCUTANEOUS EVERY 4 HOURS
Refills: 0 | Status: DISCONTINUED | OUTPATIENT
Start: 2024-10-02 | End: 2024-10-02

## 2024-10-02 RX ORDER — MAG HYDROX/ALUMINUM HYD/SIMETH 200-200-20
30 SUSPENSION, ORAL (FINAL DOSE FORM) ORAL EVERY 4 HOURS
Refills: 0 | Status: DISCONTINUED | OUTPATIENT
Start: 2024-10-02 | End: 2024-10-11

## 2024-10-02 RX ORDER — SENNOSIDES 8.6 MG
2 TABLET ORAL AT BEDTIME
Refills: 0 | Status: DISCONTINUED | OUTPATIENT
Start: 2024-10-02 | End: 2024-10-11

## 2024-10-02 RX ORDER — ACETAMINOPHEN 325 MG
675 TABLET ORAL ONCE
Refills: 0 | Status: COMPLETED | OUTPATIENT
Start: 2024-10-02 | End: 2024-10-02

## 2024-10-02 RX ORDER — ACETAMINOPHEN 325 MG
650 TABLET ORAL EVERY 6 HOURS
Refills: 0 | Status: DISCONTINUED | OUTPATIENT
Start: 2024-10-02 | End: 2024-10-03

## 2024-10-02 RX ORDER — IPRATROPIUM BROMIDE AND ALBUTEROL SULFATE .5; 3 MG/3ML; MG/3ML
3 SOLUTION RESPIRATORY (INHALATION)
Refills: 0 | Status: COMPLETED | OUTPATIENT
Start: 2024-10-02 | End: 2024-10-02

## 2024-10-02 RX ORDER — CEFPODOXIME PROXETIL 50 MG/5 ML
200 SUSPENSION, RECONSTITUTED, ORAL (ML) ORAL EVERY 12 HOURS
Refills: 0 | Status: DISCONTINUED | OUTPATIENT
Start: 2024-10-02 | End: 2024-10-02

## 2024-10-02 RX ORDER — SODIUM CHLORIDE 0.9 % (FLUSH) 0.9 %
1600 SYRINGE (ML) INJECTION ONCE
Refills: 0 | Status: COMPLETED | OUTPATIENT
Start: 2024-10-02 | End: 2024-10-02

## 2024-10-02 RX ORDER — HYDROMORPHONE HYDROCHLORIDE 1 MG/ML
0.5 INJECTION, SOLUTION INTRAMUSCULAR; INTRAVENOUS; SUBCUTANEOUS EVERY 4 HOURS
Refills: 0 | Status: DISCONTINUED | OUTPATIENT
Start: 2024-10-02 | End: 2024-10-08

## 2024-10-02 RX ORDER — HYDROMORPHONE HYDROCHLORIDE 1 MG/ML
1 INJECTION, SOLUTION INTRAMUSCULAR; INTRAVENOUS; SUBCUTANEOUS EVERY 4 HOURS
Refills: 0 | Status: DISCONTINUED | OUTPATIENT
Start: 2024-10-02 | End: 2024-10-02

## 2024-10-02 RX ORDER — ENOXAPARIN SODIUM 150 MG/ML
40 INJECTION SUBCUTANEOUS EVERY 24 HOURS
Refills: 0 | Status: DISCONTINUED | OUTPATIENT
Start: 2024-10-02 | End: 2024-10-11

## 2024-10-02 RX ORDER — SODIUM CHLORIDE 0.9 % (FLUSH) 0.9 %
1000 SYRINGE (ML) INJECTION ONCE
Refills: 0 | Status: COMPLETED | OUTPATIENT
Start: 2024-10-02 | End: 2024-10-02

## 2024-10-02 RX ORDER — ONDANSETRON HCL/PF 4 MG/2 ML
4 VIAL (ML) INJECTION EVERY 8 HOURS
Refills: 0 | Status: DISCONTINUED | OUTPATIENT
Start: 2024-10-02 | End: 2024-10-11

## 2024-10-02 RX ORDER — 5-HYDROXYTRYPTOPHAN (5-HTP) 100 MG
3 TABLET,DISINTEGRATING ORAL AT BEDTIME
Refills: 0 | Status: DISCONTINUED | OUTPATIENT
Start: 2024-10-02 | End: 2024-10-11

## 2024-10-02 RX ORDER — HYDROMORPHONE HYDROCHLORIDE 1 MG/ML
0.5 INJECTION, SOLUTION INTRAMUSCULAR; INTRAVENOUS; SUBCUTANEOUS ONCE
Refills: 0 | Status: DISCONTINUED | OUTPATIENT
Start: 2024-10-02 | End: 2024-10-02

## 2024-10-02 RX ADMIN — HYDROMORPHONE HYDROCHLORIDE 0.25 MILLIGRAM(S): 1 INJECTION, SOLUTION INTRAMUSCULAR; INTRAVENOUS; SUBCUTANEOUS at 16:35

## 2024-10-02 RX ADMIN — Medication 675 MILLIGRAM(S): at 11:15

## 2024-10-02 RX ADMIN — HYDROMORPHONE HYDROCHLORIDE 0.5 MILLIGRAM(S): 1 INJECTION, SOLUTION INTRAMUSCULAR; INTRAVENOUS; SUBCUTANEOUS at 20:24

## 2024-10-02 RX ADMIN — Medication 270 MILLIGRAM(S): at 10:59

## 2024-10-02 RX ADMIN — Medication 1000 MILLILITER(S): at 14:16

## 2024-10-02 RX ADMIN — HYDROMORPHONE HYDROCHLORIDE 0.5 MILLIGRAM(S): 1 INJECTION, SOLUTION INTRAMUSCULAR; INTRAVENOUS; SUBCUTANEOUS at 21:19

## 2024-10-02 RX ADMIN — Medication 4 MILLIGRAM(S): at 21:15

## 2024-10-02 RX ADMIN — Medication 675 MILLIGRAM(S): at 11:59

## 2024-10-02 RX ADMIN — HYDROMORPHONE HYDROCHLORIDE 0.25 MILLIGRAM(S): 1 INJECTION, SOLUTION INTRAMUSCULAR; INTRAVENOUS; SUBCUTANEOUS at 16:20

## 2024-10-02 RX ADMIN — IPRATROPIUM BROMIDE AND ALBUTEROL SULFATE 3 MILLILITER(S): .5; 3 SOLUTION RESPIRATORY (INHALATION) at 14:17

## 2024-10-02 RX ADMIN — Medication 17 GRAM(S): at 21:10

## 2024-10-02 RX ADMIN — HYDROMORPHONE HYDROCHLORIDE 0.5 MILLIGRAM(S): 1 INJECTION, SOLUTION INTRAMUSCULAR; INTRAVENOUS; SUBCUTANEOUS at 11:46

## 2024-10-02 RX ADMIN — Medication 2 TABLET(S): at 21:10

## 2024-10-02 RX ADMIN — DIAZEPAM 2 MILLIGRAM(S): 10 TABLET ORAL at 10:59

## 2024-10-02 RX ADMIN — Medication 1600 MILLILITER(S): at 10:59

## 2024-10-02 RX ADMIN — Medication 4 MILLIGRAM(S): at 11:46

## 2024-10-02 RX ADMIN — IPRATROPIUM BROMIDE AND ALBUTEROL SULFATE 3 MILLILITER(S): .5; 3 SOLUTION RESPIRATORY (INHALATION) at 12:44

## 2024-10-02 RX ADMIN — HYDROMORPHONE HYDROCHLORIDE 0.5 MILLIGRAM(S): 1 INJECTION, SOLUTION INTRAMUSCULAR; INTRAVENOUS; SUBCUTANEOUS at 12:50

## 2024-10-02 RX ADMIN — DIAZEPAM 2 MILLIGRAM(S): 10 TABLET ORAL at 12:45

## 2024-10-02 RX ADMIN — IPRATROPIUM BROMIDE AND ALBUTEROL SULFATE 3 MILLILITER(S): .5; 3 SOLUTION RESPIRATORY (INHALATION) at 11:46

## 2024-10-02 RX ADMIN — Medication 4 MILLIGRAM(S): at 16:24

## 2024-10-02 RX ADMIN — Medication 1600 MILLILITER(S): at 12:00

## 2024-10-02 RX ADMIN — Medication 6 MILLIGRAM(S): at 11:46

## 2024-10-02 RX ADMIN — Medication 0.5 MILLIGRAM(S): at 11:46

## 2024-10-02 NOTE — ED ADULT NURSE NOTE - OBJECTIVE STATEMENT
Pt axo3, c/o back pain x2 days. Hx spinal stenosis. endorses persistent cough and congestion. safety maintained.

## 2024-10-02 NOTE — ED PROVIDER NOTE - CARE PLAN
Goal:	Low back pain, cough, shaking chills,   Principal Discharge DX:	Acute lumbar radiculopathy  Goal:	Low back pain, cough, shaking chills,  Secondary Diagnosis:	Ambulatory dysfunction   1

## 2024-10-02 NOTE — ED PROVIDER NOTE - CLINICAL SUMMARY MEDICAL DECISION MAKING FREE TEXT BOX
68-year-old female with a past medical history of spinal stenosis chronic low back pain patient has been treated by pain management doctor also has a history of osteoporosis she is came to the emergency room with a chief complaint of severe lower back pain patient was also have coughing with the phlegm and is shaking chills patient stated that 2 days ago she had a fever which broke off Patient stated that she has been doing aggressive yoga training and had the MRI 2 weeks ago which showed significant degenerative disc disease spinal stenosis no fracture denies any fall any accidents   Patient has paraspinal lumbar tenderness patient is coughing phlegm sepsis workup was initiated IV fluids Dilaudid Valium IV Tylenol DuoNebs and steroid inhalers also CT of the lumbar spine to rule out spine fracture Chest x-ray 68-year-old female with a past medical history of spinal stenosis chronic low back pain patient has been treated by pain management doctor also has a history of osteoporosis she is came to the emergency room with a chief complaint of severe lower back pain patient was also have coughing with the phlegm and is shaking chills patient stated that 2 days ago she had a fever which broke off Patient stated that she has been doing aggressive yoga training and had the MRI 2 weeks ago which showed significant degenerative disc disease spinal stenosis no fracture denies any fall any accidents   Patient has paraspinal lumbar tenderness patient is coughing phlegm sepsis workup was initiated IV fluids Dilaudid Valium IV Tylenol DuoNebs and steroid inhalers also CT of the lumbar spine to rule out spine fracture Chest x-ray  Patient declined chest x-ray CT lumbar spine shows states unchanged findings since 2022 November, CBC CMP within normal limits patient was able to ambulate with a walker but because of the severity of the pain and unable to take care of her self at home patient decided to be admitted to the hospital patient was recommended short-term rehab for her pain management and physical therapy which the patient agreed upon

## 2024-10-02 NOTE — ED ADULT NURSE NOTE - NSFALLLASTSIX_ED_ALL_ED
[facility]  Progress Note    Sunni Mcneil  10/6/2023    Pre-op Diagnosis:   Thrombosed right AV graft       Post-Op Diagnosis Codes:   same    Procedure/CPT® Codes:        Procedure(s):  RIGHT AV GRAFT WITH ANGIOPLASTY AND CENTROVENOUS ANGIOPLASTY              Surgeon(s):  Phu Gaviria MD    Anesthesia: Monitored Anesthesia Care    Staff:   Circulator: Anum Martines RN  Radiology Technologist: Ct Zavala  Scrub Person: Dannielle Gonzalez; Audi Bojorquez Hannah         Estimated Blood Loss: 100ml    Urine Voided: * No values recorded between 10/6/2023  2:23 PM and 10/6/2023  3:40 PM *    Specimens:                None          Drains: * No LDAs found *    Findings: See dictation        Complications: None          Phu aGviria MD     Date: 10/6/2023  Time: 15:44 EDT        
No.

## 2024-10-02 NOTE — CHART NOTE - NSCHARTNOTEFT_GEN_A_CORE
This report was requested by: Jazzy Marx | Reference #: [id number]    Patient Demographic Information (PDI)       PDI	First Name	Last Name	Birth Date	Gender	Street Address	[facility]	Zip Code  HECTOR Whitmore	1955	Female	[address]	[place] [facility]	[id number]    Prescription Information      PDI Filter:    PDI	Current Rx	Drug Type	Rx Written	Rx Dispensed	Drug	Quantity	Days Supply	Prescriber Name	Prescriber MARIANNE #	Payment Method	Dispenser  A	Y	B	09/30/2024	10/01/2024	diazepam 5 mg tablet	9	3	Glenroy Parker)	[id number]	Insurance	[facility] #[id number]  A	Y	O	10/01/2024	10/01/2024	hydromorphone 2 mg tablet	9	3	Treasure Jaquez	[id number]	Insurance	[facility] #[id number]  A	N	B	08/13/2024	08/14/2024	diazepam 5 mg tablet	30	30	Sloan Schaefer MD	[id number]	Insurance	[facility] #[id number]  A	N	B	07/10/2024	07/11/2024	alprazolam 0.25 mg tablet	30	30	Amelie Wheatley MD	[id number]	Insurance	[id number]  A	N	B	12/21/2023	12/22/2023	alprazolam 0.25 mg tablet	30	30	Amelie Wheatley MD	[id number]	Insurance	[facility] #[id number]

## 2024-10-02 NOTE — PATIENT PROFILE ADULT - FALL HARM RISK - HARM RISK INTERVENTIONS

## 2024-10-02 NOTE — ED PROVIDER NOTE - PHYSICAL EXAMINATION
General:     NAD, well-nourished, shaking chills slim white female   Head:     NC/AT, EOMI, oral mucosa dry   Neck:     trachea midline  Lungs:     CTA b/l, no w/r/r  CVS:     S1S2, RRR, no m/g/r  Abd:     +BS, s/nt/nd, no organomegaly  Ext:    2+ radial and pedal pulses, no c/c/e  Neuro: AAOx3, no sensory/motor deficits  MSK–positive paraspinal lumbar tenderness positive straight leg raising test on the right neurovascular bilateral lower extremities intact

## 2024-10-02 NOTE — ED ADULT NURSE NOTE - SINCE THE ILLNESS OR INJURY
[facility] Discharge Instructions post BRONCHOSCOPY        8/23/2022           INSTRUCTIONS AFTER YOUR BRONCHOSCOPY    During your exam, the physician:       A biopsy and/or other specimen was obtained today. You may restart Eliquis tomorrow.    Follow up with Dr. Dorys Atkins [phone] as needed.    A light diet is recommended for today unless otherwise directed by your physician.    Your physician advises you to refrain from the following for 24 hours.  Do not drive   Do not return to work  Do not consume alcohol   Do not operate any machinery    AVOID strenuous or heavy activity for the next 24 hours.    You may experience a slight sore throat, coughing, hoarseness, a very small amount of bleeding, occasional chest discomfort or a feeling of sleepiness.    Please notify your physician or come to the emergency room if you experience any of the following:  Sudden or severe chest pain  Coughing up a large amount of bright red blood  Shortness of breath  Fever and/or chills      > If you have any questions or concerns, contact Dr. Dorys Atkins [phone]    You may experience some localized pain, tenderness or light bruising at the IV medication site.  However, if the area becomes reddened and swollen, contact your physician within 24 hours.    [facility][note 3] will be calling you within 3-5 business days to follow up after your procedure.     ADDITIONAL INFORMATION:     No

## 2024-10-02 NOTE — H&P ADULT - NSHPREVIEWOFSYSTEMS_GEN_ALL_CORE
REVIEW OF SYSTEMS:  CONSTITUTIONAL: No fever, weight loss, or fatigue  EYES: No eye pain, visual disturbances, or discharge  ENMT:  No difficulty hearing, tinnitus, vertigo; No sinus or throat pain  RESPIRATORY: No cough, wheezing, chills or hemoptysis; No shortness of breath  CARDIOVASCULAR: No chest pain, palpitations, dizziness, or leg swelling  GASTROINTESTINAL: No abdominal or epigastric pain. No nausea, vomiting, or hematemesis; No diarrhea or constipation.   GENITOURINARY: No dysuria, frequency, hematuria, or incontinence  NEUROLOGICAL: No headaches, memory loss, loss of strength  MUSCULOSKELETAL: No joint pain or swelling; + back and extremity pain  PSYCHIATRIC:+ anxiety  HEME/LYMPH: No easy bruising, or bleeding gums      ALL ROS REVIEWED AND NORMAL EXCEPT AS STATED ABOVE

## 2024-10-02 NOTE — CHART NOTE - NSCHARTNOTEFT_GEN_A_CORE
68 y o female presenting to the ED on 9/30/24 for general back pain as per chart.  The patient returned to the ED on 10/2/24 and was admitted.

## 2024-10-02 NOTE — H&P ADULT - NSHPPHYSICALEXAM_GEN_ALL_CORE
PHYSICAL EXAM:  GENERAL: mild distress, well-groomed, well-developed  HEAD:  Atraumatic, Normocephalic  EYES: EOMI, conjunctiva and sclera clear  ENMT:  Moist mucous membranes,  NECK: Supple, No JVD  NERVOUS SYSTEM:  Alert & Oriented X3, Good concentration; Motor Strength 5/5 B/L upper and lower extremities  CHEST/LUNG: Clear to percussion bilaterally; No rales, rhonchi, wheezing  HEART: Regular rate and rhythm; No murmurs, rubs, or gallops  ABDOMEN: Soft, Nontender, Nondistended; Bowel sounds present  EXTREMITIES:  2+ Peripheral Pulses, No clubbing, cyanosis, or edema

## 2024-10-02 NOTE — H&P ADULT - NSHPLABSRESULTS_GEN_ALL_CORE
12.1   9.13  )-----------( 175      ( 02 Oct 2024 11:00 )             35.2     10    138  |  105  |  14  ----------------------------<  97  4.7   |  25  |  0.64    Ca    8.7      02 Oct 2024 11:00    TPro  7.0  /  Alb  3.2[L]  /  TBili  1.0  /  DBili  x   /  AST  29  /  ALT  27  /  AlkPhos  60  1002    LIVER FUNCTIONS - ( 02 Oct 2024 11:00 )  Alb: 3.2 g/dL / Pro: 7.0 g/dL / ALK PHOS: 60 U/L / ALT: 27 U/L / AST: 29 U/L / GGT: x           PT/INR - ( 02 Oct 2024 11:00 )   PT: 9.9 sec;   INR: 0.84 ratio         PTT - ( 02 Oct 2024 11:00 )  PTT:19.8 sec  Urinalysis Basic - ( 02 Oct 2024 13:48 )    Color: Yellow / Appearance: Cloudy / S.017 / pH: x  Gluc: x / Ketone: 15 mg/dL  / Bili: Negative / Urobili: 0.2 mg/dL   Blood: x / Protein: 30 mg/dL / Nitrite: Negative   Leuk Esterase: Large / RBC: 1 /HPF / WBC 10 /HPF   Sq Epi: x / Non Sq Epi: x / Bacteria: Occasional /HPF      RADIOLOGY    < from: CT Lumbar Spine No Cont (10.02.24 @ 12:59) >    IMPRESSION:  No evidence of an acute lumbar spine fracture. Multilevel degenerative   changes greatest at L4-5 with severe spinal canal narrowing is seen.    < end of copied text >

## 2024-10-02 NOTE — ED PROVIDER NOTE - OBJECTIVE STATEMENT
68-year-old female with a past medical history of spinal stenosis chronic low back pain patient has been treated by pain management doctor also has a history of osteoporosis she is came to the emergency room with a chief complaint of severe lower back pain patient was also have coughing with the phlegm and is shaking chills patient stated that 2 days ago she had a fever which broke off Patient stated that she has been doing aggressive yoga training and had the MRI 2 weeks ago which showed significant degenerative disc disease spinal stenosis no fracture denies any fall any accidents

## 2024-10-02 NOTE — H&P ADULT - HISTORY OF PRESENT ILLNESS
68 yr F w pmhx of spinal stenosis, osteoporosis, HLD, IgA deficiency presents with intractable low back pain. Decrease the pain as sharp low back pain radiating to her legs, worse to her right. Reports had a MRI outpt about 2 weeks which showed significant degenerative disc disease and spinal stenosis, no acute fracture. Reports has been doing aggressive yoga training lately and incorporated spine twisting moves that may have exacerbated her symptoms. Also reports currently in alot social stress. Denies urinary or bowel incontinence. Pt was seen in Ed 2 days ago with similar symptoms. Reports pain improvement after receiving regimen in ED, was discharge on pain medication and prednisone. Reports started to worsen after the ED medications wore off. Pt has been following with specialists in regards to spinal stenosis. It was recommend patient would benefit from surgery.

## 2024-10-02 NOTE — H&P ADULT - ASSESSMENT
68 yr F w pmhx of spinal stenosis, osteoporosis, HLD, IgA deficiency presents with intractable low back pain      #Intractable back pain  - hx of spinal stenosis and osteoporosis  - CT lumbar: No evidence of an acute lumbar spine fracture. Multilevel degenerative changes greatest at L4-5 with severe spinal canal narrowing  - pt afebrile, no leukocytosis, lactate wnl  - UA positive for wbc and LE  - s/p dilaudid, dexamethasone and valium in ED with improvement in symptoms  - Admit to medicine  - C/w dilaudid for pain  - C/w zofran PRN  - Start on vantin. f/u Ucx  - PT and PM&R consulted      #IgG and IgA deficiency  #hx of bronchietasis  - satting well on RA  - C/w duo-neb PRN    #Depression/Anxiety  -on Fetzima, non-fomulary     68 yr F w pmhx of spinal stenosis, osteoporosis, HLD, IgA deficiency presents with intractable low back pain      #Intractable back pain  - hx of spinal stenosis and osteoporosis  - CT lumbar: No evidence of an acute lumbar spine fracture. Multilevel degenerative changes greatest at L4-5 with severe spinal canal narrowing  - pt afebrile, no leukocytosis, lactate wnl  - UA positive for wbc and LE. Pt denies urinary sxms. Discussed urinalysis results. In agreement with starting abx if Ucx positive  - s/p dilaudid, dexamethasone and valium in ED with improvement in symptoms  - Admit to medicine  - C/w dilaudid for pain  - C/w zofran PRN  - f/u Ucx  - PT and PM&R consulted      #IgG and IgA deficiency  #hx of bronchietasis  - satting well on RA  - C/w duo-neb PRN    #Depression/Anxiety  -on Fetzima, non-fomulary

## 2024-10-03 ENCOUNTER — RESULT REVIEW (OUTPATIENT)
Age: 69
End: 2024-10-03

## 2024-10-03 DIAGNOSIS — F51.04 PSYCHOPHYSIOLOGIC INSOMNIA: ICD-10-CM

## 2024-10-03 LAB
ABO RH CONFIRMATION: SIGNIFICANT CHANGE UP
ALBUMIN SERPL ELPH-MCNC: 2.5 G/DL — LOW (ref 3.3–5)
ALP SERPL-CCNC: 50 U/L — SIGNIFICANT CHANGE UP (ref 40–120)
ALT FLD-CCNC: 21 U/L — SIGNIFICANT CHANGE UP (ref 10–45)
ANION GAP SERPL CALC-SCNC: 7 MMOL/L — SIGNIFICANT CHANGE UP (ref 5–17)
AST SERPL-CCNC: 14 U/L — SIGNIFICANT CHANGE UP (ref 10–40)
BILIRUB SERPL-MCNC: 0.5 MG/DL — SIGNIFICANT CHANGE UP (ref 0.2–1.2)
BLD GP AB SCN SERPL QL: SIGNIFICANT CHANGE UP
BUN SERPL-MCNC: 12 MG/DL — SIGNIFICANT CHANGE UP (ref 7–23)
CALCIUM SERPL-MCNC: 7.7 MG/DL — LOW (ref 8.4–10.5)
CHLORIDE SERPL-SCNC: 108 MMOL/L — SIGNIFICANT CHANGE UP (ref 96–108)
CO2 SERPL-SCNC: 25 MMOL/L — SIGNIFICANT CHANGE UP (ref 22–31)
CREAT SERPL-MCNC: 0.59 MG/DL — SIGNIFICANT CHANGE UP (ref 0.5–1.3)
DIR ANTIGLOB POLYSPECIFIC INTERPRETATION: SIGNIFICANT CHANGE UP
EGFR: 99 ML/MIN/1.73M2 — SIGNIFICANT CHANGE UP
GLUCOSE SERPL-MCNC: 82 MG/DL — SIGNIFICANT CHANGE UP (ref 70–99)
GP B STREP DNA BLD POS QL NAA+NON-PROBE: SIGNIFICANT CHANGE UP
GRAM STN FLD: ABNORMAL
GRAM STN FLD: ABNORMAL
HCT VFR BLD CALC: 26.2 % — LOW (ref 34.5–45)
HGB BLD-MCNC: 9 G/DL — LOW (ref 11.5–15.5)
MCHC RBC-ENTMCNC: 30.7 PG — SIGNIFICANT CHANGE UP (ref 27–34)
MCHC RBC-ENTMCNC: 34.4 GM/DL — SIGNIFICANT CHANGE UP (ref 32–36)
MCV RBC AUTO: 89.4 FL — SIGNIFICANT CHANGE UP (ref 80–100)
METHOD TYPE: SIGNIFICANT CHANGE UP
NRBC # BLD: 0 /100 WBCS — SIGNIFICANT CHANGE UP (ref 0–0)
PLATELET # BLD AUTO: 168 K/UL — SIGNIFICANT CHANGE UP (ref 150–400)
POTASSIUM SERPL-MCNC: 4.8 MMOL/L — SIGNIFICANT CHANGE UP (ref 3.5–5.3)
POTASSIUM SERPL-SCNC: 4.8 MMOL/L — SIGNIFICANT CHANGE UP (ref 3.5–5.3)
PROT SERPL-MCNC: 5.9 G/DL — LOW (ref 6–8.3)
RBC # BLD: 2.93 M/UL — LOW (ref 3.8–5.2)
RBC # FLD: 13.9 % — SIGNIFICANT CHANGE UP (ref 10.3–14.5)
SODIUM SERPL-SCNC: 140 MMOL/L — SIGNIFICANT CHANGE UP (ref 135–145)
SPECIMEN SOURCE: SIGNIFICANT CHANGE UP
WBC # BLD: 5.73 K/UL — SIGNIFICANT CHANGE UP (ref 3.8–10.5)
WBC # FLD AUTO: 5.73 K/UL — SIGNIFICANT CHANGE UP (ref 3.8–10.5)

## 2024-10-03 PROCEDURE — 99233 SBSQ HOSP IP/OBS HIGH 50: CPT

## 2024-10-03 PROCEDURE — 93306 TTE W/DOPPLER COMPLETE: CPT | Mod: 26

## 2024-10-03 PROCEDURE — 86077 PHYS BLOOD BANK SERV XMATCH: CPT

## 2024-10-03 PROCEDURE — 90792 PSYCH DIAG EVAL W/MED SRVCS: CPT

## 2024-10-03 RX ORDER — SODIUM CHLORIDE IRRIG SOLUTION 0.9 %
500 SOLUTION, IRRIGATION IRRIGATION ONCE
Refills: 0 | Status: DISCONTINUED | OUTPATIENT
Start: 2024-10-03 | End: 2024-10-03

## 2024-10-03 RX ORDER — HYDROMORPHONE HYDROCHLORIDE 1 MG/ML
0.5 INJECTION, SOLUTION INTRAMUSCULAR; INTRAVENOUS; SUBCUTANEOUS ONCE
Refills: 0 | Status: DISCONTINUED | OUTPATIENT
Start: 2024-10-03 | End: 2024-10-03

## 2024-10-03 RX ORDER — DIAZEPAM 10 MG/1
5 TABLET ORAL EVERY 8 HOURS
Refills: 0 | Status: DISCONTINUED | OUTPATIENT
Start: 2024-10-03 | End: 2024-10-07

## 2024-10-03 RX ORDER — CEFTRIAXONE SODIUM 1 G
2000 VIAL (EA) INJECTION ONCE
Refills: 0 | Status: COMPLETED | OUTPATIENT
Start: 2024-10-03 | End: 2024-10-03

## 2024-10-03 RX ORDER — BISACODYL 5 MG/1
5 TABLET, COATED ORAL AT BEDTIME
Refills: 0 | Status: DISCONTINUED | OUTPATIENT
Start: 2024-10-03 | End: 2024-10-11

## 2024-10-03 RX ORDER — SODIUM CHLORIDE IRRIG SOLUTION 0.9 %
500 SOLUTION, IRRIGATION IRRIGATION ONCE
Refills: 0 | Status: COMPLETED | OUTPATIENT
Start: 2024-10-03 | End: 2024-10-03

## 2024-10-03 RX ORDER — ATORVASTATIN CALCIUM 10 MG/1
10 TABLET, FILM COATED ORAL AT BEDTIME
Refills: 0 | Status: DISCONTINUED | OUTPATIENT
Start: 2024-10-03 | End: 2024-10-11

## 2024-10-03 RX ORDER — MORPHINE SULFATE 30 MG/1
15 TABLET, FILM COATED, EXTENDED RELEASE ORAL ONCE
Refills: 0 | Status: DISCONTINUED | OUTPATIENT
Start: 2024-10-03 | End: 2024-10-03

## 2024-10-03 RX ORDER — SODIUM CHLORIDE IRRIG SOLUTION 0.9 %
1000 SOLUTION, IRRIGATION IRRIGATION ONCE
Refills: 0 | Status: COMPLETED | OUTPATIENT
Start: 2024-10-03 | End: 2024-10-03

## 2024-10-03 RX ORDER — MORPHINE SULFATE 30 MG/1
15 TABLET, FILM COATED, EXTENDED RELEASE ORAL EVERY 12 HOURS
Refills: 0 | Status: COMPLETED | OUTPATIENT
Start: 2024-10-04 | End: 2024-10-11

## 2024-10-03 RX ORDER — CEFTRIAXONE SODIUM 1 G
1000 VIAL (EA) INJECTION ONCE
Refills: 0 | Status: DISCONTINUED | OUTPATIENT
Start: 2024-10-03 | End: 2024-10-03

## 2024-10-03 RX ORDER — CEFTRIAXONE SODIUM 1 G
VIAL (EA) INJECTION
Refills: 0 | Status: DISCONTINUED | OUTPATIENT
Start: 2024-10-03 | End: 2024-10-03

## 2024-10-03 RX ORDER — GABAPENTIN 800 MG/1
100 TABLET, FILM COATED ORAL THREE TIMES A DAY
Refills: 0 | Status: DISCONTINUED | OUTPATIENT
Start: 2024-10-03 | End: 2024-10-06

## 2024-10-03 RX ORDER — DIAZEPAM 10 MG/1
5 TABLET ORAL ONCE
Refills: 0 | Status: DISCONTINUED | OUTPATIENT
Start: 2024-10-03 | End: 2024-10-03

## 2024-10-03 RX ORDER — DULOXETINE HCL 20 MG
20 CAPSULE,DELAYED RELEASE (ENTERIC COATED) ORAL
Refills: 0 | Status: DISCONTINUED | OUTPATIENT
Start: 2024-10-03 | End: 2024-10-11

## 2024-10-03 RX ORDER — CEFTRIAXONE SODIUM 1 G
2000 VIAL (EA) INJECTION EVERY 24 HOURS
Refills: 0 | Status: COMPLETED | OUTPATIENT
Start: 2024-10-04 | End: 2024-10-11

## 2024-10-03 RX ORDER — FAMOTIDINE 40 MG
20 TABLET ORAL DAILY
Refills: 0 | Status: DISCONTINUED | OUTPATIENT
Start: 2024-10-03 | End: 2024-10-11

## 2024-10-03 RX ADMIN — HYDROMORPHONE HYDROCHLORIDE 0.25 MILLIGRAM(S): 1 INJECTION, SOLUTION INTRAMUSCULAR; INTRAVENOUS; SUBCUTANEOUS at 00:06

## 2024-10-03 RX ADMIN — DIAZEPAM 5 MILLIGRAM(S): 10 TABLET ORAL at 11:11

## 2024-10-03 RX ADMIN — HYDROMORPHONE HYDROCHLORIDE 0.25 MILLIGRAM(S): 1 INJECTION, SOLUTION INTRAMUSCULAR; INTRAVENOUS; SUBCUTANEOUS at 19:13

## 2024-10-03 RX ADMIN — HYDROMORPHONE HYDROCHLORIDE 0.5 MILLIGRAM(S): 1 INJECTION, SOLUTION INTRAMUSCULAR; INTRAVENOUS; SUBCUTANEOUS at 02:26

## 2024-10-03 RX ADMIN — ENOXAPARIN SODIUM 40 MILLIGRAM(S): 150 INJECTION SUBCUTANEOUS at 11:15

## 2024-10-03 RX ADMIN — HYDROMORPHONE HYDROCHLORIDE 0.5 MILLIGRAM(S): 1 INJECTION, SOLUTION INTRAMUSCULAR; INTRAVENOUS; SUBCUTANEOUS at 12:03

## 2024-10-03 RX ADMIN — GABAPENTIN 100 MILLIGRAM(S): 800 TABLET, FILM COATED ORAL at 21:02

## 2024-10-03 RX ADMIN — HYDROMORPHONE HYDROCHLORIDE 0.5 MILLIGRAM(S): 1 INJECTION, SOLUTION INTRAMUSCULAR; INTRAVENOUS; SUBCUTANEOUS at 16:51

## 2024-10-03 RX ADMIN — MORPHINE SULFATE 15 MILLIGRAM(S): 30 TABLET, FILM COATED, EXTENDED RELEASE ORAL at 22:00

## 2024-10-03 RX ADMIN — Medication 1000 MILLILITER(S): at 22:20

## 2024-10-03 RX ADMIN — HYDROMORPHONE HYDROCHLORIDE 0.25 MILLIGRAM(S): 1 INJECTION, SOLUTION INTRAMUSCULAR; INTRAVENOUS; SUBCUTANEOUS at 01:04

## 2024-10-03 RX ADMIN — HYDROMORPHONE HYDROCHLORIDE 0.5 MILLIGRAM(S): 1 INJECTION, SOLUTION INTRAMUSCULAR; INTRAVENOUS; SUBCUTANEOUS at 01:24

## 2024-10-03 RX ADMIN — HYDROMORPHONE HYDROCHLORIDE 0.25 MILLIGRAM(S): 1 INJECTION, SOLUTION INTRAMUSCULAR; INTRAVENOUS; SUBCUTANEOUS at 19:43

## 2024-10-03 RX ADMIN — Medication 500 MILLILITER(S): at 20:22

## 2024-10-03 RX ADMIN — HYDROMORPHONE HYDROCHLORIDE 0.25 MILLIGRAM(S): 1 INJECTION, SOLUTION INTRAMUSCULAR; INTRAVENOUS; SUBCUTANEOUS at 04:25

## 2024-10-03 RX ADMIN — IPRATROPIUM BROMIDE AND ALBUTEROL SULFATE 3 MILLILITER(S): .5; 3 SOLUTION RESPIRATORY (INHALATION) at 17:30

## 2024-10-03 RX ADMIN — ATORVASTATIN CALCIUM 10 MILLIGRAM(S): 10 TABLET, FILM COATED ORAL at 21:15

## 2024-10-03 RX ADMIN — Medication 4 MILLIGRAM(S): at 18:10

## 2024-10-03 RX ADMIN — HYDROMORPHONE HYDROCHLORIDE 0.5 MILLIGRAM(S): 1 INJECTION, SOLUTION INTRAMUSCULAR; INTRAVENOUS; SUBCUTANEOUS at 09:00

## 2024-10-03 RX ADMIN — HYDROMORPHONE HYDROCHLORIDE 0.5 MILLIGRAM(S): 1 INJECTION, SOLUTION INTRAMUSCULAR; INTRAVENOUS; SUBCUTANEOUS at 16:21

## 2024-10-03 RX ADMIN — Medication 20 MILLIGRAM(S): at 17:31

## 2024-10-03 RX ADMIN — Medication 100 MILLIGRAM(S): at 15:20

## 2024-10-03 RX ADMIN — HYDROMORPHONE HYDROCHLORIDE 0.5 MILLIGRAM(S): 1 INJECTION, SOLUTION INTRAMUSCULAR; INTRAVENOUS; SUBCUTANEOUS at 22:49

## 2024-10-03 RX ADMIN — HYDROMORPHONE HYDROCHLORIDE 0.25 MILLIGRAM(S): 1 INJECTION, SOLUTION INTRAMUSCULAR; INTRAVENOUS; SUBCUTANEOUS at 05:16

## 2024-10-03 RX ADMIN — Medication 4 MILLIGRAM(S): at 08:39

## 2024-10-03 RX ADMIN — MORPHINE SULFATE 15 MILLIGRAM(S): 30 TABLET, FILM COATED, EXTENDED RELEASE ORAL at 21:02

## 2024-10-03 RX ADMIN — HYDROMORPHONE HYDROCHLORIDE 0.5 MILLIGRAM(S): 1 INJECTION, SOLUTION INTRAMUSCULAR; INTRAVENOUS; SUBCUTANEOUS at 22:19

## 2024-10-03 RX ADMIN — GABAPENTIN 100 MILLIGRAM(S): 800 TABLET, FILM COATED ORAL at 11:12

## 2024-10-03 RX ADMIN — HYDROMORPHONE HYDROCHLORIDE 0.5 MILLIGRAM(S): 1 INJECTION, SOLUTION INTRAMUSCULAR; INTRAVENOUS; SUBCUTANEOUS at 12:33

## 2024-10-03 RX ADMIN — Medication 2 TABLET(S): at 21:02

## 2024-10-03 RX ADMIN — DIAZEPAM 5 MILLIGRAM(S): 10 TABLET ORAL at 17:31

## 2024-10-03 RX ADMIN — HYDROMORPHONE HYDROCHLORIDE 0.5 MILLIGRAM(S): 1 INJECTION, SOLUTION INTRAMUSCULAR; INTRAVENOUS; SUBCUTANEOUS at 08:19

## 2024-10-03 NOTE — CHART NOTE - NSCHARTNOTEFT_GEN_A_CORE
Patient with persistent back pain. Dilaudid q4PRN, added MS contin 15mg q12hr   given 1.5L IV fluids for BP support   placed on telemetry Patient with persistent back pain. Dilaudid q4PRN, added MS contin 15mg q12hr   given 1.5L IV fluids for BP support   O2 93%, RR 18 - placed on 2L NC  placed on telemetry

## 2024-10-03 NOTE — PHYSICAL THERAPY INITIAL EVALUATION ADULT - ADDITIONAL COMMENTS
pt lives w/spouse in private house, 3 nicole w/rail, 1 flt to br. Elevator in house which pt does not use per spouse. pt is independent w/ambulation and ADL's, pt states she is very active, does yoga and drives

## 2024-10-03 NOTE — BH CONSULTATION LIAISON ASSESSMENT NOTE - NSBHCONSULTRECOMMENDOTHER_PSY_A_CORE FT
Will stop Fetzima while hospitalized ( non formulary) , pt agreed to a trial of Duloxetine, 20 mg which may help with neuropathic pain aspects as a benefit.   Agree with titrating up Gabapentin if tolerated.

## 2024-10-03 NOTE — BH CONSULTATION LIAISON ASSESSMENT NOTE - RISK ASSESSMENT
Pt with no history of self or other harm, is involved in treatment and seeks out several sources of support and enlightenment.   May have inadequate concrete supports in terms of active confidants.

## 2024-10-03 NOTE — BH CONSULTATION LIAISON ASSESSMENT NOTE - CURRENT MEDICATION
MEDICATIONS  (STANDING):  atorvastatin 10 milliGRAM(s) Oral at bedtime  cefTRIAXone   IVPB 2000 milliGRAM(s) IV Intermittent once  enoxaparin Injectable 40 milliGRAM(s) SubCutaneous every 24 hours  gabapentin 100 milliGRAM(s) Oral three times a day  influenza  Vaccine (HIGH DOSE) 0.5 milliLiter(s) IntraMuscular once  polyethylene glycol 3350 17 Gram(s) Oral daily  senna 2 Tablet(s) Oral at bedtime    MEDICATIONS  (PRN):  albuterol/ipratropium for Nebulization 3 milliLiter(s) Nebulizer every 6 hours PRN Shortness of Breath  aluminum hydroxide/magnesium hydroxide/simethicone Suspension 30 milliLiter(s) Oral every 4 hours PRN Dyspepsia  famotidine    Tablet 20 milliGRAM(s) Oral daily PRN GERD  HYDROmorphone  Injectable 0.25 milliGRAM(s) IV Push every 4 hours PRN Moderate Pain (4 - 6)  HYDROmorphone  Injectable 0.5 milliGRAM(s) IV Push every 4 hours PRN Severe Pain (7 - 10)  melatonin 3 milliGRAM(s) Oral at bedtime PRN Insomnia  ondansetron Injectable 4 milliGRAM(s) IV Push every 8 hours PRN Nausea and/or Vomiting

## 2024-10-03 NOTE — PROGRESS NOTE ADULT - ASSESSMENT
68 yr F w pmhx of spinal stenosis, osteoporosis, HLD, IgA deficiency presents with intractable low back pain      #Intractable back pain - Chronic  - Hx of spinal stenosis and osteoporosis  - CT lumbar: No evidence of an acute lumbar spine fracture. Multilevel degenerative changes greatest at L4-5 with severe spinal canal narrowing  - pt afebrile, no leukocytosis, lactate wnl  - UA positive for wbc and LE. Pt denies urinary sxms. Discussed urinalysis results. In agreement with starting abx if Ucx positive  - s/p dilaudid, dexamethasone and valium in ED with improvement in symptoms  - C/w dilaudid for pain  - Add gabapentin 100 TID  - C/w zofran PRN  - f/u Ucx  - PT and PM&R consulted    #Blood cultures +ve - Likely contaminant  #+ve UA, asymptomatic  - Patient informed about results, denied Abx, wants to wait for Ucx and final Bcx results  - 1x positive for group B strep  - F/u final culture results  - F/u ID    #IgG and IgA deficiency  #hx of bronchietasis  - satting well on RA  - C/w duo-neb PRN    #Depression/Anxiety  -on Fetzima at home, non-fomulary  - Given valium 5mg dose once    #DVT prophylaxis  - Lovenox    #GOC  - Full code      D/w Dr Buckley     68 yr F w pmhx of spinal stenosis, osteoporosis, HLD, bronchiectasis, IgA/IgG deficiency presents with worsening of chronic low back pain      #Intractable back pain - Chronic  - Patient established care with specialist, surgery recommended but delayed due to osteoporosis  - CT lumbar: No evidence of an acute lumbar spine fracture. Multilevel degenerative changes greatest at L4-5 with severe spinal canal narrowing  - pt afebrile, no leukocytosis, lactate wnl  - UA positive for wbc and LE. Pt denies urinary sxms. Discussed urinalysis results. In agreement with starting abx if Ucx positive  - s/p dilaudid, dexamethasone and valium in ED with improvement in symptoms  - C/w dilaudid for pain  - Add gabapentin 100 TID  - C/w zofran PRN  - f/u Ucx  - PT and PM&R consulted  - F/u MRI lumbar spine (MRI form in chart)  - Sp[facility] Sx [facility]    #Blood cultures +ve - Contaminant vs Bacteremia  #+ve UA, asymptomatic  - Patient informed about results, denied Abx, wants to wait for Ucx and final Bcx results  - 1x positive for group B strep  - F/u final culture results  - ID recs appreciated, start CTX, f/u Bcx and Ucx    #Anemia  - Patient reports anemia at baseline  - Hgb 9 today (12.1 on admission)  - Monitor  - Keep Type and screen uptodate    #IgG and IgA deficiency  #hx of bronchietasis  - satting well on RA  - C/w duo-neb PRN    #Depression/Anxiety  - on Fetzima at home, non-fomulary  - Given valium 5mg dose once    #DVT prophylaxis  - Lovenox    #GOC  - Full code      D/w Dr Buckley     68 yr F w pmhx of spinal stenosis, osteoporosis, HLD, bronchiectasis, IgA/IgG deficiency presents with worsening of chronic low back pain      #Intractable back pain - Chronic  - Patient established care with specialist, surgery recommended but delayed due to osteoporosis  - CT lumbar: No evidence of an acute lumbar spine fracture. Multilevel degenerative changes greatest at L4-5 with severe spinal canal narrowing  - pt afebrile, no leukocytosis, lactate wnl  - UA positive for wbc and LE. Pt denies urinary sxms. Discussed urinalysis results  - s/p dilaudid, dexamethasone and valium in ED with improvement in symptoms  - C/w dilaudid for pain  - Add gabapentin 100 TID  - C/w zofran PRN  - f/u Ucx  - PT and PM&R consulted  - F/u MRI lumbar spine (MRI form in chart)  - Sp[facility] Sx consult [facility]  - C/w pain control, dilaudid, gabapentin, valium, duloxetine    #Blood cultures +ve - Contaminant vs Bacteremia  #+ve UA, asymptomatic  - Patient informed about results, denied Abx, wants to wait for Ucx and final Bcx results  - 1x positive for group B strep  - F/u final culture results  - ID recs appreciated, start CTX, f/u Bcx and Ucx    #Anemia  - Patient reports anemia at baseline  - Hgb 9 today (12.1 on admission)  - Monitor  - Keep Type and screen uptodate    #IgG and IgA deficiency  #hx of bronchietasis  - satting well on RA  - C/w duo-neb PRN    #Depression/Anxiety  - on Fetzima at home, non-fomulary  - Given valium 5mg dose once    #DVT prophylaxis  - Lovenox    #GOC  - Full code      D/w Dr Buckley     68 yr F w pmhx of spinal stenosis, osteoporosis, HLD, bronchiectasis, IgA/IgG deficiency presents with worsening of chronic low back pain      #Intractable back pain - Chronic  - Patient established care with specialist, surgery recommended but delayed due to osteoporosis  - CT lumbar: No evidence of an acute lumbar spine fracture. Multilevel degenerative changes greatest at L4-5 with severe spinal canal narrowing  - pt afebrile, no leukocytosis, lactate wnl  - UA positive for wbc and LE. Pt denies urinary sxms. Discussed urinalysis results  - s/p dilaudid, dexamethasone and valium in ED with improvement in symptoms  - C/w dilaudid for pain  - Add gabapentin 100 TID  - C/w zofran PRN  - f/u Ucx  - PT and PM&R consulted  - F/u MRI lumbar spine (MRI form in chart)  - C/w pain control, dilaudid, gabapentin, valium, duloxetine  - discussed with spine surg Dr. Lucas Garcia at [facility]; recommended MRI with contrast, IV abx. Patient not considered suitable candidate for spinal surgery at this time and advised to follow up with outpatient spine surgery.    #Blood cultures +ve - Contaminant vs Bacteremia  #+ve UA, asymptomatic  - Patient informed about results, denied Abx, wants to wait for Ucx and final Bcx results  - 1x positive for group B strep  - F/u final culture results  - ID recs appreciated, start CTX, f/u Bcx and Ucx    #Anemia  - Patient reports anemia at baseline  - Hgb 9 today (12.1 on admission)  - Monitor  - Keep Type and screen uptodate    #IgG and IgA deficiency  #hx of bronchietasis  - satting well on RA  - C/w duo-neb PRN    #Depression/Anxiety  - on Fetzima at home, non-fomulary  - Given valium 5mg dose once    #DVT prophylaxis  - Lovenox    #GOC  - Full code      D/w Dr Buckley

## 2024-10-03 NOTE — PROVIDER CONTACT NOTE (CRITICAL VALUE NOTIFICATION) - TEST AND RESULT REPORTED:
Blood culture from 10/2/24: Growth in aerobic bottle gram positive in pairs
blood cultures from 10/2

## 2024-10-03 NOTE — BH CONSULTATION LIAISON ASSESSMENT NOTE - DETAILS
Pt with  one son who has alcohol use disorder  Pt other son with mood disorder, potential ADHD, substance use disorder Pt will report heart palpitations, medications are typically very low dose - either not tolerated or inadequate trial.  Due to sensitive nature will not document here.

## 2024-10-03 NOTE — OCCUPATIONAL THERAPY INITIAL EVALUATION ADULT - NSOTDISCHREC_GEN_A_CORE
Pt refusing OOB and unable to give accurate recommendation at this time, Collaborating with PT pt does not require assistance for ambulation or transfers- OT to f/u and update recommendation when pt agreeable for OOB transfers

## 2024-10-03 NOTE — OCCUPATIONAL THERAPY INITIAL EVALUATION ADULT - GENERAL OBSERVATIONS, REHAB EVAL
Pt received supine in bed +PIV. Alert and oriented x4. Anxiety, pain, and tangential impacting OT evaluation on this date. Pt left supine in bed with call bell in reach and lines intact

## 2024-10-03 NOTE — OCCUPATIONAL THERAPY INITIAL EVALUATION ADULT - PERTINENT HX OF CURRENT PROBLEM, REHAB EVAL
68 yr F w pmhx of spinal stenosis, osteoporosis, HLD, IgA deficiency presents with intractable low back pain    Overnight Events: None  Interval HPI: Patient seen and examined at bedside. Patient reports worsening pain after trying a new yoga routine. Also mentions a lot of recent stressors in her life. Patient reports being physically active all her life, chronic pain for which she tried multiple modalities and visited many specialists, but has become worse recently. Recent MRI with recommendation for potential surgery by her specialist. Patient was informed of Bcx and urine analysis results. Patient asked for her anxiety home medications.

## 2024-10-03 NOTE — BH CONSULTATION LIAISON ASSESSMENT NOTE - SUMMARY
68 yr F w pmhx of spinal stenosis, osteoporosis, HLD, IgA deficiency presents with intractable low back pain.  Psychiatry consulted for medication management.

## 2024-10-03 NOTE — PROVIDER CONTACT NOTE (CRITICAL VALUE NOTIFICATION) - ASSESSMENT
Pt is A&Ox4. VS are stable with exception of soft bp of 96/61.  Pt has complaints of severe back pain Being managed with Dilaudid.

## 2024-10-03 NOTE — PROVIDER CONTACT NOTE (CRITICAL VALUE NOTIFICATION) - SITUATION
aerobic bottle - streptococcus agalactiae group B  anaerobic bottle - gram positive cocci in pairs
Pt admitted from intractable back pain secondary to ambulatory dysfunction

## 2024-10-03 NOTE — PHARMACOTHERAPY INTERVENTION NOTE - COMMENTS
Recommended initiating ceftriaxone IV 2g q24hrs in the setting of Streptococcus agalactiae (Group B) bacteremia.    Kelly Reed, PharmD  Clinical Pharmacy Specialist, Infectious Diseases  Tele-Antimicrobial Stewardship Program (Tele-ASP)  Tele-ASP Phone: [phone]

## 2024-10-03 NOTE — BH CONSULTATION LIAISON ASSESSMENT NOTE - NSBHCHARTREVIEWVS_PSY_A_CORE FT
Vital Signs Last 24 Hrs  T(C): 36.5 (03 Oct 2024 11:53), Max: 36.9 (03 Oct 2024 04:34)  T(F): 97.7 (03 Oct 2024 11:53), Max: 98.5 (03 Oct 2024 04:34)  HR: 92 (03 Oct 2024 11:53) (87 - 99)  BP: 109/70 (03 Oct 2024 11:53) (94/53 - 111/55)  BP(mean): --  RR: 17 (03 Oct 2024 11:53) (16 - 17)  SpO2: 98% (03 Oct 2024 11:53) (95% - 98%)    Parameters below as of 03 Oct 2024 11:53  Patient On (Oxygen Delivery Method): nasal cannula

## 2024-10-03 NOTE — BH CONSULTATION LIAISON ASSESSMENT NOTE - HPI (INCLUDE ILLNESS QUALITY, SEVERITY, DURATION, TIMING, CONTEXT, MODIFYING FACTORS, ASSOCIATED SIGNS AND SYMPTOMS)
HPI:  68 yr F w pmhx of spinal stenosis, osteoporosis, HLD, IgA deficiency presents with intractable low back pain. Decrease the pain as sharp low back pain radiating to her legs, worse to her right. Reports had a MRI outpt about 2 weeks which showed significant degenerative disc disease and spinal stenosis, no acute fracture. Reports has been doing aggressive yoga training lately and incorporated spine twisting moves that may have exacerbated her symptoms. Also reports currently in alot social stress. Denies urinary or bowel incontinence. Pt was seen in Ed 2 days ago with similar symptoms. Reports pain improvement after receiving regimen in ED, was discharge on pain medication and prednisone. Reports started to worsen after the ED medications wore off. Pt has been following with specialists in regards to spinal stenosis. It was recommend patient would benefit from surgery.      (02 Oct 2024 15:03)   HPI:  68 yr F w pmhx of spinal stenosis, osteoporosis, HLD, IgA deficiency presents with intractable low back pain. Decrease the pain as sharp low back pain radiating to her legs, worse to her right. Reports had a MRI outpt about 2 weeks which showed significant degenerative disc disease and spinal stenosis, no acute fracture. Reports has been doing aggressive yoga training lately and incorporated spine twisting moves that may have exacerbated her symptoms. Also reports currently in alot social stress. Denies urinary or bowel incontinence. Pt was seen in Ed 2 days ago with similar symptoms. Reports pain improvement after receiving regimen in ED, was discharge on pain medication and prednisone. Reports started to worsen after the ED medications wore off. Pt has been following with specialists in regards to spinal stenosis. It was recommend patient would benefit from surgery.    (02 Oct 2024 15:03)    Psychiatry C/L Note: Pt known to the undersigned, pt reports having back pain which suddenly worsened. Pt reports burning and "lightning" like feelings from her waist down. Was seen in the ED, got temporary relief but then reports waking up the next day with continued pain. Writer discussed her psychosocial stresses and complicated family dynamics, which primarily contribute to sleep and anxiety- not pain. Pt has been adherent with visits, denied significant depression, not hopeless or helpless, does feel more forgetful and scattered, but meditates, listens to pod casts. She reports mild increase in anxiety due to attempting to find a psychologist for her grand daughter as part of a legal stipulation, and has found her intentions often either misinterpreted or ignored outright. Pt is insightful, no history of pepe, illicit substances, or psychosis. Does not appear delirious , tends to use benzodiazepines sparingly as an outpatient. No gross cognitive deficits noted or elicited on interview.

## 2024-10-03 NOTE — OCCUPATIONAL THERAPY INITIAL EVALUATION ADULT - MD ORDER
MD order received. Chart reviewed and pt cleared for OT. PT collaborated with. IE complete- refer below

## 2024-10-03 NOTE — CHART NOTE - NSCHARTNOTEFT_GEN_A_CORE
Notified by GINNY Nixon that patient was requesting to speak with provider regarding taking ceftriaxone. Blood cultures came back positive for gram+ cocci in pairs. Ordered 1 time dose of ceftriaxone 2g for bacteremia. Patient was seen and examined at bedside. States that she was unaware that blood cultures were drawn and is hesitant to take antibiotics until final results come back due to underlying IgA deficiency. Will hold off on antibiotics for now and sign out to day team.

## 2024-10-03 NOTE — BH CONSULTATION LIAISON ASSESSMENT NOTE - NSSUICPROTFACT_PSY_ALL_CORE
Responsibility to children, family, or others/Identifies reasons for living/Cultural, spiritual and/or moral attitudes against suicide/Frustration tolerance

## 2024-10-03 NOTE — BH CONSULTATION LIAISON ASSESSMENT NOTE - OTHER PAST PSYCHIATRIC HISTORY (INCLUDE DETAILS REGARDING ONSET, COURSE OF ILLNESS, INPATIENT/OUTPATIENT TREATMENT)
Pt seen by the undersigned in [facility] outpatient practice.   Pt has primarily been seen for sleep disturbance, anxiety and supportive counseling.   Pt prescribed Fetzima/ takes occasional Alprazolam

## 2024-10-03 NOTE — BH CONSULTATION LIAISON ASSESSMENT NOTE - VIOLENCE PROTECTIVE FACTORS:
Residential stability/Sobriety/Engagement in treatment/Affective Stability/Insight into violence risk and need for management/treatment/Good treatment response/compliance

## 2024-10-03 NOTE — BH CONSULTATION LIAISON ASSESSMENT NOTE - DESCRIPTION
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   Has 2 sons and 1 daughter who is a psychologist.

## 2024-10-03 NOTE — PROGRESS NOTE ADULT - SUBJECTIVE AND OBJECTIVE BOX
68 yr F w pmhx of spinal stenosis, osteoporosis, HLD, IgA deficiency presents with intractable low back pain    Overnight Events: None  Interval HPI: Patient seen and examined at bedside. Patient reports worsening pain after trying a new yoga routine. Also mentions a lot of recent stressors in her life. Patient reports being physically active all her life, chronic pain for which she tried multiple modalities and visited many specialists, but has become worse recently. Recent MRI with recommendation for potential surgery by her specialist. Patient was informed of Bcx and urine analysis results. Patient asked for her anxiety home medications.     REVIEW OF SYSTEMS:  CONSTITUTIONAL: (-) weakness, (-) fevers, (-) chills  EYES/ENT: (-) visual changes,  (-) vertigo,  (-) throat pain   NECK:  (-) pain, (-) stiffness  RESPIRATORY:  (-) shortness of breath, (-) cough,  (-) wheezing,  (-) hemoptysis   CARDIOVASCULAR:  (-) chest pain, (-) palpitations  GASTROINTESTINAL:  (-) abdominal or epigastric pain, (-) nausea, (-) vomiting, (-) diarrhea, (-) constipation, (-) melena,  (-) hematemesis,  (-) hematochezia  GENITOURINARY: (-) dysuria, (-) frequency, (-) hematuria  NEUROLOGICAL: (-) numbness, (-) weakness  Back: (+) pain      Vital Signs Last 24 Hrs  T(C): 36.9 (03 Oct 2024 04:34), Max: 36.9 (03 Oct 2024 04:34)  T(F): 98.5 (03 Oct 2024 04:34), Max: 98.5 (03 Oct 2024 04:34)  HR: 87 (03 Oct 2024 04:34) (87 - 100)  BP: 96/61 (03 Oct 2024 04:34) (94/53 - 111/55)  BP(mean): --  RR: 16 (03 Oct 2024 04:34) (16 - 17)  SpO2: 97% (03 Oct 2024 04:34) (95% - 98%)    Parameters below as of 03 Oct 2024 04:34  Patient On (Oxygen Delivery Method): room air        PHYSICAL EXAM:  GENERAL: NAD, sitting in chair,  HEAD:  Atraumatic, Normocephalic  CHEST/LUNG: Clear to auscultation bilaterally, good air entry bilaterally; No wheezing, rales, or rhonchi. Unlabored respirations  HEART: Regular rate and rhythm. S1 and S2. No murmurs, rubs, or gallops  ABDOMEN: Soft, Nontender, Nondistended.   EXTREMITIES:  No clubbing, cyanosis, or edema  NERVOUS SYSTEM:  Alert & Oriented X3, speech clear.       LABS:   All Labs Personally Reviewed                         9.0    5.73  )-----------( 168      ( 03 Oct 2024 06:47 )             26.2     10-03    140  |  108  |  12  ----------------------------<  82  4.8   |  25  |  0.59    Ca    7.7[L]      03 Oct 2024 06:47    TPro  5.9[L]  /  Alb  2.5[L]  /  TBili  0.5  /  DBili  x   /  AST  14  /  ALT  21  /  AlkPhos  50  10-03    PT/INR - ( 02 Oct 2024 11:00 )   PT: 9.9 sec;   INR: 0.84 ratio         PTT - ( 02 Oct 2024 11:00 )  PTT:19.8 sec      Blood Culture: 10-02 @ 11:00  Organism Blood Culture PCR  Gram Stain Blood -- Gram Stain   Growth in aerobic bottle: Gram positive cocci in pairs  Specimen Source .Blood BLOOD  Culture-Blood --      I&O's Summary    CAPILLARY BLOOD GLUCOSE        RADIOLOGY/EKG:  All Imaging and EKGs Personally Reviewed     MEDICATIONS:  MEDICATIONS  (STANDING):  cefTRIAXone   IVPB 2000 milliGRAM(s) IV Intermittent once  cefTRIAXone   IVPB      diazepam    Tablet 5 milliGRAM(s) Oral once  enoxaparin Injectable 40 milliGRAM(s) SubCutaneous every 24 hours  gabapentin 100 milliGRAM(s) Oral three times a day  influenza  Vaccine (HIGH DOSE) 0.5 milliLiter(s) IntraMuscular once  polyethylene glycol 3350 17 Gram(s) Oral daily  senna 2 Tablet(s) Oral at bedtime     68 yr F w pmhx of spinal stenosis, osteoporosis, HLD, bronchiectasis, IgA/IgG deficiency presents with worsening of chronic low back pain    Overnight Events: None  Interval HPI: Patient seen and examined at bedside. Patient reports worsening pain after trying a new yoga routine. Also mentions a lot of recent stressors in her life. Patient reports being physically active all her life, chronic pain for which she tried multiple modalities and visited many specialists, but has become worse recently. Recent MRI with recommendation for potential surgery by her specialist. Patient was informed of Bcx and urine analysis results. Patient asked for her anxiety home medications. Patient reports chills on Sunday that went away.     REVIEW OF SYSTEMS:  CONSTITUTIONAL: (-) weakness, (-) fevers, (-) chills  EYES/ENT: (-) visual changes,  (-) vertigo,  (-) throat pain   NECK:  (-) pain, (-) stiffness  RESPIRATORY:  (-) shortness of breath, (-) cough,  (-) wheezing,  (-) hemoptysis   CARDIOVASCULAR:  (-) chest pain, (-) palpitations  GASTROINTESTINAL:  (-) abdominal or epigastric pain, (-) nausea, (-) vomiting, (-) diarrhea, (-) constipation, (-) melena,  (-) hematemesis,  (-) hematochezia  GENITOURINARY: (-) dysuria, (-) frequency, (-) hematuria  NEUROLOGICAL: (-) numbness, (-) weakness  Back: (+) pain      Vital Signs Last 24 Hrs  T(C): 36.9 (03 Oct 2024 04:34), Max: 36.9 (03 Oct 2024 04:34)  T(F): 98.5 (03 Oct 2024 04:34), Max: 98.5 (03 Oct 2024 04:34)  HR: 87 (03 Oct 2024 04:34) (87 - 100)  BP: 96/61 (03 Oct 2024 04:34) (94/53 - 111/55)  BP(mean): --  RR: 16 (03 Oct 2024 04:34) (16 - 17)  SpO2: 97% (03 Oct 2024 04:34) (95% - 98%)    Parameters below as of 03 Oct 2024 04:34  Patient On (Oxygen Delivery Method): room air      PHYSICAL EXAM:  Exam limited yoshi to pain  GENERAL: NAD, sitting in chair  HEAD:  Atraumatic, Normocephalic  CHEST/LUNG: Clear to auscultation bilaterally, good air entry bilaterally; No wheezing, rales, or rhonchi. Unlabored respirations  HEART: Regular rate and rhythm. S1 and S2. No murmurs, rubs, or gallops  ABDOMEN: Soft, Nontender, Nondistended.   EXTREMITIES:  No clubbing, cyanosis, or edema  BACK: No tenderness to palpation over spinal or paraspinal area   NERVOUS SYSTEM:  Alert & Oriented X3, speech clear.     LABS:   All Labs Personally Reviewed                         9.0    5.73  )-----------( 168      ( 03 Oct 2024 06:47 )             26.2     10-03    140  |  108  |  12  ----------------------------<  82  4.8   |  25  |  0.59    Ca    7.7[L]      03 Oct 2024 06:47    TPro  5.9[L]  /  Alb  2.5[L]  /  TBili  0.5  /  DBili  x   /  AST  14  /  ALT  21  /  AlkPhos  50  10-03    PT/INR - ( 02 Oct 2024 11:00 )   PT: 9.9 sec;   INR: 0.84 ratio         PTT - ( 02 Oct 2024 11:00 )  PTT:19.8 sec      Blood Culture: 10-02 @ 11:00  Organism Blood Culture PCR  Gram Stain Blood -- Gram Stain   Growth in aerobic bottle: Gram positive cocci in pairs  Specimen Source .Blood BLOOD  Culture-Blood --      I&O's Summary    CAPILLARY BLOOD GLUCOSE      RADIOLOGY/EKG:    All Imaging and EKGs Personally Reviewed   < from: CT Lumbar Spine No Cont (10.02.24 @ 12:59) >  IMPRESSION:  No evidence of an acute lumbar spine fracture. Multilevel degenerative   changes greatest at L4-5 with severe spinal canal narrowing is seen.  Findings are similar to prior MRI of November 2022.    < end of copied text >      MEDICATIONS:  MEDICATIONS  (STANDING):  cefTRIAXone   IVPB 2000 milliGRAM(s) IV Intermittent once  cefTRIAXone   IVPB      diazepam    Tablet 5 milliGRAM(s) Oral once  enoxaparin Injectable 40 milliGRAM(s) SubCutaneous every 24 hours  gabapentin 100 milliGRAM(s) Oral three times a day  influenza  Vaccine (HIGH DOSE) 0.5 milliLiter(s) IntraMuscular once  polyethylene glycol 3350 17 Gram(s) Oral daily  senna 2 Tablet(s) Oral at bedtime

## 2024-10-03 NOTE — OCCUPATIONAL THERAPY INITIAL EVALUATION ADULT - ADDITIONAL COMMENTS
Pt tangential and difficulty with home set-up mostly due to anxiety. Pt lives in [address], 1 flight inside with . Pt reports owning RW at home that received from friend and was independent prior to admission however, difficulty due to pain

## 2024-10-03 NOTE — PROGRESS NOTE ADULT - ATTENDING COMMENTS
agree with above  met with patient and her  at bedside. full plan of care discussed in detail. also discussed with residents at bedside.  gabapentin increased to 100mg TID  Valium 5mg given this am x 1 stat, then added as q8h prn for muscle spasms  warm compresses to lower back  PT/OT evals done  Pt with +UA and GBS growing in one bottle of blood cultures  Was initially not agreeable to IV abx, now agreeable this afternoon to IV ceftriaxone as ordered  f/u echo  f/u UCx  monitor for fevers

## 2024-10-04 LAB
-  CEFTRIAXONE: SIGNIFICANT CHANGE UP
-  CLINDAMYCIN: SIGNIFICANT CHANGE UP
-  LEVOFLOXACIN: SIGNIFICANT CHANGE UP
-  PENICILLIN: SIGNIFICANT CHANGE UP
-  TETRACYCLINE: SIGNIFICANT CHANGE UP
-  VANCOMYCIN: SIGNIFICANT CHANGE UP
ALBUMIN SERPL ELPH-MCNC: 2.3 G/DL — LOW (ref 3.3–5)
ALLERGY+IMMUNOLOGY DIAG STUDY NOTE: SIGNIFICANT CHANGE UP
ALP SERPL-CCNC: 53 U/L — SIGNIFICANT CHANGE UP (ref 40–120)
ALT FLD-CCNC: 16 U/L — SIGNIFICANT CHANGE UP (ref 10–45)
ANION GAP SERPL CALC-SCNC: 8 MMOL/L — SIGNIFICANT CHANGE UP (ref 5–17)
AST SERPL-CCNC: 13 U/L — SIGNIFICANT CHANGE UP (ref 10–40)
BASOPHILS # BLD AUTO: 0.01 K/UL — SIGNIFICANT CHANGE UP (ref 0–0.2)
BASOPHILS NFR BLD AUTO: 0.2 % — SIGNIFICANT CHANGE UP (ref 0–2)
BILIRUB SERPL-MCNC: 0.8 MG/DL — SIGNIFICANT CHANGE UP (ref 0.2–1.2)
BUN SERPL-MCNC: 6 MG/DL — LOW (ref 7–23)
CALCIUM SERPL-MCNC: 8 MG/DL — LOW (ref 8.4–10.5)
CHLORIDE SERPL-SCNC: 102 MMOL/L — SIGNIFICANT CHANGE UP (ref 96–108)
CO2 SERPL-SCNC: 28 MMOL/L — SIGNIFICANT CHANGE UP (ref 22–31)
CREAT SERPL-MCNC: 0.65 MG/DL — SIGNIFICANT CHANGE UP (ref 0.5–1.3)
CULTURE RESULTS: ABNORMAL
EGFR: 96 ML/MIN/1.73M2 — SIGNIFICANT CHANGE UP
EOSINOPHIL # BLD AUTO: 0 K/UL — SIGNIFICANT CHANGE UP (ref 0–0.5)
EOSINOPHIL NFR BLD AUTO: 0 % — SIGNIFICANT CHANGE UP (ref 0–6)
GLUCOSE SERPL-MCNC: 97 MG/DL — SIGNIFICANT CHANGE UP (ref 70–99)
HCT VFR BLD CALC: 27.2 % — LOW (ref 34.5–45)
HGB BLD-MCNC: 9.1 G/DL — LOW (ref 11.5–15.5)
IMM GRANULOCYTES NFR BLD AUTO: 0.5 % — SIGNIFICANT CHANGE UP (ref 0–0.9)
LYMPHOCYTES # BLD AUTO: 1.05 K/UL — SIGNIFICANT CHANGE UP (ref 1–3.3)
LYMPHOCYTES # BLD AUTO: 18.3 % — SIGNIFICANT CHANGE UP (ref 13–44)
MCHC RBC-ENTMCNC: 29.4 PG — SIGNIFICANT CHANGE UP (ref 27–34)
MCHC RBC-ENTMCNC: 33.5 GM/DL — SIGNIFICANT CHANGE UP (ref 32–36)
MCV RBC AUTO: 87.7 FL — SIGNIFICANT CHANGE UP (ref 80–100)
METHOD TYPE: SIGNIFICANT CHANGE UP
MONOCYTES # BLD AUTO: 0.54 K/UL — SIGNIFICANT CHANGE UP (ref 0–0.9)
MONOCYTES NFR BLD AUTO: 9.4 % — SIGNIFICANT CHANGE UP (ref 2–14)
NEUTROPHILS # BLD AUTO: 4.1 K/UL — SIGNIFICANT CHANGE UP (ref 1.8–7.4)
NEUTROPHILS NFR BLD AUTO: 71.6 % — SIGNIFICANT CHANGE UP (ref 43–77)
NRBC # BLD: 0 /100 WBCS — SIGNIFICANT CHANGE UP (ref 0–0)
PLATELET # BLD AUTO: 178 K/UL — SIGNIFICANT CHANGE UP (ref 150–400)
POTASSIUM SERPL-MCNC: 3.4 MMOL/L — LOW (ref 3.5–5.3)
POTASSIUM SERPL-SCNC: 3.4 MMOL/L — LOW (ref 3.5–5.3)
PROT SERPL-MCNC: 5.6 G/DL — LOW (ref 6–8.3)
RBC # BLD: 3.1 M/UL — LOW (ref 3.8–5.2)
RBC # FLD: 13.4 % — SIGNIFICANT CHANGE UP (ref 10.3–14.5)
SODIUM SERPL-SCNC: 138 MMOL/L — SIGNIFICANT CHANGE UP (ref 135–145)
SPECIMEN SOURCE: SIGNIFICANT CHANGE UP
WBC # BLD: 5.73 K/UL — SIGNIFICANT CHANGE UP (ref 3.8–10.5)
WBC # FLD AUTO: 5.73 K/UL — SIGNIFICANT CHANGE UP (ref 3.8–10.5)

## 2024-10-04 PROCEDURE — 99233 SBSQ HOSP IP/OBS HIGH 50: CPT

## 2024-10-04 PROCEDURE — 72158 MRI LUMBAR SPINE W/O & W/DYE: CPT | Mod: 26

## 2024-10-04 PROCEDURE — 76770 US EXAM ABDO BACK WALL COMP: CPT | Mod: 26

## 2024-10-04 RX ORDER — LACTOBACILLUS ACIDOPHILUS 25MM CELL
1 CAPSULE ORAL
Refills: 0 | Status: DISCONTINUED | OUTPATIENT
Start: 2024-10-04 | End: 2024-10-11

## 2024-10-04 RX ORDER — HYDROMORPHONE HYDROCHLORIDE 1 MG/ML
0.5 INJECTION, SOLUTION INTRAMUSCULAR; INTRAVENOUS; SUBCUTANEOUS ONCE
Refills: 0 | Status: DISCONTINUED | OUTPATIENT
Start: 2024-10-04 | End: 2024-10-04

## 2024-10-04 RX ORDER — SUMATRIPTAN SUCCINATE 6 MG/0.5ML
25 CARTRIDGE (ML) SUBCUTANEOUS ONCE
Refills: 0 | Status: COMPLETED | OUTPATIENT
Start: 2024-10-04 | End: 2024-10-04

## 2024-10-04 RX ORDER — ACETAMINOPHEN 325 MG
650 TABLET ORAL ONCE
Refills: 0 | Status: COMPLETED | OUTPATIENT
Start: 2024-10-04 | End: 2024-10-04

## 2024-10-04 RX ORDER — ACETAMINOPHEN 325 MG
650 TABLET ORAL EVERY 8 HOURS
Refills: 0 | Status: DISCONTINUED | OUTPATIENT
Start: 2024-10-04 | End: 2024-10-11

## 2024-10-04 RX ADMIN — ATORVASTATIN CALCIUM 10 MILLIGRAM(S): 10 TABLET, FILM COATED ORAL at 21:09

## 2024-10-04 RX ADMIN — Medication 1 TABLET(S): at 18:30

## 2024-10-04 RX ADMIN — GABAPENTIN 100 MILLIGRAM(S): 800 TABLET, FILM COATED ORAL at 05:32

## 2024-10-04 RX ADMIN — HYDROMORPHONE HYDROCHLORIDE 0.5 MILLIGRAM(S): 1 INJECTION, SOLUTION INTRAMUSCULAR; INTRAVENOUS; SUBCUTANEOUS at 04:57

## 2024-10-04 RX ADMIN — Medication 20 MILLIGRAM(S): at 18:32

## 2024-10-04 RX ADMIN — Medication 650 MILLIGRAM(S): at 12:30

## 2024-10-04 RX ADMIN — Medication 25 MILLIGRAM(S): at 21:12

## 2024-10-04 RX ADMIN — Medication 25 MILLIGRAM(S): at 22:00

## 2024-10-04 RX ADMIN — HYDROMORPHONE HYDROCHLORIDE 0.5 MILLIGRAM(S): 1 INJECTION, SOLUTION INTRAMUSCULAR; INTRAVENOUS; SUBCUTANEOUS at 14:38

## 2024-10-04 RX ADMIN — Medication 650 MILLIGRAM(S): at 13:00

## 2024-10-04 RX ADMIN — HYDROMORPHONE HYDROCHLORIDE 0.5 MILLIGRAM(S): 1 INJECTION, SOLUTION INTRAMUSCULAR; INTRAVENOUS; SUBCUTANEOUS at 23:20

## 2024-10-04 RX ADMIN — Medication 20 MILLIGRAM(S): at 18:23

## 2024-10-04 RX ADMIN — HYDROMORPHONE HYDROCHLORIDE 0.25 MILLIGRAM(S): 1 INJECTION, SOLUTION INTRAMUSCULAR; INTRAVENOUS; SUBCUTANEOUS at 12:27

## 2024-10-04 RX ADMIN — Medication 100 MILLIGRAM(S): at 14:24

## 2024-10-04 RX ADMIN — HYDROMORPHONE HYDROCHLORIDE 0.25 MILLIGRAM(S): 1 INJECTION, SOLUTION INTRAMUSCULAR; INTRAVENOUS; SUBCUTANEOUS at 13:00

## 2024-10-04 RX ADMIN — Medication 2 TABLET(S): at 21:09

## 2024-10-04 RX ADMIN — Medication 20 MILLIEQUIVALENT(S): at 14:23

## 2024-10-04 RX ADMIN — MORPHINE SULFATE 15 MILLIGRAM(S): 30 TABLET, FILM COATED, EXTENDED RELEASE ORAL at 22:00

## 2024-10-04 RX ADMIN — Medication 650 MILLIGRAM(S): at 05:32

## 2024-10-04 RX ADMIN — ENOXAPARIN SODIUM 40 MILLIGRAM(S): 150 INJECTION SUBCUTANEOUS at 18:24

## 2024-10-04 RX ADMIN — HYDROMORPHONE HYDROCHLORIDE 0.5 MILLIGRAM(S): 1 INJECTION, SOLUTION INTRAMUSCULAR; INTRAVENOUS; SUBCUTANEOUS at 18:20

## 2024-10-04 RX ADMIN — Medication 650 MILLIGRAM(S): at 06:30

## 2024-10-04 RX ADMIN — GABAPENTIN 100 MILLIGRAM(S): 800 TABLET, FILM COATED ORAL at 21:10

## 2024-10-04 RX ADMIN — GABAPENTIN 100 MILLIGRAM(S): 800 TABLET, FILM COATED ORAL at 14:23

## 2024-10-04 RX ADMIN — HYDROMORPHONE HYDROCHLORIDE 0.5 MILLIGRAM(S): 1 INJECTION, SOLUTION INTRAMUSCULAR; INTRAVENOUS; SUBCUTANEOUS at 15:10

## 2024-10-04 RX ADMIN — Medication 20 MILLIEQUIVALENT(S): at 12:28

## 2024-10-04 RX ADMIN — MORPHINE SULFATE 15 MILLIGRAM(S): 30 TABLET, FILM COATED, EXTENDED RELEASE ORAL at 21:08

## 2024-10-04 RX ADMIN — HYDROMORPHONE HYDROCHLORIDE 0.5 MILLIGRAM(S): 1 INJECTION, SOLUTION INTRAMUSCULAR; INTRAVENOUS; SUBCUTANEOUS at 10:05

## 2024-10-04 RX ADMIN — HYDROMORPHONE HYDROCHLORIDE 0.5 MILLIGRAM(S): 1 INJECTION, SOLUTION INTRAMUSCULAR; INTRAVENOUS; SUBCUTANEOUS at 09:35

## 2024-10-04 RX ADMIN — HYDROMORPHONE HYDROCHLORIDE 0.5 MILLIGRAM(S): 1 INJECTION, SOLUTION INTRAMUSCULAR; INTRAVENOUS; SUBCUTANEOUS at 22:22

## 2024-10-04 RX ADMIN — HYDROMORPHONE HYDROCHLORIDE 0.5 MILLIGRAM(S): 1 INJECTION, SOLUTION INTRAMUSCULAR; INTRAVENOUS; SUBCUTANEOUS at 18:50

## 2024-10-04 RX ADMIN — IPRATROPIUM BROMIDE AND ALBUTEROL SULFATE 3 MILLILITER(S): .5; 3 SOLUTION RESPIRATORY (INHALATION) at 15:26

## 2024-10-04 RX ADMIN — Medication 4 MILLIGRAM(S): at 14:21

## 2024-10-04 RX ADMIN — HYDROMORPHONE HYDROCHLORIDE 0.5 MILLIGRAM(S): 1 INJECTION, SOLUTION INTRAMUSCULAR; INTRAVENOUS; SUBCUTANEOUS at 04:27

## 2024-10-04 NOTE — PROGRESS NOTE ADULT - ATTENDING COMMENTS
agree with above  MRI results reviewed  MRI and culture results reviewed at bedside with patient  all questions answered  ID consulted, recs noted. continue iv abx  monitor culture sensitivities

## 2024-10-04 NOTE — PROGRESS NOTE ADULT - ASSESSMENT
68 yr F w pmhx of spinal stenosis, osteoporosis, HLD, bronchiectasis, IgA/IgG deficiency presents with worsening of chronic low back pain      #Intractable back pain - Chronic with acute worsening  - Patient established care with specialist, surgery recommended but delayed due to osteoporosis  - CT lumbar: No evidence of an acute lumbar spine fracture. Multilevel degenerative changes greatest at L4-5 with severe spinal canal narrowing  - pt afebrile, no leukocytosis, lactate wnl  - UA positive for wbc and LE. Pt denies urinary sxms. Discussed urinalysis results  - s/p dilaudid, dexamethasone and valium in ED with improvement in symptoms  - Add gabapentin 100 TID  - C/w zofran PRN  - f/u Ucx  - PT and PM&R consulted  - F/u MRI lumbar spine (MRI form in chart)  - Optimize pain control, c/w dilaudid, gabapentin, valium, duloxetine, MS Contin  - Discussed with spine surg Dr. Lucas Garcia at [facility]; recommended MRI with contrast, IV abx. Patient not considered suitable candidate for spinal surgery at this time and advised to follow up with outpatient spine surgery.    #Blood cultures +ve - Contaminant vs Bacteremia  #+ve UA, asymptomatic  - Patient informed about results, denied Abx, wants to wait for Ucx and final Bcx results  - 1x positive for group B strep, Bcx 2nd set negative at 24hrs  - F/u final culture results  - ID recs appreciated, c/w CTX, f/u Bcx and Ucx    #Anemia  - Patient reports anemia at baseline  - Hgb 9 today (12.1 on admission)  - Monitor  - Keep Type and screen uptodate    #IgG and IgA deficiency  #hx of bronchietasis  - satting well on RA  - C/w duo-neb PRN    #Depression/Anxiety  - on Fetzima at home, non-fomulary  - On valium PRN    #DVT prophylaxis  - Lovenox    #GOC  - Full code      D/w Dr Bcukley     68 yr F w pmhx of spinal stenosis, osteoporosis, HLD, bronchiectasis, IgA/IgG deficiency presents with worsening of chronic low back pain      #Intractable back pain - Chronic with acute worsening  - Patient established care with specialist, surgery recommended but delayed due to osteoporosis  - CT lumbar: No evidence of an acute lumbar spine fracture. Multilevel degenerative changes greatest at L4-5 with severe spinal canal narrowing  - pt afebrile, no leukocytosis, lactate wnl  - UA positive for wbc and LE. Pt denies urinary sxms. Discussed urinalysis results  - s/p dilaudid, dexamethasone and valium in ED with improvement in symptoms  - c/w gabapentin 100 TID  - C/w zofran PRN  - PT and PM&R consulted  - F/u MRI lumbar spine   - Optimize pain control, c/w dilaudid, gabapentin, valium, duloxetine, MS Contin  - Discussed with spine surg Dr. Lucas Garcia at [facility]; recommended MRI with contrast, IV abx. Patient not considered suitable candidate for spinal surgery at this time and advised to follow up with outpatient spine surgery.    #Blood cultures +ve - Contaminant vs Bacteremia  #+ve UA, asymptomatic  - 1x positive for group B strep, Bcx 2nd set negative at 24hrs  - F/u final culture results  - ID recs appreciated, c/w CTX, UCx +ve for gram positive cocci in pairs and chains    #Anemia  - Patient reports anemia at baseline  - Hgb 9 today (12.1 on admission)  - Monitor  - Keep Type and screen uptodate    #IgG and IgA deficiency  #hx of bronchietasis  - satting well on RA  - C/w duo-neb PRN    #Depression/Anxiety  - on Fetzima at home, non-fomulary  - On valium PRN  - c/w Duloxetine    #DVT prophylaxis  - Lovenox    #GOC  - Full code      D/w Dr Buckley

## 2024-10-04 NOTE — CHART NOTE - NSCHARTNOTEFT_GEN_A_CORE
Notified by GINNY Chavez that patient is reporting headache. Patient was seen and examined at bedside. States that she has a history of migraines and this headache is similar to the ones she gets when she does have a migraine. At home, she takes a Tylenol and Excedrin which usually help symptoms. At this time, will order Tylenol 650mg x 1, as patient has just received dose of IV Dilaudid 0.5mg which she endorses has already slightly improved headache pain.

## 2024-10-04 NOTE — PROGRESS NOTE ADULT - SUBJECTIVE AND OBJECTIVE BOX
68 yr F w pmhx of spinal stenosis, osteoporosis, HLD, bronchiectasis, IgA/IgG deficiency presents with worsening of chronic low back pain    Overnight Events: None  Interval HPI: Patient seen and examined at bedside.     REVIEW OF SYSTEMS:  CONSTITUTIONAL: (-) weakness, (-) fevers, (-) chills  EYES/ENT: (-) visual changes,  (-) vertigo,  (-) throat pain   NECK:  (-) pain, (-) stiffness  RESPIRATORY:  (-) shortness of breath, (-) cough,  (-) wheezing,  (-) hemoptysis   CARDIOVASCULAR:  (-) chest pain, (-) palpitations  GASTROINTESTINAL:  (-) abdominal or epigastric pain, (-) nausea, (-) vomiting, (-) diarrhea, (-) constipation, (-) melena,  (-) hematemesis,  (-) hematochezia  GENITOURINARY: (-) dysuria, (-) frequency, (-) hematuria  NEUROLOGICAL: (-) numbness, (-) weakness  SKIN: (-) itching, (-) rashes, (-) lesions    Vital Signs Last 24 Hrs  T(C): 37.4 (04 Oct 2024 04:23), Max: 37.8 (03 Oct 2024 20:24)  T(F): 99.3 (04 Oct 2024 04:23), Max: 100 (03 Oct 2024 20:24)  HR: 92 (04 Oct 2024 04:23) (92 - 106)  BP: 104/74 (04 Oct 2024 04:23) (99/62 - 109/70)  BP(mean): --  RR: 17 (04 Oct 2024 04:23) (17 - 18)  SpO2: 95% (04 Oct 2024 04:23) (93% - 98%)    Parameters below as of 04 Oct 2024 04:23  Patient On (Oxygen Delivery Method): room air        PHYSICAL EXAM:  GENERAL: NAD, lying in bed comfortably  HEAD:  Atraumatic, Normocephalic  CHEST/LUNG: Clear to auscultation bilaterally, good air entry bilaterally; No wheezing, rales, or rhonchi. Unlabored respirations  HEART: Regular rate and rhythm. S1 and S2. No murmurs, rubs, or gallops  ABDOMEN: Soft, Nontender, Nondistended. Bowel sounds present.   EXTREMITIES:  2+ Peripheral Pulses. No clubbing, cyanosis, or edema  NERVOUS SYSTEM:  Alert & Oriented X3, speech clear.      LABS:   All Labs Personally Reviewed                         9.1    5.73  )-----------( 178      ( 04 Oct 2024 07:04 )             27.2     10-04    138  |  102  |  6[L]  ----------------------------<  97  3.4[L]   |  28  |  0.65    Ca    8.0[L]      04 Oct 2024 07:04    TPro  5.6[L]  /  Alb  2.3[L]  /  TBili  0.8  /  DBili  x   /  AST  13  /  ALT  16  /  AlkPhos  53  10-04    PT/INR - ( 02 Oct 2024 11:00 )   PT: 9.9 sec;   INR: 0.84 ratio         PTT - ( 02 Oct 2024 11:00 )  PTT:19.8 sec      Blood Culture: 10-02 @ 11:15  Organism --  Gram Stain Blood -- Gram Stain --  Specimen Source .Blood BLOOD  Culture-Blood --    10-02 @ 11:00  Organism Blood Culture PCR  Gram Stain Blood -- Gram Stain   Growth in aerobic bottle: Gram positive cocci in pairs  Growth in anaerobic bottle: Gram positive cocci in pairs  Specimen Source .Blood BLOOD  Culture-Blood --      I&O's Summary    03 Oct 2024 07:01  -  04 Oct 2024 07:00  --------------------------------------------------------  IN: 200 mL / OUT: 400 mL / NET: -200 mL      CAPILLARY BLOOD GLUCOSE          RADIOLOGY/EKG:  All Imaging and EKGs Personally Reviewed       MEDICATIONS:  MEDICATIONS  (STANDING):  atorvastatin 10 milliGRAM(s) Oral at bedtime  cefTRIAXone   IVPB 2000 milliGRAM(s) IV Intermittent every 24 hours  DULoxetine 20 milliGRAM(s) Oral <User Schedule>  enoxaparin Injectable 40 milliGRAM(s) SubCutaneous every 24 hours  gabapentin 100 milliGRAM(s) Oral three times a day  influenza  Vaccine (HIGH DOSE) 0.5 milliLiter(s) IntraMuscular once  lactobacillus acidophilus 1 Tablet(s) Oral two times a day with meals  morphine ER Tablet 15 milliGRAM(s) Oral every 12 hours  polyethylene glycol 3350 17 Gram(s) Oral daily  potassium chloride    Tablet ER 20 milliEquivalent(s) Oral every 2 hours  senna 2 Tablet(s) Oral at bedtime     68 yr F w pmhx of spinal stenosis, osteoporosis, HLD, bronchiectasis, IgA/IgG deficiency presents with worsening of chronic low back pain    Overnight Events: None  Interval HPI: Patient seen and examined at bedside. Patient reports pain being a lot better controlled today, able to move her torso and lower extremities with less pain today.     REVIEW OF SYSTEMS:  CONSTITUTIONAL: (-) weakness, (-) fevers, (-) chills  EYES/ENT: (-) visual changes,  (-) vertigo,  (-) throat pain   NECK:  (-) pain, (-) stiffness  BACK: (+) pain, decreased from prior  RESPIRATORY:  (-) shortness of breath, (-) cough,  (-) wheezing,  (-) hemoptysis   CARDIOVASCULAR:  (-) chest pain, (-) palpitations  GASTROINTESTINAL:  (-) abdominal or epigastric pain, (-) nausea, (-) vomiting, (-) diarrhea, (-) constipation, (-) melena,  (-) hematemesis,  (-) hematochezia  GENITOURINARY: (-) dysuria, (-) frequency, (-) hematuria  NEUROLOGICAL: (-) numbness, (-) weakness  SKIN: (-) itching, (-) rashes, (-) lesions    Vital Signs Last 24 Hrs  T(C): 37.4 (04 Oct 2024 04:23), Max: 37.8 (03 Oct 2024 20:24)  T(F): 99.3 (04 Oct 2024 04:23), Max: 100 (03 Oct 2024 20:24)  HR: 92 (04 Oct 2024 04:23) (92 - 106)  BP: 104/74 (04 Oct 2024 04:23) (99/62 - 109/70)  BP(mean): --  RR: 17 (04 Oct 2024 04:23) (17 - 18)  SpO2: 95% (04 Oct 2024 04:23) (93% - 98%)    Parameters below as of 04 Oct 2024 04:23  Patient On (Oxygen Delivery Method): room air      PHYSICAL EXAM:  GENERAL: NAD, lying in bed comfortably  HEAD:  Atraumatic, Normocephalic  CHEST/LUNG: Clear to auscultation bilaterally, good air entry bilaterally; No wheezing, rales, or rhonchi. Unlabored respirations  HEART: Regular rate and rhythm. S1 and S2. No murmurs, rubs, or gallops  ABDOMEN: Soft, Nontender, Nondistended.  EXTREMITIES:  2+ Peripheral Pulses. No clubbing, cyanosis, or edema  NERVOUS SYSTEM:  Alert & Oriented X3, speech clear.    LABS:   All Labs Personally Reviewed                         9.1    5.73  )-----------( 178      ( 04 Oct 2024 07:04 )             27.2     10-04    138  |  102  |  6[L]  ----------------------------<  97  3.4[L]   |  28  |  0.65    Ca    8.0[L]      04 Oct 2024 07:04    TPro  5.6[L]  /  Alb  2.3[L]  /  TBili  0.8  /  DBili  x   /  AST  13  /  ALT  16  /  AlkPhos  53  10-04    PT/INR - ( 02 Oct 2024 11:00 )   PT: 9.9 sec;   INR: 0.84 ratio         PTT - ( 02 Oct 2024 11:00 )  PTT:19.8 sec      Blood Culture: 10-02 @ 11:15  Organism --  Gram Stain Blood -- Gram Stain --  Specimen Source .Blood BLOOD  Culture-Blood --    10-02 @ 11:00  Organism Blood Culture PCR  Gram Stain Blood -- Gram Stain   Growth in aerobic bottle: Gram positive cocci in pairs  Growth in anaerobic bottle: Gram positive cocci in pairs  Specimen Source .Blood BLOOD  Culture-Blood --      I&O's Summary    03 Oct 2024 07:01  -  04 Oct 2024 07:00  --------------------------------------------------------  IN: 200 mL / OUT: 400 mL / NET: -200 mL      CAPILLARY BLOOD GLUCOSE        MEDICATIONS:  MEDICATIONS  (STANDING):  atorvastatin 10 milliGRAM(s) Oral at bedtime  cefTRIAXone   IVPB 2000 milliGRAM(s) IV Intermittent every 24 hours  DULoxetine 20 milliGRAM(s) Oral <User Schedule>  enoxaparin Injectable 40 milliGRAM(s) SubCutaneous every 24 hours  gabapentin 100 milliGRAM(s) Oral three times a day  influenza  Vaccine (HIGH DOSE) 0.5 milliLiter(s) IntraMuscular once  lactobacillus acidophilus 1 Tablet(s) Oral two times a day with meals  morphine ER Tablet 15 milliGRAM(s) Oral every 12 hours  polyethylene glycol 3350 17 Gram(s) Oral daily  potassium chloride    Tablet ER 20 milliEquivalent(s) Oral every 2 hours  senna 2 Tablet(s) Oral at bedtime

## 2024-10-05 LAB
ALBUMIN SERPL ELPH-MCNC: 2.5 G/DL — LOW (ref 3.3–5)
ALP SERPL-CCNC: 64 U/L — SIGNIFICANT CHANGE UP (ref 40–120)
ALT FLD-CCNC: 19 U/L — SIGNIFICANT CHANGE UP (ref 10–45)
ANION GAP SERPL CALC-SCNC: 7 MMOL/L — SIGNIFICANT CHANGE UP (ref 5–17)
AST SERPL-CCNC: 14 U/L — SIGNIFICANT CHANGE UP (ref 10–40)
BASOPHILS # BLD AUTO: 0 K/UL — SIGNIFICANT CHANGE UP (ref 0–0.2)
BASOPHILS NFR BLD AUTO: 0 % — SIGNIFICANT CHANGE UP (ref 0–2)
BILIRUB SERPL-MCNC: 0.8 MG/DL — SIGNIFICANT CHANGE UP (ref 0.2–1.2)
BUN SERPL-MCNC: 11 MG/DL — SIGNIFICANT CHANGE UP (ref 7–23)
CALCIUM SERPL-MCNC: 8.9 MG/DL — SIGNIFICANT CHANGE UP (ref 8.4–10.5)
CHLORIDE SERPL-SCNC: 102 MMOL/L — SIGNIFICANT CHANGE UP (ref 96–108)
CO2 SERPL-SCNC: 29 MMOL/L — SIGNIFICANT CHANGE UP (ref 22–31)
CREAT SERPL-MCNC: 0.6 MG/DL — SIGNIFICANT CHANGE UP (ref 0.5–1.3)
CULTURE RESULTS: ABNORMAL
EGFR: 98 ML/MIN/1.73M2 — SIGNIFICANT CHANGE UP
EOSINOPHIL # BLD AUTO: 0 K/UL — SIGNIFICANT CHANGE UP (ref 0–0.5)
EOSINOPHIL NFR BLD AUTO: 0 % — SIGNIFICANT CHANGE UP (ref 0–6)
GLUCOSE SERPL-MCNC: 96 MG/DL — SIGNIFICANT CHANGE UP (ref 70–99)
HCT VFR BLD CALC: 26.7 % — LOW (ref 34.5–45)
HGB BLD-MCNC: 9.2 G/DL — LOW (ref 11.5–15.5)
IMM GRANULOCYTES NFR BLD AUTO: 0.5 % — SIGNIFICANT CHANGE UP (ref 0–0.9)
LYMPHOCYTES # BLD AUTO: 0.78 K/UL — LOW (ref 1–3.3)
LYMPHOCYTES # BLD AUTO: 19.4 % — SIGNIFICANT CHANGE UP (ref 13–44)
MAGNESIUM SERPL-MCNC: 2.1 MG/DL — SIGNIFICANT CHANGE UP (ref 1.6–2.6)
MCHC RBC-ENTMCNC: 30 PG — SIGNIFICANT CHANGE UP (ref 27–34)
MCHC RBC-ENTMCNC: 34.5 GM/DL — SIGNIFICANT CHANGE UP (ref 32–36)
MCV RBC AUTO: 87 FL — SIGNIFICANT CHANGE UP (ref 80–100)
MONOCYTES # BLD AUTO: 0.39 K/UL — SIGNIFICANT CHANGE UP (ref 0–0.9)
MONOCYTES NFR BLD AUTO: 9.7 % — SIGNIFICANT CHANGE UP (ref 2–14)
NEUTROPHILS # BLD AUTO: 2.83 K/UL — SIGNIFICANT CHANGE UP (ref 1.8–7.4)
NEUTROPHILS NFR BLD AUTO: 70.4 % — SIGNIFICANT CHANGE UP (ref 43–77)
NRBC # BLD: 0 /100 WBCS — SIGNIFICANT CHANGE UP (ref 0–0)
ORGANISM # SPEC MICROSCOPIC CNT: ABNORMAL
ORGANISM # SPEC MICROSCOPIC CNT: ABNORMAL
ORGANISM # SPEC MICROSCOPIC CNT: SIGNIFICANT CHANGE UP
PLATELET # BLD AUTO: 212 K/UL — SIGNIFICANT CHANGE UP (ref 150–400)
POTASSIUM SERPL-MCNC: 3.9 MMOL/L — SIGNIFICANT CHANGE UP (ref 3.5–5.3)
POTASSIUM SERPL-SCNC: 3.9 MMOL/L — SIGNIFICANT CHANGE UP (ref 3.5–5.3)
PROT SERPL-MCNC: 6.3 G/DL — SIGNIFICANT CHANGE UP (ref 6–8.3)
RBC # BLD: 3.07 M/UL — LOW (ref 3.8–5.2)
RBC # FLD: 13.2 % — SIGNIFICANT CHANGE UP (ref 10.3–14.5)
SODIUM SERPL-SCNC: 138 MMOL/L — SIGNIFICANT CHANGE UP (ref 135–145)
SPECIMEN SOURCE: SIGNIFICANT CHANGE UP
WBC # BLD: 4.02 K/UL — SIGNIFICANT CHANGE UP (ref 3.8–10.5)
WBC # FLD AUTO: 4.02 K/UL — SIGNIFICANT CHANGE UP (ref 3.8–10.5)

## 2024-10-05 PROCEDURE — 99232 SBSQ HOSP IP/OBS MODERATE 35: CPT

## 2024-10-05 RX ORDER — SODIUM CHLORIDE 0.9 % (FLUSH) 0.9 %
4 SYRINGE (ML) INJECTION EVERY 12 HOURS
Refills: 0 | Status: DISCONTINUED | OUTPATIENT
Start: 2024-10-05 | End: 2024-10-05

## 2024-10-05 RX ORDER — SODIUM CHLORIDE 0.9 % (FLUSH) 0.9 %
4 SYRINGE (ML) INJECTION EVERY 12 HOURS
Refills: 0 | Status: DISCONTINUED | OUTPATIENT
Start: 2024-10-05 | End: 2024-10-11

## 2024-10-05 RX ORDER — SODIUM CHLORIDE 0.9 % (FLUSH) 0.9 %
3 SYRINGE (ML) INJECTION
Refills: 0 | Status: DISCONTINUED | OUTPATIENT
Start: 2024-10-05 | End: 2024-10-05

## 2024-10-05 RX ORDER — MULTI VITAMIN/MINERAL SUPPLEMENT WITH ASCORBIC ACID, NIACIN, PYRIDOXINE, PANTOTHENIC ACID, FOLIC ACID, RIBOFLAVIN, THIAMIN, BIOTIN, COBALAMIN AND ZINC. 60; 20; 12.5; 10; 10; 1.7; 1.5; 1; .3; .006 MG/1; MG/1; MG/1; MG/1; MG/1; MG/1; MG/1; MG/1; MG/1; MG/1
1 TABLET, COATED ORAL
Refills: 0 | Status: DISCONTINUED | OUTPATIENT
Start: 2024-10-05 | End: 2024-10-11

## 2024-10-05 RX ADMIN — Medication 650 MILLIGRAM(S): at 08:11

## 2024-10-05 RX ADMIN — HYDROMORPHONE HYDROCHLORIDE 0.5 MILLIGRAM(S): 1 INJECTION, SOLUTION INTRAMUSCULAR; INTRAVENOUS; SUBCUTANEOUS at 18:35

## 2024-10-05 RX ADMIN — MULTI VITAMIN/MINERAL SUPPLEMENT WITH ASCORBIC ACID, NIACIN, PYRIDOXINE, PANTOTHENIC ACID, FOLIC ACID, RIBOFLAVIN, THIAMIN, BIOTIN, COBALAMIN AND ZINC. 1 TABLET(S): 60; 20; 12.5; 10; 10; 1.7; 1.5; 1; .3; .006 TABLET, COATED ORAL at 17:44

## 2024-10-05 RX ADMIN — Medication 650 MILLIGRAM(S): at 15:00

## 2024-10-05 RX ADMIN — GABAPENTIN 100 MILLIGRAM(S): 800 TABLET, FILM COATED ORAL at 06:37

## 2024-10-05 RX ADMIN — HYDROMORPHONE HYDROCHLORIDE 0.5 MILLIGRAM(S): 1 INJECTION, SOLUTION INTRAMUSCULAR; INTRAVENOUS; SUBCUTANEOUS at 06:37

## 2024-10-05 RX ADMIN — MORPHINE SULFATE 15 MILLIGRAM(S): 30 TABLET, FILM COATED, EXTENDED RELEASE ORAL at 21:38

## 2024-10-05 RX ADMIN — GABAPENTIN 100 MILLIGRAM(S): 800 TABLET, FILM COATED ORAL at 21:37

## 2024-10-05 RX ADMIN — Medication 4 MILLIGRAM(S): at 12:00

## 2024-10-05 RX ADMIN — HYDROMORPHONE HYDROCHLORIDE 0.5 MILLIGRAM(S): 1 INJECTION, SOLUTION INTRAMUSCULAR; INTRAVENOUS; SUBCUTANEOUS at 02:24

## 2024-10-05 RX ADMIN — MORPHINE SULFATE 15 MILLIGRAM(S): 30 TABLET, FILM COATED, EXTENDED RELEASE ORAL at 10:00

## 2024-10-05 RX ADMIN — Medication 4 MILLILITER(S): at 21:00

## 2024-10-05 RX ADMIN — HYDROMORPHONE HYDROCHLORIDE 0.5 MILLIGRAM(S): 1 INJECTION, SOLUTION INTRAMUSCULAR; INTRAVENOUS; SUBCUTANEOUS at 07:00

## 2024-10-05 RX ADMIN — Medication 17 GRAM(S): at 12:00

## 2024-10-05 RX ADMIN — HYDROMORPHONE HYDROCHLORIDE 0.5 MILLIGRAM(S): 1 INJECTION, SOLUTION INTRAMUSCULAR; INTRAVENOUS; SUBCUTANEOUS at 02:40

## 2024-10-05 RX ADMIN — HYDROMORPHONE HYDROCHLORIDE 0.5 MILLIGRAM(S): 1 INJECTION, SOLUTION INTRAMUSCULAR; INTRAVENOUS; SUBCUTANEOUS at 18:22

## 2024-10-05 RX ADMIN — Medication 30 MILLILITER(S): at 21:44

## 2024-10-05 RX ADMIN — Medication 100 MILLIGRAM(S): at 14:00

## 2024-10-05 RX ADMIN — GABAPENTIN 100 MILLIGRAM(S): 800 TABLET, FILM COATED ORAL at 13:07

## 2024-10-05 RX ADMIN — HYDROMORPHONE HYDROCHLORIDE 0.5 MILLIGRAM(S): 1 INJECTION, SOLUTION INTRAMUSCULAR; INTRAVENOUS; SUBCUTANEOUS at 23:31

## 2024-10-05 RX ADMIN — Medication 4 MILLILITER(S): at 14:48

## 2024-10-05 RX ADMIN — Medication 20 MILLIGRAM(S): at 17:44

## 2024-10-05 RX ADMIN — Medication 2 TABLET(S): at 21:37

## 2024-10-05 RX ADMIN — HYDROMORPHONE HYDROCHLORIDE 0.5 MILLIGRAM(S): 1 INJECTION, SOLUTION INTRAMUSCULAR; INTRAVENOUS; SUBCUTANEOUS at 13:06

## 2024-10-05 RX ADMIN — Medication 650 MILLIGRAM(S): at 14:00

## 2024-10-05 RX ADMIN — Medication 1 TABLET(S): at 08:11

## 2024-10-05 RX ADMIN — Medication 650 MILLIGRAM(S): at 21:36

## 2024-10-05 RX ADMIN — Medication 4 MILLIGRAM(S): at 22:36

## 2024-10-05 RX ADMIN — HYDROMORPHONE HYDROCHLORIDE 0.5 MILLIGRAM(S): 1 INJECTION, SOLUTION INTRAMUSCULAR; INTRAVENOUS; SUBCUTANEOUS at 13:30

## 2024-10-05 RX ADMIN — Medication 650 MILLIGRAM(S): at 09:00

## 2024-10-05 RX ADMIN — MORPHINE SULFATE 15 MILLIGRAM(S): 30 TABLET, FILM COATED, EXTENDED RELEASE ORAL at 22:36

## 2024-10-05 RX ADMIN — Medication 500 MILLIGRAM(S): at 20:35

## 2024-10-05 RX ADMIN — ENOXAPARIN SODIUM 40 MILLIGRAM(S): 150 INJECTION SUBCUTANEOUS at 12:00

## 2024-10-05 RX ADMIN — HYDROMORPHONE HYDROCHLORIDE 0.5 MILLIGRAM(S): 1 INJECTION, SOLUTION INTRAMUSCULAR; INTRAVENOUS; SUBCUTANEOUS at 22:31

## 2024-10-05 RX ADMIN — Medication 650 MILLIGRAM(S): at 22:36

## 2024-10-05 RX ADMIN — MORPHINE SULFATE 15 MILLIGRAM(S): 30 TABLET, FILM COATED, EXTENDED RELEASE ORAL at 09:23

## 2024-10-05 RX ADMIN — Medication 1 TABLET(S): at 17:44

## 2024-10-05 NOTE — PROGRESS NOTE ADULT - SUBJECTIVE AND OBJECTIVE BOX
68 yr F w pmhx of spinal stenosis, osteoporosis, HLD, bronchiectasis, IgA/IgG deficiency presents with worsening of chronic low back pain    Overnight Events: None  Interval HPI: Patient seen and examined at bedside.     REVIEW OF SYSTEMS:  CONSTITUTIONAL: (-) weakness, (-) fevers, (-) chills  EYES/ENT: (-) visual changes,  (-) vertigo,  (-) throat pain   NECK:  (-) pain, (-) stiffness  RESPIRATORY:  (-) shortness of breath, (-) cough,  (-) wheezing,  (-) hemoptysis   CARDIOVASCULAR:  (-) chest pain, (-) palpitations  GASTROINTESTINAL:  (-) abdominal or epigastric pain, (-) nausea, (-) vomiting, (-) diarrhea, (-) constipation, (-) melena,  (-) hematemesis,  (-) hematochezia  GENITOURINARY: (-) dysuria, (-) frequency, (-) hematuria  NEUROLOGICAL: (-) numbness, (-) weakness  SKIN: (-) itching, (-) rashes, (-) lesions    Vital Signs Last 24 Hrs  T(C): 37.1 (05 Oct 2024 04:52), Max: 37.1 (04 Oct 2024 11:39)  T(F): 98.8 (05 Oct 2024 04:52), Max: 98.8 (04 Oct 2024 20:13)  HR: 88 (05 Oct 2024 04:52) (83 - 92)  BP: 98/62 (05 Oct 2024 04:52) (96/65 - 99/65)  BP(mean): --  RR: 16 (05 Oct 2024 04:52) (16 - 18)  SpO2: 94% (05 Oct 2024 04:52) (94% - 95%)    Parameters below as of 05 Oct 2024 04:52  Patient On (Oxygen Delivery Method): room air        PHYSICAL EXAM:  GENERAL: NAD, lying in bed comfortably  HEAD:  Atraumatic, Normocephalic  CHEST/LUNG: Clear to auscultation bilaterally, good air entry bilaterally; No wheezing, rales, or rhonchi. Unlabored respirations  HEART: Regular rate and rhythm. S1 and S2. No murmurs, rubs, or gallops  ABDOMEN: Soft, Nontender, Nondistended. Bowel sounds present.   EXTREMITIES:  2+ Peripheral Pulses. No clubbing, cyanosis, or edema  NERVOUS SYSTEM:  Alert & Oriented X3, speech clear.    LABS:   All Labs Personally Reviewed                         9.2    4.02  )-----------( 212      ( 05 Oct 2024 07:20 )             26.7     10-04    138  |  102  |  6[L]  ----------------------------<  97  3.4[L]   |  28  |  0.65    Ca    8.0[L]      04 Oct 2024 07:04    TPro  5.6[L]  /  Alb  2.3[L]  /  TBili  0.8  /  DBili  x   /  AST  13  /  ALT  16  /  AlkPhos  53  10-04          Blood Culture: 10-02 @ 13:48  Organism --  Gram Stain Blood -- Gram Stain --  Specimen Source Clean Catch Clean Catch (Midstream)  Culture-Blood --    10-02 @ 11:15  Organism --  Gram Stain Blood -- Gram Stain --  Specimen Source .Blood BLOOD  Culture-Blood --    10-02 @ 11:00  Organism Blood Culture PCR  Gram Stain Blood -- Gram Stain   Growth in aerobic bottle: Gram positive cocci in pairs  Growth in anaerobic bottle: Gram positive cocci in pairs  Specimen Source .Blood BLOOD  Culture-Blood --      I&O's Summary    04 Oct 2024 07:01  -  05 Oct 2024 07:00  --------------------------------------------------------  IN: 200 mL / OUT: 0 mL / NET: 200 mL      CAPILLARY BLOOD GLUCOSE          RADIOLOGY/EKG:    < from: US Kidney and Bladder (10.04.24 @ 17:24) >  IMPRESSION:  1.  Negative renal ultrasound.  2.  184 cc post void residual volume (PVR).    < end of copied text >    < from: MR Lumbar Spine w/wo IV Cont (10.04.24 @ 10:16) >  IMPRESSION:  Nonspecific endplate edema/enhancement and high T2 disc signal at L4-5 as   described above which may represent discitis/ osteomyelitis with phlegmon   in the correct clinical setting. No loculated fluid collection in the   paraspinal or epidural spaces to suggest abscess collection. Correlate   clinically.    Multilevel degenerative changes resulting in multilevel spinal canal   stenosis and neural foraminal narrowing as described above. Severe spinal   canal stenosis with compression of the nerve roots and severe right,   moderate left neural foraminal narrowing at L4-5.    < end of copied text >      MEDICATIONS:  MEDICATIONS  (STANDING):  acetaminophen     Tablet .. 650 milliGRAM(s) Oral every 8 hours  atorvastatin 10 milliGRAM(s) Oral at bedtime  cefTRIAXone   IVPB 2000 milliGRAM(s) IV Intermittent every 24 hours  DULoxetine 20 milliGRAM(s) Oral <User Schedule>  enoxaparin Injectable 40 milliGRAM(s) SubCutaneous every 24 hours  gabapentin 100 milliGRAM(s) Oral three times a day  influenza  Vaccine (HIGH DOSE) 0.5 milliLiter(s) IntraMuscular once  lactobacillus acidophilus 1 Tablet(s) Oral two times a day with meals  morphine ER Tablet 15 milliGRAM(s) Oral every 12 hours  polyethylene glycol 3350 17 Gram(s) Oral daily  senna 2 Tablet(s) Oral at bedtime     68 yr F w pmhx of spinal stenosis, osteoporosis, HLD, bronchiectasis, IgA/IgG deficiency presents with worsening of chronic low back pain    Overnight Events: None  Interval HPI: Patient seen and examined at bedside. Pain well controlled, improved from prior. Discussion with patient and  at bedside about MRI results, and next steps of care. All questions answered.     REVIEW OF SYSTEMS:  CONSTITUTIONAL: (-) weakness, (-) fevers, (-) chills  EYES/ENT: (-) visual changes,  (-) vertigo,  (-) throat pain   NECK:  (-) pain, (-) stiffness  RESPIRATORY:  (-) shortness of breath, (-) cough,  (-) wheezing,  (-) hemoptysis   CARDIOVASCULAR:  (-) chest pain, (-) palpitations  BACK: (+) lower back pain, improved from prior  GASTROINTESTINAL:  (-) abdominal or epigastric pain, (-) nausea, (-) vomiting, (-) diarrhea, (-) constipation, (-) melena,  (-) hematemesis,  (-) hematochezia  GENITOURINARY: (-) dysuria, (-) frequency, (-) hematuria  NEUROLOGICAL: (-) numbness, (-) weakness  SKIN: (-) itching, (-) rashes, (-) lesions    Vital Signs Last 24 Hrs  T(C): 37.1 (05 Oct 2024 04:52), Max: 37.1 (04 Oct 2024 11:39)  T(F): 98.8 (05 Oct 2024 04:52), Max: 98.8 (04 Oct 2024 20:13)  HR: 88 (05 Oct 2024 04:52) (83 - 92)  BP: 98/62 (05 Oct 2024 04:52) (96/65 - 99/65)  BP(mean): --  RR: 16 (05 Oct 2024 04:52) (16 - 18)  SpO2: 94% (05 Oct 2024 04:52) (94% - 95%)    Parameters below as of 05 Oct 2024 04:52  Patient On (Oxygen Delivery Method): room air      PHYSICAL EXAM:  GENERAL: NAD, lying in bed comfortably  HEAD:  Atraumatic, Normocephalic  CHEST/LUNG: Clear to auscultation bilaterally, good air entry bilaterally; No wheezing, rales, or rhonchi. Unlabored respirations  HEART: Regular rate and rhythm. S1 and S2. No murmurs, rubs, or gallops  ABDOMEN: Soft, Nontender, Nondistended.  EXTREMITIES:  2+ Peripheral Pulses. No clubbing, cyanosis, or edema  NERVOUS SYSTEM:  Alert & Oriented X3, speech clear.    LABS:   All Labs Personally Reviewed                         9.2    4.02  )-----------( 212      ( 05 Oct 2024 07:20 )             26.7     10-04    138  |  102  |  6[L]  ----------------------------<  97  3.4[L]   |  28  |  0.65    Ca    8.0[L]      04 Oct 2024 07:04    TPro  5.6[L]  /  Alb  2.3[L]  /  TBili  0.8  /  DBili  x   /  AST  13  /  ALT  16  /  AlkPhos  53  10-04          Blood Culture: 10-02 @ 13:48  Organism --  Gram Stain Blood -- Gram Stain --  Specimen Source Clean Catch Clean Catch (Midstream)  Culture-Blood --    10-02 @ 11:15  Organism --  Gram Stain Blood -- Gram Stain --  Specimen Source .Blood BLOOD  Culture-Blood --    10-02 @ 11:00  Organism Blood Culture PCR  Gram Stain Blood -- Gram Stain   Growth in aerobic bottle: Gram positive cocci in pairs  Growth in anaerobic bottle: Gram positive cocci in pairs  Specimen Source .Blood BLOOD  Culture-Blood --      I&O's Summary    04 Oct 2024 07:01  -  05 Oct 2024 07:00  --------------------------------------------------------  IN: 200 mL / OUT: 0 mL / NET: 200 mL      CAPILLARY BLOOD GLUCOSE          RADIOLOGY/EKG:    < from: US Kidney and Bladder (10.04.24 @ 17:24) >  IMPRESSION:  1.  Negative renal ultrasound.  2.  184 cc post void residual volume (PVR).    < end of copied text >    < from: MR Lumbar Spine w/wo IV Cont (10.04.24 @ 10:16) >  IMPRESSION:  Nonspecific endplate edema/enhancement and high T2 disc signal at L4-5 as   described above which may represent discitis/ osteomyelitis with phlegmon   in the correct clinical setting. No loculated fluid collection in the   paraspinal or epidural spaces to suggest abscess collection. Correlate   clinically.    Multilevel degenerative changes resulting in multilevel spinal canal   stenosis and neural foraminal narrowing as described above. Severe spinal   canal stenosis with compression of the nerve roots and severe right,   moderate left neural foraminal narrowing at L4-5.    < end of copied text >      MEDICATIONS:  MEDICATIONS  (STANDING):  acetaminophen     Tablet .. 650 milliGRAM(s) Oral every 8 hours  atorvastatin 10 milliGRAM(s) Oral at bedtime  cefTRIAXone   IVPB 2000 milliGRAM(s) IV Intermittent every 24 hours  DULoxetine 20 milliGRAM(s) Oral <User Schedule>  enoxaparin Injectable 40 milliGRAM(s) SubCutaneous every 24 hours  gabapentin 100 milliGRAM(s) Oral three times a day  influenza  Vaccine (HIGH DOSE) 0.5 milliLiter(s) IntraMuscular once  lactobacillus acidophilus 1 Tablet(s) Oral two times a day with meals  morphine ER Tablet 15 milliGRAM(s) Oral every 12 hours  polyethylene glycol 3350 17 Gram(s) Oral daily  senna 2 Tablet(s) Oral at bedtime

## 2024-10-05 NOTE — PROGRESS NOTE ADULT - ATTENDING COMMENTS
agree with above  no new complaints, doing well  continue IV ceftriaxone  MRI results as above  will need 6 weeks of IV abx  repeat BCx sent this AM, f/u  PICC line Monday if  repeat BCx negative  f/u am labs  monitor for fevers  d/w pt and  at bedside this morning, all questions answered. agree with above  no new complaints, doing well  continue IV ceftriaxone   MRI results as above  will need 6 weeks of IV abx    repeat BCx sent this AM, f/u   PICC line Monday if  repeat BCx negative  f/u am labs  monitor for fevers  d/w pt and  at bedside this morning, all questions answered.

## 2024-10-05 NOTE — PROGRESS NOTE ADULT - ASSESSMENT
68 yr F w pmhx of spinal stenosis, osteoporosis, HLD, bronchiectasis, IgA/IgG deficiency presents with worsening of chronic low back pain      #Intractable back pain - Chronic with acute worsening  #Osteomyelitis of spine  - Patient established care with specialist, surgery recommended but delayed due to osteoporosis  - CT lumbar: No evidence of an acute lumbar spine fracture. Multilevel degenerative changes greatest at L4-5 with severe spinal canal narrowing  - c/w gabapentin 100 TID  - C/w zofran PRN  - PT and PM&R consulted  - MRI lumbar spine - osteomyelitis  - Optimize pain control, c/w dilaudid, gabapentin, valium, duloxetine, MS Contin  - Discussed with spine surg Dr. Lucas Garcia at [facility]; recommended MRI with contrast, IV abx. Patient not considered suitable candidate for spinal surgery at this time and advised to follow up with outpatient spine surgery.  - Ortho spine consult: MRI with IV contrast; if + for infection, c/w IV abx longterm. Immediate intervention if new weakness/numbness, urinary/bowel incontinence, and neuro deficits seen; no concerning findings; No surgical intervention at this time for severe spinal stenosis d/t concern of bacteremia; Outpt surgical intervention  - ID recs appreciated, long term IV abx (6 weeks)    #Blood cultures +ve - Bacteremia  #+ve UA/Ucx, asymptomatic  - US kidney/bladder, negative renal US, PVR 184cc  - 1x positive for group B strep, Bcx 2nd set negative   - ID recs appreciated, c/w CTX, UCx +ve for gram positive cocci in pairs and chains    #Anemia  - Patient reports anemia at baseline  - Hgb 9.2 today (12.1 on admission)  - Monitor  - Keep Type and screen uptodate    #IgG and IgA deficiency  #hx of bronchietasis  - satting well on RA  - C/w duo-neb PRN    #Depression/Anxiety  - on Fetzima at home, non-fomulary  - On valium PRN  - c/w Duloxetine    #DVT prophylaxis  - Lovenox    #GOC  - Full code      D/w Dr Buckley     68 yr F w pmhx of spinal stenosis, osteoporosis, HLD, bronchiectasis, IgA/IgG deficiency presents with worsening of chronic low back pain      #Intractable back pain - Chronic with acute worsening  #Osteomyelitis of spine  - Patient established care with specialist, surgery recommended but delayed due to osteoporosis  - CT lumbar: No evidence of an acute lumbar spine fracture. Multilevel degenerative changes greatest at L4-5 with severe spinal canal narrowing  - c/w gabapentin 100 TID  - C/w zofran PRN  - PT and PM&R consulted  - MRI lumbar spine - osteomyelitis  - Optimize pain control, c/w dilaudid, gabapentin, valium, duloxetine, MS Contin  - Discussed with spine surg Dr. Lucas Garcia at [facility]; recommended MRI with contrast, IV abx. Patient not considered suitable candidate for spinal surgery at this time and advised to follow up with outpatient spine surgery.  - Ortho spine consult: MRI with IV contrast; if + for infection, c/w IV abx longterm. Immediate intervention if new weakness/numbness, urinary/bowel incontinence, and neuro deficits seen; no concerning findings; No surgical intervention at this time for severe spinal stenosis d/t concern of bacteremia; Outpt surgical intervention  - ID recs appreciated, long term IV abx (6 weeks)    #Blood cultures +ve - Bacteremia  #+ve UA/Ucx, asymptomatic  - US kidney/bladder, negative renal US, PVR 184cc  - 1x positive for group B strep, Bcx 2nd set negative   - ID recs appreciated, c/w CTX, UCx +ve for gram positive cocci in pairs and chains  - repeat BCx sent on 10/5, f/u    #Anemia  - Patient reports anemia at baseline  - Hgb 9.2 today (12.1 on admission)  - Monitor  - Keep Type and screen uptodate    #IgG and IgA deficiency  #hx of bronchietasis  - satting well on RA  - C/w duo-neb PRN    #Depression/Anxiety  - on Fetzima at home, non-fomulary  - On valium PRN  - c/w Duloxetine    #DVT prophylaxis  - Lovenox    #GOC  - Full code    d/w pt's  at bedside.    D/w Dr Buckley     68 yr F w pmhx of spinal stenosis, osteoporosis, HLD, bronchiectasis, IgA/IgG deficiency presents with worsening of chronic low back pain      #Intractable back pain - Chronic with acute worsening  #Osteomyelitis of spine  - Patient established care with specialist, surgery recommended but delayed due to osteoporosis  - CT lumbar: No evidence of an acute lumbar spine fracture. Multilevel degenerative changes greatest at L4-5 with severe spinal canal narrowing  - c/w gabapentin 100 TID  - C/w zofran PRN  - PT and PM&R consulted  - MRI lumbar spine - osteomyelitis  - Optimize pain control, c/w dilaudid, gabapentin, valium, duloxetine, MS Contin  - Discussed with spine surg Dr. Lucas Garcia at [facility]; recommended MRI with contrast, IV abx. Patient not considered suitable candidate for spinal surgery at this time and advised to follow up with outpatient spine surgery.  - Ortho spine consult: MRI with IV contrast; if + for infection, c/w IV abx longterm. Immediate intervention if new weakness/numbness, urinary/bowel incontinence, and neuro deficits seen; no concerning findings; No surgical intervention at this time for severe spinal stenosis d/t concern of bacteremia; Outpt surgical intervention  - ID recs appreciated, long term IV abx (6 weeks)  - F/u Repeat Bcx, decide on picc line after culture results.     #Blood cultures +ve - Bacteremia  #+ve UA/Ucx, asymptomatic  - US kidney/bladder, negative renal US, PVR 184cc  - 1x positive for group B strep, Bcx 2nd set negative   - ID recs appreciated, c/w CTX, UCx +ve for gram positive cocci in pairs and chains  - repeat BCx sent on 10/5, f/u    #Anemia  - Patient reports anemia at baseline  - Hgb 9.2 today - stable (12.1 on admission)  - Monitor  - Keep Type and screen uptodate    #IgG and IgA deficiency  #hx of bronchietasis  - satting well on RA  - C/w saline duonebs PRN    #Depression/Anxiety  - on Fetzima at home, non-fomulary  - On valium PRN  - c/w Duloxetine    #DVT prophylaxis  - Lovenox    #GOC  - Full code    d/w pt's  at bedside.    D/w Dr Buckley

## 2024-10-06 LAB
ALBUMIN SERPL ELPH-MCNC: 2.2 G/DL — LOW (ref 3.3–5)
ALP SERPL-CCNC: 61 U/L — SIGNIFICANT CHANGE UP (ref 40–120)
ALT FLD-CCNC: 22 U/L — SIGNIFICANT CHANGE UP (ref 10–45)
ANION GAP SERPL CALC-SCNC: 6 MMOL/L — SIGNIFICANT CHANGE UP (ref 5–17)
AST SERPL-CCNC: 16 U/L — SIGNIFICANT CHANGE UP (ref 10–40)
BILIRUB SERPL-MCNC: 0.5 MG/DL — SIGNIFICANT CHANGE UP (ref 0.2–1.2)
BUN SERPL-MCNC: 9 MG/DL — SIGNIFICANT CHANGE UP (ref 7–23)
CALCIUM SERPL-MCNC: 8.9 MG/DL — SIGNIFICANT CHANGE UP (ref 8.4–10.5)
CHLORIDE SERPL-SCNC: 106 MMOL/L — SIGNIFICANT CHANGE UP (ref 96–108)
CO2 SERPL-SCNC: 31 MMOL/L — SIGNIFICANT CHANGE UP (ref 22–31)
CREAT SERPL-MCNC: 0.67 MG/DL — SIGNIFICANT CHANGE UP (ref 0.5–1.3)
EGFR: 95 ML/MIN/1.73M2 — SIGNIFICANT CHANGE UP
GLUCOSE SERPL-MCNC: 98 MG/DL — SIGNIFICANT CHANGE UP (ref 70–99)
HCT VFR BLD CALC: 26.1 % — LOW (ref 34.5–45)
HGB BLD-MCNC: 8.7 G/DL — LOW (ref 11.5–15.5)
MAGNESIUM SERPL-MCNC: 1.8 MG/DL — SIGNIFICANT CHANGE UP (ref 1.6–2.6)
MCHC RBC-ENTMCNC: 29 PG — SIGNIFICANT CHANGE UP (ref 27–34)
MCHC RBC-ENTMCNC: 33.3 GM/DL — SIGNIFICANT CHANGE UP (ref 32–36)
MCV RBC AUTO: 87 FL — SIGNIFICANT CHANGE UP (ref 80–100)
NRBC # BLD: 0 /100 WBCS — SIGNIFICANT CHANGE UP (ref 0–0)
PLATELET # BLD AUTO: 236 K/UL — SIGNIFICANT CHANGE UP (ref 150–400)
POTASSIUM SERPL-MCNC: 4.8 MMOL/L — SIGNIFICANT CHANGE UP (ref 3.5–5.3)
POTASSIUM SERPL-SCNC: 4.8 MMOL/L — SIGNIFICANT CHANGE UP (ref 3.5–5.3)
PROT SERPL-MCNC: 5.8 G/DL — LOW (ref 6–8.3)
RBC # BLD: 3 M/UL — LOW (ref 3.8–5.2)
RBC # FLD: 12.9 % — SIGNIFICANT CHANGE UP (ref 10.3–14.5)
SODIUM SERPL-SCNC: 143 MMOL/L — SIGNIFICANT CHANGE UP (ref 135–145)
WBC # BLD: 3.75 K/UL — LOW (ref 3.8–10.5)
WBC # FLD AUTO: 3.75 K/UL — LOW (ref 3.8–10.5)

## 2024-10-06 PROCEDURE — 99233 SBSQ HOSP IP/OBS HIGH 50: CPT

## 2024-10-06 RX ORDER — GABAPENTIN 800 MG/1
300 TABLET, FILM COATED ORAL THREE TIMES A DAY
Refills: 0 | Status: DISCONTINUED | OUTPATIENT
Start: 2024-10-06 | End: 2024-10-07

## 2024-10-06 RX ADMIN — BISACODYL 5 MILLIGRAM(S): 5 TABLET, COATED ORAL at 06:12

## 2024-10-06 RX ADMIN — MORPHINE SULFATE 15 MILLIGRAM(S): 30 TABLET, FILM COATED, EXTENDED RELEASE ORAL at 10:30

## 2024-10-06 RX ADMIN — MULTI VITAMIN/MINERAL SUPPLEMENT WITH ASCORBIC ACID, NIACIN, PYRIDOXINE, PANTOTHENIC ACID, FOLIC ACID, RIBOFLAVIN, THIAMIN, BIOTIN, COBALAMIN AND ZINC. 1 TABLET(S): 60; 20; 12.5; 10; 10; 1.7; 1.5; 1; .3; .006 TABLET, COATED ORAL at 18:33

## 2024-10-06 RX ADMIN — Medication 2 TABLET(S): at 21:11

## 2024-10-06 RX ADMIN — Medication 650 MILLIGRAM(S): at 15:00

## 2024-10-06 RX ADMIN — GABAPENTIN 300 MILLIGRAM(S): 800 TABLET, FILM COATED ORAL at 21:09

## 2024-10-06 RX ADMIN — Medication 650 MILLIGRAM(S): at 14:29

## 2024-10-06 RX ADMIN — HYDROMORPHONE HYDROCHLORIDE 0.5 MILLIGRAM(S): 1 INJECTION, SOLUTION INTRAMUSCULAR; INTRAVENOUS; SUBCUTANEOUS at 21:47

## 2024-10-06 RX ADMIN — ENOXAPARIN SODIUM 40 MILLIGRAM(S): 150 INJECTION SUBCUTANEOUS at 12:55

## 2024-10-06 RX ADMIN — HYDROMORPHONE HYDROCHLORIDE 0.5 MILLIGRAM(S): 1 INJECTION, SOLUTION INTRAMUSCULAR; INTRAVENOUS; SUBCUTANEOUS at 04:35

## 2024-10-06 RX ADMIN — MORPHINE SULFATE 15 MILLIGRAM(S): 30 TABLET, FILM COATED, EXTENDED RELEASE ORAL at 21:10

## 2024-10-06 RX ADMIN — GABAPENTIN 100 MILLIGRAM(S): 800 TABLET, FILM COATED ORAL at 06:11

## 2024-10-06 RX ADMIN — ATORVASTATIN CALCIUM 10 MILLIGRAM(S): 10 TABLET, FILM COATED ORAL at 21:11

## 2024-10-06 RX ADMIN — Medication 100 MILLIGRAM(S): at 14:28

## 2024-10-06 RX ADMIN — MORPHINE SULFATE 15 MILLIGRAM(S): 30 TABLET, FILM COATED, EXTENDED RELEASE ORAL at 09:34

## 2024-10-06 RX ADMIN — Medication 1 TABLET(S): at 09:35

## 2024-10-06 RX ADMIN — Medication 650 MILLIGRAM(S): at 06:11

## 2024-10-06 RX ADMIN — HYDROMORPHONE HYDROCHLORIDE 0.5 MILLIGRAM(S): 1 INJECTION, SOLUTION INTRAMUSCULAR; INTRAVENOUS; SUBCUTANEOUS at 03:35

## 2024-10-06 RX ADMIN — Medication 4 MILLILITER(S): at 20:15

## 2024-10-06 RX ADMIN — Medication 17 GRAM(S): at 12:56

## 2024-10-06 RX ADMIN — Medication 1 TABLET(S): at 18:32

## 2024-10-06 RX ADMIN — HYDROMORPHONE HYDROCHLORIDE 0.5 MILLIGRAM(S): 1 INJECTION, SOLUTION INTRAMUSCULAR; INTRAVENOUS; SUBCUTANEOUS at 22:17

## 2024-10-06 RX ADMIN — GABAPENTIN 300 MILLIGRAM(S): 800 TABLET, FILM COATED ORAL at 12:54

## 2024-10-06 RX ADMIN — Medication 500 MILLIGRAM(S): at 18:32

## 2024-10-06 RX ADMIN — Medication 650 MILLIGRAM(S): at 07:11

## 2024-10-06 RX ADMIN — HYDROMORPHONE HYDROCHLORIDE 0.5 MILLIGRAM(S): 1 INJECTION, SOLUTION INTRAMUSCULAR; INTRAVENOUS; SUBCUTANEOUS at 14:35

## 2024-10-06 RX ADMIN — Medication 4 MILLILITER(S): at 08:11

## 2024-10-06 RX ADMIN — MORPHINE SULFATE 15 MILLIGRAM(S): 30 TABLET, FILM COATED, EXTENDED RELEASE ORAL at 21:45

## 2024-10-06 RX ADMIN — Medication 20 MILLIGRAM(S): at 18:33

## 2024-10-06 RX ADMIN — HYDROMORPHONE HYDROCHLORIDE 0.5 MILLIGRAM(S): 1 INJECTION, SOLUTION INTRAMUSCULAR; INTRAVENOUS; SUBCUTANEOUS at 15:05

## 2024-10-06 NOTE — DISCHARGE NOTE PROVIDER - NSDCCAREPROVSEEN_GEN_ALL_CORE_FT
Andree Mcclelland, Arslan Johnson, Ji Bowen, Crystal Buckley, Randy Andree Mcclelland, Arslan Johnson, Ji Bowen, Crystal Buckley, Randy Bowen, Crystal Zaragoza, Denise

## 2024-10-06 NOTE — DISCHARGE NOTE PROVIDER - HOSPITAL COURSE
68 yr F w pmhx of spinal stenosis, osteoporosis, HLD, bronchiectasis, IgA/IgG deficiency presents with worsening of chronic low back pain. Patient had subjective fevers and chills before presenting to the hospital. During her stay, patient was found to have a +UA, with a culture growing Group B strep and positive blood Cx also growing group B strep. Patient was started on CTX as per IDs recommendations. An MRI showed osteomyelitis in the lumbar spine. ID recommended 6 weeks of IV antibiotics. Rebeat blood cultures showed xxx, Picc line was placed for patient to continue home IV Abx for a total of 6 weeks. During her stay, patient's pain control regimen was optimized, she received multiple pain medications including dilaudid, Contin, gabapentin, and duloxetine. Patient's back pain improved after IV ctx and the pain medications. She is now medically optimized for d/c home. To follow up with PCP, pulmonologist, spine specialist, and ID specialist     68 yr F w pmhx of spinal stenosis, osteoporosis, HLD, bronchiectasis, IgA/IgG deficiency presents with worsening of chronic low back pain. Patient had subjective fevers and chills before presenting to the hospital. During her stay, patient was found to have a +UA, with a culture growing Group B strep and positive blood Cx also growing group B strep. Patient was started on CTX as per IDs recommendations. An MRI showed osteomyelitis in the lumbar spine. ID recommended 6 weeks of IV antibiotics. Rebeat blood cultures showed xxx, Picc line was placed for patient to continue home IV Abx for a total of 6 weeks. During her stay, patient's pain control regimen was optimized, she received multiple pain medications including dilaudid, Contin, gabapentin, and duloxetine. Patient's back pain improved after IV ctx and the pain medications. Picc line was placed for patient to receive prolonged course of IV abx. She is now medically optimized for d/c to inpatient rehab. To follow up with PCP, pulmonologist, spine specialist, and ID specialist.       Vital Signs Last 24 Hrs  T(C): 36.5 (09 Oct 2024 05:02), Max: 36.7 (08 Oct 2024 20:17)  T(F): 97.7 (09 Oct 2024 05:02), Max: 98 (08 Oct 2024 20:17)  HR: 70 (09 Oct 2024 05:02) (70 - 82)  BP: 95/61 (09 Oct 2024 05:02) (94/60 - 95/61)  BP(mean): 72 (09 Oct 2024 05:02) (72 - 72)  RR: 18 (09 Oct 2024 05:02) (18 - 18)  SpO2: 96% (09 Oct 2024 05:02) (96% - 98%)    Parameters below as of 09 Oct 2024 05:02  Patient On (Oxygen Delivery Method): room air    PHYSICAL EXAM:  GENERAL: NAD, lying in bed comfortably  HEAD:  Atraumatic, Normocephalic  CHEST/LUNG: Clear to auscultation bilaterally, good air entry bilaterally; No wheezing, rales, or rhonchi. Unlabored respirations  HEART: Regular rate and rhythm. S1 and S2. No murmurs, rubs, or gallops  ABDOMEN: Soft, Nontender, Nondistended.   EXTREMITIES:  2+ Peripheral Pulses. No clubbing, cyanosis, or edema  NERVOUS SYSTEM:  Alert & Oriented X3, speech clear.   LUE: picc line, dressing clean         68 yr F w pmhx of spinal stenosis, osteoporosis, HLD, bronchiectasis, IgA/IgG deficiency presents with worsening of chronic low back pain. Patient had subjective fevers and chills before presenting to the hospital. During her stay, patient was found to have a +UA, with a culture growing Group B strep and positive blood Cx also growing group B strep. Patient was started on CTX as per IDs recommendations. An MRI showed osteomyelitis in the lumbar spine. ID recommended 6 weeks of IV antibiotics. Repeat blood cultures showed xxx, Picc line was placed for patient to continue home IV Abx for a total of 6 weeks. During her stay, patient's pain control regimen was optimized, she received multiple pain medications including dilaudid, Contin, gabapentin, and duloxetine. Patient's back pain improved after IV ctx and the pain medications. Picc line was placed for patient to receive prolonged course of IV abx. She is now medically optimized for d/c to inpatient rehab. To follow up with PCP, pulmonologist, spine specialist, and ID specialist.       Vital Signs Last 24 Hrs  T(C): 36.5 (09 Oct 2024 05:02), Max: 36.7 (08 Oct 2024 20:17)  T(F): 97.7 (09 Oct 2024 05:02), Max: 98 (08 Oct 2024 20:17)  HR: 70 (09 Oct 2024 05:02) (70 - 82)  BP: 95/61 (09 Oct 2024 05:02) (94/60 - 95/61)  BP(mean): 72 (09 Oct 2024 05:02) (72 - 72)  RR: 18 (09 Oct 2024 05:02) (18 - 18)  SpO2: 96% (09 Oct 2024 05:02) (96% - 98%)    Parameters below as of 09 Oct 2024 05:02  Patient On (Oxygen Delivery Method): room air    PHYSICAL EXAM:  GENERAL: NAD, lying in bed comfortably  HEAD:  Atraumatic, Normocephalic  CHEST/LUNG: Clear to auscultation bilaterally, good air entry bilaterally; No wheezing, rales, or rhonchi. Unlabored respirations  HEART: Regular rate and rhythm. S1 and S2. No murmurs, rubs, or gallops  ABDOMEN: Soft, Nontender, Nondistended.   EXTREMITIES:  2+ Peripheral Pulses. No clubbing, cyanosis, or edema  NERVOUS SYSTEM:  Alert & Oriented X3, speech clear.   LUE: picc line, dressing clean         68 yr F w pmhx of spinal stenosis, osteoporosis, HLD, bronchiectasis, IgA/IgG deficiency presents with worsening of chronic low back pain. Patient had subjective fevers and chills before presenting to the hospital. During her stay, patient was found to have a +UA, with a culture growing Group B strep and positive blood Cx also growing group B strep. Patient was started on CTX as per IDs recommendations. An MRI showed osteomyelitis in the lumbar spine. ID recommended 6 weeks of IV antibiotics. Repeat blood cultures were negative, Picc line was placed 10/8 for patient to continue home IV Abx for a total of 6 weeks. During her stay, patient's pain control regimen was optimized, she received multiple pain medications including dilaudid, Contin, gabapentin, and duloxetine. Patient's back pain improved after IV ctx and the pain medications. Patient to continue IV antibiotic treatment via PICC line. She is now medically optimized for d/c to inpatient rehab. To follow up with PCP, pulmonologist, spine specialist, and ID specialist.     Vital Signs Last 24 Hrs  T(C): 36.3 (11 Oct 2024 05:02), Max: 36.7 (10 Oct 2024 12:03)  T(F): 97.4 (11 Oct 2024 05:02), Max: 98.1 (10 Oct 2024 12:03)  HR: 69 (11 Oct 2024 05:02) (69 - 89)  BP: 100/66 (11 Oct 2024 05:02) (84/57 - 100/66)  BP(mean): 78 (11 Oct 2024 05:02) (63 - 78)  RR: 17 (11 Oct 2024 05:02) (16 - 17)  SpO2: 97% (11 Oct 2024 05:02) (94% - 99%)    Parameters below as of 11 Oct 2024 05:02  Patient On (Oxygen Delivery Method): room air      PHYSICAL EXAM:  GENERAL: NAD, lying in bed comfortably  HEAD:  Atraumatic, Normocephalic  CHEST/LUNG: Clear to auscultation bilaterally, good air entry bilaterally; No wheezing, rales, or rhonchi. Unlabored respirations  HEART: Regular rate and rhythm. S1 and S2. No murmurs, rubs, or gallops  ABDOMEN: Soft, Nontender, Nondistended. Bowel sounds present.   EXTREMITIES:  2+ Peripheral Pulses. No clubbing, cyanosis, or edema  NERVOUS SYSTEM:  Alert & Oriented X3, speech clear  LUE: Picc line in place, clean dressing

## 2024-10-06 NOTE — DISCHARGE NOTE PROVIDER - CARE PROVIDER_API CALL
Shane Jansen.  Internal Medicine  [address] [place] [address]  Phone: [phone]  Fax: [phone]  Follow Up Time: 2 weeks

## 2024-10-06 NOTE — PROGRESS NOTE ADULT - ASSESSMENT
68 yr F w pmhx of spinal stenosis, osteoporosis, HLD, bronchiectasis, IgA/IgG deficiency presents with worsening of chronic low back pain      #Intractable back pain - Chronic with acute worsening  #Osteomyelitis of spine  - Patient established care with specialist, surgery recommended but delayed due to osteoporosis  - CT lumbar: No evidence of an acute lumbar spine fracture. Multilevel degenerative changes greatest at L4-5 with severe spinal canal narrowing  - c/w gabapentin 100 TID  - C/w zofran PRN  - PT and PM&R consulted  - MRI lumbar spine - osteomyelitis  - Optimize pain control, c/w dilaudid, gabapentin, valium, duloxetine, MS Contin  - Discussed with spine surg Dr. Lucas Garcia at [facility]; recommended MRI with contrast, IV abx. Patient not considered suitable candidate for spinal surgery at this time and advised to follow up with outpatient spine surgery.  - Ortho spine consult: MRI with IV contrast; if + for infection, c/w IV abx longterm. Immediate intervention if new weakness/numbness, urinary/bowel incontinence, and neuro deficits seen; no concerning findings; No surgical intervention at this time for severe spinal stenosis d/t concern of bacteremia; Outpt surgical intervention  - ID recs appreciated, long term IV abx (6 weeks)  - F/u Repeat Bcx, decide on picc line after culture results.     #Blood cultures +ve - Bacteremia  #+ve UA/Ucx, asymptomatic  - US kidney/bladder, negative renal US, PVR 184cc  - 1x positive for group B strep, Bcx 2nd set negative   - ID recs appreciated, c/w CTX, UCx +ve for gram positive cocci in pairs and chains  - repeat BCx sent on 10/5, f/u    #Anemia  - Patient reports anemia at baseline  - Hgb 9.2 today - stable (12.1 on admission)  - Monitor  - Keep Type and screen uptodate    #IgG and IgA deficiency  #hx of bronchietasis  - satting well on RA  - C/w saline duonebs PRN    #Depression/Anxiety  - on Fetzima at home, non-fomulary  - On valium PRN  - c/w Duloxetine    #DVT prophylaxis  - Lovenox    #GOC  - Full code    d/w pt's  at bedside.    D/w Dr Buckley     68 yr F w pmhx of spinal stenosis, osteoporosis, HLD, bronchiectasis, IgA/IgG deficiency presents with worsening of chronic low back pain      #Intractable back pain - Chronic with acute worsening  #Osteomyelitis of spine  - Patient established care with specialist, surgery recommended but delayed due to osteoporosis  - CT lumbar: No evidence of an acute lumbar spine fracture. Multilevel degenerative changes greatest at L4-5 with severe spinal canal narrowing  - PT and PM&R consulted  - MRI lumbar spine - osteomyelitis  - Optimize pain control, c/w dilaudid, gabapentin, valium, duloxetine, MS Contin  - Discussed with spine surg Dr. Lucas Garcia at [facility]; recommended MRI with contrast, IV abx. Patient not considered suitable candidate for spinal surgery at this time and advised to follow up with outpatient spine surgery.  - Ortho spine consult: MRI with IV contrast; if + for infection, c/w IV abx longterm. Immediate intervention if new weakness/numbness, urinary/bowel incontinence, and neuro deficits seen; no concerning findings; No surgical intervention at this time for severe spinal stenosis d/t concern of bacteremia; Outpt surgical intervention  - ID recs appreciated, long term IV abx (6 weeks)  - F/u Repeat Bcx, decide on picc line after culture results, ICU nurse aware of plan,   - Increase gabapentin to 300 TID     #Blood cultures +ve - Bacteremia  #+ve UA/Ucx, asymptomatic  - US kidney/bladder, negative renal US, PVR 184cc  - 1x positive for group B strep, Bcx 2nd set negative   - ID recs appreciated, c/w CTX, UCx +ve for gram positive cocci in pairs and chains  - repeat BCx sent on 10/5, f/u    #Anemia  - Patient reports anemia at baseline  - Hgb 8.7 today - stable (12.1 on admission)  - Monitor  - Keep Type and screen uptodate  - F/u am labs: folate, b12, iron studies    #IgG and IgA deficiency  #hx of bronchietasis  - satting well on RA  - C/w saline duonebs PRN    #Depression/Anxiety  - on Fetzima at home, non-fomulary  - On valium PRN  - c/w Duloxetine    #DVT prophylaxis  - Lovenox    #GOC  - Full code        D/w Dr Buckley     68 yr F w pmhx of spinal stenosis, osteoporosis, HLD, bronchiectasis, IgA/IgG deficiency presents with worsening of chronic low back pain      #Intractable back pain - Chronic with acute worsening  #Acute Osteomyelitis of lumbar spine  - Patient established care with specialist, surgery recommended but delayed due to osteoporosis  - CT lumbar: No evidence of an acute lumbar spine fracture. Multilevel degenerative changes greatest at L4-5 with severe spinal canal narrowing  - PT and PM&R consulted  - MRI lumbar spine - osteomyelitis  - Optimize pain control, c/w dilaudid, gabapentin, valium, duloxetine, MS Contin  - Discussed with spine surg Dr. Lucas Garcia at [facility]; recommended MRI with contrast, IV abx. Patient not considered suitable candidate for spinal surgery at this time and advised to follow up with outpatient spine surgery.  - Ortho spine consult: MRI with IV contrast; if + for infection, c/w IV abx longterm. Immediate intervention if new weakness/numbness, urinary/bowel incontinence, and neuro deficits seen; no concerning findings; No surgical intervention at this time for severe spinal stenosis d/t concern of bacteremia; Outpt surgical intervention  - ID recs appreciated, long term IV abx (6 weeks)  - F/u 10/5 Repeat Bcx, picc line after repeat culture results, ICU nurse aware of plan,   - Increase gabapentin to 300 TID     #Blood cultures +ve - Bacteremia  #+ve UA/Ucx, asymptomatic  - US kidney/bladder, negative renal US, PVR 184cc  - 1x positive for group B strep, Bcx 2nd set negative   - ID recs appreciated, c/w CTX, UCx +ve for gram positive cocci in pairs and chains  - repeat BCx sent on 10/5, f/u    #Anemia  - Patient reports anemia at baseline  - Hgb 8.7 today - stable (12.1 on admission)  - Monitor  - Keep Type and screen uptodate  - F/u am labs: folate, b12, iron studies    #IgG and IgA deficiency  #hx of bronchietasis  - satting well on RA  - C/w saline duonebs PRN    #Depression/Anxiety  - on Fetzima at home, non-fomulary  - On valium PRN  - c/w Duloxetine    #DVT prophylaxis  - Lovenox    #GOC  - Full code    discussed with pt at bedside    D/w Dr Buckley

## 2024-10-06 NOTE — DISCHARGE NOTE PROVIDER - NSDCCPCAREPLAN_GEN_ALL_CORE_FT
PRINCIPAL DISCHARGE DIAGNOSIS  Diagnosis: Osteomyelitis of spine  Assessment and Plan of Treatment: You were found to have an infection of the bone also called osteomyelitis. Please continue with your prescribed IV abx through your picc line for 6 more weeks. Be sure to follow up with your infectious disease doctor for further management.      SECONDARY DISCHARGE DIAGNOSES  Diagnosis: Ambulatory dysfunction  Assessment and Plan of Treatment:     Diagnosis: Chronic back pain  Assessment and Plan of Treatment: Follow these instructions at home:  Pay attention to any changes in your symptoms.   Take these actions to help with your pain:  Activity   Avoid bending and other activities that make the problem worse.  Maintain a proper position when standing or sitting:  When standing, keep your upper back and neck straight, with your shoulders pulled back. Avoid slouching. When sitting, keep your back straight and relax your shoulders. Do not round your shoulders or pull them backward. Do not sit or  one place for long periods of time.Take brief periods of rest throughout the day. This will reduce your pain. Resting in a lying or standing position is usually better than sitting to rest. When you are resting for longer periods, mix in some mild activity or stretching between periods of rest. This will help to prevent stiffness and pain.Get regular exercise. Ask your health care provider what activities are safe for you. Do not lift anything that is heavier than 10 lb (4.5 kg).   Always use proper lifting technique, which includes:  Bending your knees.Keeping the load close to your body. Avoiding twisting. Sleep on a firm mattress in a comfortable position. Try lying on your side with your knees slightly bent. If you lie on your back, put a pillow under your knees.   Contact a health care provider if:  You have pain that is not relieved with rest or medicine.  Get help right away if:  You have weakness or numbness in one or both of your legs or feet.You have trouble controlling your bladder or your bowels.You have nausea or vomiting.You have pain in your abdomen.You have shortness of breath or you faint. This information is not intended to replace advice given to you by your health care provider. Make sure you discuss any questions you have with your health care provider.

## 2024-10-06 NOTE — PROGRESS NOTE ADULT - ATTENDING COMMENTS
agree with above  continue iv ceftriaxone  repeat BCX in progress  ICU team informed of need for PICC line tomorrow  f/u further ID recs tomorrow  f/u am labs  anemia labs in AM  discussed with pt at bedside, all questions answered

## 2024-10-06 NOTE — DISCHARGE NOTE PROVIDER - DISCHARGING ATTENDING PHYSICIAN:
HPI     DLS: 1/11/2022- pt here for OCT N / IOP DFE. Bl glaucoma f/u     Pt states using latanoprost OU QPM. States OS watery but not using any   tears. Denies pain/ FOL/ floaters.     Pt in office today with care giver - deepti     Last edited by Evie Jimenez on 6/27/2022  1:32 PM. (History)            Assessment /Plan     For exam results, see Encounter Report.    Open angle with borderline findings of both eyes    Pseudophakia, both eyes    Diabetes mellitus type 2 without retinopathy      1. 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 Pseudophakia, both eyes  Doing well  Iridonesis/phacodonesis OS    3. Diabetes mellitus type 2 without retinopathy  Diabetes without retinopathy, discussed with patient importance of glucose control and follow up.  Patient voices understanding.                        Dr. Denise Zaragoza

## 2024-10-06 NOTE — PROGRESS NOTE ADULT - SUBJECTIVE AND OBJECTIVE BOX
68 yr F w pmhx of spinal stenosis, osteoporosis, HLD, bronchiectasis, IgA/IgG deficiency presents with worsening of chronic low back pain    Overnight Events: None  Interval HPI: Patient seen and examined at bedside.     REVIEW OF SYSTEMS:  CONSTITUTIONAL: (-) weakness, (-) fevers, (-) chills  EYES/ENT: (-) visual changes,  (-) vertigo,  (-) throat pain   NECK:  (-) pain, (-) stiffness  RESPIRATORY:  (-) shortness of breath, (-) cough,  (-) wheezing,  (-) hemoptysis   CARDIOVASCULAR:  (-) chest pain, (-) palpitations  GASTROINTESTINAL:  (-) abdominal or epigastric pain, (-) nausea, (-) vomiting, (-) diarrhea, (-) constipation, (-) melena,  (-) hematemesis,  (-) hematochezia  GENITOURINARY: (-) dysuria, (-) frequency, (-) hematuria  NEUROLOGICAL: (-) numbness, (-) weakness  SKIN: (-) itching, (-) rashes, (-) lesions    Vital Signs Last 24 Hrs  T(C): 36.8 (06 Oct 2024 04:58), Max: 36.8 (06 Oct 2024 04:58)  T(F): 98.2 (06 Oct 2024 04:58), Max: 98.2 (06 Oct 2024 04:58)  HR: 77 (06 Oct 2024 04:58) (77 - 93)  BP: 92/61 (06 Oct 2024 04:58) (90/62 - 102/68)  BP(mean): 71 (06 Oct 2024 04:58) (71 - 71)  RR: 16 (06 Oct 2024 04:58) (16 - 16)  SpO2: 95% (06 Oct 2024 04:58) (95% - 99%)    Parameters below as of 06 Oct 2024 04:58  Patient On (Oxygen Delivery Method): room air        PHYSICAL EXAM:  GENERAL: NAD, lying in bed comfortably  HEAD:  Atraumatic, Normocephalic  CHEST/LUNG: Clear to auscultation bilaterally, good air entry bilaterally; No wheezing, rales, or rhonchi. Unlabored respirations  HEART: Regular rate and rhythm. S1 and S2. No murmurs, rubs, or gallops  ABDOMEN: Soft, Nontender, Nondistended. Bowel sounds present.   EXTREMITIES:  2+ Peripheral Pulses. No clubbing, cyanosis, or edema  NERVOUS SYSTEM:  Alert & Oriented X3, speech clear.     LABS:   All Labs Personally Reviewed                         8.7    3.75  )-----------( 236      ( 06 Oct 2024 06:03 )             26.1     10-05    138  |  102  |  11  ----------------------------<  96  3.9   |  29  |  0.60    Ca    8.9      05 Oct 2024 07:20  Mg     2.1     10-05    TPro  6.3  /  Alb  2.5[L]  /  TBili  0.8  /  DBili  x   /  AST  14  /  ALT  19  /  AlkPhos  64  10-05          Blood Culture: 10-02 @ 13:48  Organism --  Gram Stain Blood -- Gram Stain --  Specimen Source Clean Catch Clean Catch (Midstream)  Culture-Blood --    10-02 @ 11:15  Organism --  Gram Stain Blood -- Gram Stain --  Specimen Source .Blood BLOOD  Culture-Blood --    10-02 @ 11:00  Organism Blood Culture PCR  Gram Stain Blood -- Gram Stain   Growth in aerobic bottle: Gram positive cocci in pairs  Growth in anaerobic bottle: Gram positive cocci in pairs  Specimen Source .Blood BLOOD  Culture-Blood --      I&O's Summary    CAPILLARY BLOOD GLUCOSE          RADIOLOGY/EKG:  All Imaging and EKGs Personally Reviewed     MEDICATIONS:  MEDICATIONS  (STANDING):  acetaminophen     Tablet .. 650 milliGRAM(s) Oral every 8 hours  ascorbic acid 500 milliGRAM(s) Oral <User Schedule>  atorvastatin 10 milliGRAM(s) Oral at bedtime  cefTRIAXone   IVPB 2000 milliGRAM(s) IV Intermittent every 24 hours  DULoxetine 20 milliGRAM(s) Oral <User Schedule>  enoxaparin Injectable 40 milliGRAM(s) SubCutaneous every 24 hours  gabapentin 100 milliGRAM(s) Oral three times a day  influenza  Vaccine (HIGH DOSE) 0.5 milliLiter(s) IntraMuscular once  lactobacillus acidophilus 1 Tablet(s) Oral two times a day with meals  morphine ER Tablet 15 milliGRAM(s) Oral every 12 hours  multivitamin 1 Tablet(s) Oral <User Schedule>  polyethylene glycol 3350 17 Gram(s) Oral daily  senna 2 Tablet(s) Oral at bedtime  sodium chloride 3%  Inhalation 4 milliLiter(s) Inhalation every 12 hours     68 yr F w pmhx of spinal stenosis, osteoporosis, HLD, bronchiectasis, IgA/IgG deficiency presents with worsening of chronic low back pain    Overnight Events: None  Interval HPI: Patient seen and examined at bedside. Patient doing better today, concerned about long course of abx in setting of her IgA/G deficiency, reassured. Advised patient to supplement with probiotics/prebiotics and multivitamins. Patient reported nerve pain in hip area down her left leg.     REVIEW OF SYSTEMS:  CONSTITUTIONAL: (-) weakness, (-) fevers, (-) chills  EYES/ENT: (-) visual changes,  (-) vertigo,  (-) throat pain   NECK:  (-) pain, (-) stiffness  RESPIRATORY:  (-) shortness of breath, (-) cough,  (-) wheezing,  (-) hemoptysis   CARDIOVASCULAR:  (-) chest pain, (-) palpitations  GASTROINTESTINAL:  (-) abdominal or epigastric pain, (-) nausea, (-) vomiting, (-) diarrhea, (-) constipation, (-) melena,  (-) hematemesis,  (-) hematochezia  GENITOURINARY: (-) dysuria, (-) frequency, (-) hematuria  BACK: (+) pain, decreased from prior  NEUROLOGICAL: (-) numbness, (-) weakness  SKIN: (-) itching, (-) rashes, (-) lesions    Vital Signs Last 24 Hrs  T(C): 36.8 (06 Oct 2024 04:58), Max: 36.8 (06 Oct 2024 04:58)  T(F): 98.2 (06 Oct 2024 04:58), Max: 98.2 (06 Oct 2024 04:58)  HR: 77 (06 Oct 2024 04:58) (77 - 93)  BP: 92/61 (06 Oct 2024 04:58) (90/62 - 102/68)  BP(mean): 71 (06 Oct 2024 04:58) (71 - 71)  RR: 16 (06 Oct 2024 04:58) (16 - 16)  SpO2: 95% (06 Oct 2024 04:58) (95% - 99%)    Parameters below as of 06 Oct 2024 04:58  Patient On (Oxygen Delivery Method): room air        PHYSICAL EXAM:  GENERAL: NAD, lying in bed comfortably  HEAD:  Atraumatic, Normocephalic  CHEST/LUNG: Clear to auscultation bilaterally, good air entry bilaterally; No wheezing, rales, or rhonchi. Unlabored respirations  HEART: Regular rate and rhythm. S1 and S2. No murmurs, rubs, or gallops  ABDOMEN: Soft, Nontender, Nondistended.   EXTREMITIES:  2+ Peripheral Pulses. No clubbing, cyanosis, or edema  NERVOUS SYSTEM:  Alert & Oriented X3, speech clear.     LABS:   All Labs Personally Reviewed                         8.7    3.75  )-----------( 236      ( 06 Oct 2024 06:03 )             26.1     10-05    138  |  102  |  11  ----------------------------<  96  3.9   |  29  |  0.60    Ca    8.9      05 Oct 2024 07:20  Mg     2.1     10-05    TPro  6.3  /  Alb  2.5[L]  /  TBili  0.8  /  DBili  x   /  AST  14  /  ALT  19  /  AlkPhos  64  10-05          Blood Culture: 10-02 @ 13:48  Organism --  Gram Stain Blood -- Gram Stain --  Specimen Source Clean Catch Clean Catch (Midstream)  Culture-Blood --    10-02 @ 11:15  Organism --  Gram Stain Blood -- Gram Stain --  Specimen Source .Blood BLOOD  Culture-Blood --    10-02 @ 11:00  Organism Blood Culture PCR  Gram Stain Blood -- Gram Stain   Growth in aerobic bottle: Gram positive cocci in pairs  Growth in anaerobic bottle: Gram positive cocci in pairs  Specimen Source .Blood BLOOD  Culture-Blood --      I&O's Summary    CAPILLARY BLOOD GLUCOSE      MEDICATIONS:  MEDICATIONS  (STANDING):  acetaminophen     Tablet .. 650 milliGRAM(s) Oral every 8 hours  ascorbic acid 500 milliGRAM(s) Oral <User Schedule>  atorvastatin 10 milliGRAM(s) Oral at bedtime  cefTRIAXone   IVPB 2000 milliGRAM(s) IV Intermittent every 24 hours  DULoxetine 20 milliGRAM(s) Oral <User Schedule>  enoxaparin Injectable 40 milliGRAM(s) SubCutaneous every 24 hours  gabapentin 100 milliGRAM(s) Oral three times a day  influenza  Vaccine (HIGH DOSE) 0.5 milliLiter(s) IntraMuscular once  lactobacillus acidophilus 1 Tablet(s) Oral two times a day with meals  morphine ER Tablet 15 milliGRAM(s) Oral every 12 hours  multivitamin 1 Tablet(s) Oral <User Schedule>  polyethylene glycol 3350 17 Gram(s) Oral daily  senna 2 Tablet(s) Oral at bedtime  sodium chloride 3%  Inhalation 4 milliLiter(s) Inhalation every 12 hours

## 2024-10-06 NOTE — DISCHARGE NOTE PROVIDER - NSDCMRMEDTOKEN_GEN_ALL_CORE_FT
atorvastatin 10 mg oral tablet: 1 tab(s) orally every other day  Dilaudid 2 mg oral tablet: 1 tab(s) orally 3 times a day as needed for  moderate pain MDD: 6mg  Fetzima 20 mg oral capsule, extended release: 1 cap(s) orally once a day  hydrOXYzine hydrochloride 10 mg oral tablet: 1  orally once a day, As Needed  ondansetron 4 mg oral tablet, disintegratin tab(s) orally 3 times a day (before meals)  Pepcid 40 mg oral tablet: 1 tab(s) orally once a day (at bedtime)  predniSONE 20 mg oral tablet: 2 tab(s) orally once a day  Vitamin C plus Zinc oral tablet, disintegratin tab(s) orally once a day   acetaminophen 325 mg oral tablet: 2 tab(s) orally every 8 hours  aluminum hydroxide-magnesium hydroxide 200 mg-200 mg/5 mL oral suspension: 30 milliliter(s) orally every 4 hours As needed Dyspepsia  ascorbic acid 500 mg oral tablet: 1 tab(s) orally once a day  atorvastatin 10 mg oral tablet: 1 tab(s) orally every other day  bisacodyl 5 mg oral delayed release tablet: 1 tab(s) orally once a day (at bedtime) As needed Constipation  cefTRIAXone: 2,000 milligram(s) intravenous every 24 hours  DULoxetine 20 mg oral delayed release capsule: 1 cap(s) orally once a day  famotidine 20 mg oral tablet: 1 tab(s) orally once a day As needed GERD  Fetzima 20 mg oral capsule, extended release: 1 cap(s) orally once a day  gabapentin 100 mg oral capsule: 1 cap(s) orally 3 times a day  HYDROmorphone 2 mg oral tablet: 1 tab(s) orally every 6 hours As needed Severe Pain (7 - 10)  hydrOXYzine hydrochloride 10 mg oral tablet: 1  orally once a day, As Needed  ipratropium-albuterol 0.5 mg-2.5 mg/3 mL inhalation solution: 3 milliliter(s) inhaled every 6 hours As needed Shortness of Breath  lactobacillus acidophilus oral capsule: 1 tab(s) orally 2 times a day  melatonin 3 mg oral tablet: 1 tab(s) orally once a day (at bedtime) As needed Insomnia  morphine 15 mg/8 to 12 hr oral tablet, extended release: 1 tab(s) orally every 12 hours  morphine 15 mg/8 to 12 hr oral tablet, extended release: 1 tab(s) orally every 12 hours  Multiple Vitamins oral tablet: 1 tab(s) orally once a day  ondansetron 4 mg oral tablet, disintegratin tab(s) orally 3 times a day (before meals)  polyethylene glycol 3350 oral powder for reconstitution: 17 gram(s) orally 2 times a day  senna leaf extract oral tablet: 2 tab(s) orally once a day (at bedtime)  sodium chloride 3% inhalation solution: 4 milliliter(s) inhaled every 12 hours  Vitamin C plus Zinc oral tablet, disintegratin tab(s) orally once a day

## 2024-10-06 NOTE — DISCHARGE NOTE PROVIDER - NSDCFUADDAPPT_GEN_ALL_CORE_FT
APPTS ARE READY TO BE MADE: [ ] YES    Best Family or Patient Contact (if needed):    Additional Information about above appointments (if needed):    1: Spine specialist  2: PCP  3: ID  4:     Other comments or requests:

## 2024-10-06 NOTE — DISCHARGE NOTE PROVIDER - NSDCFUSCHEDAPPT_GEN_ALL_CORE_FT
Amelie Wheatley  [facility] Physician [facility]  PSYCHIATRY [address] [facility]  Scheduled Appointment: 10/30/2024    Girma Jhaveri  [facility]  ENDOCRIN [address] [place] [address]  Scheduled Appointment: 12/16/2024

## 2024-10-07 LAB
ALBUMIN SERPL ELPH-MCNC: 2.8 G/DL — LOW (ref 3.3–5)
ALP SERPL-CCNC: 74 U/L — SIGNIFICANT CHANGE UP (ref 40–120)
ALT FLD-CCNC: 25 U/L — SIGNIFICANT CHANGE UP (ref 10–45)
ANION GAP SERPL CALC-SCNC: 8 MMOL/L — SIGNIFICANT CHANGE UP (ref 5–17)
AST SERPL-CCNC: 18 U/L — SIGNIFICANT CHANGE UP (ref 10–40)
BILIRUB SERPL-MCNC: 0.7 MG/DL — SIGNIFICANT CHANGE UP (ref 0.2–1.2)
BUN SERPL-MCNC: 10 MG/DL — SIGNIFICANT CHANGE UP (ref 7–23)
CALCIUM SERPL-MCNC: 9.9 MG/DL — SIGNIFICANT CHANGE UP (ref 8.4–10.5)
CHLORIDE SERPL-SCNC: 100 MMOL/L — SIGNIFICANT CHANGE UP (ref 96–108)
CO2 SERPL-SCNC: 30 MMOL/L — SIGNIFICANT CHANGE UP (ref 22–31)
CREAT SERPL-MCNC: 0.59 MG/DL — SIGNIFICANT CHANGE UP (ref 0.5–1.3)
CULTURE RESULTS: SIGNIFICANT CHANGE UP
EGFR: 98 ML/MIN/1.73M2 — SIGNIFICANT CHANGE UP
FERRITIN SERPL-MCNC: 415 NG/ML — HIGH (ref 13–330)
FOLATE SERPL-MCNC: >20 NG/ML — SIGNIFICANT CHANGE UP
GLUCOSE SERPL-MCNC: 93 MG/DL — SIGNIFICANT CHANGE UP (ref 70–99)
HCT VFR BLD CALC: 31.6 % — LOW (ref 34.5–45)
HGB BLD-MCNC: 10.5 G/DL — LOW (ref 11.5–15.5)
IRON SATN MFR SERPL: 11 % — LOW (ref 14–50)
IRON SATN MFR SERPL: 27 UG/DL — LOW (ref 30–160)
MCHC RBC-ENTMCNC: 28.5 PG — SIGNIFICANT CHANGE UP (ref 27–34)
MCHC RBC-ENTMCNC: 33.2 GM/DL — SIGNIFICANT CHANGE UP (ref 32–36)
MCV RBC AUTO: 85.9 FL — SIGNIFICANT CHANGE UP (ref 80–100)
NRBC # BLD: 0 /100 WBCS — SIGNIFICANT CHANGE UP (ref 0–0)
PLATELET # BLD AUTO: 379 K/UL — SIGNIFICANT CHANGE UP (ref 150–400)
POTASSIUM SERPL-MCNC: 4.2 MMOL/L — SIGNIFICANT CHANGE UP (ref 3.5–5.3)
POTASSIUM SERPL-SCNC: 4.2 MMOL/L — SIGNIFICANT CHANGE UP (ref 3.5–5.3)
PROT SERPL-MCNC: 7.3 G/DL — SIGNIFICANT CHANGE UP (ref 6–8.3)
RBC # BLD: 3.68 M/UL — LOW (ref 3.8–5.2)
RBC # FLD: 13 % — SIGNIFICANT CHANGE UP (ref 10.3–14.5)
SODIUM SERPL-SCNC: 138 MMOL/L — SIGNIFICANT CHANGE UP (ref 135–145)
SPECIMEN SOURCE: SIGNIFICANT CHANGE UP
TIBC SERPL-MCNC: 252 UG/DL — SIGNIFICANT CHANGE UP (ref 220–430)
TRANSFERRIN SERPL-MCNC: 181 MG/DL — LOW (ref 200–360)
UIBC SERPL-MCNC: 225 UG/DL — SIGNIFICANT CHANGE UP (ref 110–370)
VIT B12 SERPL-MCNC: >2000 PG/ML — HIGH (ref 232–1245)
WBC # BLD: 5.02 K/UL — SIGNIFICANT CHANGE UP (ref 3.8–10.5)
WBC # FLD AUTO: 5.02 K/UL — SIGNIFICANT CHANGE UP (ref 3.8–10.5)

## 2024-10-07 PROCEDURE — 99233 SBSQ HOSP IP/OBS HIGH 50: CPT

## 2024-10-07 PROCEDURE — 99223 1ST HOSP IP/OBS HIGH 75: CPT | Mod: GC

## 2024-10-07 RX ORDER — DIAZEPAM 10 MG/1
5 TABLET ORAL EVERY 8 HOURS
Refills: 0 | Status: DISCONTINUED | OUTPATIENT
Start: 2024-10-07 | End: 2024-10-11

## 2024-10-07 RX ORDER — GABAPENTIN 800 MG/1
100 TABLET, FILM COATED ORAL THREE TIMES A DAY
Refills: 0 | Status: DISCONTINUED | OUTPATIENT
Start: 2024-10-07 | End: 2024-10-11

## 2024-10-07 RX ADMIN — Medication 1 TABLET(S): at 09:44

## 2024-10-07 RX ADMIN — DIAZEPAM 5 MILLIGRAM(S): 10 TABLET ORAL at 10:45

## 2024-10-07 RX ADMIN — ENOXAPARIN SODIUM 40 MILLIGRAM(S): 150 INJECTION SUBCUTANEOUS at 14:04

## 2024-10-07 RX ADMIN — Medication 20 MILLIGRAM(S): at 18:04

## 2024-10-07 RX ADMIN — MORPHINE SULFATE 15 MILLIGRAM(S): 30 TABLET, FILM COATED, EXTENDED RELEASE ORAL at 20:33

## 2024-10-07 RX ADMIN — MORPHINE SULFATE 15 MILLIGRAM(S): 30 TABLET, FILM COATED, EXTENDED RELEASE ORAL at 10:30

## 2024-10-07 RX ADMIN — Medication 17 GRAM(S): at 10:41

## 2024-10-07 RX ADMIN — GABAPENTIN 300 MILLIGRAM(S): 800 TABLET, FILM COATED ORAL at 14:08

## 2024-10-07 RX ADMIN — HYDROMORPHONE HYDROCHLORIDE 0.5 MILLIGRAM(S): 1 INJECTION, SOLUTION INTRAMUSCULAR; INTRAVENOUS; SUBCUTANEOUS at 05:28

## 2024-10-07 RX ADMIN — HYDROMORPHONE HYDROCHLORIDE 0.5 MILLIGRAM(S): 1 INJECTION, SOLUTION INTRAMUSCULAR; INTRAVENOUS; SUBCUTANEOUS at 22:44

## 2024-10-07 RX ADMIN — HYDROMORPHONE HYDROCHLORIDE 0.5 MILLIGRAM(S): 1 INJECTION, SOLUTION INTRAMUSCULAR; INTRAVENOUS; SUBCUTANEOUS at 23:10

## 2024-10-07 RX ADMIN — GABAPENTIN 300 MILLIGRAM(S): 800 TABLET, FILM COATED ORAL at 05:27

## 2024-10-07 RX ADMIN — BISACODYL 5 MILLIGRAM(S): 5 TABLET, COATED ORAL at 09:45

## 2024-10-07 RX ADMIN — Medication 100 MILLIGRAM(S): at 14:05

## 2024-10-07 RX ADMIN — Medication 650 MILLIGRAM(S): at 15:00

## 2024-10-07 RX ADMIN — DIAZEPAM 5 MILLIGRAM(S): 10 TABLET ORAL at 00:10

## 2024-10-07 RX ADMIN — DIAZEPAM 5 MILLIGRAM(S): 10 TABLET ORAL at 21:29

## 2024-10-07 RX ADMIN — Medication 4 MILLILITER(S): at 20:47

## 2024-10-07 RX ADMIN — Medication 17 GRAM(S): at 18:02

## 2024-10-07 RX ADMIN — ATORVASTATIN CALCIUM 10 MILLIGRAM(S): 10 TABLET, FILM COATED ORAL at 21:29

## 2024-10-07 RX ADMIN — Medication 4 MILLILITER(S): at 08:21

## 2024-10-07 RX ADMIN — Medication 650 MILLIGRAM(S): at 14:06

## 2024-10-07 RX ADMIN — MORPHINE SULFATE 15 MILLIGRAM(S): 30 TABLET, FILM COATED, EXTENDED RELEASE ORAL at 09:44

## 2024-10-07 RX ADMIN — Medication 500 MILLIGRAM(S): at 18:04

## 2024-10-07 RX ADMIN — MULTI VITAMIN/MINERAL SUPPLEMENT WITH ASCORBIC ACID, NIACIN, PYRIDOXINE, PANTOTHENIC ACID, FOLIC ACID, RIBOFLAVIN, THIAMIN, BIOTIN, COBALAMIN AND ZINC. 1 TABLET(S): 60; 20; 12.5; 10; 10; 1.7; 1.5; 1; .3; .006 TABLET, COATED ORAL at 18:04

## 2024-10-07 RX ADMIN — GABAPENTIN 100 MILLIGRAM(S): 800 TABLET, FILM COATED ORAL at 21:29

## 2024-10-07 RX ADMIN — HYDROMORPHONE HYDROCHLORIDE 0.5 MILLIGRAM(S): 1 INJECTION, SOLUTION INTRAMUSCULAR; INTRAVENOUS; SUBCUTANEOUS at 05:55

## 2024-10-07 RX ADMIN — Medication 1 TABLET(S): at 18:03

## 2024-10-07 RX ADMIN — Medication 2 TABLET(S): at 21:29

## 2024-10-07 RX ADMIN — MORPHINE SULFATE 15 MILLIGRAM(S): 30 TABLET, FILM COATED, EXTENDED RELEASE ORAL at 21:30

## 2024-10-07 NOTE — PROGRESS NOTE ADULT - ASSESSMENT
68y female with PMH significant for bronchiectasis, IgA deficiency, chronic back pain/spinal stenosis on pain meds & chronic urinary frequency who developed rigors and fever of 101.7F on 9/29/24, followed by diffuse myalgia, arthralgia and profound incapacitating lower back pain as of 9/30. Was seen in the ED, found hypotensive to 96/59 but afebrile & without leukocytosis. She was sent home after pain improved on analgesics.    She remained with generalized weakness & diffuse bodyaches & presented again on 10/02/24 w c/o persistent severe low back pain.   Found afebrile, without leukocytosis, a little hypertensive.   Admitted for w/up of back pain.   Blood cx recovered 2/4 bottles (1 set) with GBS.    Started on CTX on 10/04/24.   Reported issues of chronic urinary frequency & a feeling of incomplete bladder emptying.  UA revealed 10 WBCs & large LE, without nitrites.   Urine cx with GBS - Bacteremia was felt to originate from  source.  And given back pain soon after the symptoms of chills w fever and then bacteremia, raised high suspicion for spine infection.   MRI of spine revealed high T2 disc signal at L4-5, which may represent discitis/ osteomyelitis with phlegmon.   No support for sepsis at this time   Repeated blood cx from 10/05 - NGTD.    PLAN:  Continue IV CTX - 2 gram daily.   Follow repeated blood cx.  Patient's & her 's questions were addressed to the best of my knowledge.   She shares anxiety about taking antibiotics - but there is no other option for treating her infection was reviewed with her.  shared if surgery can take care of this infection as opposed to antibiotics - which is not the answer with early infection - she certainly needs antibiotics for a 6 wks course.   Pain in of its own is not a marker for severity or cure of infection, and unfortunately may persist even after she is cures of the infection - this was explained as well.   She was strongly encouraged to ambulate & perform PT recommended exercises.    Will check CRP in AM.  Once blood cx are sterile - we must place a PICC for a prolonged course of antibiotics.   Complicated medical of infection of blood & spine from a  source requiring complex medical decisions.

## 2024-10-07 NOTE — DIETITIAN INITIAL EVALUATION ADULT - ORAL INTAKE PTA/DIET HISTORY
Patient reports chronic issue with constipation , takes Miralax x 2 day , consumes a low sugar diet pta

## 2024-10-07 NOTE — DIETITIAN INITIAL EVALUATION ADULT - OTHER INFO
68 yr F w pmhx of spinal stenosis, osteoporosis, HLD, IgA deficiency presents with intractable low back pain. Patient noted pain regimen , (+) constipation reported , patient receptive to bowel meds , unable to conduct NFPE patient did not appear receptive , patient appears frail , thin . Appetite reported fair due to issue with constipation , Skin intact last BM (10/2) no changes in weight status reported , reports UBW ~ 105lbs , ? admit weight

## 2024-10-07 NOTE — CONSULT NOTE ADULT - TIME BILLING
face to face time encounter and counseling the patient, meeting with hospitalist, nursing staff, therapists and social work to discuss medical updates and management, care coordination, reviewing chart and data face to face time encounter and counseling the patient, care coordination, reviewing chart and data

## 2024-10-07 NOTE — PROGRESS NOTE ADULT - SUBJECTIVE AND OBJECTIVE BOX
Patient is a 69y old  Female who presents with a chief complaint of intractable back pain (07 Oct 2024 08:20)      INTERVAL HPI/OVERNIGHT EVENTS: Patient seen and examined at bedside. No overnight events.    MEDICATIONS  (STANDING):  acetaminophen     Tablet .. 650 milliGRAM(s) Oral every 8 hours  ascorbic acid 500 milliGRAM(s) Oral <User Schedule>  atorvastatin 10 milliGRAM(s) Oral at bedtime  cefTRIAXone   IVPB 2000 milliGRAM(s) IV Intermittent every 24 hours  diazepam    Tablet 5 milliGRAM(s) Oral every 8 hours  DULoxetine 20 milliGRAM(s) Oral <User Schedule>  enoxaparin Injectable 40 milliGRAM(s) SubCutaneous every 24 hours  gabapentin 300 milliGRAM(s) Oral three times a day  influenza  Vaccine (HIGH DOSE) 0.5 milliLiter(s) IntraMuscular once  lactobacillus acidophilus 1 Tablet(s) Oral two times a day with meals  morphine ER Tablet 15 milliGRAM(s) Oral every 12 hours  multivitamin 1 Tablet(s) Oral <User Schedule>  polyethylene glycol 3350 17 Gram(s) Oral two times a day  senna 2 Tablet(s) Oral at bedtime  sodium chloride 3%  Inhalation 4 milliLiter(s) Inhalation every 12 hours    MEDICATIONS  (PRN):  albuterol/ipratropium for Nebulization 3 milliLiter(s) Nebulizer every 6 hours PRN Shortness of Breath  aluminum hydroxide/magnesium hydroxide/simethicone Suspension 30 milliLiter(s) Oral every 4 hours PRN Dyspepsia  bisacodyl 5 milliGRAM(s) Oral at bedtime PRN Constipation  famotidine    Tablet 20 milliGRAM(s) Oral daily PRN GERD  HYDROmorphone  Injectable 0.25 milliGRAM(s) IV Push every 4 hours PRN Moderate Pain (4 - 6)  HYDROmorphone  Injectable 0.5 milliGRAM(s) IV Push every 4 hours PRN Severe Pain (7 - 10)  melatonin 3 milliGRAM(s) Oral at bedtime PRN Insomnia  ondansetron Injectable 4 milliGRAM(s) IV Push every 8 hours PRN Nausea and/or Vomiting      Allergies    Wheat (Diarrhea)  No Known Drug Allergies    Intolerances        REVIEW OF SYSTEMS:  CONSTITUTIONAL: No fever or chills  HEENT:  No headache, no sore throat  RESPIRATORY: No cough, wheezing, or shortness of breath  CARDIOVASCULAR: No chest pain, palpitations  GASTROINTESTINAL: No abd pain, nausea, vomiting, or diarrhea  GENITOURINARY: No dysuria, frequency, or hematuria  NEUROLOGICAL: no focal weakness or dizziness  MUSCULOSKELETAL: no myalgias     Vital Signs Last 24 Hrs  T(C): 36.9 (07 Oct 2024 04:55), Max: 36.9 (06 Oct 2024 21:10)  T(F): 98.4 (07 Oct 2024 04:55), Max: 98.4 (06 Oct 2024 21:10)  HR: 82 (07 Oct 2024 08:22) (77 - 87)  BP: 106/64 (07 Oct 2024 04:55) (95/64 - 109/70)  BP(mean): --  RR: 16 (07 Oct 2024 04:55) (16 - 17)  SpO2: 94% (07 Oct 2024 08:22) (94% - 97%)    Parameters below as of 07 Oct 2024 08:22  Patient On (Oxygen Delivery Method): room air      I&O's Summary    BMI (kg/m2): 23.1 (10-02-24 @ 17:28)    PHYSICAL EXAM:  GENERAL: NAD, lying in bed comfortably  HEAD:  Atraumatic, Normocephalic  CHEST/LUNG: Clear to auscultation bilaterally, good air entry bilaterally; No wheezing, rales, or rhonchi. Unlabored respirations  HEART: Regular rate and rhythm. S1 and S2. No murmurs, rubs, or gallops  ABDOMEN: Soft, Nontender, Nondistended.   EXTREMITIES:  2+ Peripheral Pulses. No clubbing, cyanosis, or edema  NERVOUS SYSTEM:  Alert & Oriented X3, speech clear.       LABS: Personally reviewed  CBC                        10.5   5.02  )-----------( 379      ( 07 Oct 2024 08:34 )             31.6     CMP  10-07    138  |  100  |  10  ----------------------------<  93  4.2   |  30  |  0.59    Ca    9.9      07 Oct 2024 08:34  Mg     1.8     10-06    TPro  7.3  /  Alb  2.8  /  TBili  0.7  /  DBili  x   /  AST  18  /  ALT  25  /  AlkPhos  74  10-07                                      Urinalysis Basic - ( 07 Oct 2024 08:34 )    Color: x / Appearance: x / SG: x / pH: x  Gluc: 93 mg/dL / Ketone: x  / Bili: x / Urobili: x   Blood: x / Protein: x / Nitrite: x   Leuk Esterase: x / RBC: x / WBC x   Sq Epi: x / Non Sq Epi: x / Bacteria: x        Culture - Blood (collected 05 Oct 2024 07:25)  Source: .Blood BLOOD  Preliminary Report (06 Oct 2024 13:01):    No growth at 24 hours    Culture - Blood (collected 05 Oct 2024 07:20)  Source: .Blood BLOOD  Preliminary Report (06 Oct 2024 13:01):    No growth at 24 hours    Culture - Urine (collected 02 Oct 2024 13:48)  Source: Clean Catch Clean Catch (Midstream)  Final Report (04 Oct 2024 19:44):    >100,000 CFU/ml Streptococcus agalactiae (Group B) isolated    Group B streptococci are susceptible to ampicillin,    penicillin and cefazolin, but may be resistant to    erythromycin and clindamycin.    Culture - Blood (collected 02 Oct 2024 11:15)  Source: .Blood BLOOD  Preliminary Report (06 Oct 2024 15:00):    No growth at 4 days    Culture - Blood (collected 02 Oct 2024 11:00)  Source: .Blood BLOOD  Gram Stain (03 Oct 2024 22:56):    Growth in aerobic bottle: Gram positive cocci in pairs    Growth in anaerobic bottle: Gram positive cocci in pairs  Final Report (05 Oct 2024 01:08):    Growth in aerobic and anaerobic bottles: Streptococcus agalactiae (Group    B)    Direct identification is available within approximately 3-5    hours either by Blood Panel Multiplexed PCR or Direct    MALDI-TOF. Details: [email or web address]  Organism: Blood Culture PCR  Streptococcus agalactiae (Group B) (05 Oct 2024 01:08)  Organism: Streptococcus agalactiae (Group B) (05 Oct 2024 01:08)      Method Type: CONTRERAS      -  Ceftriaxone: S <=0.25      -  Clindamycin: S <=0.06      -  Levofloxacin: S 0.5      -  Penicillin: S <=0.03 Predicts results for ampicillin, amoxicillin, amoxicillin/clavulanate, ampicillin/sulbactam, 1st, 2nd and 3rd generation cephalosporins and carbapenems.      -  Tetracycline: R >4      -  Vancomycin: S 0.5  Organism: Blood Culture PCR (05 Oct 2024 01:08)      Method Type: PCR      -  Streptococcus agalactiae (Group B): Detec            Culture - Blood (collected 10-05-24 @ 07:25)  Source: .Blood BLOOD  Preliminary Report (10-06-24 @ 13:01):    No growth at 24 hours    Culture - Blood (collected 10-05-24 @ 07:20)  Source: .Blood BLOOD  Preliminary Report (10-06-24 @ 13:01):    No growth at 24 hours    Culture - Urine (collected 10-02-24 @ 13:48)  Source: Clean Catch Clean Catch (Midstream)  Final Report (10-04-24 @ 19:44):    >100,000 CFU/ml Streptococcus agalactiae (Group B) isolated    Group B streptococci are susceptible to ampicillin,    penicillin and cefazolin, but may be resistant to    erythromycin and clindamycin.    Culture - Blood (collected 10-02-24 @ 11:15)  Source: .Blood BLOOD  Preliminary Report (10-06-24 @ 15:00):    No growth at 4 days    Culture - Blood (collected 10-02-24 @ 11:00)  Source: .Blood BLOOD  Gram Stain (10-03-24 @ 22:56):    Growth in aerobic bottle: Gram positive cocci in pairs    Growth in anaerobic bottle: Gram positive cocci in pairs  Final Report (10-05-24 @ 01:08):    Growth in aerobic and anaerobic bottles: Streptococcus agalactiae (Group    B)    Direct identification is available within approximately 3-5    hours either by Blood Panel Multiplexed PCR or Direct    MALDI-TOF. Details: [email or web address]  Organism: Blood Culture PCR  Streptococcus agalactiae (Group B) (10-05-24 @ 01:08)  Organism: Streptococcus agalactiae (Group B) (10-05-24 @ 01:08)      Method Type: CONTRERAS      -  Ceftriaxone: S <=0.25      -  Clindamycin: S <=0.06      -  Levofloxacin: S 0.5      -  Penicillin: S <=0.03 Predicts results for ampicillin, amoxicillin, amoxicillin/clavulanate, ampicillin/sulbactam, 1st, 2nd and 3rd generation cephalosporins and carbapenems.      -  Tetracycline: R >4      -  Vancomycin: S 0.5  Organism: Blood Culture PCR (10-05-24 @ 01:08)      Method Type: PCR      -  Streptococcus agalactiae (Group B): Detec        RADIOLOGY & ADDITIONAL TESTS: Personally reviewed.     Consultant(s) Notes Reviewed:  [x] YES  [ ] NO   Discussed with SW/VINH, RN

## 2024-10-07 NOTE — PROGRESS NOTE ADULT - SUBJECTIVE AND OBJECTIVE BOX
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 Was able to walk yesterday. No more body aches. No fevers.     REVIEW OF SYSTEMS:  All other review of systems negative (Comprehensive ROS) except as above     Antimicrobials Day #  : 3  cefTRIAXone   IVPB 2000 milliGRAM(s) IV Intermittent every 24 hours    Other Medications Reviewed    T(F): 98.4 (10-07-24 @ 04:55), Max: 98.4 (10-06-24 @ 21:10)  HR: 82 (10-07-24 @ 08:22)  BP: 106/64 (10-07-24 @ 04:55)  RR: 16 (10-07-24 @ 04:55)  SpO2: 94% (10-07-24 @ 08:22)  Wt(kg): --    PHYSICAL EXAM:  General: alert, no acute distress  Eyes:  anicteric, no conjunctival injection, no discharge  Neck: supple  Lungs: clear to auscultation  Heart: regular rate and rhythm; no murmurs  Abdomen: soft, nondistended, nontender.  Skin: no lesions  Extremities: no clubbing, cyanosis, or edema  Neurologic: alert, oriented, moves all extremities    LAB RESULTS:                        10.5   5.02  )-----------( 379      ( 07 Oct 2024 08:34 )             31.6     10-07    138  |  100  |  10  ----------------------------<  93  4.2   |  30  |  0.59    Ca    9.9      07 Oct 2024 08:34  Mg     1.8     10-06    TPro  7.3  /  Alb  2.8[L]  /  TBili  0.7  /  DBili  x   /  AST  18  /  ALT  25  /  AlkPhos  74  10-07    LIVER FUNCTIONS - ( 07 Oct 2024 08:34 )  Alb: 2.8 g/dL / Pro: 7.3 g/dL / ALK PHOS: 74 U/L / ALT: 25 U/L / AST: 18 U/L / GGT: x           Urinalysis Basic - ( 07 Oct 2024 08:34 )    Color: x / Appearance: x / SG: x / pH: x  Gluc: 93 mg/dL / Ketone: x  / Bili: x / Urobili: x   Blood: x / Protein: x / Nitrite: x   Leuk Esterase: x / RBC: x / WBC x   Sq Epi: x / Non Sq Epi: x / Bacteria: x      MICROBIOLOGY:  RECENT CULTURES:  10-05 @ 07:25 .Blood BLOOD     No growth at 24 hours      10-05 @ 07:20 .Blood BLOOD     No growth at 24 hours      10-02 @ 13:48 Clean Catch Clean Catch (Midstream)     >100,000 CFU/ml Streptococcus agalactiae (Group B) isolated  Group B streptococci are susceptible to ampicillin,  penicillin and cefazolin, but may be resistant to  erythromycin and clindamycin.      10-02 @ 11:15 .Blood BLOOD     No growth at 4 days      10-02 @ 11:00 .Blood BLOOD Blood Culture PCR  Streptococcus agalactiae (Group B)    Growth in aerobic and anaerobic bottles: Streptococcus agalactiae (Group  B)  Direct identification is available within approximately 3-5  hours either by Blood Panel Multiplexed PCR or Direct  MALDI-TOF. Details: [email or web address]    Growth in aerobic bottle: Gram positive cocci in pairs  Growth in anaerobic bottle: Gram positive cocci in pairs      RADIOLOGY REVIEWED:  < from: MR Lumbar Spine w/wo IV Cont (10.04.24 @ 10:16) >  IMPRESSION:  Nonspecific endplate edema/enhancement and high T2 disc signal at L4-5 as   described above which may represent discitis/ osteomyelitis with phlegmon   in the correct clinical setting. No loculated fluid collection in the   paraspinal or epidural spaces to suggest abscess collection. Correlate   clinically.    Multilevel degenerative changes resulting in multilevel spinal canal   stenosis and neural foraminal narrowing as described above. Severe spinal   canal stenosis with compression of the nerve roots and severe right,   moderate left neural foraminal narrowing at L4-5.    --- End of Report ---      MARCELINA LADD MD; Attending Radiologist  This document has been electronically signed. Oct  4 2024  4:14PM    < end of copied text >

## 2024-10-07 NOTE — PROGRESS NOTE ADULT - ASSESSMENT
68 yr F w pmhx of spinal stenosis, osteoporosis, HLD, bronchiectasis, IgA/IgG deficiency presents with worsening of chronic low back pain      #Intractable back pain - Chronic with acute worsening  #Acute Osteomyelitis of lumbar spine  - Patient established care with specialist, surgery recommended but delayed due to osteoporosis  - CT lumbar: No evidence of an acute lumbar spine fracture. Multilevel degenerative changes greatest at L4-5 with severe spinal canal narrowing  - PT and PM&R consulted  - MRI lumbar spine - osteomyelitis  - Optimize pain control, c/w dilaudid, gabapentin, valium, duloxetine, MS Contin  - Discussed with spine surg Dr. Lucas Garcia at [facility]; recommended MRI with contrast, IV abx. Patient not considered suitable candidate for spinal surgery at this time and advised to follow up with outpatient spine surgery.  - Ortho spine consult: MRI with IV contrast; if + for infection, c/w IV abx longterm. Immediate intervention if new weakness/numbness, urinary/bowel incontinence, and neuro deficits seen; no concerning findings; No surgical intervention at this time for severe spinal stenosis d/t concern of bacteremia; Outpt surgical intervention  - ID recs appreciated, long term IV abx (6 weeks)  - F/u 10/5 Repeat Bcx NG@24h  - Increased gabapentin to 300 TID   - per ICU can get PICC line tomorrow 10/8  - f/u ID    #Blood cultures +ve - Bacteremia  #+ve UA/Ucx, asymptomatic  - US kidney/bladder, negative renal US, PVR 184cc  - 1x positive for group B strep, Bcx 2nd set negative   - ID recs appreciated, c/w CTX, UCx +ve for gram positive cocci in pairs and chains  - repeat BCx sent on 10/5 NG@24h    #Anemia  - Patient reports anemia at baseline  - Hgb 8.7 today - stable (12.1 on admission)  - Monitor  - Keep Type and screen uptodate  - F/u am labs: folate, b12, iron studies    #IgG and IgA deficiency  #hx of bronchietasis  - satting well on RA  - C/w saline duonebs PRN    #Depression/Anxiety  - on Fetzima at home, non-fomulary  - On valium PRN  - c/w Duloxetine    #DVT prophylaxis  - Lovenox    #GOC  - Full code    discussed with pt and  at bedside

## 2024-10-07 NOTE — PROGRESS NOTE ADULT - ATTENDING COMMENTS
agree with above  PICC line to placed in AM by ICU team  ID recs 6 weeks of ceftriaxone  PMR eval for acute rehab  f/u 10/5 repeat BCx, NGTD  d/w pt and  at bedside, all questions answered

## 2024-10-07 NOTE — DIETITIAN INITIAL EVALUATION ADULT - PERTINENT MEDS FT
MEDICATIONS  (STANDING):  acetaminophen     Tablet .. 650 milliGRAM(s) Oral every 8 hours  ascorbic acid 500 milliGRAM(s) Oral <User Schedule>  atorvastatin 10 milliGRAM(s) Oral at bedtime  cefTRIAXone   IVPB 2000 milliGRAM(s) IV Intermittent every 24 hours  diazepam    Tablet 5 milliGRAM(s) Oral every 8 hours  DULoxetine 20 milliGRAM(s) Oral <User Schedule>  enoxaparin Injectable 40 milliGRAM(s) SubCutaneous every 24 hours  gabapentin 300 milliGRAM(s) Oral three times a day  influenza  Vaccine (HIGH DOSE) 0.5 milliLiter(s) IntraMuscular once  lactobacillus acidophilus 1 Tablet(s) Oral two times a day with meals  morphine ER Tablet 15 milliGRAM(s) Oral every 12 hours  multivitamin 1 Tablet(s) Oral <User Schedule>  polyethylene glycol 3350 17 Gram(s) Oral two times a day  senna 2 Tablet(s) Oral at bedtime  sodium chloride 3%  Inhalation 4 milliLiter(s) Inhalation every 12 hours    MEDICATIONS  (PRN):  albuterol/ipratropium for Nebulization 3 milliLiter(s) Nebulizer every 6 hours PRN Shortness of Breath  aluminum hydroxide/magnesium hydroxide/simethicone Suspension 30 milliLiter(s) Oral every 4 hours PRN Dyspepsia  bisacodyl 5 milliGRAM(s) Oral at bedtime PRN Constipation  famotidine    Tablet 20 milliGRAM(s) Oral daily PRN GERD  HYDROmorphone  Injectable 0.25 milliGRAM(s) IV Push every 4 hours PRN Moderate Pain (4 - 6)  HYDROmorphone  Injectable 0.5 milliGRAM(s) IV Push every 4 hours PRN Severe Pain (7 - 10)  melatonin 3 milliGRAM(s) Oral at bedtime PRN Insomnia  ondansetron Injectable 4 milliGRAM(s) IV Push every 8 hours PRN Nausea and/or Vomiting

## 2024-10-07 NOTE — CHART NOTE - NSCHARTNOTEFT_GEN_A_CORE
10/7/24.    To whom it may concern,    Mrs. Simin Whitmore,  [birth year], is currently a patient under my medical care at [facility] in [place], [place].  She is not medically cleared to fly.        Randy Buckley D.O.  Attending Physician - Hospital Medicine  [facility]

## 2024-10-07 NOTE — CONSULT NOTE ADULT - SUBJECTIVE AND OBJECTIVE BOX
69yF was admitted on 10-02      Patient is a 69y old  Female who presents with a chief complaint of Lumbar radiculopathy     (07 Oct 2024 12:09)    HPI:  68 yr F w pmhx of spinal stenosis, osteoporosis, HLD, IgA deficiency presents with intractable low back pain. Decrease the pain as sharp low back pain radiating to her legs, worse to her right. Reports had a MRI outpt about 2 weeks which showed significant degenerative disc disease and spinal stenosis, no acute fracture. Reports has been doing aggressive yoga training lately and incorporated spine twisting moves that may have exacerbated her symptoms. Also reports currently in alot social stress. Denies urinary or bowel incontinence. Pt was seen in Ed 2 days ago with similar symptoms. Reports pain improvement after receiving regimen in ED, was discharge on pain medication and prednisone. Reports started to worsen after the ED medications wore off. Pt has been following with specialists in regards to spinal stenosis. It was recommend patient would benefit from surgery. She is currently admitted to medicine service and is on IV antibiotics for osteomyelitis. Upon evaluation, patient is anxious, reports that she does not want to go home while on IV antibiotics and her  is unable to provide assist at this time. Per patient, she was taking care of her 9 year old grand-daughter and she also cares for an autistic son.      (02 Oct 2024 15:03)      Imaging performed:  CT Lumbar spine 10/2/24- No evidence of an acute lumbar spine fracture. Multilevel degenerative   changes greatest at L4-5 with severe spinal canal narrowing is seen.  Findings are similar to prior MRI of November 2022.    MRI L spine 10/4/24-Nonspecific endplate edema/enhancement and high T2 disc signal at L4-5 as   described above which may represent discitis/ osteomyelitis with phlegmon   in the correct clinical setting. No loculated fluid collection in the   paraspinal or epidural spaces to suggest abscess collection. Correlate   clinically.    Multilevel degenerative changes resulting in multilevel spinal canal   stenosis and neural foraminal narrowing as described above. Severe spinal   canal stenosis with compression of the nerve roots and severe right,   moderate left neural foraminal narrowing at L4-5.    US kidney and bladder 10/4/24- 1.  Negative renal ultrasound.  2.  184 cc post void residual volume (PVR).    Echo 10/3/24   1. Left ventricular ejection fraction, by visual estimation, is 60 to   65%.   2. Normal global left ventricular systolic function.   3. Spectral Doppler shows impaired relaxation pattern of left   ventricular myocardial filling (Grade I diastolic dysfunction).   4. Normal right ventricular size and function.   5. The left atrium is normal in size.   6. The right atrium is normal in size.   7. There is no evidence of pericardial effusion.   8. Mild mitral valve regurgitation.   9. Mild-moderate tricuspid regurgitation.  10. The main pulmonary artery is normal in size.  11. LA volume Index is 19.5 ml/m² ml/m2.    REVIEW OF SYSTEMS  Constitutional - No fever, + fatigue  Respiratory - No cough, No wheezing, No shortness of breath  Cardiovascular - No chest pain, No palpitations  Gastrointestinal + constipation  Genitourinary - No dysuria, No frequency, No hematuria, No incontinence  Neurological -  + loss of strength  Skin - No itching, No rashes  Musculoskeletal + right hip pain, +back pain     VITALS  T(C): 36.7 (10-07-24 @ 12:59), Max: 36.9 (10-06-24 @ 21:10)  HR: 87 (10-07-24 @ 13:50) (70 - 87)  BP: 91/52 (10-07-24 @ 14:15) (85/61 - 109/70)  RR: 17 (10-07-24 @ 12:59) (16 - 17)  SpO2: 94% (10-07-24 @ 13:50) (94% - 99%)  Wt(kg): --    PAST MEDICAL & SURGICAL HISTORY  Sprain rotator cuff    Depression    ADHD (attention deficit hyperactivity disorder)    Anxiety    Mass on back    Bronchiectasis    Spinal stenosis    Lower extremity tendon tear    S/P trigger finger release        FUNCTIONAL HISTORY  Patient lives in a house with her spouse w/ 3-4 steps to enter. Bed and bath on first floor. 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 Per patient, her  is unable to provide support for her at this time.    CURRENT FUNCTIONAL STATUS  PT note 10/7-  Bed Mobility  Bed Mobility Training Rolling/Turning: contact guard;  verbal cues;  bed rails  Bed Mobility Training Supine-to-Sit: contact guard;  bed rails  Bed Mobility Training Limitations: pain    Sit-Stand Transfer Training  Transfer Training Stand-to-Sit Transfer: supervsion;  rolling walker;  full weight-bearing    Gait Training  Gait Training: supervsion;  75 feet;  rolling walker;  full weight-bearing  Gait Analysis: 3-point gait   75 feet;  rolling walker    OT 10/7-  Bed Mobility  Bed Mobility Training Rolling/Turning: supervsion;  1 person assist  Bed Mobility Training Scooting: stand-by assist;  supervsion;  supervision  Bed Mobility Training Sit-to-Supine: contact guard;  supervsion;  supervision  Bed Mobility Training Supine-to-Sit: contact guard;  supervsion;  supervision  Bed Mobility Training Limitations: pain    Bed-Chair Transfer Training  Transfer Training Bed-to-Chair Transfer: contact guard;  supervsion;  1 person assist;  supervision;  full weight-bearing   rolling walker  Transfer Training Chair-to-Bed Transfer: contact guard;  supervsion;  1 person assist;  supervision;  full weight-bearing   rolling walker  Bed-to-Chair Transfer Training Transfer Safety Analysis: pain    Sit-Stand Transfer Training  Transfer Training Sit-to-Stand Transfer: supervsion;  supervision;  full weight-bearing   rolling walker  Transfer Training Stand-to-Sit Transfer: supervsion;  supervision;  full weight-bearing   rolling walker  Sit-to-Stand Transfer Training Transfer Safety Analysis: pain;  rolling walker    Toilet Transfer Training  Transfer Training Toilet Transfer: contact guard;  supervsion;  1 person assist;  supervision;  full weight-bearing   rolling walker  Toilet Transfer Training Transfer Safety Analysis: pain;  rolling walker    Therapeutic Exercise  Therapeutic Exercise Detail: Pt. educated on UE/LE therex completion at bedside. 3x10.     Grooming Training  Grooming Training Assistance: independent;  supervsion;  supervision;  Standing at sinklevel, with RW.  S/U- Distant Supervision. ;  pain      SOCIAL HISTORY - as per documentation/history  Smoking - None  EtOH - None  Drugs - None    FAMILY HISTORY       RECENT LABS - Reviewed  CBC Full  -  ( 07 Oct 2024 08:34 )  WBC Count : 5.02 K/uL  RBC Count : 3.68 M/uL  Hemoglobin : 10.5 g/dL  Hematocrit : 31.6 %  Platelet Count - Automated : 379 K/uL  Mean Cell Volume : 85.9 fl  Mean Cell Hemoglobin : 28.5 pg  Mean Cell Hemoglobin Concentration : 33.2 gm/dL  Auto Neutrophil # : x  Auto Lymphocyte # : x  Auto Monocyte # : x  Auto Eosinophil # : x  Auto Basophil # : x  Auto Neutrophil % : x  Auto Lymphocyte % : x  Auto Monocyte % : x  Auto Eosinophil % : x  Auto Basophil % : x    10-07    138  |  100  |  10  ----------------------------<  93  4.2   |  30  |  0.59    Ca    9.9      07 Oct 2024 08:34  Mg     1.8     10-06    TPro  7.3  /  Alb  2.8[L]  /  TBili  0.7  /  DBili  x   /  AST  18  /  ALT  25  /  AlkPhos  74  10-07    Urinalysis Basic - ( 07 Oct 2024 08:34 )    Color: x / Appearance: x / SG: x / pH: x  Gluc: 93 mg/dL / Ketone: x  / Bili: x / Urobili: x   Blood: x / Protein: x / Nitrite: x   Leuk Esterase: x / RBC: x / WBC x   Sq Epi: x / Non Sq Epi: x / Bacteria: x        ALLERGIES  Wheat (Diarrhea)  No Known Drug Allergies      MEDICATIONS   acetaminophen     Tablet .. 650 milliGRAM(s) Oral every 8 hours  albuterol/ipratropium for Nebulization 3 milliLiter(s) Nebulizer every 6 hours PRN  aluminum hydroxide/magnesium hydroxide/simethicone Suspension 30 milliLiter(s) Oral every 4 hours PRN  ascorbic acid 500 milliGRAM(s) Oral <User Schedule>  atorvastatin 10 milliGRAM(s) Oral at bedtime  bisacodyl 5 milliGRAM(s) Oral at bedtime PRN  cefTRIAXone   IVPB 2000 milliGRAM(s) IV Intermittent every 24 hours  diazepam    Tablet 5 milliGRAM(s) Oral every 8 hours  DULoxetine 20 milliGRAM(s) Oral <User Schedule>  enoxaparin Injectable 40 milliGRAM(s) SubCutaneous every 24 hours  famotidine    Tablet 20 milliGRAM(s) Oral daily PRN  gabapentin 300 milliGRAM(s) Oral three times a day  HYDROmorphone  Injectable 0.25 milliGRAM(s) IV Push every 4 hours PRN  HYDROmorphone  Injectable 0.5 milliGRAM(s) IV Push every 4 hours PRN  influenza  Vaccine (HIGH DOSE) 0.5 milliLiter(s) IntraMuscular once  lactobacillus acidophilus 1 Tablet(s) Oral two times a day with meals  melatonin 3 milliGRAM(s) Oral at bedtime PRN  morphine ER Tablet 15 milliGRAM(s) Oral every 12 hours  multivitamin 1 Tablet(s) Oral <User Schedule>  ondansetron Injectable 4 milliGRAM(s) IV Push every 8 hours PRN  polyethylene glycol 3350 17 Gram(s) Oral two times a day  senna 2 Tablet(s) Oral at bedtime  sodium chloride 3%  Inhalation 4 milliLiter(s) Inhalation every 12 hours      ----------------------------------------------------------------------------------------  PHYSICAL EXAM  Constitutional - Tired  HEENT - NCAT, EOMI  Neck - Supple, No limited ROM  Chest - Breathing comfortably, No wheezing  Cardiovascular - S1S2   Abdomen - Soft   Extremities -  No calf tenderness   Neurologic Exam -                    Cognitive - AAO to self, place, date, year, situation     Communication - Fluent, No dysarthria     Cranial Nerves - CN 2-12 intact     Motor - bilateral LE proximal weakness--limited due to pain                    LEFT    UE - ShAB 5/5, EF 5/5, EE 5/5, WE 5/5,  5/5                    RIGHT UE - ShAB 5/5, EF 5/5, EE 5/5, WE 5/5,  5/5                    LEFT    LE - HF 4/5, KE 4/5, DF 5/5, PF 5/5                    RIGHT LE - HF 4/5, KE 4/5, DF 5/5, PF 5/5     Psychiatric - Calm   ----------------------------------------------------------------------------------------  ASSESSMENT/PLAN  69yFemale admitted with osteomyelitis of the lumbar spine and bacteremia resulting in lower extremity weakness and pain.   Osteomyelitis of lumbar spine and bacteremia-  Rocephin IVPB daily for 6 weeks. Blood cultures with strep, repeat blood cultures without growth, ID following. Pending PICC line placement  Pain - Gabapentin 300 mg TID, MS Contin 15 mg q12, duloxetine 20 mg, Dilaudid PRN. Per patient, she was on gabapentin 100 mg qhs-- consider dose adjustment as patient appears tired and reports "feeling groggy"  Bowel- Patient reports no bowel movement in a week. Optimize bowel regimen. Currently on miralax BID, Senna qhs, dulcolax PRN  Anxiety- Valium 5 mg q8, psychiatry following  Sleep- melatonin PRN  DVT PPX - Lovenox sq   Rehab - Will continue to follow for rehab recommendations. Patient is pending PICC line placement and reports her pain is uncontrolled at this time, which is limiting her mobility. Per chart review patient is ambulating 75ft with RW, supervision assist. Recommend ongoing mobilization by staff to maintain cardiopulmonary function and prevention of secondary complications related to debility. Discussed with rehab team. 
HPI:   Patient is a 68y female with bronchiectasis, iga deficiency,chronic back pain/spinal stenosis who developed rigors and fever 5 days ago then the next day diffuse myalgia, arthralgia and profound incapacitating lower back pain starting 3 d ago. She came to ed then, went home , came back 2 d ago for persistent severe pain. Seh had blood cx found to grow group b strept now in 2/4 bottles. Urine has gram pos cocci in pr and chains. Patient was started on ctx yesterday and is on pain meds adn feeling much better. She has no dysuria but oftne feels like she does not totally empty her bladder. She has not had any recent shots in the back but has had some in the past. No further rigors but had temp to 100 overnight.     REVIEW OF SYSTEMS:  All other review of systems negative (Comprehensive ROS)    PAST MEDICAL & SURGICAL HISTORY:  Sprain rotator cuff  right shoulder      Depressi  ADHD (attention deficit hyperactivity disorder)      Anxiety      Mass on back      Bronchiectasis      Spinal stenosis      Lower extremity tendon tear  hip 2002      S/P trigger finger release  right thumb          Allergies    Wheat (Diarrhea)  No Known Drug Allergies    Intolerances        Antimicrobials Day #  :2  cefTRIAXone   IVPB 2000 milliGRAM(s) IV Intermittent every 24 hours    Other Medications:  acetaminophen     Tablet .. 650 milliGRAM(s) Oral every 8 hours  albuterol/ipratropium for Nebulization 3 milliLiter(s) Nebulizer every 6 hours PRN  aluminum hydroxide/magnesium hydroxide/simethicone Suspension 30 milliLiter(s) Oral every 4 hours PRN  atorvastatin 10 milliGRAM(s) Oral at bedtime  bisacodyl 5 milliGRAM(s) Oral at bedtime PRN  diazepam    Tablet 5 milliGRAM(s) Oral every 8 hours PRN  DULoxetine 20 milliGRAM(s) Oral <User Schedule>  enoxaparin Injectable 40 milliGRAM(s) SubCutaneous every 24 hours  famotidine    Tablet 20 milliGRAM(s) Oral daily PRN  gabapentin 100 milliGRAM(s) Oral three times a day  HYDROmorphone  Injectable 0.25 milliGRAM(s) IV Push every 4 hours PRN  HYDROmorphone  Injectable 0.5 milliGRAM(s) IV Push every 4 hours PRN  influenza  Vaccine (HIGH DOSE) 0.5 milliLiter(s) IntraMuscular once  lactobacillus acidophilus 1 Tablet(s) Oral two times a day with meals  melatonin 3 milliGRAM(s) Oral at bedtime PRN  morphine ER Tablet 15 milliGRAM(s) Oral every 12 hours  ondansetron Injectable 4 milliGRAM(s) IV Push every 8 hours PRN  polyethylene glycol 3350 17 Gram(s) Oral daily  senna 2 Tablet(s) Oral at bedtime      FAMILY HISTORY:      SOCIAL HISTORY:  Smoking: [ ]Yes x[ ]No  ETOH: [ ]Yes [x ]No  Drug Use: [ ]Yes [ ]xNo   [x ] Single[ ]    T(F): 98.7 (10-04-24 @ 11:39), Max: 100 (10-03-24 @ 20:24)  HR: 83 (10-04-24 @ 11:39)  BP: 99/65 (10-04-24 @ 11:39)  RR: 18 (10-04-24 @ 11:39)  SpO2: 95% (10-04-24 @ 11:39)  Wt(kg): --    PHYSICAL EXAM:  General: alert, no acute distress  Eyes:  anicteric, no conjunctival injection, no discharge  Oropharynx: no lesions or injection 	  Neck: supple, without adenopathy  Lungs: clear to auscultation  Heart: regular rate and rhythm; no murmur, rubs or gallops  Abdomen: soft, nondistended, nontender, without mass or organomegaly  Skin: no lesions  Extremities: no clubbing, cyanosis, or edema  Neurologic: alert, oriented, moves all extremities  no cvat  LAB RESULTS:                        9.1    5.73  )-----------( 178      ( 04 Oct 2024 07:04 )             27.2     10-04    138  |  102  |  6[L]  ----------------------------<  97  3.4[L]   |  28  |  0.65    Ca    8.0[L]      04 Oct 2024 07:04    TPro  5.6[L]  /  Alb  2.3[L]  /  TBili  0.8  /  DBili  x   /  AST  13  /  ALT  16  /  AlkPhos  53  10-04    LIVER FUNCTIONS - ( 04 Oct 2024 07:04 )  Alb: 2.3 g/dL / Pro: 5.6 g/dL / ALK PHOS: 53 U/L / ALT: 16 U/L / AST: 13 U/L / GGT: x           Urinalysis Basic - ( 04 Oct 2024 07:04 )    Color: x / Appearance: x / SG: x / pH: x  Gluc: 97 mg/dL / Ketone: x  / Bili: x / Urobili: x   Blood: x / Protein: x / Nitrite: x   Leuk Esterase: x / RBC: x / WBC x   Sq Epi: x / Non Sq Epi: x / Bacteria: x        MICROBIOLOGY:  RECENT CULTURES:  10-02 @ 13:48 Clean Catch Clean Catch (Midstream)     >100,000 CFU/ml Gram Positive Cocci in Pairs and Chains      10-02 @ 11:15 .Blood BLOOD     No growth at 48 Hours      10-02 @ 11:00 .Blood BLOOD Blood Culture PCR    Growth in aerobic bottle: Streptococcus agalactiae (Group B)  Susceptibility to follow.  Direct identification is available within approximately 3-5  hours either by Blood Panel Multiplexed PCR or Direct  MALDI-TOF. Details: [email or web address]  Growth in anaerobic bottle: Gram positive cocci in pairs    Growth in aerobic bottle: Gram positive cocci in pairs  Growth in anaerobic bottle: Gram positive cocci in pairs          RADIOLOGY REVIEWED:  < from: CT Lumbar Spine No Cont (10.02.24 @ 12:59) >  ACC: [id number] EXAM:  CT LUMBAR SPINE   ORDERED BY: FREDA DOSS     PROCEDURE DATE:  10/02/2024          INTERPRETATION:  CT LUMBAR SPINE    CLINICAL INFORMATION: severe low back pain    TECHNIQUE:  Noncontrast CT.  Axial acquisition. Sagittal and coronal reformations.    COMPARISON:  Prior lumbar MRI of 11/30/2022    FINDINGS:  SPINAL COLUMN:  Mild straightening of the normal lumbar lordosis. Mild levoscoliosis.  Mild grade 1 retrolisthesis L2 on L3 as well as mild right lateral   subluxation L2 on L3.  Vertebral body heights are well-maintained.  Narrowing of the L2-3 and L4-5 disc spaces with associated endplate   degenerative changes and osteophytes. Milder degenerative disc disease   T12-L1 and L3-4.  There is no evidence of an acute lumbar spine fracture.    DISC LEVELS:  T11-12: No disc bulge or protrusion. Canal and neural foramina are patent.  T12/L1: No disc bulge or protrusion. Mild facet DJD. Canal and neural   foramina appear patent  L1-2: No disc bulge or protrusion. [place] neural foramina are patent.  L2-3: Mild bulge/bony ridge. Mild facet DJD. Mild canal and left greater   than right neural foramina narrowing.  L3-4: Mild disc bulge. Moderate right greater than left facet DJD. Mild   to moderate canal and bilateral neural foramina narrowing.  L4-5: Moderate disc bulge. Moderate to severe facet degenerative changes.   Severe canal and bilateral neural foramina narrowing.  L5-S1: No disc protrusion. Mild facet DJD. Canal and neural foramina are   patent    OTHER:  The visualized sacrum appears intact.    IMPRESSION:  No evidence of an acute lumbar spine fracture. Multilevel degenerative   changes greatest at L4-5 with severe spinal canal narrowing is seen.  Findings are similar to prior MRI of November 2022.    --- End of Report ---      < end of copied text >          Impression:  Patient with history of bronchiectasis,iga deficiency,  spinal stenosis, developed rigors and fever 5 d ago then severe lower back pain 3 d ago. [facility] is now known to grow group b strept from mack blood. She has gram pos cocci in the urine and reports some perception of retention at times so original source of bacteremia is likely the urine. Given onset of the back pain soon after the fever and now known bacteremia, I have high concern for spine infection too. MRI pends reading. Even if no zahida om/discitis I would favor an extended course of iv ctx since she could still have very early spine infection.     Recommendations:  continue ceftriaxone  repeat blood cx  await reading of mri spine  await urine isolate  suspect will do at least 4 weeks iv abx then po for 2 more weeks . If zahida positive mri for om will likely do 6 weeks iv  us kidneys and bladder, pt refuses a ct a/p to complete source search

## 2024-10-08 DIAGNOSIS — M86.10 OTHER ACUTE OSTEOMYELITIS, UNSPECIFIED SITE: ICD-10-CM

## 2024-10-08 LAB
ALBUMIN SERPL ELPH-MCNC: 2.6 G/DL — LOW (ref 3.3–5)
ALP SERPL-CCNC: 69 U/L — SIGNIFICANT CHANGE UP (ref 40–120)
ALT FLD-CCNC: 32 U/L — SIGNIFICANT CHANGE UP (ref 10–45)
ANION GAP SERPL CALC-SCNC: 7 MMOL/L — SIGNIFICANT CHANGE UP (ref 5–17)
AST SERPL-CCNC: 28 U/L — SIGNIFICANT CHANGE UP (ref 10–40)
BILIRUB SERPL-MCNC: 0.6 MG/DL — SIGNIFICANT CHANGE UP (ref 0.2–1.2)
BUN SERPL-MCNC: 13 MG/DL — SIGNIFICANT CHANGE UP (ref 7–23)
CALCIUM SERPL-MCNC: 9.6 MG/DL — SIGNIFICANT CHANGE UP (ref 8.4–10.5)
CHLORIDE SERPL-SCNC: 102 MMOL/L — SIGNIFICANT CHANGE UP (ref 96–108)
CO2 SERPL-SCNC: 30 MMOL/L — SIGNIFICANT CHANGE UP (ref 22–31)
CREAT SERPL-MCNC: 0.58 MG/DL — SIGNIFICANT CHANGE UP (ref 0.5–1.3)
CRP SERPL-MCNC: 54 MG/L — HIGH
EGFR: 99 ML/MIN/1.73M2 — SIGNIFICANT CHANGE UP
GLUCOSE SERPL-MCNC: 95 MG/DL — SIGNIFICANT CHANGE UP (ref 70–99)
HCT VFR BLD CALC: 28 % — LOW (ref 34.5–45)
HGB BLD-MCNC: 9.6 G/DL — LOW (ref 11.5–15.5)
MCHC RBC-ENTMCNC: 29.2 PG — SIGNIFICANT CHANGE UP (ref 27–34)
MCHC RBC-ENTMCNC: 34.3 GM/DL — SIGNIFICANT CHANGE UP (ref 32–36)
MCV RBC AUTO: 85.1 FL — SIGNIFICANT CHANGE UP (ref 80–100)
NRBC # BLD: 0 /100 WBCS — SIGNIFICANT CHANGE UP (ref 0–0)
PLATELET # BLD AUTO: 305 K/UL — SIGNIFICANT CHANGE UP (ref 150–400)
POTASSIUM SERPL-MCNC: 4.3 MMOL/L — SIGNIFICANT CHANGE UP (ref 3.5–5.3)
POTASSIUM SERPL-SCNC: 4.3 MMOL/L — SIGNIFICANT CHANGE UP (ref 3.5–5.3)
PROT SERPL-MCNC: 6.5 G/DL — SIGNIFICANT CHANGE UP (ref 6–8.3)
RBC # BLD: 3.29 M/UL — LOW (ref 3.8–5.2)
RBC # FLD: 12.7 % — SIGNIFICANT CHANGE UP (ref 10.3–14.5)
SODIUM SERPL-SCNC: 139 MMOL/L — SIGNIFICANT CHANGE UP (ref 135–145)
WBC # BLD: 3.86 K/UL — SIGNIFICANT CHANGE UP (ref 3.8–10.5)
WBC # FLD AUTO: 3.86 K/UL — SIGNIFICANT CHANGE UP (ref 3.8–10.5)

## 2024-10-08 PROCEDURE — 99232 SBSQ HOSP IP/OBS MODERATE 35: CPT | Mod: GC

## 2024-10-08 PROCEDURE — 99231 SBSQ HOSP IP/OBS SF/LOW 25: CPT

## 2024-10-08 PROCEDURE — 99233 SBSQ HOSP IP/OBS HIGH 50: CPT

## 2024-10-08 PROCEDURE — 71045 X-RAY EXAM CHEST 1 VIEW: CPT | Mod: 26

## 2024-10-08 RX ORDER — HYDROMORPHONE HYDROCHLORIDE 1 MG/ML
2 INJECTION, SOLUTION INTRAMUSCULAR; INTRAVENOUS; SUBCUTANEOUS EVERY 6 HOURS
Refills: 0 | Status: DISCONTINUED | OUTPATIENT
Start: 2024-10-08 | End: 2024-10-11

## 2024-10-08 RX ADMIN — ENOXAPARIN SODIUM 40 MILLIGRAM(S): 150 INJECTION SUBCUTANEOUS at 13:48

## 2024-10-08 RX ADMIN — GABAPENTIN 100 MILLIGRAM(S): 800 TABLET, FILM COATED ORAL at 05:46

## 2024-10-08 RX ADMIN — Medication 1 TABLET(S): at 08:37

## 2024-10-08 RX ADMIN — GABAPENTIN 100 MILLIGRAM(S): 800 TABLET, FILM COATED ORAL at 13:48

## 2024-10-08 RX ADMIN — Medication 4 MILLILITER(S): at 10:07

## 2024-10-08 RX ADMIN — DIAZEPAM 5 MILLIGRAM(S): 10 TABLET ORAL at 21:05

## 2024-10-08 RX ADMIN — DIAZEPAM 5 MILLIGRAM(S): 10 TABLET ORAL at 05:46

## 2024-10-08 RX ADMIN — MORPHINE SULFATE 15 MILLIGRAM(S): 30 TABLET, FILM COATED, EXTENDED RELEASE ORAL at 21:00

## 2024-10-08 RX ADMIN — Medication 650 MILLIGRAM(S): at 13:48

## 2024-10-08 RX ADMIN — ATORVASTATIN CALCIUM 10 MILLIGRAM(S): 10 TABLET, FILM COATED ORAL at 21:02

## 2024-10-08 RX ADMIN — HYDROMORPHONE HYDROCHLORIDE 2 MILLIGRAM(S): 1 INJECTION, SOLUTION INTRAMUSCULAR; INTRAVENOUS; SUBCUTANEOUS at 18:39

## 2024-10-08 RX ADMIN — Medication 650 MILLIGRAM(S): at 21:01

## 2024-10-08 RX ADMIN — DIAZEPAM 5 MILLIGRAM(S): 10 TABLET ORAL at 13:48

## 2024-10-08 RX ADMIN — Medication 500 MILLIGRAM(S): at 18:00

## 2024-10-08 RX ADMIN — Medication 650 MILLIGRAM(S): at 05:46

## 2024-10-08 RX ADMIN — GABAPENTIN 100 MILLIGRAM(S): 800 TABLET, FILM COATED ORAL at 21:05

## 2024-10-08 RX ADMIN — MULTI VITAMIN/MINERAL SUPPLEMENT WITH ASCORBIC ACID, NIACIN, PYRIDOXINE, PANTOTHENIC ACID, FOLIC ACID, RIBOFLAVIN, THIAMIN, BIOTIN, COBALAMIN AND ZINC. 1 TABLET(S): 60; 20; 12.5; 10; 10; 1.7; 1.5; 1; .3; .006 TABLET, COATED ORAL at 18:00

## 2024-10-08 RX ADMIN — Medication 650 MILLIGRAM(S): at 14:40

## 2024-10-08 RX ADMIN — Medication 650 MILLIGRAM(S): at 22:15

## 2024-10-08 RX ADMIN — Medication 100 MILLIGRAM(S): at 13:50

## 2024-10-08 RX ADMIN — MORPHINE SULFATE 15 MILLIGRAM(S): 30 TABLET, FILM COATED, EXTENDED RELEASE ORAL at 08:36

## 2024-10-08 RX ADMIN — Medication 4 MILLILITER(S): at 20:44

## 2024-10-08 RX ADMIN — Medication 296 MILLILITER(S): at 13:49

## 2024-10-08 RX ADMIN — Medication 20 MILLIGRAM(S): at 18:00

## 2024-10-08 RX ADMIN — MORPHINE SULFATE 15 MILLIGRAM(S): 30 TABLET, FILM COATED, EXTENDED RELEASE ORAL at 22:00

## 2024-10-08 RX ADMIN — Medication 1 TABLET(S): at 18:00

## 2024-10-08 RX ADMIN — MORPHINE SULFATE 15 MILLIGRAM(S): 30 TABLET, FILM COATED, EXTENDED RELEASE ORAL at 09:30

## 2024-10-08 RX ADMIN — Medication 650 MILLIGRAM(S): at 06:20

## 2024-10-08 RX ADMIN — Medication 17 GRAM(S): at 05:45

## 2024-10-08 NOTE — BH CONSULTATION LIAISON PROGRESS NOTE - NSBHCHARTREVIEWLAB_PSY_A_CORE FT
10-08    139  |  102  |  13  ----------------------------<  95  4.3   |  30  |  0.58    Ca    9.6      08 Oct 2024 06:35    TPro  6.5  /  Alb  2.6[L]  /  TBili  0.6  /  DBili  x   /  AST  28  /  ALT  32  /  AlkPhos  69  10-08                          9.6    3.86  )-----------( 305      ( 08 Oct 2024 06:35 )             28.0

## 2024-10-08 NOTE — PROGRESS NOTE ADULT - SUBJECTIVE AND OBJECTIVE BOX
69yF was admitted on 10-02      Patient is a 69y old  Female who presents with a chief complaint of Lumbar radiculopathy     (07 Oct 2024 12:09)    HPI:  68 yr F w pmhx of spinal stenosis, osteoporosis, HLD, IgA deficiency presents with intractable low back pain. Decrease the pain as sharp low back pain radiating to her legs, worse to her right. Reports had a MRI outpt about 2 weeks which showed significant degenerative disc disease and spinal stenosis, no acute fracture. Reports has been doing aggressive yoga training lately and incorporated spine twisting moves that may have exacerbated her symptoms. Also reports currently in alot social stress. Denies urinary or bowel incontinence. Pt was seen in Ed 2 days ago with similar symptoms. Reports pain improvement after receiving regimen in ED, was discharge on pain medication and prednisone. Reports started to worsen after the ED medications wore off. Pt has been following with specialists in regards to spinal stenosis. It was recommend patient would benefit from surgery. She is currently admitted to medicine service and is on IV antibiotics for osteomyelitis of the spine. Patient received PICC line today, reports she had a bowel movement. Her pain is better controlled. Family at bedside during visit.      (02 Oct 2024 15:03)      Imaging performed:  CT Lumbar spine 10/2/24- No evidence of an acute lumbar spine fracture. Multilevel degenerative   changes greatest at L4-5 with severe spinal canal narrowing is seen.  Findings are similar to prior MRI of November 2022.    MRI L spine 10/4/24-Nonspecific endplate edema/enhancement and high T2 disc signal at L4-5 as   described above which may represent discitis/ osteomyelitis with phlegmon   in the correct clinical setting. No loculated fluid collection in the   paraspinal or epidural spaces to suggest abscess collection. Correlate   clinically.    Multilevel degenerative changes resulting in multilevel spinal canal   stenosis and neural foraminal narrowing as described above. Severe spinal   canal stenosis with compression of the nerve roots and severe right,   moderate left neural foraminal narrowing at L4-5.    US kidney and bladder 10/4/24- 1.  Negative renal ultrasound.  2.  184 cc post void residual volume (PVR).    Echo 10/3/24   1. Left ventricular ejection fraction, by visual estimation, is 60 to   65%.   2. Normal global left ventricular systolic function.   3. Spectral Doppler shows impaired relaxation pattern of left   ventricular myocardial filling (Grade I diastolic dysfunction).   4. Normal right ventricular size and function.   5. The left atrium is normal in size.   6. The right atrium is normal in size.   7. There is no evidence of pericardial effusion.   8. Mild mitral valve regurgitation.   9. Mild-moderate tricuspid regurgitation.  10. The main pulmonary artery is normal in size.  11. LA volume Index is 19.5 ml/m² ml/m2.    REVIEW OF SYSTEMS  Constitutional - No fever, + fatigue  Respiratory - No cough, No wheezing, No shortness of breath  Cardiovascular - No chest pain, No palpitations  Gastrointestinal + constipation  Genitourinary - No dysuria, No frequency, No hematuria, No incontinence  Neurological -  + loss of strength  Skin - No itching, No rashes  Musculoskeletal + right hip pain, +back pain     VITALS  T(C): 36.7 (10-07-24 @ 12:59), Max: 36.9 (10-06-24 @ 21:10)  HR: 87 (10-07-24 @ 13:50) (70 - 87)  BP: 91/52 (10-07-24 @ 14:15) (85/61 - 109/70)  RR: 17 (10-07-24 @ 12:59) (16 - 17)  SpO2: 94% (10-07-24 @ 13:50) (94% - 99%)  Wt(kg): --    PAST MEDICAL & SURGICAL HISTORY  Sprain rotator cuff    Depression    ADHD (attention deficit hyperactivity disorder)    Anxiety    Mass on back    Bronchiectasis    Spinal stenosis    Lower extremity tendon tear    S/P trigger finger release        FUNCTIONAL HISTORY  Patient lives in a house with her spouse w/ 3-4 steps to enter. Bed and bath on first floor. 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 Per patient, her  is unable to provide support for her at this time.    CURRENT FUNCTIONAL STATUS  PT note 10/7-  Bed Mobility  Bed Mobility Training Rolling/Turning: contact guard;  verbal cues;  bed rails  Bed Mobility Training Supine-to-Sit: contact guard;  bed rails  Bed Mobility Training Limitations: pain    Sit-Stand Transfer Training  Transfer Training Stand-to-Sit Transfer: supervsion;  rolling walker;  full weight-bearing    Gait Training  Gait Training: supervsion;  75 feet;  rolling walker;  full weight-bearing  Gait Analysis: 3-point gait   75 feet;  rolling walker    OT 10/7-  Bed Mobility  Bed Mobility Training Rolling/Turning: supervsion;  1 person assist  Bed Mobility Training Scooting: stand-by assist;  supervsion;  supervision  Bed Mobility Training Sit-to-Supine: contact guard;  supervsion;  supervision  Bed Mobility Training Supine-to-Sit: contact guard;  supervsion;  supervision  Bed Mobility Training Limitations: pain    Bed-Chair Transfer Training  Transfer Training Bed-to-Chair Transfer: contact guard;  supervsion;  1 person assist;  supervision;  full weight-bearing   rolling walker  Transfer Training Chair-to-Bed Transfer: contact guard;  supervsion;  1 person assist;  supervision;  full weight-bearing   rolling walker  Bed-to-Chair Transfer Training Transfer Safety Analysis: pain    Sit-Stand Transfer Training  Transfer Training Sit-to-Stand Transfer: supervsion;  supervision;  full weight-bearing   rolling walker  Transfer Training Stand-to-Sit Transfer: supervsion;  supervision;  full weight-bearing   rolling walker  Sit-to-Stand Transfer Training Transfer Safety Analysis: pain;  rolling walker    Toilet Transfer Training  Transfer Training Toilet Transfer: contact guard;  supervsion;  1 person assist;  supervision;  full weight-bearing   rolling walker  Toilet Transfer Training Transfer Safety Analysis: pain;  rolling walker    Therapeutic Exercise  Therapeutic Exercise Detail: Pt. educated on UE/LE therex completion at bedside. 3x10.     Grooming Training  Grooming Training Assistance: independent;  supervsion;  supervision;  Standing at sinklevel, with RW.  S/U- Distant Supervision. ;  pain      SOCIAL HISTORY - as per documentation/history  Smoking - None  EtOH - None  Drugs - None    FAMILY HISTORY       RECENT LABS - Reviewed  CBC Full  -  ( 07 Oct 2024 08:34 )  WBC Count : 5.02 K/uL  RBC Count : 3.68 M/uL  Hemoglobin : 10.5 g/dL  Hematocrit : 31.6 %  Platelet Count - Automated : 379 K/uL  Mean Cell Volume : 85.9 fl  Mean Cell Hemoglobin : 28.5 pg  Mean Cell Hemoglobin Concentration : 33.2 gm/dL  Auto Neutrophil # : x  Auto Lymphocyte # : x  Auto Monocyte # : x  Auto Eosinophil # : x  Auto Basophil # : x  Auto Neutrophil % : x  Auto Lymphocyte % : x  Auto Monocyte % : x  Auto Eosinophil % : x  Auto Basophil % : x    10-07    138  |  100  |  10  ----------------------------<  93  4.2   |  30  |  0.59    Ca    9.9      07 Oct 2024 08:34  Mg     1.8     10-06    TPro  7.3  /  Alb  2.8[L]  /  TBili  0.7  /  DBili  x   /  AST  18  /  ALT  25  /  AlkPhos  74  10-07    Urinalysis Basic - ( 07 Oct 2024 08:34 )    Color: x / Appearance: x / SG: x / pH: x  Gluc: 93 mg/dL / Ketone: x  / Bili: x / Urobili: x   Blood: x / Protein: x / Nitrite: x   Leuk Esterase: x / RBC: x / WBC x   Sq Epi: x / Non Sq Epi: x / Bacteria: x        ALLERGIES  Wheat (Diarrhea)  No Known Drug Allergies      MEDICATIONS   acetaminophen     Tablet .. 650 milliGRAM(s) Oral every 8 hours  albuterol/ipratropium for Nebulization 3 milliLiter(s) Nebulizer every 6 hours PRN  aluminum hydroxide/magnesium hydroxide/simethicone Suspension 30 milliLiter(s) Oral every 4 hours PRN  ascorbic acid 500 milliGRAM(s) Oral <User Schedule>  atorvastatin 10 milliGRAM(s) Oral at bedtime  bisacodyl 5 milliGRAM(s) Oral at bedtime PRN  cefTRIAXone   IVPB 2000 milliGRAM(s) IV Intermittent every 24 hours  diazepam    Tablet 5 milliGRAM(s) Oral every 8 hours  DULoxetine 20 milliGRAM(s) Oral <User Schedule>  enoxaparin Injectable 40 milliGRAM(s) SubCutaneous every 24 hours  famotidine    Tablet 20 milliGRAM(s) Oral daily PRN  gabapentin 300 milliGRAM(s) Oral three times a day  HYDROmorphone  Injectable 0.25 milliGRAM(s) IV Push every 4 hours PRN  HYDROmorphone  Injectable 0.5 milliGRAM(s) IV Push every 4 hours PRN  influenza  Vaccine (HIGH DOSE) 0.5 milliLiter(s) IntraMuscular once  lactobacillus acidophilus 1 Tablet(s) Oral two times a day with meals  melatonin 3 milliGRAM(s) Oral at bedtime PRN  morphine ER Tablet 15 milliGRAM(s) Oral every 12 hours  multivitamin 1 Tablet(s) Oral <User Schedule>  ondansetron Injectable 4 milliGRAM(s) IV Push every 8 hours PRN  polyethylene glycol 3350 17 Gram(s) Oral two times a day  senna 2 Tablet(s) Oral at bedtime  sodium chloride 3%  Inhalation 4 milliLiter(s) Inhalation every 12 hours      ----------------------------------------------------------------------------------------  PHYSICAL EXAM  Constitutional - Tired  HEENT - NCAT, EOMI  Neck - Supple, No limited ROM  Chest - Breathing comfortably, No wheezing  Cardiovascular - S1S2   Abdomen - Soft   Extremities -  No calf tenderness, left arm with PICC in place   Neurologic Exam -                    Cognitive - AAO to self, place, date, year, situation     Communication - Fluent, No dysarthria     Cranial Nerves - CN 2-12 intact     Motor - bilateral LE proximal weakness--limited due to pain                    LEFT    UE - ShAB 5/5, EF 5/5, EE 5/5, WE 5/5,  5/5                    RIGHT UE - ShAB 5/5, EF 5/5, EE 5/5, WE 5/5,  5/5                    LEFT    LE - HF 4/5, KE 4/5, DF 5/5, PF 5/5                    RIGHT LE - HF 4/5, KE 4/5, DF 5/5, PF 5/5     Psychiatric - Calm   ----------------------------------------------------------------------------------------  ASSESSMENT/PLAN  69yFemale admitted with osteomyelitis of the lumbar spine and bacteremia resulting in lower extremity weakness and pain.   Osteomyelitis of lumbar spine and bacteremia-  Rocephin IVPB daily for 6 weeks. Blood cultures with strep, repeat blood cultures without growth, ID following.  Pain - Gabapentin 100 mg TID, MS Contin 15 mg q12, duloxetine 20 mg, Dilaudid 2mg PRN  Bowel- miralax BID, Senna qhs, dulcolax PRN, s/p mag citrate with bowel movement  Anxiety- Valium 5 mg q8, psychiatry following  Sleep- Melatonin PRN  DVT PPX - Lovenox sq   Rehab - Recommend ACUTE inpatient rehabilitation for the functional deficits consisting of 3 hours of therapy/day & 24 hour RN/daily PMR physician for comorbid medical management. Patient will be able to tolerate 3 hours a day. Short term stay to achieve functional goals of independence with mobility and ambulation. Recommend ongoing mobilization by staff to maintain cardiopulmonary function and prevention of secondary complications related to debility. Discussed with rehab team.    69yF was admitted on 10-02      Patient is a 69y old  Female who presents with a chief complaint of Lumbar radiculopathy     (07 Oct 2024 12:09)    HPI:  68 yr F w pmhx of spinal stenosis, osteoporosis, HLD, IgA deficiency presents with intractable low back pain. Decrease the pain as sharp low back pain radiating to her legs, worse to her right. Reports had a MRI outpt about 2 weeks which showed significant degenerative disc disease and spinal stenosis, no acute fracture. Reports has been doing aggressive yoga training lately and incorporated spine twisting moves that may have exacerbated her symptoms. Also reports currently in alot social stress. Denies urinary or bowel incontinence. Pt was seen in Ed 2 days ago with similar symptoms. Reports pain improvement after receiving regimen in ED, was discharge on pain medication and prednisone. Reports started to worsen after the ED medications wore off. Pt has been following with specialists in regards to spinal stenosis. It was recommend patient would benefit from surgery. She is currently admitted to medicine service and is on IV antibiotics for osteomyelitis of the spine. Patient received PICC line today, reports she had a bowel movement. Her pain is better controlled. Family at bedside during visit.      (02 Oct 2024 15:03)      Imaging performed:  CT Lumbar spine 10/2/24- No evidence of an acute lumbar spine fracture. Multilevel degenerative   changes greatest at L4-5 with severe spinal canal narrowing is seen.  Findings are similar to prior MRI of November 2022.    MRI L spine 10/4/24-Nonspecific endplate edema/enhancement and high T2 disc signal at L4-5 as   described above which may represent discitis/ osteomyelitis with phlegmon   in the correct clinical setting. No loculated fluid collection in the   paraspinal or epidural spaces to suggest abscess collection. Correlate   clinically.    Multilevel degenerative changes resulting in multilevel spinal canal   stenosis and neural foraminal narrowing as described above. Severe spinal   canal stenosis with compression of the nerve roots and severe right,   moderate left neural foraminal narrowing at L4-5.    US kidney and bladder 10/4/24- 1.  Negative renal ultrasound.  2.  184 cc post void residual volume (PVR).    Echo 10/3/24   1. Left ventricular ejection fraction, by visual estimation, is 60 to   65%.   2. Normal global left ventricular systolic function.   3. Spectral Doppler shows impaired relaxation pattern of left   ventricular myocardial filling (Grade I diastolic dysfunction).   4. Normal right ventricular size and function.   5. The left atrium is normal in size.   6. The right atrium is normal in size.   7. There is no evidence of pericardial effusion.   8. Mild mitral valve regurgitation.   9. Mild-moderate tricuspid regurgitation.  10. The main pulmonary artery is normal in size.  11. LA volume Index is 19.5 ml/m² ml/m2.    REVIEW OF SYSTEMS  Constitutional - No fever, + fatigue  Respiratory - No cough, No wheezing, No shortness of breath  Cardiovascular - No chest pain, No palpitations  Gastrointestinal + constipation  Genitourinary - No dysuria, No frequency, No hematuria, No incontinence  Neurological -  + loss of strength  Skin - No itching, No rashes  Musculoskeletal + right hip pain, +back pain     VITALS  T(C): 36.7 (10-07-24 @ 12:59), Max: 36.9 (10-06-24 @ 21:10)  HR: 87 (10-07-24 @ 13:50) (70 - 87)  BP: 91/52 (10-07-24 @ 14:15) (85/61 - 109/70)  RR: 17 (10-07-24 @ 12:59) (16 - 17)  SpO2: 94% (10-07-24 @ 13:50) (94% - 99%)  Wt(kg): --    PAST MEDICAL & SURGICAL HISTORY  Sprain rotator cuff    Depression    ADHD (attention deficit hyperactivity disorder)    Anxiety    Mass on back    Bronchiectasis    Spinal stenosis    Lower extremity tendon tear    S/P trigger finger release        FUNCTIONAL HISTORY  Patient lives in a house with her spouse w/ 3-4 steps to enter. Bed and bath on first floor. 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 Per patient, her  is unable to provide support for her at this time.    CURRENT FUNCTIONAL STATUS  PT note 10/7-  Bed Mobility  Bed Mobility Training Rolling/Turning: contact guard;  verbal cues;  bed rails  Bed Mobility Training Supine-to-Sit: contact guard;  bed rails  Bed Mobility Training Limitations: pain    Sit-Stand Transfer Training  Transfer Training Stand-to-Sit Transfer: supervsion;  rolling walker;  full weight-bearing    Gait Training  Gait Training: supervsion;  75 feet;  rolling walker;  full weight-bearing  Gait Analysis: 3-point gait   75 feet;  rolling walker    OT 10/7-  Bed Mobility  Bed Mobility Training Rolling/Turning: supervsion;  1 person assist  Bed Mobility Training Scooting: stand-by assist;  supervsion;  supervision  Bed Mobility Training Sit-to-Supine: contact guard;  supervsion;  supervision  Bed Mobility Training Supine-to-Sit: contact guard;  supervsion;  supervision  Bed Mobility Training Limitations: pain    Bed-Chair Transfer Training  Transfer Training Bed-to-Chair Transfer: contact guard;  supervsion;  1 person assist;  supervision;  full weight-bearing   rolling walker  Transfer Training Chair-to-Bed Transfer: contact guard;  supervsion;  1 person assist;  supervision;  full weight-bearing   rolling walker  Bed-to-Chair Transfer Training Transfer Safety Analysis: pain    Sit-Stand Transfer Training  Transfer Training Sit-to-Stand Transfer: supervsion;  supervision;  full weight-bearing   rolling walker  Transfer Training Stand-to-Sit Transfer: supervsion;  supervision;  full weight-bearing   rolling walker  Sit-to-Stand Transfer Training Transfer Safety Analysis: pain;  rolling walker    Toilet Transfer Training  Transfer Training Toilet Transfer: contact guard;  supervsion;  1 person assist;  supervision;  full weight-bearing   rolling walker  Toilet Transfer Training Transfer Safety Analysis: pain;  rolling walker    Therapeutic Exercise  Therapeutic Exercise Detail: Pt. educated on UE/LE therex completion at bedside. 3x10.     Grooming Training  Grooming Training Assistance: independent;  supervsion;  supervision;  Standing at sinklevel, with RW.  S/U- Distant Supervision. ;  pain      SOCIAL HISTORY - as per documentation/history  Smoking - None  EtOH - None  Drugs - None    FAMILY HISTORY       RECENT LABS - Reviewed  CBC Full  -  ( 07 Oct 2024 08:34 )  WBC Count : 5.02 K/uL  RBC Count : 3.68 M/uL  Hemoglobin : 10.5 g/dL  Hematocrit : 31.6 %  Platelet Count - Automated : 379 K/uL  Mean Cell Volume : 85.9 fl  Mean Cell Hemoglobin : 28.5 pg  Mean Cell Hemoglobin Concentration : 33.2 gm/dL  Auto Neutrophil # : x  Auto Lymphocyte # : x  Auto Monocyte # : x  Auto Eosinophil # : x  Auto Basophil # : x  Auto Neutrophil % : x  Auto Lymphocyte % : x  Auto Monocyte % : x  Auto Eosinophil % : x  Auto Basophil % : x    10-07    138  |  100  |  10  ----------------------------<  93  4.2   |  30  |  0.59    Ca    9.9      07 Oct 2024 08:34  Mg     1.8     10-06    TPro  7.3  /  Alb  2.8[L]  /  TBili  0.7  /  DBili  x   /  AST  18  /  ALT  25  /  AlkPhos  74  10-07    Urinalysis Basic - ( 07 Oct 2024 08:34 )    Color: x / Appearance: x / SG: x / pH: x  Gluc: 93 mg/dL / Ketone: x  / Bili: x / Urobili: x   Blood: x / Protein: x / Nitrite: x   Leuk Esterase: x / RBC: x / WBC x   Sq Epi: x / Non Sq Epi: x / Bacteria: x        ALLERGIES  Wheat (Diarrhea)  No Known Drug Allergies      MEDICATIONS   acetaminophen     Tablet .. 650 milliGRAM(s) Oral every 8 hours  albuterol/ipratropium for Nebulization 3 milliLiter(s) Nebulizer every 6 hours PRN  aluminum hydroxide/magnesium hydroxide/simethicone Suspension 30 milliLiter(s) Oral every 4 hours PRN  ascorbic acid 500 milliGRAM(s) Oral <User Schedule>  atorvastatin 10 milliGRAM(s) Oral at bedtime  bisacodyl 5 milliGRAM(s) Oral at bedtime PRN  cefTRIAXone   IVPB 2000 milliGRAM(s) IV Intermittent every 24 hours  diazepam    Tablet 5 milliGRAM(s) Oral every 8 hours  DULoxetine 20 milliGRAM(s) Oral <User Schedule>  enoxaparin Injectable 40 milliGRAM(s) SubCutaneous every 24 hours  famotidine    Tablet 20 milliGRAM(s) Oral daily PRN  gabapentin 300 milliGRAM(s) Oral three times a day  HYDROmorphone  Injectable 0.25 milliGRAM(s) IV Push every 4 hours PRN  HYDROmorphone  Injectable 0.5 milliGRAM(s) IV Push every 4 hours PRN  influenza  Vaccine (HIGH DOSE) 0.5 milliLiter(s) IntraMuscular once  lactobacillus acidophilus 1 Tablet(s) Oral two times a day with meals  melatonin 3 milliGRAM(s) Oral at bedtime PRN  morphine ER Tablet 15 milliGRAM(s) Oral every 12 hours  multivitamin 1 Tablet(s) Oral <User Schedule>  ondansetron Injectable 4 milliGRAM(s) IV Push every 8 hours PRN  polyethylene glycol 3350 17 Gram(s) Oral two times a day  senna 2 Tablet(s) Oral at bedtime  sodium chloride 3%  Inhalation 4 milliLiter(s) Inhalation every 12 hours      ----------------------------------------------------------------------------------------  PHYSICAL EXAM  Constitutional - NAD  HEENT - NCAT, EOMI  Neck - Supple, No limited ROM  Chest - Breathing comfortably, No wheezing  Cardiovascular - S1S2   Abdomen - Soft   Extremities -  No calf tenderness, left arm with PICC in place   Neurologic Exam -                    Cognitive - AAO to self, place, date, year, situation     Communication - Fluent, No dysarthria     Cranial Nerves - CN 2-12 intact     Motor - bilateral LE proximal weakness--limited due to pain                    LEFT    UE - ShAB 5/5, EF 5/5, EE 5/5, WE 5/5,  5/5                    RIGHT UE - ShAB 5/5, EF 5/5, EE 5/5, WE 5/5,  5/5                    LEFT    LE - HF 4/5, KE 4/5, DF 5/5, PF 5/5                    RIGHT LE - HF 4/5, KE 4/5, DF 5/5, PF 5/5     Psychiatric - Calm   ----------------------------------------------------------------------------------------  ASSESSMENT/PLAN  69yFemale admitted with osteomyelitis of the lumbar spine and bacteremia resulting in lower extremity weakness and pain.   Osteomyelitis of lumbar spine and bacteremia-  Rocephin IVPB daily for 6 weeks. Blood cultures with strep, repeat blood cultures without growth, ID following.  Pain - Gabapentin 100 mg TID, MS Contin 15 mg q12, duloxetine 20 mg, Dilaudid 2mg PRN  Bowel- miralax BID, Senna qhs, dulcolax PRN, s/p mag citrate with bowel movement  Anxiety- Valium 5 mg q8, psychiatry following  Sleep- Melatonin PRN  DVT PPX - Lovenox sq   Rehab - Recommend ACUTE inpatient rehabilitation for the functional deficits consisting of 3 hours of therapy/day & 24 hour RN/daily PMR physician for comorbid medical management. Patient will be able to tolerate 3 hours a day. Short term stay to achieve functional goals of independence with mobility and ambulation. Recommend ongoing mobilization by staff to maintain cardiopulmonary function and prevention of secondary complications related to debility. Discussed with rehab team.

## 2024-10-08 NOTE — BH CONSULTATION LIAISON PROGRESS NOTE - CURRENT MEDICATION
MEDICATIONS  (STANDING):  acetaminophen     Tablet .. 650 milliGRAM(s) Oral every 8 hours  ascorbic acid 500 milliGRAM(s) Oral <User Schedule>  atorvastatin 10 milliGRAM(s) Oral at bedtime  cefTRIAXone   IVPB 2000 milliGRAM(s) IV Intermittent every 24 hours  diazepam    Tablet 5 milliGRAM(s) Oral every 8 hours  DULoxetine 20 milliGRAM(s) Oral <User Schedule>  enoxaparin Injectable 40 milliGRAM(s) SubCutaneous every 24 hours  gabapentin 100 milliGRAM(s) Oral three times a day  influenza  Vaccine (HIGH DOSE) 0.5 milliLiter(s) IntraMuscular once  lactobacillus acidophilus 1 Tablet(s) Oral two times a day with meals  morphine ER Tablet 15 milliGRAM(s) Oral every 12 hours  multivitamin 1 Tablet(s) Oral <User Schedule>  polyethylene glycol 3350 17 Gram(s) Oral two times a day  senna 2 Tablet(s) Oral at bedtime  sodium chloride 3%  Inhalation 4 milliLiter(s) Inhalation every 12 hours    MEDICATIONS  (PRN):  albuterol/ipratropium for Nebulization 3 milliLiter(s) Nebulizer every 6 hours PRN Shortness of Breath  aluminum hydroxide/magnesium hydroxide/simethicone Suspension 30 milliLiter(s) Oral every 4 hours PRN Dyspepsia  bisacodyl 5 milliGRAM(s) Oral at bedtime PRN Constipation  famotidine    Tablet 20 milliGRAM(s) Oral daily PRN GERD  HYDROmorphone   Tablet 2 milliGRAM(s) Oral every 6 hours PRN Severe Pain (7 - 10)  melatonin 3 milliGRAM(s) Oral at bedtime PRN Insomnia  ondansetron Injectable 4 milliGRAM(s) IV Push every 8 hours PRN Nausea and/or Vomiting

## 2024-10-08 NOTE — PROGRESS NOTE ADULT - ASSESSMENT
68y female with PMH significant for bronchiectasis, IgA deficiency, chronic back pain/spinal stenosis on pain meds & chronic urinary frequency who developed rigors and fever of 101.7F on 9/29/24, followed by diffuse myalgia, arthralgia and profound incapacitating lower back pain as of 9/30. Was seen in the ED, found hypotensive to 96/59 but afebrile & without leukocytosis. She was sent home after pain improved on analgesics.    She remained with generalized weakness & diffuse bodyaches & presented again on 10/02/24 w c/o persistent severe low back pain.   Found afebrile, without leukocytosis, a little hypertensive.   Admitted for w/up of back pain.   Blood cx recovered 2/4 bottles (1 set) with GBS.    Started on CTX on 10/04/24.   Reported issues of chronic urinary frequency & a feeling of incomplete bladder emptying.  UA revealed 10 WBCs & large LE, without nitrites.   Urine cx with GBS - Bacteremia was felt to originate from  source.  And given back pain soon after the symptoms of chills w fever and then bacteremia, raised high suspicion for spine infection.   MRI of spine revealed high T2 disc signal at L4-5, which may represent discitis/ osteomyelitis with phlegmon.   No support for sepsis at this time   Repeated blood cx from 10/05 - NGTD.  She is s/p PICC placement   PLAN:  Continue IV CTX - 2 gram daily.   Follow repeated blood cx.  Patient's  questions were addressed to the best of my knowledge.   She shares anxiety about taking antibiotics - but there is no other option for treating her infection was reviewed with her.    Will check CRP in AM

## 2024-10-08 NOTE — PROGRESS NOTE ADULT - ATTENDING COMMENTS
agree with above  no new complaints, pain better controlled  PICC placed today  bowel regimen increased to promote BM  repeat BCx remain negative  Acute Rehab planning

## 2024-10-08 NOTE — BH CONSULTATION LIAISON PROGRESS NOTE - NSBHCONSULTFOLLOWAFTERCARE_PSY_A_CORE FT
Pt to be transferred to acute rehab at [facility].  Pt to follow up with the undersigned in [facility] practice.

## 2024-10-08 NOTE — PROGRESS NOTE ADULT - SUBJECTIVE AND OBJECTIVE BOX
68 yr F w pmhx of spinal stenosis, osteoporosis, HLD, bronchiectasis, IgA/IgG deficiency presents with worsening of chronic low back pain    Overnight Events: None  Interval HPI: Patient seen and examined at bedside.     REVIEW OF SYSTEMS:  CONSTITUTIONAL: (-) weakness, (-) fevers, (-) chills  EYES/ENT: (-) visual changes,  (-) vertigo,  (-) throat pain   NECK:  (-) pain, (-) stiffness  RESPIRATORY:  (-) shortness of breath, (-) cough,  (-) wheezing,  (-) hemoptysis   CARDIOVASCULAR:  (-) chest pain, (-) palpitations  GASTROINTESTINAL:  (-) abdominal or epigastric pain, (-) nausea, (-) vomiting, (-) diarrhea, (-) constipation, (-) melena,  (-) hematemesis,  (-) hematochezia  GENITOURINARY: (-) dysuria, (-) frequency, (-) hematuria  NEUROLOGICAL: (-) numbness, (-) weakness  SKIN: (-) itching, (-) rashes, (-) lesions    Vital Signs Last 24 Hrs  T(C): 36.6 (08 Oct 2024 05:10), Max: 36.7 (07 Oct 2024 12:59)  T(F): 97.8 (08 Oct 2024 05:10), Max: 98.1 (07 Oct 2024 12:59)  HR: 90 (08 Oct 2024 05:10) (70 - 90)  BP: 100/64 (08 Oct 2024 05:10) (85/61 - 107/60)  BP(mean): 76 (08 Oct 2024 05:10) (76 - 76)  RR: 17 (08 Oct 2024 05:10) (16 - 17)  SpO2: 95% (08 Oct 2024 05:10) (94% - 99%)    Parameters below as of 08 Oct 2024 05:10  Patient On (Oxygen Delivery Method): room air        PHYSICAL EXAM:  GENERAL: NAD, lying in bed comfortably  HEAD:  Atraumatic, Normocephalic  CHEST/LUNG: Clear to auscultation bilaterally, good air entry bilaterally; No wheezing, rales, or rhonchi. Unlabored respirations  HEART: Regular rate and rhythm. S1 and S2. No murmurs, rubs, or gallops  ABDOMEN: Soft, Nontender, Nondistended. Bowel sounds present.   EXTREMITIES:  2+ Peripheral Pulses. No clubbing, cyanosis, or edema  NERVOUS SYSTEM:  Alert & Oriented X3, speech clear.     LABS:   All Labs Personally Reviewed                         9.6    3.86  )-----------( 305      ( 08 Oct 2024 06:35 )             28.0     10-07    138  |  100  |  10  ----------------------------<  93  4.2   |  30  |  0.59    Ca    9.9      07 Oct 2024 08:34    TPro  7.3  /  Alb  2.8[L]  /  TBili  0.7  /  DBili  x   /  AST  18  /  ALT  25  /  AlkPhos  74  10-07          Blood Culture: 10-05 @ 07:25  Organism --  Gram Stain Blood -- Gram Stain --  Specimen Source .Blood BLOOD  Culture-Blood --    10-05 @ 07:20  Organism --  Gram Stain Blood -- Gram Stain --  Specimen Source .Blood BLOOD  Culture-Blood --      I&O's Summary    CAPILLARY BLOOD GLUCOSE          RADIOLOGY/EKG:  All Imaging and EKGs Personally Reviewed     MEDICATIONS:  MEDICATIONS  (STANDING):  acetaminophen     Tablet .. 650 milliGRAM(s) Oral every 8 hours  ascorbic acid 500 milliGRAM(s) Oral <User Schedule>  atorvastatin 10 milliGRAM(s) Oral at bedtime  cefTRIAXone   IVPB 2000 milliGRAM(s) IV Intermittent every 24 hours  diazepam    Tablet 5 milliGRAM(s) Oral every 8 hours  DULoxetine 20 milliGRAM(s) Oral <User Schedule>  enoxaparin Injectable 40 milliGRAM(s) SubCutaneous every 24 hours  gabapentin 100 milliGRAM(s) Oral three times a day  influenza  Vaccine (HIGH DOSE) 0.5 milliLiter(s) IntraMuscular once  lactobacillus acidophilus 1 Tablet(s) Oral two times a day with meals  morphine ER Tablet 15 milliGRAM(s) Oral every 12 hours  multivitamin 1 Tablet(s) Oral <User Schedule>  polyethylene glycol 3350 17 Gram(s) Oral two times a day  senna 2 Tablet(s) Oral at bedtime  sodium chloride 3%  Inhalation 4 milliLiter(s) Inhalation every 12 hours     68 yr F w pmhx of spinal stenosis, osteoporosis, HLD, bronchiectasis, IgA/IgG deficiency presents with worsening of chronic low back pain    Overnight Events: None  Interval HPI: Patient seen and examined at bedside. Patient feeling better today, ambulating in room. Hip/lower back pain still present, decreased from prior    REVIEW OF SYSTEMS:  Negative, as per HPI, Hip/lower back pain still present, decreased from prior    Vital Signs Last 24 Hrs  T(C): 36.6 (08 Oct 2024 05:10), Max: 36.7 (07 Oct 2024 12:59)  T(F): 97.8 (08 Oct 2024 05:10), Max: 98.1 (07 Oct 2024 12:59)  HR: 90 (08 Oct 2024 05:10) (70 - 90)  BP: 100/64 (08 Oct 2024 05:10) (85/61 - 107/60)  BP(mean): 76 (08 Oct 2024 05:10) (76 - 76)  RR: 17 (08 Oct 2024 05:10) (16 - 17)  SpO2: 95% (08 Oct 2024 05:10) (94% - 99%)    Parameters below as of 08 Oct 2024 05:10  Patient On (Oxygen Delivery Method): room air      PHYSICAL EXAM:  GENERAL: NAD, Sitting in chair comfortably  HEAD:  Atraumatic, Normocephalic  CHEST/LUNG: Clear to auscultation bilaterally, good air entry bilaterally; No wheezing, rales, or rhonchi. Unlabored respirations  HEART: Regular rate and rhythm. S1 and S2. No murmurs, rubs, or gallops  ABDOMEN: Soft, Nontender, Nondistended.   EXTREMITIES:  2+ Peripheral Pulses. No clubbing, cyanosis, or edema  NERVOUS SYSTEM:  Alert & Oriented X3, speech clear.     LABS:   All Labs Personally Reviewed                         9.6    3.86  )-----------( 305      ( 08 Oct 2024 06:35 )             28.0     10-07    138  |  100  |  10  ----------------------------<  93  4.2   |  30  |  0.59    Ca    9.9      07 Oct 2024 08:34    TPro  7.3  /  Alb  2.8[L]  /  TBili  0.7  /  DBili  x   /  AST  18  /  ALT  25  /  AlkPhos  74  10-07          Blood Culture: 10-05 @ 07:25  Organism --  Gram Stain Blood -- Gram Stain --  Specimen Source .Blood BLOOD  Culture-Blood --    10-05 @ 07:20  Organism --  Gram Stain Blood -- Gram Stain --  Specimen Source .Blood BLOOD  Culture-Blood --      I&O's Summary    CAPILLARY BLOOD GLUCOSE      MEDICATIONS:  MEDICATIONS  (STANDING):  acetaminophen     Tablet .. 650 milliGRAM(s) Oral every 8 hours  ascorbic acid 500 milliGRAM(s) Oral <User Schedule>  atorvastatin 10 milliGRAM(s) Oral at bedtime  cefTRIAXone   IVPB 2000 milliGRAM(s) IV Intermittent every 24 hours  diazepam    Tablet 5 milliGRAM(s) Oral every 8 hours  DULoxetine 20 milliGRAM(s) Oral <User Schedule>  enoxaparin Injectable 40 milliGRAM(s) SubCutaneous every 24 hours  gabapentin 100 milliGRAM(s) Oral three times a day  influenza  Vaccine (HIGH DOSE) 0.5 milliLiter(s) IntraMuscular once  lactobacillus acidophilus 1 Tablet(s) Oral two times a day with meals  morphine ER Tablet 15 milliGRAM(s) Oral every 12 hours  multivitamin 1 Tablet(s) Oral <User Schedule>  polyethylene glycol 3350 17 Gram(s) Oral two times a day  senna 2 Tablet(s) Oral at bedtime  sodium chloride 3%  Inhalation 4 milliLiter(s) Inhalation every 12 hours

## 2024-10-08 NOTE — PROCEDURAL SAFETY CHECKLIST WITH OR WITHOUT SEDATION - NSPROCEDPERFORMDFREE_GEN_ALL_CORE
Dear Dr. Tong,     In the absence of Poncho Mobley MD, can you please address this patients concern or request?    Pt requesting refill on Valium 5 mg   #30  Last refill 1.26.22  LOV 2.9.23    Thank you,  Yara Maguire LPN       PICC LINe Placement

## 2024-10-08 NOTE — BH CONSULTATION LIAISON PROGRESS NOTE - NSBHCHARTREVIEWVS_PSY_A_CORE FT
Vital Signs Last 24 Hrs  T(C): 36.9 (08 Oct 2024 11:54), Max: 36.9 (08 Oct 2024 11:54)  T(F): 98.4 (08 Oct 2024 11:54), Max: 98.4 (08 Oct 2024 11:54)  HR: 100 (08 Oct 2024 11:54) (87 - 100)  BP: 103/70 (08 Oct 2024 11:54) (96/63 - 104/68)  BP(mean): 76 (08 Oct 2024 05:10) (76 - 76)  RR: 18 (08 Oct 2024 11:54) (16 - 18)  SpO2: 98% (08 Oct 2024 11:54) (95% - 98%)    Parameters below as of 08 Oct 2024 11:54  Patient On (Oxygen Delivery Method): room air

## 2024-10-08 NOTE — BH CONSULTATION LIAISON PROGRESS NOTE - NSBHCONSULTMEDSLEEP_PSY_A_CORE FT
consider Ramelteon while in the hospital for sleep 8 mg at bedtime, can consider augmentation with Mirtazapine.

## 2024-10-08 NOTE — BH CONSULTATION LIAISON PROGRESS NOTE - NSBHFUPINTERVALHXFT_PSY_A_CORE
HPI:  68 yr F w pmhx of spinal stenosis, osteoporosis, HLD, IgA deficiency presents with intractable low back pain. Decrease the pain as sharp low back pain radiating to her legs, worse to her right. Reports had a MRI outpt about 2 weeks which showed significant degenerative disc disease and spinal stenosis, no acute fracture. Reports has been doing aggressive yoga training lately and incorporated spine twisting moves that may have exacerbated her symptoms. Also reports currently in a lot social stress. Denies urinary or bowel incontinence. Pt was seen in Ed 2 days ago with similar symptoms. Reports pain improvement after receiving regimen in ED, was discharge on pain medication and prednisone. Reports started to worsen after the ED medications wore off. Pt has been following with specialists in regards to spinal stenosis. It was recommend patient would benefit from surgery.    (02 Oct 2024 15:03)    Psychiatry C/L Note: Pt seen and evaluated today, her sister present at the bedside. Brief discussion of current nuclear and extended family dynamics and their   antecedents in family of origin. Pt reports some guilt about not being able to take her grand daughter to gymnastics. Encouraged a shift in focus for her own health and recovery. Pt tolerating Duloxetine ( Fetzima non formulary). Pt may have some benefits in terms of neuropathic pain. Duloxetine also indicated for depression and anxiety. Will continue to monitor response to medication. Pt looking forward to acute rehab, will require many weeks of IV antibiotics for osteomyelitis. Bun/Creatinine acceptable to continue current regiment.

## 2024-10-08 NOTE — PROCEDURE NOTE - NSICDXPROCEDURE_GEN_ALL_CORE_FT
PROCEDURES:  Insertion, PICC line with port, age 5 years or older 08-Oct-2024 12:12:26  Jimena Burns N

## 2024-10-08 NOTE — PROGRESS NOTE ADULT - ASSESSMENT
68 yr F w pmhx of spinal stenosis, osteoporosis, HLD, bronchiectasis, IgA/IgG deficiency presents with worsening of chronic low back pain      #Intractable back pain - Chronic with acute worsening  #Acute Osteomyelitis of lumbar spine  - Patient established care with specialist, surgery recommended but delayed due to osteoporosis  - CT lumbar: No evidence of an acute lumbar spine fracture. Multilevel degenerative changes greatest at L4-5 with severe spinal canal narrowing  - PT and PM&R consulted  - MRI lumbar spine - osteomyelitis  - Optimize pain control, c/w dilaudid, gabapentin, valium, duloxetine, MS Contin  - Discussed with spine surg Dr. Lucas Garcia at [facility]; recommended MRI with contrast, IV abx. Patient not considered suitable candidate for spinal surgery at this time and advised to follow up with outpatient spine surgery.  - Ortho spine consult: MRI with IV contrast; if + for infection, c/w IV abx longterm. Immediate intervention if new weakness/numbness, urinary/bowel incontinence, and neuro deficits seen; no concerning findings; No surgical intervention at this time for severe spinal stenosis d/t concern of bacteremia; Outpt surgical intervention  - ID recs appreciated, long term IV abx (6 weeks)  - 10/5 Repeat Bcx NG@48h  - Increased gabapentin to 300 TID   - per ICU can get PICC line today 10/8  - f/u ID    #Blood cultures +ve - Bacteremia  #+ve UA/Ucx, asymptomatic  - US kidney/bladder, negative renal US, PVR 184cc  - 1x positive for group B strep, Bcx 2nd set negative   - ID recs appreciated, c/w CTX, UCx +ve for gram positive cocci in pairs and chains  - repeat BCx sent on 10/5 NG@24h    #Anemia  - Patient reports anemia at baseline  - Hgb 9.6 today - stable (12.1 on admission)  - Monitor  - Keep Type and screen uptodate  - F/u am labs: folate, b12, iron studies    #IgG and IgA deficiency  #hx of bronchietasis  - satting well on RA  - C/w saline duonebs PRN    #Depression/Anxiety  - on Fetzima at home, non-fomulary  - On valium PRN  - c/w Duloxetine    #DVT prophylaxis  - Lovenox    #GOC  - Full code    d/w Dr Buckley 68 yr F w pmhx of spinal stenosis, osteoporosis, HLD, bronchiectasis, IgA/IgG deficiency presents with worsening of chronic low back pain      #Intractable back pain - Chronic with acute worsening  #Acute Osteomyelitis of lumbar spine  - Patient established care with specialist, surgery recommended but delayed due to osteoporosis  - CT lumbar: No evidence of an acute lumbar spine fracture. Multilevel degenerative changes greatest at L4-5 with severe spinal canal narrowing  - PT and PM&R consulted  - MRI lumbar spine - osteomyelitis  - Optimize pain control, c/w dilaudid, gabapentin, valium, duloxetine, MS Contin  - Discussed with spine surg Dr. Lucas Garcia at [facility]; recommended MRI with contrast, IV abx. Patient not considered suitable candidate for spinal surgery at this time and advised to follow up with outpatient spine surgery.  - Ortho spine consult: MRI with IV contrast; if + for infection, c/w IV abx longterm. Immediate intervention if new weakness/numbness, urinary/bowel incontinence, and neuro deficits seen; no concerning findings; No surgical intervention at this time for severe spinal stenosis d/t concern of bacteremia; Outpt surgical intervention  - ID recs appreciated, long term IV abx (6 weeks)  - 10/5 Repeat Bcx NG@48h  - C/w gabapentin 100mg TID (patient did not tolerate 300mg dose)  - per ICU can get PICC line today 10/8  - f/u ID recommendations    #Blood cultures +ve - Bacteremia  #+ve UA/Ucx, asymptomatic  - US kidney/bladder, negative renal US, PVR 184cc  - 1x positive for group B strep, Bcx 2nd set negative   - ID recs appreciated, long term IV abx (6 weeks)  - repeat BCx sent on 10/5 NG@48hr     #Anemia  - Patient reports anemia at baseline  - Hgb 9.6 today - stable (12.1 on admission)  - Monitor  - Keep Type and screen uptodate  - F/u am labs: folate, b12, iron studies    #IgG and IgA deficiency  #hx of bronchietasis  - satting well on RA  - C/w saline duonebs PRN    #Depression/Anxiety  - on Fetzima at home, non-fomulary  - On valium PRN  - c/w Duloxetine    #DVT prophylaxis  - Lovenox    #GOC  - Full code    d/w Dr Buckley 68 yr F w pmhx of spinal stenosis, osteoporosis, HLD, bronchiectasis, IgA/IgG deficiency presents with worsening of chronic low back pain      #Intractable back pain - Chronic with acute worsening  #Acute Osteomyelitis of lumbar spine  - Patient established care with specialist, surgery recommended but delayed due to osteoporosis  - CT lumbar: No evidence of an acute lumbar spine fracture. Multilevel degenerative changes greatest at L4-5 with severe spinal canal narrowing  - MRI lumbar spine - osteomyelitis  - Discussed with spine surg Dr. Lucas Garcia at [facility]; recommended MRI with contrast, IV abx. Patient not considered suitable candidate for spinal surgery at this time and advised to follow up with outpatient spine surgery.  - Ortho spine consult: MRI with IV contrast; if + for infection, c/w IV abx longterm. Immediate intervention if new weakness/numbness, urinary/bowel incontinence, and neuro deficits seen; no concerning findings; No surgical intervention at this time for severe spinal stenosis d/t concern of bacteremia; Outpt surgical intervention  - ID recs appreciated, long term IV abx (6 weeks)  - 10/5 Repeat Bcx NG@48h  - C/w gabapentin 100mg TID (patient did not tolerate 300mg dose)  - per ICU can get PICC line today 10/8  - f/u ID recommendations  - Optimize pain control, switched to oral dilaudid. C/w gabapentin, valium PRN, duloxetine, MS Contin    #Blood cultures +ve - Bacteremia  #+ve UA/Ucx, asymptomatic  - US kidney/bladder, negative renal US, PVR 184cc  - 1x positive for group B strep, Bcx 2nd set negative   - ID recs appreciated, long term IV abx (6 weeks)  - repeat BCx sent on 10/5 NG@48hr     #Anemia  - Patient reports anemia at baseline  - Hgb 9.6 today - stable (12.1 on admission)  - Monitor  - Keep Type and screen uptodate  - F/u am labs: folate, b12, iron studies    #IgG and IgA deficiency  #hx of bronchietasis  - satting well on RA  - C/w saline duonebs PRN    #Depression/Anxiety  - on Fetzima at home, non-fomulary  - On valium PRN  - c/w Duloxetine    #DVT prophylaxis  - Lovenox    #GOC  - Full code    d/w Dr Buckley 68 yr F w pmhx of spinal stenosis, osteoporosis, HLD, bronchiectasis, IgA/IgG deficiency presents with worsening of chronic low back pain      #Intractable back pain - Chronic with acute worsening  #Acute Osteomyelitis of lumbar spine  - Patient established care with specialist, surgery recommended but delayed due to osteoporosis  - CT lumbar: No evidence of an acute lumbar spine fracture. Multilevel degenerative changes greatest at L4-5 with severe spinal canal narrowing  - MRI lumbar spine - osteomyelitis  - Discussed with spine surg Dr. Lucas Garcia at [facility]; recommended MRI with contrast, IV abx. Patient not considered suitable candidate for spinal surgery at this time and advised to follow up with outpatient spine surgery.  - Ortho spine consult: MRI with IV contrast; if + for infection, c/w IV abx longterm. Immediate intervention if new weakness/numbness, urinary/bowel incontinence, and neuro deficits seen; no concerning findings; No surgical intervention at this time for severe spinal stenosis d/t concern of bacteremia; Outpt surgical intervention  - ID recs appreciated, long term IV abx (6 weeks)  - 10/5 Repeat Bcx NGTD@48h  - C/w gabapentin 100mg TID (patient did not tolerate 300mg dose)  - per ICU can get PICC line today 10/8  - Optimize pain control, switched to oral dilaudid. C/w gabapentin, valium , duloxetine, MS Contin BID    #Blood cultures +ve - Bacteremia  #+ve UA/Ucx, asymptomatic  - US kidney/bladder, negative renal US, PVR 184cc  - 1x positive for group B strep, Bcx 2nd set negative   - ID recs appreciated, long term IV abx (6 weeks)  - repeat BCx sent on 10/5 NGTD@48hr     #Constipation  -likely 2/2 pain control regimen  -pt initially refusing bowel regimen  -10/7 increased miralax to BID  -continue senna qHS  -dulcolax PRN  -10/8- Mg citrate x 1 now    #Anemia  - Patient reports anemia at baseline  - Hgb 9.6 today - stable (12.1 on admission)  - Monitor  - Keep Type and screen uptodate  - F/u am labs: folate, b12, iron studies    #IgG and IgA deficiency  #hx of bronchietasis  - satting well on RA  - C/w saline duonebs PRN    #Depression/Anxiety  - on Fetzima at home, non-fomulary  - On valium PRN  - c/w Duloxetine    #DVT prophylaxis  - Lovenox    #GOC  - Full code    d/w patient at bedside    d/w Dr Buckley

## 2024-10-08 NOTE — PROGRESS NOTE ADULT - SUBJECTIVE AND OBJECTIVE BOX
CC: f/u for GBS bacteremia    Patient reports: anxiety, waiting for valium to work and psychiatry to assist    REVIEW OF SYSTEMS:  All other review of systems negative (Comprehensive ROS)    Antimicrobials Day #  :day 4  cefTRIAXone   IVPB 2000 milliGRAM(s) IV Intermittent every 24 hours    Other Medications Reviewed    T(F): 98.4 (10-08-24 @ 11:54), Max: 98.4 (10-08-24 @ 11:54)  HR: 100 (10-08-24 @ 11:54)  BP: 103/70 (10-08-24 @ 11:54)  RR: 18 (10-08-24 @ 11:54)  SpO2: 98% (10-08-24 @ 11:54)  Wt(kg): --    PHYSICAL EXAM:  General: alert, no acute distress  Eyes:  anicteric, no conjunctival injection, no discharge  Oropharynx: no lesions or injection 	  Neck: supple, without adenopathy  Lungs: clear to auscultation  Heart: regular rate and rhythm; no murmur, rubs or gallops  Abdomen: soft, nondistended, nontender, without mass or organomegaly  Skin: no lesions  Extremities: no clubbing, cyanosis, or edema  Neurologic: alert, oriented, moves all extremities    LAB RESULTS:                        9.6    3.86  )-----------( 305      ( 08 Oct 2024 06:35 )             28.0     10-08    139  |  102  |  13  ----------------------------<  95  4.3   |  30  |  0.58    Ca    9.6      08 Oct 2024 06:35    TPro  6.5  /  Alb  2.6[L]  /  TBili  0.6  /  DBili  x   /  AST  28  /  ALT  32  /  AlkPhos  69  10-08    LIVER FUNCTIONS - ( 08 Oct 2024 06:35 )  Alb: 2.6 g/dL / Pro: 6.5 g/dL / ALK PHOS: 69 U/L / ALT: 32 U/L / AST: 28 U/L / GGT: x                 MICROBIOLOGY:  RECENT CULTURES:  10-05 @ 07:25 .Blood BLOOD     No growth at 72 Hours      10-05 @ 07:20 .Blood BLOOD     No growth at 72 Hours          RADIOLOGY REVIEWED:  < from: Xray Chest 1 View- PORTABLE-Urgent (Xray Chest 1 View- PORTABLE-Urgent .) (10.08.24 @ 12:17) >  IMPRESSION: Mild right base atelectasis at this time. Left PICC line has   tip going up into the right innominate vein.    < end of copied text >  < from: MR Lumbar Spine w/wo IV Cont (10.04.24 @ 10:16) >  IMPRESSION:  Nonspecific endplate edema/enhancement and high T2 disc signal at L4-5 as   described above which may represent discitis/ osteomyelitis with phlegmon   in the correct clinical setting. No loculated fluid collection in the   paraspinal or epidural spaces to suggest abscess collection. Correlate   clinically.    Multilevel degenerative changes resulting in multilevel spinal canal   stenosis and neural foraminal narrowing as described above. Severe spinal   canal stenosis with compression of the nerve roots and severe right,   moderate left neural foraminal narrowing at L4-5.    < end of copied text >

## 2024-10-09 LAB
ANION GAP SERPL CALC-SCNC: 9 MMOL/L — SIGNIFICANT CHANGE UP (ref 5–17)
BUN SERPL-MCNC: 16 MG/DL — SIGNIFICANT CHANGE UP (ref 7–23)
CALCIUM SERPL-MCNC: 9.2 MG/DL — SIGNIFICANT CHANGE UP (ref 8.4–10.5)
CHLORIDE SERPL-SCNC: 104 MMOL/L — SIGNIFICANT CHANGE UP (ref 96–108)
CO2 SERPL-SCNC: 29 MMOL/L — SIGNIFICANT CHANGE UP (ref 22–31)
CREAT SERPL-MCNC: 0.76 MG/DL — SIGNIFICANT CHANGE UP (ref 0.5–1.3)
CRP SERPL-MCNC: 37 MG/L — HIGH
CRP SERPL-MCNC: 43 MG/L — HIGH
EGFR: 86 ML/MIN/1.73M2 — SIGNIFICANT CHANGE UP
GLUCOSE SERPL-MCNC: 92 MG/DL — SIGNIFICANT CHANGE UP (ref 70–99)
HCT VFR BLD CALC: 28.6 % — LOW (ref 34.5–45)
HGB BLD-MCNC: 9.9 G/DL — LOW (ref 11.5–15.5)
MCHC RBC-ENTMCNC: 29.2 PG — SIGNIFICANT CHANGE UP (ref 27–34)
MCHC RBC-ENTMCNC: 34.6 GM/DL — SIGNIFICANT CHANGE UP (ref 32–36)
MCV RBC AUTO: 84.4 FL — SIGNIFICANT CHANGE UP (ref 80–100)
NRBC # BLD: 0 /100 WBCS — SIGNIFICANT CHANGE UP (ref 0–0)
PLATELET # BLD AUTO: 324 K/UL — SIGNIFICANT CHANGE UP (ref 150–400)
POTASSIUM SERPL-MCNC: 4.1 MMOL/L — SIGNIFICANT CHANGE UP (ref 3.5–5.3)
POTASSIUM SERPL-SCNC: 4.1 MMOL/L — SIGNIFICANT CHANGE UP (ref 3.5–5.3)
RBC # BLD: 3.39 M/UL — LOW (ref 3.8–5.2)
RBC # FLD: 12.6 % — SIGNIFICANT CHANGE UP (ref 10.3–14.5)
SODIUM SERPL-SCNC: 142 MMOL/L — SIGNIFICANT CHANGE UP (ref 135–145)
WBC # BLD: 3.73 K/UL — LOW (ref 3.8–10.5)
WBC # FLD AUTO: 3.73 K/UL — LOW (ref 3.8–10.5)

## 2024-10-09 PROCEDURE — 99233 SBSQ HOSP IP/OBS HIGH 50: CPT

## 2024-10-09 PROCEDURE — 71045 X-RAY EXAM CHEST 1 VIEW: CPT | Mod: 26

## 2024-10-09 PROCEDURE — 99232 SBSQ HOSP IP/OBS MODERATE 35: CPT | Mod: GC

## 2024-10-09 RX ORDER — DIAZEPAM 10 MG/1
5 TABLET ORAL ONCE
Refills: 0 | Status: DISCONTINUED | OUTPATIENT
Start: 2024-10-09 | End: 2024-10-09

## 2024-10-09 RX ORDER — HYDROMORPHONE HYDROCHLORIDE 1 MG/ML
0.5 INJECTION, SOLUTION INTRAMUSCULAR; INTRAVENOUS; SUBCUTANEOUS ONCE
Refills: 0 | Status: DISCONTINUED | OUTPATIENT
Start: 2024-10-09 | End: 2024-10-09

## 2024-10-09 RX ORDER — MORPHINE SULFATE 30 MG/1
15 TABLET, FILM COATED, EXTENDED RELEASE ORAL EVERY 12 HOURS
Refills: 0 | Status: DISCONTINUED | OUTPATIENT
Start: 2024-10-12 | End: 2024-10-11

## 2024-10-09 RX ADMIN — Medication 500 MILLIGRAM(S): at 22:10

## 2024-10-09 RX ADMIN — Medication 20 MILLIGRAM(S): at 05:50

## 2024-10-09 RX ADMIN — Medication 650 MILLIGRAM(S): at 22:11

## 2024-10-09 RX ADMIN — HYDROMORPHONE HYDROCHLORIDE 0.5 MILLIGRAM(S): 1 INJECTION, SOLUTION INTRAMUSCULAR; INTRAVENOUS; SUBCUTANEOUS at 02:45

## 2024-10-09 RX ADMIN — DIAZEPAM 5 MILLIGRAM(S): 10 TABLET ORAL at 05:49

## 2024-10-09 RX ADMIN — Medication 650 MILLIGRAM(S): at 05:50

## 2024-10-09 RX ADMIN — Medication 650 MILLIGRAM(S): at 15:27

## 2024-10-09 RX ADMIN — Medication 1 TABLET(S): at 16:40

## 2024-10-09 RX ADMIN — ENOXAPARIN SODIUM 40 MILLIGRAM(S): 150 INJECTION SUBCUTANEOUS at 14:52

## 2024-10-09 RX ADMIN — MULTI VITAMIN/MINERAL SUPPLEMENT WITH ASCORBIC ACID, NIACIN, PYRIDOXINE, PANTOTHENIC ACID, FOLIC ACID, RIBOFLAVIN, THIAMIN, BIOTIN, COBALAMIN AND ZINC. 1 TABLET(S): 60; 20; 12.5; 10; 10; 1.7; 1.5; 1; .3; .006 TABLET, COATED ORAL at 22:11

## 2024-10-09 RX ADMIN — GABAPENTIN 100 MILLIGRAM(S): 800 TABLET, FILM COATED ORAL at 05:49

## 2024-10-09 RX ADMIN — MORPHINE SULFATE 15 MILLIGRAM(S): 30 TABLET, FILM COATED, EXTENDED RELEASE ORAL at 23:10

## 2024-10-09 RX ADMIN — HYDROMORPHONE HYDROCHLORIDE 0.5 MILLIGRAM(S): 1 INJECTION, SOLUTION INTRAMUSCULAR; INTRAVENOUS; SUBCUTANEOUS at 01:49

## 2024-10-09 RX ADMIN — Medication 20 MILLIGRAM(S): at 16:41

## 2024-10-09 RX ADMIN — Medication 650 MILLIGRAM(S): at 23:10

## 2024-10-09 RX ADMIN — MORPHINE SULFATE 15 MILLIGRAM(S): 30 TABLET, FILM COATED, EXTENDED RELEASE ORAL at 09:37

## 2024-10-09 RX ADMIN — Medication 1 TABLET(S): at 09:38

## 2024-10-09 RX ADMIN — DIAZEPAM 5 MILLIGRAM(S): 10 TABLET ORAL at 22:10

## 2024-10-09 RX ADMIN — Medication 650 MILLIGRAM(S): at 06:45

## 2024-10-09 RX ADMIN — Medication 100 MILLIGRAM(S): at 14:55

## 2024-10-09 RX ADMIN — ATORVASTATIN CALCIUM 10 MILLIGRAM(S): 10 TABLET, FILM COATED ORAL at 22:10

## 2024-10-09 RX ADMIN — GABAPENTIN 100 MILLIGRAM(S): 800 TABLET, FILM COATED ORAL at 14:51

## 2024-10-09 RX ADMIN — GABAPENTIN 100 MILLIGRAM(S): 800 TABLET, FILM COATED ORAL at 22:10

## 2024-10-09 RX ADMIN — DIAZEPAM 5 MILLIGRAM(S): 10 TABLET ORAL at 16:40

## 2024-10-09 RX ADMIN — Medication 4 MILLILITER(S): at 20:52

## 2024-10-09 RX ADMIN — MORPHINE SULFATE 15 MILLIGRAM(S): 30 TABLET, FILM COATED, EXTENDED RELEASE ORAL at 23:50

## 2024-10-09 NOTE — PROGRESS NOTE ADULT - ASSESSMENT
68 yr F w pmhx of spinal stenosis, osteoporosis, HLD, bronchiectasis, IgA/IgG deficiency presents with worsening of chronic low back pain      #Intractable back pain - Chronic with acute worsening  #Acute Osteomyelitis of lumbar spine  - Patient established care with specialist, surgery recommended but delayed due to osteoporosis  - CT lumbar: No evidence of an acute lumbar spine fracture. Multilevel degenerative changes greatest at L4-5 with severe spinal canal narrowing  - MRI lumbar spine - osteomyelitis  - Discussed with spine surg Dr. Lucas Garcia at [facility]; recommended MRI with contrast, IV abx. Patient not considered suitable candidate for spinal surgery at this time and advised to follow up with outpatient spine surgery.  - Ortho spine consult: MRI with IV contrast; if + for infection, c/w IV abx longterm. Immediate intervention if new weakness/numbness, urinary/bowel incontinence, and neuro deficits seen; no concerning findings; No surgical intervention at this time for severe spinal stenosis d/t concern of bacteremia; Outpt surgical intervention  - ID recs appreciated, long term IV abx (6 weeks)  - 10/5 Repeat Bcx NGTD@48h  - Optimize pain control, switched to oral dilaudid. C/w gabapentin, valium , duloxetine, MS Contin BID  - Shuttle to [facility] today to picc line by IR (10/9/24)    #Blood cultures +ve - Bacteremia  #+ve UA/Ucx, asymptomatic  - US kidney/bladder, negative renal US, PVR 184cc  - 1x positive for group B strep, Bcx 2nd set negative   - ID recs appreciated, long term IV abx (6 weeks)  - repeat BCx sent on 10/5 NGTD@48hr     #Constipation  -likely 2/2 pain control regimen  -pt initially refusing bowel regimen  -10/7 increased miralax to BID  -continue senna qHS  -dulcolax PRN  -10/8- Mg citrate x 1 now    #Anemia  - Patient reports anemia at baseline  - Hgb 9.6 today - stable (12.1 on admission)  - Monitor  - Keep Type and screen uptodate  - F/u am labs: folate, b12, iron studies    #IgG and IgA deficiency  #hx of bronchietasis  - satting well on RA  - C/w saline duonebs PRN    #Depression/Anxiety  - on Fetzima at home, non-fomulary  - On valium PRN  - c/w Duloxetine    #DVT prophylaxis  - Lovenox    #GOC  - Full code    d/w patient at bedside    d/w Dr Buckley 68 yr F w pmhx of spinal stenosis, osteoporosis, HLD, bronchiectasis, IgA/IgG deficiency presents with worsening of chronic low back pain      #Intractable back pain - Chronic with acute worsening  #Acute Osteomyelitis of lumbar spine  - Patient established care with specialist, surgery recommended but delayed due to osteoporosis  - CT lumbar: No evidence of an acute lumbar spine fracture. Multilevel degenerative changes greatest at L4-5 with severe spinal canal narrowing  - MRI lumbar spine - osteomyelitis  - Discussed with spine surg Dr. Lucas Garcia at [facility]; recommended MRI with contrast, IV abx. Patient not considered suitable candidate for spinal surgery at this time and advised to follow up with outpatient spine surgery.  - Ortho spine consult: MRI with IV contrast; if + for infection, c/w IV abx longterm. Immediate intervention if new weakness/numbness, urinary/bowel incontinence, and neuro deficits seen; no concerning findings; No surgical intervention at this time for severe spinal stenosis d/t concern of bacteremia; Outpt surgical intervention  - ID recs appreciated, long term IV abx (6 weeks)  - 10/5 Repeat Bcx NGTD@48h  - Optimize pain control, switched to oral dilaudid. C/w gabapentin, valium , duloxetine, MS Contin BID  - Shuttle to [facility] today to picc line by IR (10/9/24) - Cancelled, Picc line was flushed with 10cc saline x2 at bedside as per IR's recommendation, repeat Xray showed correct placement of line after flushes    #Blood cultures +ve - Bacteremia  #+ve UA/Ucx, asymptomatic  - US kidney/bladder, negative renal US, PVR 184cc  - 1x positive for group B strep, Bcx 2nd set negative   - ID recs appreciated, long term IV abx (6 weeks)  - repeat BCx sent on 10/5 NGTD@48hr     #Constipation  -likely 2/2 pain control regimen  -pt initially refusing bowel regimen  -10/7 increased miralax to BID  -continue senna qHS  -dulcolax PRN  -10/8- Mg citrate x 1 now    #Anemia  - Patient reports anemia at baseline  - Hgb 9.9 today - stable (12.1 on admission)  - Monitor  - Keep Type and screen uptodate  - folate, b12, iron studies    #IgG and IgA deficiency  #hx of bronchietasis  - satting well on RA  - C/w saline duonebs PRN    #Depression/Anxiety  - on Fetzima at home, non-fomulary  - On valium PRN  - c/w Duloxetine    #DVT prophylaxis  - Lovenox    #GOC  - Full code    d/w patient at bedside    d/w Dr Buckley 68 yr F w pmhx of spinal stenosis, osteoporosis, HLD, bronchiectasis, IgA/IgG deficiency presents with worsening of chronic low back pain      #Intractable back pain - Chronic with acute worsening  #Acute Osteomyelitis of lumbar spine  - Patient established care with specialist, surgery recommended but delayed due to osteoporosis  - CT lumbar: No evidence of an acute lumbar spine fracture. Multilevel degenerative changes greatest at L4-5 with severe spinal canal narrowing  - MRI lumbar spine - osteomyelitis  - Discussed with spine surg Dr. Lucas Garcia at [facility]; recommended MRI with contrast, IV abx. Patient not considered suitable candidate for spinal surgery at this time and advised to follow up with outpatient spine surgery.  - Ortho spine consult: MRI with IV contrast; if + for infection, c/w IV abx longterm. Immediate intervention if new weakness/numbness, urinary/bowel incontinence, and neuro deficits seen; no concerning findings; No surgical intervention at this time for severe spinal stenosis d/t concern of bacteremia; Outpt surgical intervention  - ID recs appreciated, long term IV abx (6 weeks)  - 10/5 Repeat Bcx NGTD@72h  - Optimize pain control, switched to oral dilaudid. C/w gabapentin, valium , duloxetine, MS Contin BID  - PICC line was flushed with 10cc saline x2 at bedside as per IR's recommendation, repeat Xray showed correct placement of LUE PICC.    #Blood cultures +ve - Bacteremia  #+ve UA/Ucx, asymptomatic  - US kidney/bladder, negative renal US, PVR 184cc  - 1x positive for group B strep, Bcx 2nd set negative   - ID recs appreciated, long term IV abx (6 weeks)  - repeat BCx sent on 10/5 NGTD@72hr     #Constipation  -likely 2/2 pain control regimen  -pt initially refusing bowel regimen  -10/7 increased miralax to BID  -continue senna qHS  -dulcolax PRN  -10/8- Mg citrate x 1 now    #Anemia  - Patient reports anemia at baseline  - Hgb stable (12.1 on admission)  - Monitor  - Keep Type and screen uptodate    #IgG and IgA deficiency  #hx of bronchietasis  - satting well on RA  - C/w saline duonebs PRN    #Depression/Anxiety  - on Fetzima at home, non-fomulary  - On valium PRN  - c/w Duloxetine    #DVT prophylaxis  - Lovenox    #GOC  - Full code    d/w patient at bedside as well as -Edgar Whitmore    dispo: awaiting auth for acute rehab    d/w Dr Buckley

## 2024-10-09 NOTE — PROGRESS NOTE ADULT - SUBJECTIVE AND OBJECTIVE BOX
CC: f/u for GBS bacteremia & discitis/ OM of L4-5 spine    Patient reports that she has back pain radiating to her pelvis. No fevers or chills.     REVIEW OF SYSTEMS:  All other review of systems negative (Comprehensive ROS)    Antimicrobials Day #  : 5/42  cefTRIAXone   IVPB 2000 milliGRAM(s) IV Intermittent every 24 hours    Other Medications Reviewed    T(F): 97.5 (10-09-24 @ 13:13), Max: 98 (10-08-24 @ 20:17)  HR: 90 (10-09-24 @ 13:13)  BP: 90/65 (10-09-24 @ 13:13)  RR: 16 (10-09-24 @ 13:13)  SpO2: 95% (10-09-24 @ 13:13)  Wt(kg): --    PHYSICAL EXAM:  General: alert, no acute distress  Eyes:  anicteric, no conjunctival injection, no discharge  Neck: supple  Lungs: clear to auscultation  Heart: regular rate and rhythm; no murmurs  Abdomen: soft, nondistended, nontender  Skin: no lesions  Extremities: no clubbing, cyanosis, or edema.                   left arm PICC +  Neurologic: alert, oriented, moves all extremities    LAB RESULTS:                        9.9    3.73  )-----------( 324      ( 09 Oct 2024 06:37 )             28.6     10-09    142  |  104  |  16  ----------------------------<  92  4.1   |  29  |  0.76    Ca    9.2      09 Oct 2024 06:37    TPro  6.5  /  Alb  2.6[L]  /  TBili  0.6  /  DBili  x   /  AST  28  /  ALT  32  /  AlkPhos  69  10-08    LIVER FUNCTIONS - ( 08 Oct 2024 06:35 )  Alb: 2.6 g/dL / Pro: 6.5 g/dL / ALK PHOS: 69 U/L / ALT: 32 U/L / AST: 28 U/L / GGT: x           Urinalysis Basic - ( 09 Oct 2024 06:37 )    Color: x / Appearance: x / SG: x / pH: x  Gluc: 92 mg/dL / Ketone: x  / Bili: x / Urobili: x   Blood: x / Protein: x / Nitrite: x   Leuk Esterase: x / RBC: x / WBC x   Sq Epi: x / Non Sq Epi: x / Bacteria: x      MICROBIOLOGY:  RECENT CULTURES:  10-05 @ 07:25 .Blood BLOOD     No growth at 4 days      10-05 @ 07:20 .Blood BLOOD     No growth at 4 days                RADIOLOGY REVIEWED:

## 2024-10-09 NOTE — PROGRESS NOTE ADULT - ASSESSMENT
68y female with PMH significant for bronchiectasis, IgA deficiency, chronic back pain/spinal stenosis on pain meds & chronic urinary frequency who developed rigors and fever of 101.7F on 9/29/24, followed by diffuse myalgia, arthralgia and profound incapacitating lower back pain as of 9/30. Was seen in the ED, found hypotensive to 96/59 but afebrile & without leukocytosis. She was sent home after pain improved on analgesics.    She remained with generalized weakness & diffuse bodyaches & presented again on 10/02/24 w c/o persistent severe low back pain.   Found afebrile, without leukocytosis, a little hypertensive.   Admitted for w/up of back pain.   Blood cx recovered 2/4 bottles (1 set) with GBS.    Started on CTX on 10/04/24.   Reported issues of chronic urinary frequency & a feeling of incomplete bladder emptying.  UA revealed 10 WBCs & large LE, without nitrites.   Urine cx with GBS - Bacteremia was felt to originate from  source.  And given back pain soon after the symptoms of chills w fever and then bacteremia, raised high suspicion for spine infection.   MRI of spine revealed high T2 disc signal at L4-5, which may represent discitis/ osteomyelitis with phlegmon.   No support for sepsis at this time   Repeated blood cx from 10/05 - NGTD.  S/p left arm PICC placement   CRP has declined from 54 -----> 37    PLAN:  Continue IV CTX - 2 gram daily.   Follow repeated blood cx.  Patient's & her family's questions were addressed to the best of my knowledge.   She shares anxiety about taking antibiotics, reassured that the only way to treat this infection is with prolonged course of antibiotics.

## 2024-10-09 NOTE — PROGRESS NOTE ADULT - ATTENDING COMMENTS
PICC line flushed as above, repeat CXR confirmed placement  PICC ok to use  continue iv ceftriaxone for 6 weeks total  acute rehab accepted pt  awaiting insurance auth  d/w pt and  at bedside, all questions answered

## 2024-10-09 NOTE — PROGRESS NOTE ADULT - SUBJECTIVE AND OBJECTIVE BOX
69yF was admitted on 10-02      Patient is a 69y old  Female who presents with a chief complaint of Lumbar radiculopathy     (07 Oct 2024 12:09)    HPI:  68 yr F w pmhx of spinal stenosis, osteoporosis, HLD, IgA deficiency presents with intractable low back pain. Decrease the pain as sharp low back pain radiating to her legs, worse to her right. Reports had a MRI outpt about 2 weeks which showed significant degenerative disc disease and spinal stenosis, no acute fracture. Reports has been doing aggressive yoga training lately and incorporated spine twisting moves that may have exacerbated her symptoms. Also reports currently in alot social stress. Denies urinary or bowel incontinence. Pt was seen in Ed 2 days ago with similar symptoms. Reports pain improvement after receiving regimen in ED, was discharge on pain medication and prednisone. Reports started to worsen after the ED medications wore off. Pt has been following with specialists in regards to spinal stenosis. It was recommend patient would benefit from surgery. She is currently admitted to medicine service and is on IV antibiotics for osteomyelitis of the spine. Patient received PICC line yesterday, concern for incorrect picc line postioning, however it was able to be readjusted, now in correct location per medicine team. Patient seen this morning, reports she was constipated but then had episode of loose stool yesterday, relayed could possibly be overflow as she reported being constipated for over a week and the multiple narcotic medications. She feels as though the pain is better controlled but still experiences pain especially in the right hip upon movement.      (02 Oct 2024 15:03)      Imaging performed:  Chest X ray 10/9-- Left PICC line in good position. Bibasilar linear density slightly increased from prior.    Chest x ray 10/8- Mild right base atelectasis at this time. Left PICC line has tip going up into the right innominate vein.    CT Lumbar spine 10/2/24- No evidence of an acute lumbar spine fracture. Multilevel degenerative   changes greatest at L4-5 with severe spinal canal narrowing is seen.  Findings are similar to prior MRI of November 2022.    MRI L spine 10/4/24-Nonspecific endplate edema/enhancement and high T2 disc signal at L4-5 as   described above which may represent discitis/ osteomyelitis with phlegmon   in the correct clinical setting. No loculated fluid collection in the   paraspinal or epidural spaces to suggest abscess collection. Correlate   clinically.    Multilevel degenerative changes resulting in multilevel spinal canal   stenosis and neural foraminal narrowing as described above. Severe spinal   canal stenosis with compression of the nerve roots and severe right,   moderate left neural foraminal narrowing at L4-5.    US kidney and bladder 10/4/24- 1.  Negative renal ultrasound.  2.  184 cc post void residual volume (PVR).    Echo 10/3/24   1. Left ventricular ejection fraction, by visual estimation, is 60 to   65%.   2. Normal global left ventricular systolic function.   3. Spectral Doppler shows impaired relaxation pattern of left   ventricular myocardial filling (Grade I diastolic dysfunction).   4. Normal right ventricular size and function.   5. The left atrium is normal in size.   6. The right atrium is normal in size.   7. There is no evidence of pericardial effusion.   8. Mild mitral valve regurgitation.   9. Mild-moderate tricuspid regurgitation.  10. The main pulmonary artery is normal in size.  11. LA volume Index is 19.5 ml/m² ml/m2.    REVIEW OF SYSTEMS  Constitutional - No fever, + fatigue  Respiratory - No cough, No wheezing, No shortness of breath  Cardiovascular - No chest pain, No palpitations  Gastrointestinal + constipation  Genitourinary - No dysuria, No frequency, No hematuria, No incontinence  Neurological -  + loss of strength  Skin - No itching, No rashes  Musculoskeletal + right hip pain, +back pain     VITALS  T(C): 36.4 (10-09-24 @ 13:13)  T(F): 97.5 (10-09-24 @ 13:13), Max: 98 (10-08-24 @ 20:17)  HR: 90 (10-09-24 @ 13:13) (70 - 90)  BP: 90/65 (10-09-24 @ 13:13) (90/65 - 95/61)  RR:  (16 - 18)  SpO2:  (95% - 98%)      PAST MEDICAL & SURGICAL HISTORY  Sprain rotator cuff    Depression    ADHD (attention deficit hyperactivity disorder)    Anxiety    Mass on back    Bronchiectasis    Spinal stenosis    Lower extremity tendon tear    S/P trigger finger release      FUNCTIONAL HISTORY  Patient lives in a house with her spouse w/ 3-4 steps to enter. Bed and bath on first floor. 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 Per patient, her  is unable to provide support for her at this time.    CURRENT FUNCTIONAL STATUS  PT note 10/7-  Bed Mobility  Bed Mobility Training Rolling/Turning: contact guard;  verbal cues;  bed rails  Bed Mobility Training Supine-to-Sit: contact guard;  bed rails  Bed Mobility Training Limitations: pain    Sit-Stand Transfer Training  Transfer Training Stand-to-Sit Transfer: supervsion;  rolling walker;  full weight-bearing    Gait Training  Gait Training: supervsion;  75 feet;  rolling walker;  full weight-bearing  Gait Analysis: 3-point gait   75 feet;  rolling walker    OT 10/7-  Bed Mobility  Bed Mobility Training Rolling/Turning: supervsion;  1 person assist  Bed Mobility Training Scooting: stand-by assist;  supervsion;  supervision  Bed Mobility Training Sit-to-Supine: contact guard;  supervsion;  supervision  Bed Mobility Training Supine-to-Sit: contact guard;  supervsion;  supervision  Bed Mobility Training Limitations: pain    Bed-Chair Transfer Training  Transfer Training Bed-to-Chair Transfer: contact guard;  supervsion;  1 person assist;  supervision;  full weight-bearing   rolling walker  Transfer Training Chair-to-Bed Transfer: contact guard;  supervsion;  1 person assist;  supervision;  full weight-bearing   rolling walker  Bed-to-Chair Transfer Training Transfer Safety Analysis: pain    Sit-Stand Transfer Training  Transfer Training Sit-to-Stand Transfer: supervsion;  supervision;  full weight-bearing   rolling walker  Transfer Training Stand-to-Sit Transfer: supervsion;  supervision;  full weight-bearing   rolling walker  Sit-to-Stand Transfer Training Transfer Safety Analysis: pain;  rolling walker    Toilet Transfer Training  Transfer Training Toilet Transfer: contact guard;  supervsion;  1 person assist;  supervision;  full weight-bearing   rolling walker  Toilet Transfer Training Transfer Safety Analysis: pain;  rolling walker    Therapeutic Exercise  Therapeutic Exercise Detail: Pt. educated on UE/LE therex completion at bedside. 3x10.     Grooming Training  Grooming Training Assistance: independent;  supervsion;  supervision;  Standing at sinklevel, with RW.  S/U- Distant Supervision. ;  pain      SOCIAL HISTORY - as per documentation/history  Smoking - None  EtOH - None  Drugs - None    FAMILY HISTORY       RECENT LABS - Reviewed  CBC Full  -  ( 07 Oct 2024 08:34 )  WBC Count : 5.02 K/uL  RBC Count : 3.68 M/uL  Hemoglobin : 10.5 g/dL  Hematocrit : 31.6 %  Platelet Count - Automated : 379 K/uL  Mean Cell Volume : 85.9 fl  Mean Cell Hemoglobin : 28.5 pg  Mean Cell Hemoglobin Concentration : 33.2 gm/dL  Auto Neutrophil # : x  Auto Lymphocyte # : x  Auto Monocyte # : x  Auto Eosinophil # : x  Auto Basophil # : x  Auto Neutrophil % : x  Auto Lymphocyte % : x  Auto Monocyte % : x  Auto Eosinophil % : x  Auto Basophil % : x    10-07    138  |  100  |  10  ----------------------------<  93  4.2   |  30  |  0.59    Ca    9.9      07 Oct 2024 08:34  Mg     1.8     10-06    TPro  7.3  /  Alb  2.8[L]  /  TBili  0.7  /  DBili  x   /  AST  18  /  ALT  25  /  AlkPhos  74  10-07    Urinalysis Basic - ( 07 Oct 2024 08:34 )    Color: x / Appearance: x / SG: x / pH: x  Gluc: 93 mg/dL / Ketone: x  / Bili: x / Urobili: x   Blood: x / Protein: x / Nitrite: x   Leuk Esterase: x / RBC: x / WBC x   Sq Epi: x / Non Sq Epi: x / Bacteria: x        ALLERGIES  Wheat (Diarrhea)  No Known Drug Allergies      MEDICATIONS   acetaminophen     Tablet .. 650 milliGRAM(s) Oral every 8 hours  albuterol/ipratropium for Nebulization 3 milliLiter(s) Nebulizer every 6 hours PRN  aluminum hydroxide/magnesium hydroxide/simethicone Suspension 30 milliLiter(s) Oral every 4 hours PRN  ascorbic acid 500 milliGRAM(s) Oral <User Schedule>  atorvastatin 10 milliGRAM(s) Oral at bedtime  bisacodyl 5 milliGRAM(s) Oral at bedtime PRN  cefTRIAXone   IVPB 2000 milliGRAM(s) IV Intermittent every 24 hours  diazepam    Tablet 5 milliGRAM(s) Oral every 8 hours  DULoxetine 20 milliGRAM(s) Oral <User Schedule>  enoxaparin Injectable 40 milliGRAM(s) SubCutaneous every 24 hours  famotidine    Tablet 20 milliGRAM(s) Oral daily PRN  gabapentin 300 milliGRAM(s) Oral three times a day  HYDROmorphone  Injectable 0.25 milliGRAM(s) IV Push every 4 hours PRN  HYDROmorphone  Injectable 0.5 milliGRAM(s) IV Push every 4 hours PRN  influenza  Vaccine (HIGH DOSE) 0.5 milliLiter(s) IntraMuscular once  lactobacillus acidophilus 1 Tablet(s) Oral two times a day with meals  melatonin 3 milliGRAM(s) Oral at bedtime PRN  morphine ER Tablet 15 milliGRAM(s) Oral every 12 hours  multivitamin 1 Tablet(s) Oral <User Schedule>  ondansetron Injectable 4 milliGRAM(s) IV Push every 8 hours PRN  polyethylene glycol 3350 17 Gram(s) Oral two times a day  senna 2 Tablet(s) Oral at bedtime  sodium chloride 3%  Inhalation 4 milliLiter(s) Inhalation every 12 hours      ----------------------------------------------------------------------------------------  PHYSICAL EXAM  Constitutional - NAD  HEENT - NCAT, EOMI  Neck - Supple, No limited ROM  Chest - Breathing comfortably, No wheezing  Cardiovascular - S1S2   Abdomen - Soft   Extremities -  No calf tenderness, left arm with PICC in place   Neurologic Exam -                    Cognitive - AAO to self, place, date, year, situation     Communication - Fluent, No dysarthria     Cranial Nerves - CN 2-12 intact     Motor - bilateral LE proximal weakness--limited due to pain                    LEFT    UE - ShAB 5/5, EF 5/5, EE 5/5, WE 5/5,  5/5                    RIGHT UE - ShAB 5/5, EF 5/5, EE 5/5, WE 5/5,  5/5                    LEFT    LE - HF 4/5, KE 4/5, DF 5/5, PF 5/5                    RIGHT LE - HF 4/5, KE 4/5, DF 5/5, PF 5/5     Psychiatric - Calm   ----------------------------------------------------------------------------------------  ASSESSMENT/PLAN  69yFemale admitted with osteomyelitis of the lumbar spine and bacteremia resulting in lower extremity weakness and pain.   Osteomyelitis of lumbar spine and bacteremia-  Rocephin IVPB daily for 6 weeks. Blood cultures with strep, repeat blood cultures without growth, ID following. PICC line in place.   Pain - Gabapentin 100 mg TID, MS Contin 15 mg q12, Duloxetine 20 mg, Dilaudid 2mg PRN  Bowel- Miralax BID, Senna qhs, dulcolax PRN, s/p mag citrate with bowel movement  Anxiety- Valium 5 mg q8, psychiatry following  Sleep- Melatonin PRN  DVT PPX - Lovenox sq   Rehab - Recommend ACUTE inpatient rehabilitation for the functional deficits consisting of 3 hours of therapy/day & 24 hour RN/daily PMR physician for comorbid medical management. Patient will be able to tolerate 3 hours a day. Pending authorization. Short term stay to achieve functional goals of independence with mobility and ambulation. Recommend ongoing mobilization by staff to maintain cardiopulmonary function and prevention of secondary complications related to debility. Discussed with rehab team.    69yF was admitted on 10-02      Patient is a 69y old  Female who presents with a chief complaint of Lumbar radiculopathy     (07 Oct 2024 12:09)    HPI:  68 yr F w pmhx of spinal stenosis, osteoporosis, HLD, IgA deficiency presents with intractable low back pain. Decrease the pain as sharp low back pain radiating to her legs, worse to her right. Reports had a MRI outpt about 2 weeks which showed significant degenerative disc disease and spinal stenosis, no acute fracture. Reports has been doing aggressive yoga training lately and incorporated spine twisting moves that may have exacerbated her symptoms. Also reports currently in alot social stress. Denies urinary or bowel incontinence. Pt was seen in Ed 2 days ago with similar symptoms. Reports pain improvement after receiving regimen in ED, was discharge on pain medication and prednisone. Reports started to worsen after the ED medications wore off. Pt has been following with specialists in regards to spinal stenosis. It was recommend patient would benefit from surgery. She is currently admitted to medicine service and is on IV antibiotics for osteomyelitis of the spine. Patient received PICC line yesterday, concern for incorrect picc line postioning, however it was able to be readjusted, now in correct location per medicine team. Patient seen this morning, reports she was constipated but then had episode of loose stool yesterday, relayed could possibly be overflow as she reported being constipated for over a week and the multiple narcotic medications. She feels as though the pain is better controlled but still experiences pain especially in the right hip upon movement.      (02 Oct 2024 15:03)      Imaging performed:  Chest X ray 10/9-- Left PICC line in good position. Bibasilar linear density slightly increased from prior.    Chest x ray 10/8- Mild right base atelectasis at this time. Left PICC line has tip going up into the right innominate vein.    CT Lumbar spine 10/2/24- No evidence of an acute lumbar spine fracture. Multilevel degenerative   changes greatest at L4-5 with severe spinal canal narrowing is seen.  Findings are similar to prior MRI of November 2022.    MRI L spine 10/4/24-Nonspecific endplate edema/enhancement and high T2 disc signal at L4-5 as   described above which may represent discitis/ osteomyelitis with phlegmon   in the correct clinical setting. No loculated fluid collection in the   paraspinal or epidural spaces to suggest abscess collection. Correlate   clinically.    Multilevel degenerative changes resulting in multilevel spinal canal   stenosis and neural foraminal narrowing as described above. Severe spinal   canal stenosis with compression of the nerve roots and severe right,   moderate left neural foraminal narrowing at L4-5.    US kidney and bladder 10/4/24- 1.  Negative renal ultrasound.  2.  184 cc post void residual volume (PVR).    Echo 10/3/24   1. Left ventricular ejection fraction, by visual estimation, is 60 to   65%.   2. Normal global left ventricular systolic function.   3. Spectral Doppler shows impaired relaxation pattern of left   ventricular myocardial filling (Grade I diastolic dysfunction).   4. Normal right ventricular size and function.   5. The left atrium is normal in size.   6. The right atrium is normal in size.   7. There is no evidence of pericardial effusion.   8. Mild mitral valve regurgitation.   9. Mild-moderate tricuspid regurgitation.  10. The main pulmonary artery is normal in size.  11. LA volume Index is 19.5 ml/m² ml/m2.    REVIEW OF SYSTEMS  Constitutional - No fever, + fatigue  Respiratory - No cough, No wheezing, No shortness of breath  Cardiovascular - No chest pain, No palpitations  Gastrointestinal + constipation  Genitourinary - No dysuria, No frequency, No hematuria, No incontinence  Neurological -  + loss of strength  Skin - No itching, No rashes  Musculoskeletal + right hip pain, +back pain     VITALS  T(C): 36.4 (10-09-24 @ 13:13)  T(F): 97.5 (10-09-24 @ 13:13), Max: 98 (10-08-24 @ 20:17)  HR: 90 (10-09-24 @ 13:13) (70 - 90)  BP: 90/65 (10-09-24 @ 13:13) (90/65 - 95/61)  RR:  (16 - 18)  SpO2:  (95% - 98%)      PAST MEDICAL & SURGICAL HISTORY  Sprain rotator cuff    Depression    ADHD (attention deficit hyperactivity disorder)    Anxiety    Mass on back    Bronchiectasis    Spinal stenosis    Lower extremity tendon tear    S/P trigger finger release      FUNCTIONAL HISTORY  Patient lives in a house with her spouse w/ 3-4 steps to enter. Bed and bath on first floor. 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 Per patient, her  is unable to provide support for her at this time.    CURRENT FUNCTIONAL STATUS  PT note 10/7-  Bed Mobility  Bed Mobility Training Rolling/Turning: contact guard;  verbal cues;  bed rails  Bed Mobility Training Supine-to-Sit: contact guard;  bed rails  Bed Mobility Training Limitations: pain    Sit-Stand Transfer Training  Transfer Training Stand-to-Sit Transfer: supervsion;  rolling walker;  full weight-bearing    Gait Training  Gait Training: supervsion;  75 feet;  rolling walker;  full weight-bearing  Gait Analysis: 3-point gait   75 feet;  rolling walker    OT 10/7-  Bed Mobility  Bed Mobility Training Rolling/Turning: supervsion;  1 person assist  Bed Mobility Training Scooting: stand-by assist;  supervsion;  supervision  Bed Mobility Training Sit-to-Supine: contact guard;  supervsion;  supervision  Bed Mobility Training Supine-to-Sit: contact guard;  supervsion;  supervision  Bed Mobility Training Limitations: pain    Bed-Chair Transfer Training  Transfer Training Bed-to-Chair Transfer: contact guard;  supervsion;  1 person assist;  supervision;  full weight-bearing   rolling walker  Transfer Training Chair-to-Bed Transfer: contact guard;  supervsion;  1 person assist;  supervision;  full weight-bearing   rolling walker  Bed-to-Chair Transfer Training Transfer Safety Analysis: pain    Sit-Stand Transfer Training  Transfer Training Sit-to-Stand Transfer: supervsion;  supervision;  full weight-bearing   rolling walker  Transfer Training Stand-to-Sit Transfer: supervsion;  supervision;  full weight-bearing   rolling walker  Sit-to-Stand Transfer Training Transfer Safety Analysis: pain;  rolling walker    Toilet Transfer Training  Transfer Training Toilet Transfer: contact guard;  supervsion;  1 person assist;  supervision;  full weight-bearing   rolling walker  Toilet Transfer Training Transfer Safety Analysis: pain;  rolling walker    Therapeutic Exercise  Therapeutic Exercise Detail: Pt. educated on UE/LE therex completion at bedside. 3x10.     Grooming Training  Grooming Training Assistance: independent;  supervsion;  supervision;  Standing at sinklevel, with RW.  S/U- Distant Supervision. ;  pain      SOCIAL HISTORY - as per documentation/history  Smoking - None  EtOH - None  Drugs - None       LAB                        9.9    3.73  )-----------( 324      ( 09 Oct 2024 06:37 )             28.6     10-09    142  |  104  |  16  ----------------------------<  92  4.1   |  29  |  0.76    Ca    9.2      09 Oct 2024 06:37    TPro  6.5  /  Alb  2.6[L]  /  TBili  0.6  /  DBili  x   /  AST  28  /  ALT  32  /  AlkPhos  69  10-08    LIVER FUNCTIONS - ( 08 Oct 2024 06:35 )  Alb: 2.6 g/dL / Pro: 6.5 g/dL / ALK PHOS: 69 U/L / ALT: 32 U/L / AST: 28 U/L / GGT: x               Urinalysis Basic - ( 09 Oct 2024 06:37 )    Color: x / Appearance: x / SG: x / pH: x  Gluc: 92 mg/dL / Ketone: x  / Bili: x / Urobili: x   Blood: x / Protein: x / Nitrite: x   Leuk Esterase: x / RBC: x / WBC x   Sq Epi: x / Non Sq Epi: x / Bacteria: x          ALLERGIES  Wheat (Diarrhea)  No Known Drug Allergies      MEDICATIONS   acetaminophen     Tablet .. 650 milliGRAM(s) Oral every 8 hours  albuterol/ipratropium for Nebulization 3 milliLiter(s) Nebulizer every 6 hours PRN  aluminum hydroxide/magnesium hydroxide/simethicone Suspension 30 milliLiter(s) Oral every 4 hours PRN  ascorbic acid 500 milliGRAM(s) Oral <User Schedule>  atorvastatin 10 milliGRAM(s) Oral at bedtime  bisacodyl 5 milliGRAM(s) Oral at bedtime PRN  cefTRIAXone   IVPB 2000 milliGRAM(s) IV Intermittent every 24 hours  diazepam    Tablet 5 milliGRAM(s) Oral every 8 hours  DULoxetine 20 milliGRAM(s) Oral <User Schedule>  enoxaparin Injectable 40 milliGRAM(s) SubCutaneous every 24 hours  famotidine    Tablet 20 milliGRAM(s) Oral daily PRN  gabapentin 300 milliGRAM(s) Oral three times a day  HYDROmorphone  Injectable 0.25 milliGRAM(s) IV Push every 4 hours PRN  HYDROmorphone  Injectable 0.5 milliGRAM(s) IV Push every 4 hours PRN  influenza  Vaccine (HIGH DOSE) 0.5 milliLiter(s) IntraMuscular once  lactobacillus acidophilus 1 Tablet(s) Oral two times a day with meals  melatonin 3 milliGRAM(s) Oral at bedtime PRN  morphine ER Tablet 15 milliGRAM(s) Oral every 12 hours  multivitamin 1 Tablet(s) Oral <User Schedule>  ondansetron Injectable 4 milliGRAM(s) IV Push every 8 hours PRN  polyethylene glycol 3350 17 Gram(s) Oral two times a day  senna 2 Tablet(s) Oral at bedtime  sodium chloride 3%  Inhalation 4 milliLiter(s) Inhalation every 12 hours      ----------------------------------------------------------------------------------------  PHYSICAL EXAM  Constitutional - NAD  HEENT - NCAT, EOMI  Neck - Supple, No limited ROM  Chest - Breathing comfortably, No wheezing  Cardiovascular - S1S2   Abdomen - Soft   Extremities -  No calf tenderness, left arm with PICC in place   Neurologic Exam -                    Cognitive - AAO to self, place, date, year, situation     Communication - Fluent, No dysarthria     Cranial Nerves - CN 2-12 intact     Motor - bilateral LE proximal weakness--limited due to pain                    LEFT    UE - ShAB 5/5, EF 5/5, EE 5/5, WE 5/5,  5/5                    RIGHT UE - ShAB 5/5, EF 5/5, EE 5/5, WE 5/5,  5/5                    LEFT    LE - HF 4/5, KE 4/5, DF 5/5, PF 5/5                    RIGHT LE - HF 4/5, KE 4/5, DF 5/5, PF 5/5     Psychiatric - Calm   ----------------------------------------------------------------------------------------  ASSESSMENT/PLAN  69yFemale admitted with osteomyelitis of the lumbar spine and bacteremia resulting in lower extremity weakness and pain.   Osteomyelitis of lumbar spine and bacteremia-  Rocephin IVPB daily for 6 weeks. Blood cultures with strep, repeat blood cultures without growth, ID following. PICC line in place.   Pain - Gabapentin 100 mg TID, MS Contin 15 mg q12, Duloxetine 20 mg, Dilaudid 2mg PRN  Bowel- Miralax BID, Senna qhs, dulcolax PRN, s/p mag citrate with bowel movement  Anxiety- Valium 5 mg q8, psychiatry following  Sleep- Melatonin PRN  DVT PPX - Lovenox sq   Rehab - Recommend ACUTE inpatient rehabilitation for the functional deficits consisting of 3 hours of therapy/day & 24 hour RN/daily PMR physician for comorbid medical management. Patient will be able to tolerate 3 hours a day. Pending authorization. Short term stay to achieve functional goals of independence with mobility and ambulation. Recommend ongoing mobilization by staff to maintain cardiopulmonary function and prevention of secondary complications related to debility. Discussed with rehab team.

## 2024-10-09 NOTE — PROGRESS NOTE ADULT - SUBJECTIVE AND OBJECTIVE BOX
68 yr F w pmhx of spinal stenosis, osteoporosis, HLD, bronchiectasis, IgA/IgG deficiency presents with worsening of chronic low back pain    Overnight Events: None  Interval HPI: Patient seen and examined at bedside.     REVIEW OF SYSTEMS:  CONSTITUTIONAL: (-) weakness, (-) fevers, (-) chills  EYES/ENT: (-) visual changes,  (-) vertigo,  (-) throat pain   NECK:  (-) pain, (-) stiffness  RESPIRATORY:  (-) shortness of breath, (-) cough,  (-) wheezing,  (-) hemoptysis   CARDIOVASCULAR:  (-) chest pain, (-) palpitations  GASTROINTESTINAL:  (-) abdominal or epigastric pain, (-) nausea, (-) vomiting, (-) diarrhea, (-) constipation, (-) melena,  (-) hematemesis,  (-) hematochezia  GENITOURINARY: (-) dysuria, (-) frequency, (-) hematuria  NEUROLOGICAL: (-) numbness, (-) weakness  SKIN: (-) itching, (-) rashes, (-) lesions    Vital Signs Last 24 Hrs  T(C): 36.5 (09 Oct 2024 05:02), Max: 36.9 (08 Oct 2024 11:54)  T(F): 97.7 (09 Oct 2024 05:02), Max: 98.4 (08 Oct 2024 11:54)  HR: 70 (09 Oct 2024 05:02) (70 - 100)  BP: 95/61 (09 Oct 2024 05:02) (94/60 - 103/70)  BP(mean): 72 (09 Oct 2024 05:02) (72 - 72)  RR: 18 (09 Oct 2024 05:02) (18 - 18)  SpO2: 96% (09 Oct 2024 05:02) (96% - 98%)    Parameters below as of 09 Oct 2024 05:02  Patient On (Oxygen Delivery Method): room air        PHYSICAL EXAM:  GENERAL: NAD, lying in bed comfortably  HEAD:  Atraumatic, Normocephalic  CHEST/LUNG: Clear to auscultation bilaterally, good air entry bilaterally; No wheezing, rales, or rhonchi. Unlabored respirations  HEART: Regular rate and rhythm. S1 and S2. No murmurs, rubs, or gallops  ABDOMEN: Soft, Nontender, Nondistended. Bowel sounds present.   EXTREMITIES:  2+ Peripheral Pulses. No clubbing, cyanosis, or edema  NERVOUS SYSTEM:  Alert & Oriented X3, speech clear.     LABS:   All Labs Personally Reviewed                         9.9    3.73  )-----------( 324      ( 09 Oct 2024 06:37 )             28.6     10-08    139  |  102  |  13  ----------------------------<  95  4.3   |  30  |  0.58    Ca    9.6      08 Oct 2024 06:35    TPro  6.5  /  Alb  2.6[L]  /  TBili  0.6  /  DBili  x   /  AST  28  /  ALT  32  /  AlkPhos  69  10-08          Blood Culture: 10-05 @ 07:25  Organism --  Gram Stain Blood -- Gram Stain --  Specimen Source .Blood BLOOD  Culture-Blood --    10-05 @ 07:20  Organism --  Gram Stain Blood -- Gram Stain --  Specimen Source .Blood BLOOD  Culture-Blood --      I&O's Summary    08 Oct 2024 07:01  -  09 Oct 2024 07:00  --------------------------------------------------------  IN: 200 mL / OUT: 0 mL / NET: 200 mL      CAPILLARY BLOOD GLUCOSE          RADIOLOGY/EKG:  All Imaging and EKGs Personally Reviewed   < from: Xray Chest 1 View- PORTABLE-Urgent (Xray Chest 1 View- PORTABLE-Urgent .) (10.08.24 @ 12:17) >  IMPRESSION: Mild right base atelectasis at this time. Left PICC line has   tip going up into the right innominate vein.    < end of copied text >      MEDICATIONS:  MEDICATIONS  (STANDING):  acetaminophen     Tablet .. 650 milliGRAM(s) Oral every 8 hours  ascorbic acid 500 milliGRAM(s) Oral <User Schedule>  atorvastatin 10 milliGRAM(s) Oral at bedtime  cefTRIAXone   IVPB 2000 milliGRAM(s) IV Intermittent every 24 hours  diazepam    Tablet 5 milliGRAM(s) Oral every 8 hours  DULoxetine 20 milliGRAM(s) Oral <User Schedule>  enoxaparin Injectable 40 milliGRAM(s) SubCutaneous every 24 hours  gabapentin 100 milliGRAM(s) Oral three times a day  influenza  Vaccine (HIGH DOSE) 0.5 milliLiter(s) IntraMuscular once  lactobacillus acidophilus 1 Tablet(s) Oral two times a day with meals  morphine ER Tablet 15 milliGRAM(s) Oral every 12 hours  multivitamin 1 Tablet(s) Oral <User Schedule>  polyethylene glycol 3350 17 Gram(s) Oral two times a day  senna 2 Tablet(s) Oral at bedtime  sodium chloride 3%  Inhalation 4 milliLiter(s) Inhalation every 12 hours     68 yr F w pmhx of spinal stenosis, osteoporosis, HLD, bronchiectasis, IgA/IgG deficiency presents with worsening of chronic low back pain    Overnight Events: None  Interval HPI: Patient seen and examined at bedside. No acute complaints. Picc line flushed with 10cc saline x2 as per IR's recommendation. Repeat Xray confirmed picc line was in correct place after flushes.     REVIEW OF SYSTEMS:  CONSTITUTIONAL: (-) weakness, (-) fevers, (-) chills  EYES/ENT: (-) visual changes,  (-) vertigo,  (-) throat pain   RESPIRATORY:  (-) shortness of breath, (-) cough,  (-) wheezing,  (-) hemoptysis   CARDIOVASCULAR:  (-) chest pain, (-) palpitations  GASTROINTESTINAL:  (-) abdominal or epigastric pain, (-) nausea, (-) vomiting, (-) diarrhea, (-) constipation, (-) melena,  (-) hematemesis,  (-) hematochezia  GENITOURINARY: (-) dysuria, (-) frequency, (-) hematuria      Vital Signs Last 24 Hrs  T(C): 36.5 (09 Oct 2024 05:02), Max: 36.9 (08 Oct 2024 11:54)  T(F): 97.7 (09 Oct 2024 05:02), Max: 98.4 (08 Oct 2024 11:54)  HR: 70 (09 Oct 2024 05:02) (70 - 100)  BP: 95/61 (09 Oct 2024 05:02) (94/60 - 103/70)  BP(mean): 72 (09 Oct 2024 05:02) (72 - 72)  RR: 18 (09 Oct 2024 05:02) (18 - 18)  SpO2: 96% (09 Oct 2024 05:02) (96% - 98%)    Parameters below as of 09 Oct 2024 05:02  Patient On (Oxygen Delivery Method): room air      PHYSICAL EXAM:  GENERAL: NAD, lying in bed comfortably  HEAD:  Atraumatic, Normocephalic  CHEST/LUNG: Clear to auscultation bilaterally, good air entry bilaterally; No wheezing, rales, or rhonchi. Unlabored respirations  HEART: Regular rate and rhythm. S1 and S2. No murmurs, rubs, or gallops  ABDOMEN: Soft, Nontender, Nondistended.   EXTREMITIES:  2+ Peripheral Pulses. No clubbing, cyanosis, or edema  NERVOUS SYSTEM:  Alert & Oriented X3, speech clear.   LUE: picc line, dressing clean    LABS:   All Labs Personally Reviewed                         9.9    3.73  )-----------( 324      ( 09 Oct 2024 06:37 )             28.6     10-08    139  |  102  |  13  ----------------------------<  95  4.3   |  30  |  0.58    Ca    9.6      08 Oct 2024 06:35    TPro  6.5  /  Alb  2.6[L]  /  TBili  0.6  /  DBili  x   /  AST  28  /  ALT  32  /  AlkPhos  69  10-08          Blood Culture: 10-05 @ 07:25  Organism --  Gram Stain Blood -- Gram Stain --  Specimen Source .Blood BLOOD  Culture-Blood --    10-05 @ 07:20  Organism --  Gram Stain Blood -- Gram Stain --  Specimen Source .Blood BLOOD  Culture-Blood --      I&O's Summary    08 Oct 2024 07:01  -  09 Oct 2024 07:00  --------------------------------------------------------  IN: 200 mL / OUT: 0 mL / NET: 200 mL      CAPILLARY BLOOD GLUCOSE          RADIOLOGY/EKG:  All Imaging and EKGs Personally Reviewed   < from: Xray Chest 1 View- PORTABLE-Urgent (Xray Chest 1 View- PORTABLE-Urgent .) (10.08.24 @ 12:17) >  IMPRESSION: Mild right base atelectasis at this time. Left PICC line has   tip going up into the right innominate vein.    < end of copied text >      MEDICATIONS:  MEDICATIONS  (STANDING):  acetaminophen     Tablet .. 650 milliGRAM(s) Oral every 8 hours  ascorbic acid 500 milliGRAM(s) Oral <User Schedule>  atorvastatin 10 milliGRAM(s) Oral at bedtime  cefTRIAXone   IVPB 2000 milliGRAM(s) IV Intermittent every 24 hours  diazepam    Tablet 5 milliGRAM(s) Oral every 8 hours  DULoxetine 20 milliGRAM(s) Oral <User Schedule>  enoxaparin Injectable 40 milliGRAM(s) SubCutaneous every 24 hours  gabapentin 100 milliGRAM(s) Oral three times a day  influenza  Vaccine (HIGH DOSE) 0.5 milliLiter(s) IntraMuscular once  lactobacillus acidophilus 1 Tablet(s) Oral two times a day with meals  morphine ER Tablet 15 milliGRAM(s) Oral every 12 hours  multivitamin 1 Tablet(s) Oral <User Schedule>  polyethylene glycol 3350 17 Gram(s) Oral two times a day  senna 2 Tablet(s) Oral at bedtime  sodium chloride 3%  Inhalation 4 milliLiter(s) Inhalation every 12 hours

## 2024-10-10 LAB
ANION GAP SERPL CALC-SCNC: 7 MMOL/L — SIGNIFICANT CHANGE UP (ref 5–17)
BUN SERPL-MCNC: 15 MG/DL — SIGNIFICANT CHANGE UP (ref 7–23)
CALCIUM SERPL-MCNC: 9.4 MG/DL — SIGNIFICANT CHANGE UP (ref 8.4–10.5)
CHLORIDE SERPL-SCNC: 105 MMOL/L — SIGNIFICANT CHANGE UP (ref 96–108)
CO2 SERPL-SCNC: 29 MMOL/L — SIGNIFICANT CHANGE UP (ref 22–31)
CREAT SERPL-MCNC: 0.59 MG/DL — SIGNIFICANT CHANGE UP (ref 0.5–1.3)
CULTURE RESULTS: SIGNIFICANT CHANGE UP
CULTURE RESULTS: SIGNIFICANT CHANGE UP
EGFR: 98 ML/MIN/1.73M2 — SIGNIFICANT CHANGE UP
GLUCOSE SERPL-MCNC: 98 MG/DL — SIGNIFICANT CHANGE UP (ref 70–99)
HCT VFR BLD CALC: 27.4 % — LOW (ref 34.5–45)
HGB BLD-MCNC: 9.3 G/DL — LOW (ref 11.5–15.5)
MCHC RBC-ENTMCNC: 29.2 PG — SIGNIFICANT CHANGE UP (ref 27–34)
MCHC RBC-ENTMCNC: 33.9 GM/DL — SIGNIFICANT CHANGE UP (ref 32–36)
MCV RBC AUTO: 85.9 FL — SIGNIFICANT CHANGE UP (ref 80–100)
NRBC # BLD: 0 /100 WBCS — SIGNIFICANT CHANGE UP (ref 0–0)
PLATELET # BLD AUTO: 311 K/UL — SIGNIFICANT CHANGE UP (ref 150–400)
POTASSIUM SERPL-MCNC: 4.9 MMOL/L — SIGNIFICANT CHANGE UP (ref 3.5–5.3)
POTASSIUM SERPL-SCNC: 4.9 MMOL/L — SIGNIFICANT CHANGE UP (ref 3.5–5.3)
RBC # BLD: 3.19 M/UL — LOW (ref 3.8–5.2)
RBC # FLD: 12.6 % — SIGNIFICANT CHANGE UP (ref 10.3–14.5)
SODIUM SERPL-SCNC: 141 MMOL/L — SIGNIFICANT CHANGE UP (ref 135–145)
SPECIMEN SOURCE: SIGNIFICANT CHANGE UP
SPECIMEN SOURCE: SIGNIFICANT CHANGE UP
WBC # BLD: 5.36 K/UL — SIGNIFICANT CHANGE UP (ref 3.8–10.5)
WBC # FLD AUTO: 5.36 K/UL — SIGNIFICANT CHANGE UP (ref 3.8–10.5)

## 2024-10-10 PROCEDURE — 99233 SBSQ HOSP IP/OBS HIGH 50: CPT | Mod: GC

## 2024-10-10 RX ORDER — SODIUM CHLORIDE 0.9 % (FLUSH) 0.9 %
500 SYRINGE (ML) INJECTION ONCE
Refills: 0 | Status: COMPLETED | OUTPATIENT
Start: 2024-10-10 | End: 2024-10-10

## 2024-10-10 RX ADMIN — DIAZEPAM 5 MILLIGRAM(S): 10 TABLET ORAL at 05:23

## 2024-10-10 RX ADMIN — HYDROMORPHONE HYDROCHLORIDE 2 MILLIGRAM(S): 1 INJECTION, SOLUTION INTRAMUSCULAR; INTRAVENOUS; SUBCUTANEOUS at 05:28

## 2024-10-10 RX ADMIN — Medication 500 MILLILITER(S): at 21:40

## 2024-10-10 RX ADMIN — Medication 650 MILLIGRAM(S): at 15:14

## 2024-10-10 RX ADMIN — Medication 650 MILLIGRAM(S): at 05:23

## 2024-10-10 RX ADMIN — GABAPENTIN 100 MILLIGRAM(S): 800 TABLET, FILM COATED ORAL at 21:26

## 2024-10-10 RX ADMIN — Medication 650 MILLIGRAM(S): at 22:26

## 2024-10-10 RX ADMIN — GABAPENTIN 100 MILLIGRAM(S): 800 TABLET, FILM COATED ORAL at 05:23

## 2024-10-10 RX ADMIN — MORPHINE SULFATE 15 MILLIGRAM(S): 30 TABLET, FILM COATED, EXTENDED RELEASE ORAL at 10:36

## 2024-10-10 RX ADMIN — Medication 20 MILLIGRAM(S): at 10:36

## 2024-10-10 RX ADMIN — Medication 1 TABLET(S): at 08:56

## 2024-10-10 RX ADMIN — Medication 20 MILLIGRAM(S): at 18:08

## 2024-10-10 RX ADMIN — Medication 4 MILLILITER(S): at 10:18

## 2024-10-10 RX ADMIN — Medication 650 MILLIGRAM(S): at 06:20

## 2024-10-10 RX ADMIN — Medication 4 MILLILITER(S): at 20:12

## 2024-10-10 RX ADMIN — Medication 1 TABLET(S): at 18:08

## 2024-10-10 RX ADMIN — MORPHINE SULFATE 15 MILLIGRAM(S): 30 TABLET, FILM COATED, EXTENDED RELEASE ORAL at 11:58

## 2024-10-10 RX ADMIN — Medication 500 MILLIGRAM(S): at 18:09

## 2024-10-10 RX ADMIN — Medication 650 MILLIGRAM(S): at 21:26

## 2024-10-10 RX ADMIN — Medication 17 GRAM(S): at 18:08

## 2024-10-10 RX ADMIN — ENOXAPARIN SODIUM 40 MILLIGRAM(S): 150 INJECTION SUBCUTANEOUS at 12:01

## 2024-10-10 RX ADMIN — MULTI VITAMIN/MINERAL SUPPLEMENT WITH ASCORBIC ACID, NIACIN, PYRIDOXINE, PANTOTHENIC ACID, FOLIC ACID, RIBOFLAVIN, THIAMIN, BIOTIN, COBALAMIN AND ZINC. 1 TABLET(S): 60; 20; 12.5; 10; 10; 1.7; 1.5; 1; .3; .006 TABLET, COATED ORAL at 18:08

## 2024-10-10 NOTE — PROGRESS NOTE ADULT - SUBJECTIVE AND OBJECTIVE BOX
CC: f/u for GBS bacteremia & L spine OM/ discitis    Patient reports no complaints. Pain is better controlled today & she was able to ambulate better.     REVIEW OF SYSTEMS:  All other review of systems negative (Comprehensive ROS)    Antimicrobials Day #  : 6/42  cefTRIAXone   IVPB 2000 milliGRAM(s) IV Intermittent every 24 hours    Other Medications Reviewed    T(F): 97.6 (10-10-24 @ 05:14), Max: 98.3 (10-09-24 @ 14:58)  HR: 72 (10-10-24 @ 05:14)  BP: 94/52 (10-10-24 @ 05:14)  RR: 16 (10-10-24 @ 05:14)  SpO2: 96% (10-10-24 @ 05:14)  Wt(kg): --    PHYSICAL EXAM:  General: alert, no acute distress  Eyes:  anicteric, no conjunctival injection, no discharge  Oropharynx: no lesions or injection 	  Neck: supple  Lungs: clear to auscultation  Heart: regular rate and rhythm; no murmurs  Abdomen: soft, nondistended, nontender.   Skin: no lesions  Extremities: no clubbing, cyanosis, or edema.                   Left arm PICC +  Neurologic: alert, oriented, moves all extremities    LAB RESULTS:                        9.3    5.36  )-----------( 311      ( 10 Oct 2024 06:55 )             27.4     10-10    141  |  105  |  15  ----------------------------<  98  4.9   |  29  |  0.59    Ca    9.4      10 Oct 2024 06:55      Urinalysis Basic - ( 10 Oct 2024 06:55 )    Color: x / Appearance: x / SG: x / pH: x  Gluc: 98 mg/dL / Ketone: x  / Bili: x / Urobili: x   Blood: x / Protein: x / Nitrite: x   Leuk Esterase: x / RBC: x / WBC x   Sq Epi: x / Non Sq Epi: x / Bacteria: x      MICROBIOLOGY:  RECENT CULTURES:      RADIOLOGY REVIEWED:

## 2024-10-10 NOTE — CHART NOTE - NSCHARTNOTEFT_GEN_A_CORE
Was notified pt's blood pressure is 86/53 pt is afebrile HR 89, Sao2 94. Pt is currently asymptomatic. Held Morphine and Valium, gave 500 cc bolus of IVF, will reassess BP Was notified pt's blood pressure is 86/53 pt is afebrile HR 89, Sao2 94. Pt is currently asymptomatic. Held Morphine and Valium, gave 500 cc bolus of IVF, will reassess BP  Bp was reassessed still running low, 84/57, assessed pt at bedside pt not in any distress. Pt denies any sob, chest pain, abdominal pain, hematochezia. Physical exam was unremarkable. Pt given another bolus of 500 cc IVF, ordered T+S, and stat CBC. Will reassess Was notified pt's blood pressure is 86/53 pt is afebrile HR 89, Sao2 94. Pt is currently asymptomatic. assessed pt at bedside pt not in any distress. Pt denies any sob, chest pain, abdominal pain, hematochezia. Physical exam was unremarkable. Held Morphine and Valium, gave 2x 500 cc bolus of IVF. BP went up to 98/66. T+S, stat CBC ordered. H/H 9.0/26.6. Pt states inability to sleep d/t right hip pain, Dilaudid given.

## 2024-10-10 NOTE — PROGRESS NOTE ADULT - ATTENDING COMMENTS
Please see resident note for full details regarding the service.    PE: A+Ox3, no murmurs, lungs CTA b/l, and soft/NT/ND, no lower extremity swelling    Assessment:  - Acute on chronic intractable back pain  - Acute osteomyelitis/discitis of the lumbar spine  - GBS Bacteremia  - Constipation  - Normocytic anemia  - History of spinal stenosis, osteoporosis, HLD, bronchiectasis, IgA/IgG deficiency    Plan:  - Ortho spine recs appreciated - will need outpatient follow up with spine surgery for surgical intervention, long term antibiotics  - ID recs appreciated - IV Ceftriaxone 2 grams daily for 6 weeks total  - s/p left arm PICC  - Follow up repeat blood cultures  - Pain control  - Completed a peer to peer today with the insurance company. Denied insurance authorization to acute rehab. Can consider [facility] vs. home with outpatient PT.   - DVT ppx: Lovenox 40 mg subcutaneous daily  - Code status: Full code  - Dispo: [facility]    I spent a total of 50 minutes on the date of this encounter coordinating the patient's care, excluding resident teaching time. This includes reviewing results/imaging and discussions with specialists, nursing, case management/social work. Further tests, medications, and procedures have been ordered as indicated. Results and the plan of care were communicated to the patient and/or their family member. Supporting documentation was completed and added to the patient's chart.

## 2024-10-10 NOTE — PROGRESS NOTE ADULT - ASSESSMENT
68 yr F w pmhx of spinal stenosis, osteoporosis, HLD, bronchiectasis, IgA/IgG deficiency presents with worsening of chronic low back pain      #Intractable back pain - Chronic with acute worsening  #Acute Osteomyelitis of lumbar spine  - Patient established care with specialist, surgery recommended but delayed due to osteoporosis  - CT lumbar: No evidence of an acute lumbar spine fracture. Multilevel degenerative changes greatest at L4-5 with severe spinal canal narrowing  - MRI lumbar spine - osteomyelitis  - Discussed with spine surg Dr. Lucas Garcia at [facility]; recommended MRI with contrast, IV abx. Patient not considered suitable candidate for spinal surgery at this time and advised to follow up with outpatient spine surgery.  - Ortho spine consult: MRI with IV contrast; if + for infection, c/w IV abx longterm. Immediate intervention if new weakness/numbness, urinary/bowel incontinence, and neuro deficits seen; no concerning findings; No surgical intervention at this time for severe spinal stenosis d/t concern of bacteremia; Outpt surgical intervention  - ID recs appreciated, long term IV abx (6 weeks)  - 10/5 Repeat Bcx NGTD@72h  - Optimize pain control, switched to oral dilaudid. C/w gabapentin, valium , duloxetine, MS Contin BID  - PICC line was flushed with 10cc saline x2 at bedside as per IR's recommendation, repeat Xray showed correct placement of LUE PICC.    #Blood cultures +ve - Bacteremia  #+ve UA/Ucx, asymptomatic  - US kidney/bladder, negative renal US, PVR 184cc  - 1x positive for group B strep, Bcx 2nd set negative   - ID recs appreciated, long term IV abx (6 weeks)  - repeat BCx sent on 10/5 NGTD@4 days    #Constipation  -likely 2/2 pain control regimen  -pt initially refusing bowel regimen  -10/7 increased miralax to BID  -continue senna qHS  -dulcolax PRN  -10/8- Mg citrate x 1 now    #Anemia  - Patient reports anemia at baseline  - Hgb stable (12.1 on admission)  - Monitor  - Keep Type and screen uptodate    #IgG and IgA deficiency  #hx of bronchietasis  - satting well on RA  - C/w saline duonebs PRN    #Depression/Anxiety  - on Fetzima at home, non-fomulary  - On valium PRN  - c/w Duloxetine    #DVT prophylaxis  - Lovenox    #GOC  - Full code      dispo: awaiting auth for acute rehab    d/w Dr Zaragoza 68 yr F w pmhx of spinal stenosis, osteoporosis, HLD, bronchiectasis, IgA/IgG deficiency presents with worsening of chronic low back pain      #Intractable back pain - Chronic with acute worsening  #Acute Osteomyelitis of lumbar spine  - Patient established care with specialist, surgery recommended but delayed due to osteoporosis  - CT lumbar: No evidence of an acute lumbar spine fracture. Multilevel degenerative changes greatest at L4-5 with severe spinal canal narrowing  - MRI lumbar spine - osteomyelitis  - Discussed with spine surg Dr. Lucas Garcia at [facility]; recommended MRI with contrast, IV abx. Patient not considered suitable candidate for spinal surgery at this time and advised to follow up with outpatient spine surgery.  - Ortho spine consult: MRI with IV contrast; if + for infection, c/w IV abx longterm. Immediate intervention if new weakness/numbness, urinary/bowel incontinence, and neuro deficits seen; no concerning findings; No surgical intervention at this time for severe spinal stenosis d/t concern of bacteremia; Outpt surgical intervention  - 10/5 Repeat Bcx NGTD@72h  - Optimize pain control, switched to oral dilaudid. C/w gabapentin, valium , duloxetine, MS Contin BID  - PICC line was flushed with 10cc saline x2 at bedside as per IR's recommendation, repeat Xray showed correct placement of LUE PICC.  - ID recs appreciated, long term IV abx (6 weeks), weekly CBC, CMP, CRP while on IV Abx, f/u with ID in 2 weeks, encourage probiotics      #Blood cultures +ve - Bacteremia  #+ve UA/Ucx, asymptomatic  - US kidney/bladder, negative renal US, PVR 184cc  - 1x positive for group B strep, Bcx 2nd set negative   - ID recs appreciated, long term IV abx (6 weeks)  - repeat BCx sent on 10/5 NGTD    #Constipation  -likely 2/2 pain control regimen  -pt initially refusing bowel regimen  -10/7 increased miralax to BID  -continue senna qHS  -dulcolax PRN    #Anemia  - Patient reports anemia at baseline  - Hgb stable (12.1 on admission)  - Monitor  - Keep Type and screen uptodate  - Total iron 27, transferrin 181, Iron sat 11%  - Folate wnl, B12>2000    #IgG and IgA deficiency  #hx of bronchietasis  - satting well on RA  - C/w saline duonebs PRN    #Depression/Anxiety  - on Fetzima at home, non-fomulary  - On valium PRN  - c/w Duloxetine    #DVT prophylaxis  - Lovenox    #GOC  - Full code      dispo: Insurance refused inpatient rehab, P2P today, f/u on auth    d/w Dr Zaragoza 68 yr F w pmhx of spinal stenosis, osteoporosis, HLD, bronchiectasis, IgA/IgG deficiency presents with worsening of chronic low back pain    #Intractable back pain - Chronic with acute worsening  #Acute Osteomyelitis of lumbar spine  - Patient established care with specialist, surgery recommended but delayed due to osteoporosis  - CT lumbar: No evidence of an acute lumbar spine fracture. Multilevel degenerative changes greatest at L4-5 with severe spinal canal narrowing  - MRI lumbar spine - osteomyelitis  - Discussed with spine surg Dr. Lucas Garcia at [facility]; recommended MRI with contrast, IV abx. Patient not considered suitable candidate for spinal surgery at this time and advised to follow up with outpatient spine surgery.  - Ortho spine consult: MRI with IV contrast; if + for infection, c/w IV abx longterm. Immediate intervention if new weakness/numbness, urinary/bowel incontinence, and neuro deficits seen; no concerning findings; No surgical intervention at this time for severe spinal stenosis d/t concern of bacteremia; Outpt surgical intervention  - 10/5 Repeat Bcx NGTD@72h  - Optimize pain control, switched to oral dilaudid. C/w gabapentin, valium , duloxetine, MS Contin BID  - PICC line was flushed with 10cc saline x2 at bedside as per IR's recommendation, repeat Xray showed correct placement of LUE PICC.  - ID recs appreciated, long term IV abx (6 weeks), weekly CBC, CMP, CRP while on IV Abx, f/u with ID in 2 weeks, encourage probiotics    #Bacteremia  - US kidney/bladder, negative renal US, PVR 184cc  - 1x positive for group B strep, Bcx 2nd set negative   - ID recs appreciated, long term IV abx (6 weeks)  - repeat BCx sent on 10/5 NGTD    #Constipation  -likely 2/2 pain control regimen  -pt initially refusing bowel regimen  -10/7 increased miralax to BID  -continue senna qHS  -dulcolax PRN    #Anemia  - Patient reports anemia at baseline  - Hgb stable (12.1 on admission)  - Monitor  - Keep Type and screen uptodate  - Total iron 27, transferrin 181, Iron sat 11%  - Folate wnl, B12>2000    #IgG and IgA deficiency  #hx of bronchietasis  - satting well on RA  - C/w saline duonebs PRN    #Depression/Anxiety  - on Fetzima at home, non-fomulary  - On valium PRN  - c/w Duloxetine    #DVT prophylaxis  - Lovenox    #GOC  - Full code      dispo: Insurance refused inpatient rehab, P2P today, f/u on auth    d/w Dr Zaragoza

## 2024-10-10 NOTE — PROGRESS NOTE ADULT - SUBJECTIVE AND OBJECTIVE BOX
68 yr F w pmhx of spinal stenosis, osteoporosis, HLD, bronchiectasis, IgA/IgG deficiency presents with worsening of chronic low back pain    Overnight Events: None  Interval HPI: Patient seen and examined at bedside.     REVIEW OF SYSTEMS:  CONSTITUTIONAL: (-) weakness, (-) fevers, (-) chills  EYES/ENT: (-) visual changes,  (-) vertigo,  (-) throat pain   NECK:  (-) pain, (-) stiffness  RESPIRATORY:  (-) shortness of breath, (-) cough,  (-) wheezing,  (-) hemoptysis   CARDIOVASCULAR:  (-) chest pain, (-) palpitations  GASTROINTESTINAL:  (-) abdominal or epigastric pain, (-) nausea, (-) vomiting, (-) diarrhea, (-) constipation, (-) melena,  (-) hematemesis,  (-) hematochezia  GENITOURINARY: (-) dysuria, (-) frequency, (-) hematuria  NEUROLOGICAL: (-) numbness, (-) weakness  SKIN: (-) itching, (-) rashes, (-) lesions    Vital Signs Last 24 Hrs  T(C): 36.4 (10 Oct 2024 05:14), Max: 36.8 (09 Oct 2024 14:58)  T(F): 97.6 (10 Oct 2024 05:14), Max: 98.3 (09 Oct 2024 14:58)  HR: 72 (10 Oct 2024 05:14) (72 - 91)  BP: 94/52 (10 Oct 2024 05:14) (90/52 - 138/84)  BP(mean): --  RR: 16 (10 Oct 2024 05:14) (16 - 16)  SpO2: 96% (10 Oct 2024 05:14) (95% - 97%)    Parameters below as of 10 Oct 2024 05:14  Patient On (Oxygen Delivery Method): room air        PHYSICAL EXAM:  GENERAL: NAD, lying in bed comfortably  HEAD:  Atraumatic, Normocephalic  CHEST/LUNG: Clear to auscultation bilaterally, good air entry bilaterally; No wheezing, rales, or rhonchi. Unlabored respirations  HEART: Regular rate and rhythm. S1 and S2. No murmurs, rubs, or gallops  ABDOMEN: Soft, Nontender, Nondistended. Bowel sounds present.   EXTREMITIES:  2+ Peripheral Pulses. No clubbing, cyanosis, or edema  NERVOUS SYSTEM:  Alert & Oriented X3, speech clear.     LABS:   All Labs Personally Reviewed                         9.3    5.36  )-----------( 311      ( 10 Oct 2024 06:55 )             27.4     10-09    142  |  104  |  16  ----------------------------<  92  4.1   |  29  |  0.76    Ca    9.2      09 Oct 2024 06:37            Blood Culture:   I&O's Summary    CAPILLARY BLOOD GLUCOSE        RADIOLOGY/EKG:  All Imaging and EKGs Personally Reviewed   < from: Xray Chest 1 View-PORTABLE IMMEDIATE (Xray Chest 1 View-PORTABLE IMMEDIATE .) (10.09.24 @ 09:41) >  IMPRESSION: Left PICC line in good position.    Bibasilar linear density slightly increased from prior.    < end of copied text >      MEDICATIONS:  MEDICATIONS  (STANDING):  acetaminophen     Tablet .. 650 milliGRAM(s) Oral every 8 hours  ascorbic acid 500 milliGRAM(s) Oral <User Schedule>  atorvastatin 10 milliGRAM(s) Oral at bedtime  cefTRIAXone   IVPB 2000 milliGRAM(s) IV Intermittent every 24 hours  diazepam    Tablet 5 milliGRAM(s) Oral every 8 hours  DULoxetine 20 milliGRAM(s) Oral <User Schedule>  enoxaparin Injectable 40 milliGRAM(s) SubCutaneous every 24 hours  gabapentin 100 milliGRAM(s) Oral three times a day  influenza  Vaccine (HIGH DOSE) 0.5 milliLiter(s) IntraMuscular once  lactobacillus acidophilus 1 Tablet(s) Oral two times a day with meals  morphine ER Tablet 15 milliGRAM(s) Oral every 12 hours  multivitamin 1 Tablet(s) Oral <User Schedule>  polyethylene glycol 3350 17 Gram(s) Oral two times a day  senna 2 Tablet(s) Oral at bedtime  sodium chloride 3%  Inhalation 4 milliLiter(s) Inhalation every 12 hours     68 yr F w pmhx of spinal stenosis, osteoporosis, HLD, bronchiectasis, IgA/IgG deficiency presents with worsening of chronic low back pain    Overnight Events: None  Interval HPI: Patient seen and examined at bedside. No acute complaints    REVIEW OF SYSTEMS:  CONSTITUTIONAL: (-) weakness, (-) fevers, (-) chills  EYES/ENT: (-) visual changes,  (-) vertigo,  (-) throat pain   NECK:  (-) pain, (-) stiffness  RESPIRATORY:  (-) shortness of breath, (-) cough,  (-) wheezing,  (-) hemoptysis   CARDIOVASCULAR:  (-) chest pain, (-) palpitations  BACK: + pain, improved from before  GASTROINTESTINAL:  (-) abdominal or epigastric pain, (-) nausea, (-) vomiting, (-) diarrhea, (-) constipation, (-) melena,  (-) hematemesis,  (-) hematochezia  GENITOURINARY: (-) dysuria, (-) frequency, (-) hematuria  NEUROLOGICAL: (-) numbness, (-) weakness  SKIN: (-) itching, (-) rashes, (-) lesions    Vital Signs Last 24 Hrs  T(C): 36.4 (10 Oct 2024 05:14), Max: 36.8 (09 Oct 2024 14:58)  T(F): 97.6 (10 Oct 2024 05:14), Max: 98.3 (09 Oct 2024 14:58)  HR: 72 (10 Oct 2024 05:14) (72 - 91)  BP: 94/52 (10 Oct 2024 05:14) (90/52 - 138/84)  BP(mean): --  RR: 16 (10 Oct 2024 05:14) (16 - 16)  SpO2: 96% (10 Oct 2024 05:14) (95% - 97%)    Parameters below as of 10 Oct 2024 05:14  Patient On (Oxygen Delivery Method): room air      PHYSICAL EXAM:  GENERAL: NAD, lying in bed comfortably  HEAD:  Atraumatic, Normocephalic  CHEST/LUNG: Clear to auscultation bilaterally, good air entry bilaterally; No wheezing, rales, or rhonchi. Unlabored respirations  HEART: Regular rate and rhythm. S1 and S2. No murmurs, rubs, or gallops  BACK: no spinal or paraspinal tenderness  ABDOMEN: Soft, Nontender, Nondistended. Bowel sounds present.   EXTREMITIES:  2+ Peripheral Pulses. No clubbing, cyanosis, or edema  NERVOUS SYSTEM:  Alert & Oriented X3, speech clear.     LABS:   All Labs Personally Reviewed                         9.3    5.36  )-----------( 311      ( 10 Oct 2024 06:55 )             27.4     10-09    142  |  104  |  16  ----------------------------<  92  4.1   |  29  |  0.76    Ca    9.2      09 Oct 2024 06:37    Blood Culture:   I&O's Summary    CAPILLARY BLOOD GLUCOSE        RADIOLOGY/EKG:  All Imaging and EKGs Personally Reviewed   < from: Xray Chest 1 View-PORTABLE IMMEDIATE (Xray Chest 1 View-PORTABLE IMMEDIATE .) (10.09.24 @ 09:41) >  IMPRESSION: Left PICC line in good position.    Bibasilar linear density slightly increased from prior.    < end of copied text >      MEDICATIONS:  MEDICATIONS  (STANDING):  acetaminophen     Tablet .. 650 milliGRAM(s) Oral every 8 hours  ascorbic acid 500 milliGRAM(s) Oral <User Schedule>  atorvastatin 10 milliGRAM(s) Oral at bedtime  cefTRIAXone   IVPB 2000 milliGRAM(s) IV Intermittent every 24 hours  diazepam    Tablet 5 milliGRAM(s) Oral every 8 hours  DULoxetine 20 milliGRAM(s) Oral <User Schedule>  enoxaparin Injectable 40 milliGRAM(s) SubCutaneous every 24 hours  gabapentin 100 milliGRAM(s) Oral three times a day  influenza  Vaccine (HIGH DOSE) 0.5 milliLiter(s) IntraMuscular once  lactobacillus acidophilus 1 Tablet(s) Oral two times a day with meals  morphine ER Tablet 15 milliGRAM(s) Oral every 12 hours  multivitamin 1 Tablet(s) Oral <User Schedule>  polyethylene glycol 3350 17 Gram(s) Oral two times a day  senna 2 Tablet(s) Oral at bedtime  sodium chloride 3%  Inhalation 4 milliLiter(s) Inhalation every 12 hours

## 2024-10-10 NOTE — PROGRESS NOTE ADULT - ASSESSMENT
68y female with PMH significant for bronchiectasis, IgA deficiency, chronic back pain/spinal stenosis on pain meds & chronic urinary frequency who developed rigors and fever of 101.7F on 9/29/24, followed by diffuse myalgia, arthralgia and profound incapacitating lower back pain as of 9/30. Was seen in the ED, found hypotensive to 96/59 but afebrile & without leukocytosis. She was sent home after pain improved on analgesics.    She remained with generalized weakness & diffuse bodyaches & presented again on 10/02/24 w c/o persistent severe low back pain.   Found afebrile, without leukocytosis, a little hypertensive.   Admitted for w/up of back pain.   Blood cx recovered 2/4 bottles (1 set) with GBS.    Started on CTX on 10/04/24.   Reported issues of chronic urinary frequency & a feeling of incomplete bladder emptying.  UA revealed 10 WBCs & large LE, without nitrites.   Urine cx with GBS - Bacteremia was felt to originate from  source.  And given back pain soon after the symptoms of chills w fever and then bacteremia, raised high suspicion for spine infection.   MRI of spine revealed high T2 disc signal at L4-5, which may represent discitis/ osteomyelitis with phlegmon.   No support for sepsis at this time   S/p left arm PICC placement   CRP has declined from 54 -----> 37  Repeated blood cx from 10/05 - [facility].    PLAN:  Continue IV CTX - 2 gram daily  She will need CBC, CMP & CRP to be checked q wkly while on IV antibiotics.   Probiotics encouraged  Discharge planning  Patient must follow up in ID office in about 2 wks

## 2024-10-11 ENCOUNTER — INPATIENT (INPATIENT)
Facility: HOSPITAL | Age: 69
LOS: 3 days | Discharge: HOME | DRG: 541 | End: 2024-10-15
Attending: PHYSICAL MEDICINE & REHABILITATION | Admitting: PHYSICAL MEDICINE & REHABILITATION
Payer: COMMERCIAL

## 2024-10-11 ENCOUNTER — TRANSCRIPTION ENCOUNTER (OUTPATIENT)
Age: 69
End: 2024-10-11

## 2024-10-11 VITALS — DIASTOLIC BLOOD PRESSURE: 59 MMHG | HEART RATE: 69 BPM | SYSTOLIC BLOOD PRESSURE: 96 MMHG

## 2024-10-11 VITALS
TEMPERATURE: 98 F | WEIGHT: 100.09 LBS | DIASTOLIC BLOOD PRESSURE: 53 MMHG | SYSTOLIC BLOOD PRESSURE: 80 MMHG | RESPIRATION RATE: 16 BRPM | OXYGEN SATURATION: 98 % | HEART RATE: 68 BPM

## 2024-10-11 DIAGNOSIS — Z98.89 OTHER SPECIFIED POSTPROCEDURAL STATES: Chronic | ICD-10-CM

## 2024-10-11 DIAGNOSIS — M86.9 OSTEOMYELITIS, UNSPECIFIED: ICD-10-CM

## 2024-10-11 LAB
ALLERGY+IMMUNOLOGY DIAG STUDY NOTE: SIGNIFICANT CHANGE UP
ANION GAP SERPL CALC-SCNC: 5 MMOL/L — SIGNIFICANT CHANGE UP (ref 5–17)
BLD GP AB SCN SERPL QL: SIGNIFICANT CHANGE UP
BUN SERPL-MCNC: 13 MG/DL — SIGNIFICANT CHANGE UP (ref 7–23)
CALCIUM SERPL-MCNC: 8.6 MG/DL — SIGNIFICANT CHANGE UP (ref 8.4–10.5)
CHLORIDE SERPL-SCNC: 108 MMOL/L — SIGNIFICANT CHANGE UP (ref 96–108)
CO2 SERPL-SCNC: 28 MMOL/L — SIGNIFICANT CHANGE UP (ref 22–31)
CREAT SERPL-MCNC: 0.61 MG/DL — SIGNIFICANT CHANGE UP (ref 0.5–1.3)
DIR ANTIGLOB POLYSPECIFIC INTERPRETATION: SIGNIFICANT CHANGE UP
EGFR: 97 ML/MIN/1.73M2 — SIGNIFICANT CHANGE UP
GLUCOSE SERPL-MCNC: 95 MG/DL — SIGNIFICANT CHANGE UP (ref 70–99)
HCT VFR BLD CALC: 24.6 % — LOW (ref 34.5–45)
HCT VFR BLD CALC: 26.6 % — LOW (ref 34.5–45)
HGB BLD-MCNC: 8.6 G/DL — LOW (ref 11.5–15.5)
HGB BLD-MCNC: 9 G/DL — LOW (ref 11.5–15.5)
MCHC RBC-ENTMCNC: 29 PG — SIGNIFICANT CHANGE UP (ref 27–34)
MCHC RBC-ENTMCNC: 30 PG — SIGNIFICANT CHANGE UP (ref 27–34)
MCHC RBC-ENTMCNC: 33.8 GM/DL — SIGNIFICANT CHANGE UP (ref 32–36)
MCHC RBC-ENTMCNC: 35 GM/DL — SIGNIFICANT CHANGE UP (ref 32–36)
MCV RBC AUTO: 85.7 FL — SIGNIFICANT CHANGE UP (ref 80–100)
MCV RBC AUTO: 85.8 FL — SIGNIFICANT CHANGE UP (ref 80–100)
NRBC # BLD: 0 /100 WBCS — SIGNIFICANT CHANGE UP (ref 0–0)
NRBC # BLD: 0 /100 WBCS — SIGNIFICANT CHANGE UP (ref 0–0)
PLATELET # BLD AUTO: 279 K/UL — SIGNIFICANT CHANGE UP (ref 150–400)
PLATELET # BLD AUTO: 299 K/UL — SIGNIFICANT CHANGE UP (ref 150–400)
POTASSIUM SERPL-MCNC: 4.1 MMOL/L — SIGNIFICANT CHANGE UP (ref 3.5–5.3)
POTASSIUM SERPL-SCNC: 4.1 MMOL/L — SIGNIFICANT CHANGE UP (ref 3.5–5.3)
RBC # BLD: 2.87 M/UL — LOW (ref 3.8–5.2)
RBC # BLD: 3.1 M/UL — LOW (ref 3.8–5.2)
RBC # FLD: 12.6 % — SIGNIFICANT CHANGE UP (ref 10.3–14.5)
RBC # FLD: 12.8 % — SIGNIFICANT CHANGE UP (ref 10.3–14.5)
SODIUM SERPL-SCNC: 141 MMOL/L — SIGNIFICANT CHANGE UP (ref 135–145)
WBC # BLD: 3.34 K/UL — LOW (ref 3.8–10.5)
WBC # BLD: 5.54 K/UL — SIGNIFICANT CHANGE UP (ref 3.8–10.5)
WBC # FLD AUTO: 3.34 K/UL — LOW (ref 3.8–10.5)
WBC # FLD AUTO: 5.54 K/UL — SIGNIFICANT CHANGE UP (ref 3.8–10.5)

## 2024-10-11 PROCEDURE — 80048 BASIC METABOLIC PNL TOTAL CA: CPT

## 2024-10-11 PROCEDURE — 87186 SC STD MICRODIL/AGAR DIL: CPT

## 2024-10-11 PROCEDURE — 85025 COMPLETE CBC W/AUTO DIFF WBC: CPT

## 2024-10-11 PROCEDURE — 0001U RBC DNA HEA 35 AG 11 BLD GRP: CPT

## 2024-10-11 PROCEDURE — 71045 X-RAY EXAM CHEST 1 VIEW: CPT

## 2024-10-11 PROCEDURE — 99239 HOSP IP/OBS DSCHRG MGMT >30: CPT | Mod: GC

## 2024-10-11 PROCEDURE — 72158 MRI LUMBAR SPINE W/O & W/DYE: CPT | Mod: MC

## 2024-10-11 PROCEDURE — 85610 PROTHROMBIN TIME: CPT

## 2024-10-11 PROCEDURE — 96375 TX/PRO/DX INJ NEW DRUG ADDON: CPT

## 2024-10-11 PROCEDURE — 99285 EMERGENCY DEPT VISIT HI MDM: CPT | Mod: 25

## 2024-10-11 PROCEDURE — 86900 BLOOD TYPING SEROLOGIC ABO: CPT

## 2024-10-11 PROCEDURE — 82728 ASSAY OF FERRITIN: CPT

## 2024-10-11 PROCEDURE — 86077 PHYS BLOOD BANK SERV XMATCH: CPT

## 2024-10-11 PROCEDURE — A9585: CPT

## 2024-10-11 PROCEDURE — 87077 CULTURE AEROBIC IDENTIFY: CPT

## 2024-10-11 PROCEDURE — 87040 BLOOD CULTURE FOR BACTERIA: CPT

## 2024-10-11 PROCEDURE — 94760 N-INVAS EAR/PLS OXIMETRY 1: CPT

## 2024-10-11 PROCEDURE — 97166 OT EVAL MOD COMPLEX 45 MIN: CPT

## 2024-10-11 PROCEDURE — 80053 COMPREHEN METABOLIC PANEL: CPT

## 2024-10-11 PROCEDURE — 82746 ASSAY OF FOLIC ACID SERUM: CPT

## 2024-10-11 PROCEDURE — 97535 SELF CARE MNGMENT TRAINING: CPT

## 2024-10-11 PROCEDURE — 84466 ASSAY OF TRANSFERRIN: CPT

## 2024-10-11 PROCEDURE — 97116 GAIT TRAINING THERAPY: CPT

## 2024-10-11 PROCEDURE — 76770 US EXAM ABDO BACK WALL COMP: CPT

## 2024-10-11 PROCEDURE — 93005 ELECTROCARDIOGRAM TRACING: CPT

## 2024-10-11 PROCEDURE — 86870 RBC ANTIBODY IDENTIFICATION: CPT

## 2024-10-11 PROCEDURE — 93306 TTE W/DOPPLER COMPLETE: CPT

## 2024-10-11 PROCEDURE — 87637 SARSCOV2&INF A&B&RSV AMP PRB: CPT

## 2024-10-11 PROCEDURE — 85027 COMPLETE CBC AUTOMATED: CPT

## 2024-10-11 PROCEDURE — 96361 HYDRATE IV INFUSION ADD-ON: CPT

## 2024-10-11 PROCEDURE — 72131 CT LUMBAR SPINE W/O DYE: CPT | Mod: MC

## 2024-10-11 PROCEDURE — 36415 COLL VENOUS BLD VENIPUNCTURE: CPT

## 2024-10-11 PROCEDURE — 83735 ASSAY OF MAGNESIUM: CPT

## 2024-10-11 PROCEDURE — 85730 THROMBOPLASTIN TIME PARTIAL: CPT

## 2024-10-11 PROCEDURE — 86850 RBC ANTIBODY SCREEN: CPT

## 2024-10-11 PROCEDURE — 86901 BLOOD TYPING SEROLOGIC RH(D): CPT

## 2024-10-11 PROCEDURE — 99223 1ST HOSP IP/OBS HIGH 75: CPT | Mod: GC

## 2024-10-11 PROCEDURE — 87150 DNA/RNA AMPLIFIED PROBE: CPT

## 2024-10-11 PROCEDURE — 87086 URINE CULTURE/COLONY COUNT: CPT

## 2024-10-11 PROCEDURE — 86880 COOMBS TEST DIRECT: CPT

## 2024-10-11 PROCEDURE — 83550 IRON BINDING TEST: CPT

## 2024-10-11 PROCEDURE — 82607 VITAMIN B-12: CPT

## 2024-10-11 PROCEDURE — 86905 BLOOD TYPING RBC ANTIGENS: CPT

## 2024-10-11 PROCEDURE — 97110 THERAPEUTIC EXERCISES: CPT

## 2024-10-11 PROCEDURE — 86140 C-REACTIVE PROTEIN: CPT

## 2024-10-11 PROCEDURE — 94640 AIRWAY INHALATION TREATMENT: CPT

## 2024-10-11 PROCEDURE — 96365 THER/PROPH/DIAG IV INF INIT: CPT

## 2024-10-11 PROCEDURE — 97161 PT EVAL LOW COMPLEX 20 MIN: CPT

## 2024-10-11 PROCEDURE — 83605 ASSAY OF LACTIC ACID: CPT

## 2024-10-11 PROCEDURE — 81001 URINALYSIS AUTO W/SCOPE: CPT

## 2024-10-11 PROCEDURE — 96374 THER/PROPH/DIAG INJ IV PUSH: CPT

## 2024-10-11 PROCEDURE — 83540 ASSAY OF IRON: CPT

## 2024-10-11 RX ORDER — SODIUM CHLORIDE 0.9 % (FLUSH) 0.9 %
4 SYRINGE (ML) INJECTION EVERY 12 HOURS
Refills: 0 | Status: DISCONTINUED | OUTPATIENT
Start: 2024-10-11 | End: 2024-10-15

## 2024-10-11 RX ORDER — 5-HYDROXYTRYPTOPHAN (5-HTP) 100 MG
1 TABLET,DISINTEGRATING ORAL
Qty: 0 | Refills: 0 | DISCHARGE
Start: 2024-10-11

## 2024-10-11 RX ORDER — DULOXETINE HCL 20 MG
20 CAPSULE,DELAYED RELEASE (ENTERIC COATED) ORAL
Refills: 0 | Status: DISCONTINUED | OUTPATIENT
Start: 2024-10-11 | End: 2024-10-12

## 2024-10-11 RX ORDER — ATORVASTATIN CALCIUM 10 MG/1
10 TABLET, FILM COATED ORAL AT BEDTIME
Refills: 0 | Status: DISCONTINUED | OUTPATIENT
Start: 2024-10-11 | End: 2024-10-15

## 2024-10-11 RX ORDER — ACETAMINOPHEN 325 MG
2 TABLET ORAL
Qty: 0 | Refills: 0 | DISCHARGE
Start: 2024-10-11

## 2024-10-11 RX ORDER — 5-HYDROXYTRYPTOPHAN (5-HTP) 100 MG
3 TABLET,DISINTEGRATING ORAL AT BEDTIME
Refills: 0 | Status: DISCONTINUED | OUTPATIENT
Start: 2024-10-11 | End: 2024-10-15

## 2024-10-11 RX ORDER — SODIUM CHLORIDE 0.9 % (FLUSH) 0.9 %
4 SYRINGE (ML) INJECTION
Qty: 0 | Refills: 0 | DISCHARGE
Start: 2024-10-11

## 2024-10-11 RX ORDER — HYDROMORPHONE HYDROCHLORIDE 1 MG/ML
1 INJECTION, SOLUTION INTRAMUSCULAR; INTRAVENOUS; SUBCUTANEOUS
Qty: 0 | Refills: 0 | DISCHARGE
Start: 2024-10-11

## 2024-10-11 RX ORDER — MORPHINE SULFATE 30 MG/1
1 TABLET, FILM COATED, EXTENDED RELEASE ORAL
Qty: 0 | Refills: 0 | DISCHARGE
Start: 2024-10-11

## 2024-10-11 RX ORDER — MULTI VITAMIN/MINERAL SUPPLEMENT WITH ASCORBIC ACID, NIACIN, PYRIDOXINE, PANTOTHENIC ACID, FOLIC ACID, RIBOFLAVIN, THIAMIN, BIOTIN, COBALAMIN AND ZINC. 60; 20; 12.5; 10; 10; 1.7; 1.5; 1; .3; .006 MG/1; MG/1; MG/1; MG/1; MG/1; MG/1; MG/1; MG/1; MG/1; MG/1
1 TABLET, COATED ORAL
Refills: 0 | Status: DISCONTINUED | OUTPATIENT
Start: 2024-10-11 | End: 2024-10-15

## 2024-10-11 RX ORDER — MAG HYDROX/ALUMINUM HYD/SIMETH 200-200-20
30 SUSPENSION, ORAL (FINAL DOSE FORM) ORAL
Qty: 0 | Refills: 0 | DISCHARGE
Start: 2024-10-11

## 2024-10-11 RX ORDER — CEFTRIAXONE SODIUM 1 G
2000 VIAL (EA) INJECTION
Qty: 0 | Refills: 0 | DISCHARGE
Start: 2024-10-11

## 2024-10-11 RX ORDER — SENNOSIDES 8.6 MG
2 TABLET ORAL
Qty: 0 | Refills: 0 | DISCHARGE
Start: 2024-10-11

## 2024-10-11 RX ORDER — ACETAMINOPHEN 325 MG
650 TABLET ORAL EVERY 8 HOURS
Refills: 0 | Status: DISCONTINUED | OUTPATIENT
Start: 2024-10-11 | End: 2024-10-15

## 2024-10-11 RX ORDER — MULTI VITAMIN/MINERAL SUPPLEMENT WITH ASCORBIC ACID, NIACIN, PYRIDOXINE, PANTOTHENIC ACID, FOLIC ACID, RIBOFLAVIN, THIAMIN, BIOTIN, COBALAMIN AND ZINC. 60; 20; 12.5; 10; 10; 1.7; 1.5; 1; .3; .006 MG/1; MG/1; MG/1; MG/1; MG/1; MG/1; MG/1; MG/1; MG/1; MG/1
1 TABLET, COATED ORAL
Qty: 0 | Refills: 0 | DISCHARGE
Start: 2024-10-11

## 2024-10-11 RX ORDER — BISACODYL 5 MG/1
1 TABLET, COATED ORAL
Qty: 0 | Refills: 0 | DISCHARGE
Start: 2024-10-11

## 2024-10-11 RX ORDER — DIAZEPAM 10 MG/1
5 TABLET ORAL EVERY 8 HOURS
Refills: 0 | Status: DISCONTINUED | OUTPATIENT
Start: 2024-10-11 | End: 2024-10-15

## 2024-10-11 RX ORDER — SODIUM CHLORIDE 0.9 % (FLUSH) 0.9 %
500 SYRINGE (ML) INJECTION ONCE
Refills: 0 | Status: COMPLETED | OUTPATIENT
Start: 2024-10-11 | End: 2024-10-11

## 2024-10-11 RX ORDER — GABAPENTIN 800 MG/1
100 TABLET, FILM COATED ORAL THREE TIMES A DAY
Refills: 0 | Status: DISCONTINUED | OUTPATIENT
Start: 2024-10-11 | End: 2024-10-12

## 2024-10-11 RX ORDER — MORPHINE SULFATE 30 MG/1
15 TABLET, FILM COATED, EXTENDED RELEASE ORAL EVERY 12 HOURS
Refills: 0 | Status: DISCONTINUED | OUTPATIENT
Start: 2024-10-11 | End: 2024-10-15

## 2024-10-11 RX ORDER — SENNOSIDES 8.6 MG
2 TABLET ORAL AT BEDTIME
Refills: 0 | Status: DISCONTINUED | OUTPATIENT
Start: 2024-10-11 | End: 2024-10-15

## 2024-10-11 RX ORDER — CEFTRIAXONE SODIUM 1 G
2000 VIAL (EA) INJECTION EVERY 24 HOURS
Refills: 0 | Status: DISCONTINUED | OUTPATIENT
Start: 2024-10-11 | End: 2024-10-13

## 2024-10-11 RX ORDER — FAMOTIDINE 40 MG
20 TABLET ORAL DAILY
Refills: 0 | Status: DISCONTINUED | OUTPATIENT
Start: 2024-10-11 | End: 2024-10-15

## 2024-10-11 RX ORDER — FAMOTIDINE 40 MG
1 TABLET ORAL
Qty: 0 | Refills: 0 | DISCHARGE
Start: 2024-10-11

## 2024-10-11 RX ORDER — BISACODYL 5 MG/1
5 TABLET, COATED ORAL AT BEDTIME
Refills: 0 | Status: DISCONTINUED | OUTPATIENT
Start: 2024-10-11 | End: 2024-10-15

## 2024-10-11 RX ORDER — HYDROMORPHONE HYDROCHLORIDE 1 MG/ML
2 INJECTION, SOLUTION INTRAMUSCULAR; INTRAVENOUS; SUBCUTANEOUS EVERY 6 HOURS
Refills: 0 | Status: DISCONTINUED | OUTPATIENT
Start: 2024-10-11 | End: 2024-10-15

## 2024-10-11 RX ORDER — DIAZEPAM 10 MG/1
5 TABLET ORAL EVERY 8 HOURS
Refills: 0 | Status: DISCONTINUED | OUTPATIENT
Start: 2024-10-11 | End: 2024-10-11

## 2024-10-11 RX ORDER — IPRATROPIUM BROMIDE AND ALBUTEROL SULFATE .5; 3 MG/3ML; MG/3ML
3 SOLUTION RESPIRATORY (INHALATION)
Qty: 0 | Refills: 0 | DISCHARGE
Start: 2024-10-11

## 2024-10-11 RX ORDER — GABAPENTIN 800 MG/1
1 TABLET, FILM COATED ORAL
Qty: 0 | Refills: 0 | DISCHARGE
Start: 2024-10-11

## 2024-10-11 RX ORDER — DIAZEPAM 10 MG
1 TABLET ORAL
Qty: 9 | Refills: 0
Start: 2024-10-11 | End: 2024-10-13

## 2024-10-11 RX ORDER — LACTOBACILLUS ACIDOPHILUS 25MM CELL
1 CAPSULE ORAL
Qty: 0 | Refills: 0 | DISCHARGE
Start: 2024-10-11

## 2024-10-11 RX ORDER — ENOXAPARIN SODIUM 150 MG/ML
40 INJECTION SUBCUTANEOUS EVERY 24 HOURS
Refills: 0 | Status: DISCONTINUED | OUTPATIENT
Start: 2024-10-11 | End: 2024-10-12

## 2024-10-11 RX ORDER — LACTOBACILLUS ACIDOPHILUS 25MM CELL
1 CAPSULE ORAL
Refills: 0 | Status: DISCONTINUED | OUTPATIENT
Start: 2024-10-11 | End: 2024-10-15

## 2024-10-11 RX ORDER — DULOXETINE HCL 20 MG
1 CAPSULE,DELAYED RELEASE (ENTERIC COATED) ORAL
Qty: 0 | Refills: 0 | DISCHARGE
Start: 2024-10-11

## 2024-10-11 RX ADMIN — HYDROMORPHONE HYDROCHLORIDE 2 MILLIGRAM(S): 1 INJECTION, SOLUTION INTRAMUSCULAR; INTRAVENOUS; SUBCUTANEOUS at 01:10

## 2024-10-11 RX ADMIN — HYDROMORPHONE HYDROCHLORIDE 2 MILLIGRAM(S): 1 INJECTION, SOLUTION INTRAMUSCULAR; INTRAVENOUS; SUBCUTANEOUS at 02:10

## 2024-10-11 RX ADMIN — Medication 1 TABLET(S): at 10:23

## 2024-10-11 RX ADMIN — Medication 20 MILLIGRAM(S): at 23:07

## 2024-10-11 RX ADMIN — MORPHINE SULFATE 15 MILLIGRAM(S): 30 TABLET, FILM COATED, EXTENDED RELEASE ORAL at 10:53

## 2024-10-11 RX ADMIN — Medication 650 MILLIGRAM(S): at 14:54

## 2024-10-11 RX ADMIN — Medication 500 MILLILITER(S): at 00:21

## 2024-10-11 RX ADMIN — GABAPENTIN 100 MILLIGRAM(S): 800 TABLET, FILM COATED ORAL at 05:11

## 2024-10-11 RX ADMIN — Medication 4 MILLILITER(S): at 09:52

## 2024-10-11 RX ADMIN — Medication 650 MILLIGRAM(S): at 23:08

## 2024-10-11 RX ADMIN — Medication 650 MILLIGRAM(S): at 06:11

## 2024-10-11 RX ADMIN — Medication 1 TABLET(S): at 17:17

## 2024-10-11 RX ADMIN — Medication 1 TABLET(S): at 23:06

## 2024-10-11 RX ADMIN — Medication 650 MILLIGRAM(S): at 00:00

## 2024-10-11 RX ADMIN — Medication 650 MILLIGRAM(S): at 05:11

## 2024-10-11 RX ADMIN — HYDROMORPHONE HYDROCHLORIDE 2 MILLIGRAM(S): 1 INJECTION, SOLUTION INTRAMUSCULAR; INTRAVENOUS; SUBCUTANEOUS at 23:06

## 2024-10-11 RX ADMIN — DIAZEPAM 5 MILLIGRAM(S): 10 TABLET ORAL at 05:11

## 2024-10-11 RX ADMIN — Medication 500 MILLIGRAM(S): at 17:17

## 2024-10-11 RX ADMIN — GABAPENTIN 100 MILLIGRAM(S): 800 TABLET, FILM COATED ORAL at 14:54

## 2024-10-11 RX ADMIN — Medication 500 MILLILITER(S): at 16:04

## 2024-10-11 RX ADMIN — Medication 100 MILLIGRAM(S): at 14:54

## 2024-10-11 RX ADMIN — MULTI VITAMIN/MINERAL SUPPLEMENT WITH ASCORBIC ACID, NIACIN, PYRIDOXINE, PANTOTHENIC ACID, FOLIC ACID, RIBOFLAVIN, THIAMIN, BIOTIN, COBALAMIN AND ZINC. 1 TABLET(S): 60; 20; 12.5; 10; 10; 1.7; 1.5; 1; .3; .006 TABLET, COATED ORAL at 17:18

## 2024-10-11 RX ADMIN — ATORVASTATIN CALCIUM 10 MILLIGRAM(S): 10 TABLET, FILM COATED ORAL at 23:07

## 2024-10-11 RX ADMIN — Medication 20 MILLIGRAM(S): at 17:18

## 2024-10-11 RX ADMIN — MORPHINE SULFATE 15 MILLIGRAM(S): 30 TABLET, FILM COATED, EXTENDED RELEASE ORAL at 10:23

## 2024-10-11 NOTE — PROGRESS NOTE ADULT - PROVIDER SPECIALTY LIST ADULT
Family Medicine
Infectious Disease
Physiatry
Family Medicine
Infectious Disease
Physiatry
Infectious Disease
Infectious Disease
Family Medicine
Family Medicine

## 2024-10-11 NOTE — PROGRESS NOTE ADULT - ATTENDING COMMENTS
Please see resident note for full details regarding the service.    PE: A+Ox3, no murmurs, lungs CTA b/l, and soft/NT/ND, no lower extremity swelling    Assessment:  - Acute on chronic intractable back pain  - Acute osteomyelitis/discitis of the lumbar spine  - GBS Bacteremia  - Constipation  - Normocytic anemia  - History of spinal stenosis, osteoporosis, HLD, bronchiectasis, IgA/IgG deficiency    Plan:  - BP low overnight, given IVF with improvement. Continue to monitor BP today.   - Ortho spine recs appreciated - will need outpatient follow up with spine surgery for surgical intervention, long term antibiotics  - ID recs appreciated - IV Ceftriaxone 2 grams daily for 6 weeks total (to end on 11/14/24)   - s/p left arm PICC  - Pain control  - DVT ppx: Lovenox 40 mg subcutaneous daily  - Code status: Full code  - Dispo: pending PMR vs. [facility] follow up     I spent a total of 50 minutes on the date of this encounter coordinating the patient's care, excluding resident teaching time. This includes reviewing results/imaging and discussions with specialists, nursing, case management/social work. Further tests, medications, and procedures have been ordered as indicated. Results and the plan of care were communicated to the patient and/or their family member. Supporting documentation was completed and added to the patient's chart.

## 2024-10-11 NOTE — DISCHARGE NOTE NURSING/CASE MANAGEMENT/SOCIAL WORK - NSDCFUADDAPPT_GEN_ALL_CORE_FT
D/c to [facility] acute rehab room 115      APPTS ARE READY TO BE MADE: [ ] YES    Best Family or Patient Contact (if needed):    Additional Information about above appointments (if needed):    1: Spine specialist  2: PCP  3: ID  4:     Other comments or requests:

## 2024-10-11 NOTE — H&P ADULT - NS ATTEND AMEND GEN_ALL_CORE FT
Patient with multilevel DJD< chronic pain   Will benefit from strengthening lumbar paraspinals, gait trainin g  PT/OT eval

## 2024-10-11 NOTE — H&P ADULT - NSHPLABSRESULTS_GEN_ALL_CORE
LABS:                          8.6    3.34  )-----------( 279      ( 11 Oct 2024 06:14 )             24.6     10-11    141  |  108  |  13  ----------------------------<  95  4.1   |  28  |  0.61    Ca    8.6      11 Oct 2024 06:14 ACC: [id number] EXAM:  MR SPINE LUMBAR WAW IC   ORDERED BY:  DENA CONNELL   JOSIASTRINH     PROCEDURE DATE:  10/04/2024          INTERPRETATION:  CLINICAL STATEMENT: Chronic back pain. Status post   laminectomy. Positive blood culture. Rule out osteomyelitis    TECHNIQUE: MRI of the lumbar spine was performed with and without   gadolinium. 60 cc administered. 1.5 cc discarded    COMPARISON: MRI lumbar spine 11/30/2022    FINDINGS:  Nonspecific endplate edema and enhancement noted at L4-5. Nonspecific   high T2 disc signal also noted at L4-5. Adjacent paraspinal enhancements   noted. There is adjacent epidural enhancement. No loculated epidural   fluid collection noted to suggest epidural abscess.    The vertebral body heights and alignment are maintained. Multilevel   Schmorl's nodes are noted.    The conus terminates at L1    Multilevel degenerative disc disease with loss of signal. Mild disc space   narrowing lower thoracic spine, L1-2, moderate disc space narrowing L2-3,   mild disc space narrowing L3-4, moderate disc space narrowing L4-5.    T12-L1: Disc bulge and facet hypertrophy noted resulting in effacement of   ventral thecal sac.    L2-3: Disc bulge and facet hypertrophy noted resulting in mild spinal   canal stenosis unchanged. Mild bilateral neural foraminal narrowing   unchanged.    L3-4: Disc bulge and facet hypertrophy/ligamentum flavum enfolding noted   resulting in mild spinal canal stenosis progressed compared to prior   exam. Mild bilateral neural foraminal narrowingwithout significant   change. Nonspecific small amount of fluid noted in the facet joints.    L4-5: Disc bulge and facet hypertrophy/ligamentum flavum enfolding noted   resulting in severe spinal canal stenosis and compression of the nerve   roots progressed compared to prior exam. This is superimposed on the   anterior epidural enhancement. Severe right, moderate left neural   foraminal narrowing without significant change.    L5-S1: Mild disc bulge and facet hypertrophy noted resulting in mild   effacement of ventral thecal sac.     Incidental note is made of a cystic structure anterior to the L2   vertebral body without significant interval change measuring   approximately 1.6 cm AP by 1.6 cm transverse by 3.5 cm CC. Slightly   increased in size compared to prior MRI exam. This could be related to a   paraspinal/mesenteric cyst or necrotic lymph node less likely.        IMPRESSION:  Nonspecific endplate edema/enhancement and high T2 disc signal at L4-5 as   described above which may represent discitis/ osteomyelitis with phlegmon   in the correct clinical setting. No loculated fluid collection in the   paraspinal or epidural spaces to suggest abscess collection. Correlate   clinically.    Multilevel degenerative changes resulting in multilevel spinal canal   stenosis and neural foraminal narrowing as described above. Severe spinal   canal stenosis with compression of the nerve roots and severe right,   moderate left neural foraminal narrowing at L4-5.    --- End of Report ---            MARCELINA LADD MD; Attending Radiologist  This document has been electronically signed. Oct  4 2024  4:14PM

## 2024-10-11 NOTE — H&P ADULT - NSHPPHYSICALEXAM_GEN_ALL_CORE
VItals: T(C): 36.3 (10-11-24 @ 05:02), Max: 36.7 (10-10-24 @ 20:59)  HR: 76 (10-11-24 @ 10:15) (69 - 89)  BP: 100/62 (10-11-24 @ 10:15) (84/57 - 100/66)  RR: 16 (10-11-24 @ 10:15) (16 - 17)  SpO2: 99% (10-11-24 @ 10:00) (94% - 99%)    General: NAD in chair                                HEENT: NC/AT, EOM I, Normal Conjunctivae  Cardio: RRR, Normal S1-S2                           Pulm: No Respiratory Distress,  Lungs CTAB                        Abdomen: ND/NT, Soft, BS+                                                MSK: No joint swelling, Full ROM                                         Ext: No C/C/E, No calf tenderness    Skin:  all skin intact                                                                 Wounds: none  Decubitus Ulcers: None Present     Neurological Examination    Cognitive: AAO x 3                                                                         Attention: Intact   Judgment: Good evidence of judgement                               Memory: intact   Mood/Affect anxious                                                                         Communication:  Fluent,  No dysarthria   Swallow: intact  CN II - XII  intact  Coordination: intact                                                                          Sensory: Intact                                                                                         Tone: normal Throughout   Balance: impaired    Motor    LEFT    UE: SF [5/5], EF [5/5], EE [5/5], WE [5/5],  [wnl]  RIGHT UE: SF [5/5], EF [5/5], EE [5/5], WE [5/5],  [wnl]  LEFT    LE:  HF [5/5], KE [5/5], DF [5/5], EHL [5/5],  PF [5/5]  RIGHT LE:  HF [5/5], KE [5/5], DF [5/5], EHL [5/5],  PF [5/5]

## 2024-10-11 NOTE — PROGRESS NOTE ADULT - SUBJECTIVE AND OBJECTIVE BOX
68 yr F w pmhx of spinal stenosis, osteoporosis, HLD, bronchiectasis, IgA/IgG deficiency presents with worsening of chronic low back pain    Overnight Events: None  Interval HPI: Patient seen and examined at bedside.     REVIEW OF SYSTEMS:  CONSTITUTIONAL: (-) weakness, (-) fevers, (-) chills  EYES/ENT: (-) visual changes,  (-) vertigo,  (-) throat pain   NECK:  (-) pain, (-) stiffness  RESPIRATORY:  (-) shortness of breath, (-) cough,  (-) wheezing,  (-) hemoptysis   CARDIOVASCULAR:  (-) chest pain, (-) palpitations  GASTROINTESTINAL:  (-) abdominal or epigastric pain, (-) nausea, (-) vomiting, (-) diarrhea, (-) constipation, (-) melena,  (-) hematemesis,  (-) hematochezia  GENITOURINARY: (-) dysuria, (-) frequency, (-) hematuria  NEUROLOGICAL: (-) numbness, (-) weakness  SKIN: (-) itching, (-) rashes, (-) lesions    Vital Signs Last 24 Hrs  T(C): 36.3 (11 Oct 2024 05:02), Max: 36.7 (10 Oct 2024 12:03)  T(F): 97.4 (11 Oct 2024 05:02), Max: 98.1 (10 Oct 2024 12:03)  HR: 69 (11 Oct 2024 05:02) (69 - 89)  BP: 100/66 (11 Oct 2024 05:02) (84/57 - 100/66)  BP(mean): 78 (11 Oct 2024 05:02) (63 - 78)  RR: 17 (11 Oct 2024 05:02) (16 - 17)  SpO2: 97% (11 Oct 2024 05:02) (94% - 99%)    Parameters below as of 11 Oct 2024 05:02  Patient On (Oxygen Delivery Method): room air      PHYSICAL EXAM:  GENERAL: NAD, lying in bed comfortably  HEAD:  Atraumatic, Normocephalic  CHEST/LUNG: Clear to auscultation bilaterally, good air entry bilaterally; No wheezing, rales, or rhonchi. Unlabored respirations  HEART: Regular rate and rhythm. S1 and S2. No murmurs, rubs, or gallops  ABDOMEN: Soft, Nontender, Nondistended. Bowel sounds present.   EXTREMITIES:  2+ Peripheral Pulses. No clubbing, cyanosis, or edema  NERVOUS SYSTEM:  Alert & Oriented X3, speech clear.     LABS:   All Labs Personally Reviewed                         8.6    3.34  )-----------( 279      ( 11 Oct 2024 06:14 )             24.6     10-10    141  |  105  |  15  ----------------------------<  98  4.9   |  29  |  0.59    Ca    9.4      10 Oct 2024 06:55            Blood Culture:   I&O's Summary    10 Oct 2024 07:01  -  11 Oct 2024 07:00  --------------------------------------------------------  IN: 400 mL / OUT: 600 mL / NET: -200 mL      CAPILLARY BLOOD GLUCOSE        RADIOLOGY/EKG:  All Imaging and EKGs Personally Reviewed     MEDICATIONS:  MEDICATIONS  (STANDING):  acetaminophen     Tablet .. 650 milliGRAM(s) Oral every 8 hours  ascorbic acid 500 milliGRAM(s) Oral <User Schedule>  atorvastatin 10 milliGRAM(s) Oral at bedtime  cefTRIAXone   IVPB 2000 milliGRAM(s) IV Intermittent every 24 hours  diazepam    Tablet 5 milliGRAM(s) Oral every 8 hours  DULoxetine 20 milliGRAM(s) Oral <User Schedule>  enoxaparin Injectable 40 milliGRAM(s) SubCutaneous every 24 hours  gabapentin 100 milliGRAM(s) Oral three times a day  influenza  Vaccine (HIGH DOSE) 0.5 milliLiter(s) IntraMuscular once  lactobacillus acidophilus 1 Tablet(s) Oral two times a day with meals  morphine ER Tablet 15 milliGRAM(s) Oral every 12 hours  multivitamin 1 Tablet(s) Oral <User Schedule>  polyethylene glycol 3350 17 Gram(s) Oral two times a day  senna 2 Tablet(s) Oral at bedtime  sodium chloride 3%  Inhalation 4 milliLiter(s) Inhalation every 12 hours     68 yr F w pmhx of spinal stenosis, osteoporosis, HLD, bronchiectasis, IgA/IgG deficiency presents with worsening of chronic low back pain    Overnight Events: Patient's baseline BP on lower side, BP readings of systolic in 80s, patient was doing fine, BP improved after IV fluids.   Interval HPI: Patient seen and examined at bedside. Patient doing much better overall, compared to admission.     REVIEW OF SYSTEMS:  CONSTITUTIONAL: (-) weakness, (-) fevers, (-) chills  EYES/ENT: (-) visual changes,  (-) vertigo,  (-) throat pain   NECK:  (-) pain, (-) stiffness  RESPIRATORY:  (-) shortness of breath, (-) cough,  (-) wheezing,  (-) hemoptysis   CARDIOVASCULAR:  (-) chest pain, (-) palpitations  GASTROINTESTINAL:  (-) abdominal or epigastric pain, (-) nausea, (-) vomiting, (-) diarrhea, (-) constipation, (-) melena,  (-) hematemesis,  (-) hematochezia  GENITOURINARY: (-) dysuria, (-) frequency, (-) hematuria  NEUROLOGICAL: (-) numbness, (-) weakness  SKIN: (-) itching, (-) rashes, (-) lesions    Vital Signs Last 24 Hrs  T(C): 36.3 (11 Oct 2024 05:02), Max: 36.7 (10 Oct 2024 12:03)  T(F): 97.4 (11 Oct 2024 05:02), Max: 98.1 (10 Oct 2024 12:03)  HR: 69 (11 Oct 2024 05:02) (69 - 89)  BP: 100/66 (11 Oct 2024 05:02) (84/57 - 100/66)  BP(mean): 78 (11 Oct 2024 05:02) (63 - 78)  RR: 17 (11 Oct 2024 05:02) (16 - 17)  SpO2: 97% (11 Oct 2024 05:02) (94% - 99%)    Parameters below as of 11 Oct 2024 05:02  Patient On (Oxygen Delivery Method): room air      PHYSICAL EXAM:  GENERAL: NAD, lying in bed comfortably  HEAD:  Atraumatic, Normocephalic  CHEST/LUNG: Clear to auscultation bilaterally, good air entry bilaterally; No wheezing, rales, or rhonchi. Unlabored respirations  HEART: Regular rate and rhythm. S1 and S2. No murmurs, rubs, or gallops  ABDOMEN: Soft, Nontender, Nondistended. Bowel sounds present.   EXTREMITIES:  2+ Peripheral Pulses. No clubbing, cyanosis, or edema  NERVOUS SYSTEM:  Alert & Oriented X3, speech clear.   LUE: Picc line in place, clean dressing    LABS:   All Labs Personally Reviewed                         8.6    3.34  )-----------( 279      ( 11 Oct 2024 06:14 )             24.6     10-10    141  |  105  |  15  ----------------------------<  98  4.9   |  29  |  0.59    Ca    9.4      10 Oct 2024 06:55        Blood Culture:   I&O's Summary    10 Oct 2024 07:01  -  11 Oct 2024 07:00  --------------------------------------------------------  IN: 400 mL / OUT: 600 mL / NET: -200 mL      CAPILLARY BLOOD GLUCOSE      MEDICATIONS:  MEDICATIONS  (STANDING):  acetaminophen     Tablet .. 650 milliGRAM(s) Oral every 8 hours  ascorbic acid 500 milliGRAM(s) Oral <User Schedule>  atorvastatin 10 milliGRAM(s) Oral at bedtime  cefTRIAXone   IVPB 2000 milliGRAM(s) IV Intermittent every 24 hours  diazepam    Tablet 5 milliGRAM(s) Oral every 8 hours  DULoxetine 20 milliGRAM(s) Oral <User Schedule>  enoxaparin Injectable 40 milliGRAM(s) SubCutaneous every 24 hours  gabapentin 100 milliGRAM(s) Oral three times a day  influenza  Vaccine (HIGH DOSE) 0.5 milliLiter(s) IntraMuscular once  lactobacillus acidophilus 1 Tablet(s) Oral two times a day with meals  morphine ER Tablet 15 milliGRAM(s) Oral every 12 hours  multivitamin 1 Tablet(s) Oral <User Schedule>  polyethylene glycol 3350 17 Gram(s) Oral two times a day  senna 2 Tablet(s) Oral at bedtime  sodium chloride 3%  Inhalation 4 milliLiter(s) Inhalation every 12 hours

## 2024-10-11 NOTE — DISCHARGE NOTE NURSING/CASE MANAGEMENT/SOCIAL WORK - NSDCPEFALRISK_GEN_ALL_CORE
For information on Fall & Injury Prevention, visit: [email or web address] OR  [email or web address] OR  https://www.cdc.gov/steadi/patient.html

## 2024-10-11 NOTE — PROGRESS NOTE ADULT - ASSESSMENT
68 yr F w pmhx of spinal stenosis, osteoporosis, HLD, bronchiectasis, IgA/IgG deficiency presents with worsening of chronic low back pain    #Intractable back pain - Chronic with acute worsening  #Acute Osteomyelitis of lumbar spine  - Patient established care with specialist, surgery recommended but delayed due to osteoporosis  - CT lumbar: No evidence of an acute lumbar spine fracture. Multilevel degenerative changes greatest at L4-5 with severe spinal canal narrowing  - MRI lumbar spine - osteomyelitis  - Discussed with spine surg Dr. Lucas Garcia at [facility]; recommended MRI with contrast, IV abx. Patient not considered suitable candidate for spinal surgery at this time and advised to follow up with outpatient spine surgery.  - Ortho spine consult: MRI with IV contrast; if + for infection, c/w IV abx longterm. Immediate intervention if new weakness/numbness, urinary/bowel incontinence, and neuro deficits seen; no concerning findings; No surgical intervention at this time for severe spinal stenosis d/t concern of bacteremia; Outpt surgical intervention  - 10/5 Repeat Bcx NGTD@72h  - Optimize pain control, switched to oral dilaudid. C/w gabapentin, valium , duloxetine, MS Contin BID  - PICC line was flushed with 10cc saline x2 at bedside as per IR's recommendation, repeat Xray showed correct placement of LUE PICC.  - ID recs appreciated, long term IV abx (6 weeks), weekly CBC, CMP, CRP while on IV Abx, f/u with ID in 2 weeks, encourage probiotics    #Bacteremia  - US kidney/bladder, negative renal US, PVR 184cc  - 1x positive for group B strep, Bcx 2nd set negative   - ID recs appreciated, long term IV abx (6 weeks)  - repeat BCx sent on 10/5 NGTD    #Constipation  -likely 2/2 pain control regimen  -pt initially refusing bowel regimen  -10/7 increased miralax to BID  -continue senna qHS  -dulcolax PRN    #Anemia  - Patient reports anemia at baseline  - Hgb stable (12.1 on admission)  - Monitor  - Keep Type and screen uptodate  - Total iron 27, transferrin 181, Iron sat 11%  - Folate wnl, B12>2000    #IgG and IgA deficiency  #hx of bronchietasis  - satting well on RA  - C/w saline duonebs PRN    #Depression/Anxiety  - on Fetzima at home, non-fomulary  - On valium PRN  - c/w Duloxetine    #DVT prophylaxis  - Lovenox    #GOC  - Full code      dispo: Insurance refused inpatient rehab, P2P today, f/u on auth    d/w Dr Zaragoza 68 yr F w pmhx of spinal stenosis, osteoporosis, HLD, bronchiectasis, IgA/IgG deficiency presents with worsening of chronic low back pain    #Intractable back pain - Chronic with acute worsening  #Acute Osteomyelitis of lumbar spine  - Patient established care with specialist, surgery recommended but delayed due to osteoporosis  - CT lumbar: No evidence of an acute lumbar spine fracture. Multilevel degenerative changes greatest at L4-5 with severe spinal canal narrowing  - MRI lumbar spine - osteomyelitis  - Discussed with spine surg Dr. Lucas Garcia at [facility]; recommended MRI with contrast, IV abx. Patient not considered suitable candidate for spinal surgery at this time and advised to follow up with outpatient spine surgery.  - Ortho spine consult: MRI with IV contrast; if + for infection, c/w IV abx longterm. Immediate intervention if new weakness/numbness, urinary/bowel incontinence, and neuro deficits seen; no concerning findings; No surgical intervention at this time for severe spinal stenosis d/t concern of bacteremia; Outpt surgical intervention  - 10/5 Repeat Bcx NGTD@72h  - Optimize pain control, switched to oral dilaudid. C/w gabapentin, valium , duloxetine, MS Contin BID  - PICC line was flushed with 10cc saline x2 at bedside as per IR's recommendation, repeat Xray showed correct placement of LUE PICC.  - ID recs appreciated, long term IV abx (6 weeks, until 11/14/24), weekly CBC, CMP, CRP while on IV Abx, f/u with ID in 2 weeks, encourage probiotics    #Bacteremia  - US kidney/bladder, negative renal US, PVR 184cc  - 1x positive for group B strep, Bcx 2nd set negative   - ID recs appreciated, long term IV abx (6 weeks)  - repeat BCx sent on 10/5 NGTD    #Constipation  -likely 2/2 pain control regimen  -pt initially refusing bowel regimen  -10/7 increased miralax to BID  -continue senna qHS  -dulcolax PRN    #Anemia  - Patient reports anemia at baseline  - Hgb stable (12.1 on admission)  - Monitor  - Keep Type and screen uptodate  - Total iron 27, transferrin 181, Iron sat 11%  - Folate wnl, B12>2000    #IgG and IgA deficiency  #hx of bronchietasis  - satting well on RA  - C/w saline duonebs PRN    #Depression/Anxiety  - on Fetzima at home, non-fomulary  - On valium PRN  - c/w Duloxetine    #DVT prophylaxis  - Lovenox    #GOC  - Full code      dispo: Insurance refused inpatient rehab, P2P today, f/u on auth    d/w Dr Zaragoza

## 2024-10-11 NOTE — H&P ADULT - HISTORY OF PRESENT ILLNESS
68 year old female with PMH of spinal stenosis, osteoporosis, HLD, bronchiectasis, IgA/IgG deficiency who presented to [facility] on 10/2/24 with worsening of chronic low back pain. Patient had subjective fevers and chills before presenting to the hospital. During her stay, patient was found to have a +UA, with a culture growing Group B strep and positive blood Cx also growing group B strep. Patient was started on CTX as per IDs recommendations. An MRI showed osteomyelitis in the lumbar spine. ID recommended 6 weeks of IV antibiotics. Picc line was placed for patient to continue home IV Abx for a total of 6 weeks. During her stay, patient's pain control regimen was optimized, she received multiple pain medications including dilaudid, Contin, gabapentin, and duloxetine. She was evaluated for admission to acute inpatient rehab and admitted to [facility] 10/11/24.    68 year old female with PMH of spinal stenosis, osteoporosis, HLD, bronchiectasis, IgA/IgG deficiency who presented to [facility] on 10/2/24 with worsening of chronic low back pain. Patient had subjective fevers and chills before presenting to the hospital. During her stay, patient was found to have a +UA, with a culture growing Group B strep and positive blood Cx also growing group B strep. Patient was started on CTX as per IDs recommendations. An MRI showed osteomyelitis in the lumbar spine. ID recommended 6 weeks of IV antibiotics. Picc line was placed for patient to continue home IV Abx for a total of 6 weeks. During her stay, patient's pain control regimen was optimized, she received multiple pain medications including dilaudid, Contin, gabapentin, and duloxetine. S/p IVF boluses for hypotension. Mag citrate for constipation. She was evaluated for admission to acute inpatient rehab and admitted to [facility] 10/11/24.    68 year old female with PMH of spinal stenosis, osteoporosis, HLD, bronchiectasis, IgA/IgG deficiency who presented to [facility] on 10/2/24 with worsening of chronic low back pain. Patient had subjective fevers and chills before presenting to the hospital. During her stay, patient was found to have a +UA, with a culture growing Group B strep and positive blood Cx also growing group B strep. Patient was started on CTX as per IDs recommendations. An MRI showed osteomyelitis in the lumbar spine. ID recommended 6 weeks of IV antibiotics. Picc line was placed for patient to continue home IV Abx for a total of 6 weeks. During her stay, patient's pain control regimen was optimized, she received multiple pain medications including dilaudid, oxyContin, gabapentin, and duloxetine. S/p IVF boluses for hypotension. Mag citrate for constipation. She was evaluated for admission to acute inpatient rehab and admitted to [facility] 10/11/24.

## 2024-10-11 NOTE — DISCHARGE NOTE NURSING/CASE MANAGEMENT/SOCIAL WORK - PATIENT PORTAL LINK FT
You can access the FollowMyHealth Patient Portal offered by [facility] by registering at the following website: [email or web address]. By joining [facility]’s FollowMyHealth portal, you will also be able to view your health information using other applications (apps) compatible with our system.

## 2024-10-11 NOTE — H&P ADULT - ASSESSMENT
ASSESSMENT/PLAN  68 year old female with PMH of spinal stenosis, osteoporosis, HLD, bronchiectasis, IgA/IgG deficiency who presented to [facility] on 10/2/24 with worsening of chronic low back pain. Patient had subjective fevers and chills before presenting to the hospital. During her stay, patient was found to have a +UA, with a culture growing Group B strep and positive blood Cx also growing group B strep. Patient was started on CTX as per IDs recommendations. An MRI showed osteomyelitis in the lumbar spine. ID recommended 6 weeks of IV antibiotics. Picc line was placed for patient to continue home IV Abx for a total of 6 weeks. During her stay, patient's pain control regimen was optimized, she received multiple pain medications including dilaudid, Contin, gabapentin, and duloxetine. She was evaluated for admission to acute inpatient rehab and admitted to [facility] 10/11/24.     #Osteomyelitis of lumbar spine now with Gait Instability, ADL impairments and Functional impairments  - Start Comprehensive Rehab Program of PT/OT  - CT lumbar: No evidence of an acute lumbar spine fracture. Multilevel degenerative changes greatest at L4-5 with severe spinal canal narrowing  - MRI lumbar spine - osteomyelitis  - 10/5 Repeat Bcx NGTD@72h  - ID recs appreciated, long term IV abx (6 weeks), weekly CBC, CMP, CRP while on IV Abx, f/u with ID in 2 weeks, encourage probiotics  -Continue ceftriaxone 2g daily   -ID consult    #Pain control  - Tylenol PRN  -Continue standing Morphine 15mg BID  -Conitnue dilaudid 2mg q6h PRN  -Continue gabapentin 100mg TID    #Bacteremia  -GBS + now with repeat BC negative   -Continue IV CTX    #IgG and IgA deficiency  -Continue duonebs/saline nebs PRN  -Monitor respiratory status     #Depression/anxiety   - on Fetzima at home, non-fomulary  - On valium PRN  - c/w Duloxetine    #HLD  -Continue atorvastatin 10mg daily     #GI/Bowel Mgmt   - Continue Senna at bedtime   - Miralax BID  -Bisacodyl pRN  -Conitnue pepcid   -Lactobacillus for PPX during antibiotics     #Bladder management  - Continue to monitor PVR q 8 hours (SC if > 400)  -Monitor UO    #DVT  - Lovenox  - TEDs     #Skin:  -***    FEN   - Diet - Regular diet     Precautions / PROPHYLAXIS:   - Falls  - ortho: Weight bearing status: WBAT   - Lungs: Aspiration, Incentive Spirometer   - Pressure injury/Skin:  OOB to Chair, PT/OT        MEDICAL PROGNOSIS: GOOD              REHAB POTENTIAL: GOOD              ESTIMATED DISPOSITION: HOME WITH HOME CARE              ELOS: 10-14 Days   EXPECTED THERAPY:     P.T. 2hr/day       O.T. 1hr/day       P&O TBD  EXP FREQUENCY: 5 days per 7 day period     PRESCREEN COMPARISON:   I have reviewed the prescreen information and I have found no relevant changes between the preadmission screening and my post admission evaluation     RATIONALE FOR INPATIENT ADMISSION - Patient demonstrates the following: (check all that apply)  [X] Medically appropriate for rehabilitation admission  [X] Has attainable rehab goals with an appropriate initial discharge plan  [X] Has rehabilitation potential (expected to make a significant improvement within a reasonable period of time)   [X] Requires close medical management by a rehab physician, rehab nursing care, Hospitalist and comprehensive interdisciplinary team (including PT, OT, & or SLP, Prosthetics and Orthotics)   ASSESSMENT/PLAN  68 year old female with PMH of spinal stenosis, osteoporosis, HLD, bronchiectasis, IgA/IgG deficiency who presented to [facility] on 10/2/24 with worsening of chronic low back pain. Patient had subjective fevers and chills before presenting to the hospital. During her stay, patient was found to have a +UA, with a culture growing Group B strep and positive blood Cx also growing group B strep. Patient was started on CTX as per IDs recommendations. An MRI showed osteomyelitis in the lumbar spine. ID recommended 6 weeks of IV antibiotics. Picc line was placed for patient to continue home IV Abx for a total of 6 weeks. During her stay, patient's pain control regimen was optimized, she received multiple pain medications including dilaudid, Contin, gabapentin, and duloxetine. She was evaluated for admission to acute inpatient rehab and admitted to [facility] 10/11/24.     #Osteomyelitis of lumbar spine now with Gait Instability, ADL impairments and Functional impairments  - Start Comprehensive Rehab Program of PT/OT  - CT lumbar: No evidence of an acute lumbar spine fracture. Multilevel degenerative changes greatest at L4-5 with severe spinal canal narrowing  - MRI lumbar spine - osteomyelitis  - 10/5 Repeat Bcx NGTD  - ID recs  long term IV abx (6 weeks), weekly CBC, CMP, CRP while on IV Abx, f/u with ID - ceftriaxone 2g daily     #Pain control  - Tylenol PRN  -Continue standing Morphine 15mg BID  -Conitnue dilaudid 2mg q6h PRN  -Continue gabapentin 100mg TID  - on Valium for pain/anxiety  - adjust prn     #hypotension  - monitor standing BP, s/p IVF  - adjust pain regimen as able, outpt f/up pain management    #IgG and IgA deficiency  -Continue duonebs/saline nebs PRN  -Monitor respiratory status     #Depression/anxiety   - on Fetzima at home, non-fomulary  - On valium PRN  - c/w Duloxetine- BH appreciated, outpt follow up    #HLD  -Continue atorvastatin 10mg daily     #GI/Bowel Mgmt   - Continue Senna at bedtime   - Miralax BID  -Conitnue pepcid   -Lactobacillus for PPX during antibiotics     #hx UTI  - check PVR, Monitor UO, labs    #DVT  - Lovenox sq    #Skin:  - picc in place    FEN   - Diet - Regular diet     Precautions / PROPHYLAXIS:   - Falls  - ortho: Weight bearing status: WBAT    - Pressure injury/Skin:  OOB to Chair, PT/OT        MEDICAL PROGNOSIS: GOOD              REHAB POTENTIAL: GOOD              ESTIMATED DISPOSITION: HOME WITH HOME CARE              ELOS: 10-14 Days   EXPECTED THERAPY:     P.T. 2hr/day       O.T. 1hr/day       P&O TBD  EXP FREQUENCY: 5 days per 7 day period     PRESCREEN COMPARISON:   I have reviewed the prescreen information and I have found no relevant changes between the preadmission screening and my post admission evaluation     RATIONALE FOR INPATIENT ADMISSION - Patient demonstrates the following: (check all that apply)  [X] Medically appropriate for rehabilitation admission  [X] Has attainable rehab goals with an appropriate initial discharge plan  [X] Has rehabilitation potential (expected to make a significant improvement within a reasonable period of time)   [X] Requires close medical management by a rehab physician, rehab nursing care, Hospitalist and comprehensive interdisciplinary team (including PT, OT, & or SLP, Prosthetics and Orthotics)

## 2024-10-11 NOTE — PATIENT PROFILE ADULT - NSPRONUTRITIONRISK_GEN_A_NUR
Call to Kennethsharon Cris that procedure was approved for 8/10/2023 and that Brianda Perry should call her a few days before for the pre op call and between 2:00 PM and 4:00 PM  the business day before with the arrival time. Instructed Krys to hold ibuprofen for 24 hours, Eliquis for 3 days,naprosyn for 4 days and any aspirin containing products or fish oil for 7 days. Instructed to call office back if any questions. Robert Diaz verbalized understanding.     Electronically signed by David Orozco RN on 8/4/2023 at 9:29 AM
No indicators present

## 2024-10-11 NOTE — H&P ADULT - NSHPREVIEWOFSYSTEMS_GEN_ALL_CORE
CONSTITUTIONAL: No fever, weight loss, or fatigue  EYES: No eye pain, visual disturbances, or discharge  ENMT:  No difficulty hearing, tinnitus, vertigo; No sinus or throat pain  NECK: No pain or stiffness  BREASTS: No pain, masses, or nipple discharge  RESPIRATORY: No cough, wheezing, chills or hemoptysis; No shortness of breath  CARDIOVASCULAR: No chest pain, palpitations, dizziness, or leg swelling, hypotension  GASTROINTESTINAL: No abdominal or epigastric pain. No nausea, vomiting, or hematemesis; No diarrhea or constipation. No melena or hematochezia.  GENITOURINARY: No dysuria, frequency, hematuria, or incontinence  NEUROLOGICAL: No headaches, memory loss, loss of strength, numbness, or tremors  SKIN: No itching, burning, rashes, or lesions   LYMPH NODES: No enlarged glands  ENDOCRINE: No heat or cold intolerance; No hair loss  MUSCULOSKELETAL: No joint pain or swelling; No muscle, back, or extremity pain  PSYCHIATRIC: No depression, anxiety, mood swings, or difficulty sleeping  HEME/LYMPH: No easy bruising, or bleeding gums  ALLERY AND IMMUNOLOGIC: IgA deficiency

## 2024-10-11 NOTE — H&P ADULT - NSHPSOCIALHISTORY_GEN_ALL_CORE
SOCIAL HISTORY  Smoking - Denied  EtOH - Denied   Drugs - Denied    FUNCTIONAL HISTORY  Patient lives in a house with her spouse w/ 3-4 steps to enter. Bed and bath on first floor. 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 Per patient, her  is unable to provide support for her at this time.    CURRENT FUNCTIONAL STATUS  PT note 10/7-  Bed Mobility  Bed Mobility Training Rolling/Turning: contact guard;  verbal cues;  bed rails  Bed Mobility Training Supine-to-Sit: contact guard;  bed rails  Bed Mobility Training Limitations: pain    Sit-Stand Transfer Training  Transfer Training Stand-to-Sit Transfer: supervsion;  rolling walker;  full weight-bearing    Gait Training  Gait Training: supervsion;  75 feet;  rolling walker;  full weight-bearing  Gait Analysis: 3-point gait   75 feet;  rolling walker    OT 10/7-  Bed Mobility  Bed Mobility Training Rolling/Turning: supervsion;  1 person assist  Bed Mobility Training Scooting: stand-by assist;  supervsion;  supervision  Bed Mobility Training Sit-to-Supine: contact guard;  supervsion;  supervision  Bed Mobility Training Supine-to-Sit: contact guard;  supervsion;  supervision  Bed Mobility Training Limitations: pain SOCIAL HISTORY  Smoking - Denied  EtOH - Denied   Drugs - Denied    FUNCTIONAL HISTORY  Patient lives in a house with her spouse w/ 3-4 steps to enter. Bed and bath on first floor. 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    CURRENT FUNCTIONAL STATUS  PT note 10/7-  Bed Mobility Training Rolling/Turning: contact guard;  verbal cues;  bed rails  Bed Mobility Training Supine-to-Sit: contact guard;  bed rails  Bed Mobility Training Limitations: pain    Sit-Stand Transfer Training  Transfer Training Stand-to-Sit Transfer: supervsion;  rolling walker;  full weight-bearing    Gait Training  Gait Training: supervsion;  75 feet;  rolling walker;  full weight-bearing  Gait Analysis: 3-point gait   75 feet;  rolling walker    OT 10/7-  Bed Mobility Training Rolling/Turning: supervsion;  1 person assist  Bed Mobility Training Scooting: stand-by assist;  supervsion;  supervision  Bed Mobility Training Sit-to-Supine: contact guard;  supervsion;  supervision  Bed Mobility Training Supine-to-Sit: contact guard;  supervsion;  supervision  Bed Mobility Training Limitations: pain

## 2024-10-11 NOTE — PATIENT PROFILE ADULT - CONTRAINDICATIONS & PRECAUTIONS (SELECT ALL THAT APPLY)
Irma Gordon MD  [address] [place] [address]   [phone]) 25 [facility], BENNY FLOYD [facility], [address] [place] [address]   [phone]     Hip Arthroscopy Postoperative Discharge Instructions    Your operation was performed by arthroscopic methods. The interior of your joint was visualized with a small telescope and the appropriate surgery was performed with special instruments. ACTIVITY  []  You may weight bear as tolerated. Crutches are for your comfort. [x]  Foot flat partial weight bearing (20 pounds pressure) for 2 weeks, then full weight bearing with crutches for additional 2 weeks. Then discontinue crutches. []  Foot flat partial weight bearing (20 pounds pressure) for 6 weeks. []  Avoid abduction (hip movement away from your body) for 6 weeks. [x]  If you have a continuous passive motion (CPM) machine, begin to use this the day after surgery. WOUND CARE/BANDAGES  [x]  A feeling of âsplashingâ fluid in the joint is not a cause for concern. It is residual fluid from surgery and will be absorbed. [x]  The small incisions may be sore and develop bruising over the next several days. This bruising will eventually disappear and does not require special care. [x]  A soft compression dressing has been applied to your hip. This compression dressing should be comfortable and absorb any leakage of fluid and/or blood. [x]  Although dressing may become moist or blood stained, this is not usually cause for alarm. [x]  Change dressing the day after surgery. Cover with a light gauze dressing. [x]  May use Band-Aids over the sutures or leave open to air the second day after surgery. COLD THERAPY  [x]  Use ice packs the first 7-10 days after surgery or use a cooling device if delivered. BATHING  [x]  Keep your incisions dry for the first 48 hours. [x]  After 48 hours you may shower.   [x]  NO bathing or complete soaking of the incision is allowed for 10 days until the sutures are removed. PAIN  [x]  Please remind us of any drug allergies. [x]  You will be given a prescription for pain medication at the time of discharge to be taken as directed. Often the prescription will contain Hydrocodone and can cause drowsiness, nausea or a fine rash. If these problems develop, contact our office for a different medication. [x]  You should not drive an automobile or operate heavy machinery while on pain medication. [x]  Anti-inflammatory for 4 weeks to prevent soft tissue calcification. []  Ibuprofen (i.e., Advil or Motrin) 800 mg, three times daily. [x]  Naprosyn (i.e., Aleve 2 tabs) 500 mg, two times daily. []  Mobic 7.5/15 mg, daily. []  Aspirin 81 mg, once daily for 4 weeks to prevent blood clots. [x]  Aspirin 325 mg, once daily for 4 weeks to prevent blood clots. FOLLOW UP  [x]  Call [phone] the first weekday after your surgery to schedule a postoperative clinic appointment  7-10 days after surgery. RESULTS  Since you are usually still sleepy from the anesthesia, a detailed explanation and recommendations will be discussed with you at your first postoperative clinic appointment. Although the incisions are small, remember that you have had an operation in your joint. Experience has shown that early healing takes six weeks and return to normal activity after three months. If arthritis is also present, recovery may take longer.     When to Call Your Doctor  Call your doctor right away at [phone] or [phone] if off hours or on weekends (ask for the Orthopedic Surgeon on call) if you have any of these problems after surgery:   â¢ Increased hip pain  â¢ Swelling/redness  â¢ Yellowish drainage  â¢ Fever over 101.0Â°F or 38.3Â°C  â¢ Pain not controlled by medication is also a cause for concern   â¢      Care After Anesthesia or Sedation    After Discharge  Â· Due to the medicine given, someone must drive you home. If possible, have someone stay with you at home the day of discharge and the night after surgery. Â· If you have infants or small children at home, please have someone help you for at least 24 hours after your surgery. Â· Do not drive for at least 24 hours after surgery (or as told by your doctor). Â· Rest for the remainder of the day. Go up and down stairs slowly. Â· Do not smoke after surgery. Smoking can delay healing. Â· Do not operate heavy or potential harmful equipment. Â· Do not make legally binding decisions. Â· Do no drink alcohol for 24 hours. Diet  Â· Nausea may be expected for the first 24 to 48 hours. Start eating a bland diet (toast, gelatin, 7-up, hot cereal, crackers, sherbet, broth, soup). Â· Drink plenty of fluids (6 to 8 glasses of water). Â· Resume your regular diet as able. Â· Avoid greasy or spicy foods for 24 hours. Urination  Â· The effects of anesthetics may cause some people to have trouble passing urine the day of surgery. Drink a lot of fluids to help prevent this. Â· Try to urinate within 12 hours of surgery. Â· If you are unable to pass urine and feel like you need to, call your doctor or the hospital.    Pain Control  Â· If your incision was injected with a long acting local anesthetic, it will wear off in 4 to 6 hours. You can expect to have some pain at this time. Â· Treat your pain with the prescribed pain medicine before it wears off. Do not wait until your pain becomes severe. Â· Ask your nurse when you had your last pain medicine, so you know when you can take another one after you get home. Â· Your last pain medicine was given at _________. Call your doctor if you have:    Â· Nausea and vomiting that does not stop  Â· Fever over 101 degrees F.  Â· Pain not relieved by pain medication  Â· If you feel you have to pass urine and you are not able to do so.   Â· Unusual changes in behavior  Â· Dizziness  Â· Hives     If you are not able to reach your doctor, you may call the emergency department. If currently taking or on Hormonal Contraceptives, these may be ineffective for the next 8 days following anesthesia due to interactions with medications that you did receive during surgery. Please use additional (back up) contraception during this time. Examples of Hormonal Contraceptive:  -Birth control pills (oral)  -Birth control shots (injectable)  -Implantable contraceptives  -Patches  -Vaginal rings       Care After Anesthesia or Sedation    After Discharge  Â· Due to the medicine given, someone must drive you home. If possible, have someone stay with you at home the day of discharge and the night after surgery. Â· If you have infants or small children at home, please have someone help you for at least 24 hours after your surgery. Â· Do not drive for at least 24 hours after surgery (or as told by your doctor). Â· Rest for the remainder of the day. Go up and down stairs slowly. Â· Do not smoke after surgery. Smoking can delay healing. Â· Do not operate heavy or potential harmful equipment. Â· Do not make legally binding decisions. Â· Do no drink alcohol for 24 hours. Diet  Â· Nausea may be expected for the first 24 to 48 hours. Start eating a bland diet (toast, gelatin, 7-up, hot cereal, crackers, sherbet, broth, soup). Â· Drink plenty of fluids (6 to 8 glasses of water). Â· Resume your regular diet as able. Â· Avoid greasy or spicy foods for 24 hours. Urination  Â· The effects of anesthetics may cause some people to have trouble passing urine the day of surgery. Drink a lot of fluids to help prevent this. Â· Try to urinate within 12 hours of surgery. Â· If you are unable to pass urine and feel like you need to, call your doctor or the hospital.    Pain Control  Â· If your incision was injected with a long acting local anesthetic, it will wear off in 4 to 6 hours. You can expect to have some pain at this time.   Â· Treat your pain with the prescribed pain medicine before it wears off. Do not wait until your pain becomes severe. Â· Ask your nurse when you had your last pain medicine, so you know when you can take another one after you get home. Â· Your last pain medicine was given at _________. Call your doctor if you have:    Â· Nausea and vomiting that does not stop  Â· Fever over 101 degrees F.  Â· Pain not relieved by pain medication  Â· If you feel you have to pass urine and you are not able to do so.   Â· Unusual changes in behavior  Â· Dizziness  Â· Hives     If you are not able to reach your doctor, you may call the emergency departmen none...

## 2024-10-11 NOTE — PATIENT PROFILE ADULT - NSPROMEDSBROUGHTTOHOSP_GEN_A_NUR
Number Of Freeze-Thaw Cycles: 1 freeze-thaw cycle Render Post-Care Instructions In Note?: no Detail Level: Detailed Show Applicator Variable?: Yes Duration Of Freeze Thaw-Cycle (Seconds): 0 Post-Care Instructions: I reviewed with the patient in detail post-care instructions. Patient is to wear sunprotection, and avoid picking at any of the treated lesions. Pt may apply Vaseline to crusted or scabbing areas. Consent: The patient's consent was obtained including but not limited to risks of crusting, scabbing, blistering, scarring, darker or lighter pigmentary change, recurrence, incomplete removal and infection. no

## 2024-10-11 NOTE — PROGRESS NOTE ADULT - REASON FOR ADMISSION
intractable back pain

## 2024-10-12 LAB
ALBUMIN SERPL ELPH-MCNC: 2.4 G/DL — LOW (ref 3.3–5)
ALP SERPL-CCNC: 61 U/L — SIGNIFICANT CHANGE UP (ref 40–120)
ALT FLD-CCNC: 35 U/L — SIGNIFICANT CHANGE UP (ref 10–45)
ANION GAP SERPL CALC-SCNC: 11 MMOL/L — SIGNIFICANT CHANGE UP (ref 5–17)
AST SERPL-CCNC: 25 U/L — SIGNIFICANT CHANGE UP (ref 10–40)
BASOPHILS # BLD AUTO: 0 K/UL — SIGNIFICANT CHANGE UP (ref 0–0.2)
BASOPHILS NFR BLD AUTO: 0 % — SIGNIFICANT CHANGE UP (ref 0–2)
BILIRUB SERPL-MCNC: 0.3 MG/DL — SIGNIFICANT CHANGE UP (ref 0.2–1.2)
BUN SERPL-MCNC: 9 MG/DL — SIGNIFICANT CHANGE UP (ref 7–23)
CALCIUM SERPL-MCNC: 8.1 MG/DL — LOW (ref 8.4–10.5)
CHLORIDE SERPL-SCNC: 108 MMOL/L — SIGNIFICANT CHANGE UP (ref 96–108)
CO2 SERPL-SCNC: 24 MMOL/L — SIGNIFICANT CHANGE UP (ref 22–31)
CREAT SERPL-MCNC: 0.66 MG/DL — SIGNIFICANT CHANGE UP (ref 0.5–1.3)
EGFR: 95 ML/MIN/1.73M2 — SIGNIFICANT CHANGE UP
EOSINOPHIL # BLD AUTO: 0 K/UL — SIGNIFICANT CHANGE UP (ref 0–0.5)
EOSINOPHIL NFR BLD AUTO: 0 % — SIGNIFICANT CHANGE UP (ref 0–6)
GLUCOSE SERPL-MCNC: 103 MG/DL — HIGH (ref 70–99)
HCT VFR BLD CALC: 22.1 % — LOW (ref 34.5–45)
HGB BLD-MCNC: 7.5 G/DL — LOW (ref 11.5–15.5)
IMM GRANULOCYTES NFR BLD AUTO: 0.3 % — SIGNIFICANT CHANGE UP (ref 0–0.9)
LYMPHOCYTES # BLD AUTO: 1.03 K/UL — SIGNIFICANT CHANGE UP (ref 1–3.3)
LYMPHOCYTES # BLD AUTO: 30.8 % — SIGNIFICANT CHANGE UP (ref 13–44)
MCHC RBC-ENTMCNC: 29 PG — SIGNIFICANT CHANGE UP (ref 27–34)
MCHC RBC-ENTMCNC: 33.9 GM/DL — SIGNIFICANT CHANGE UP (ref 32–36)
MCV RBC AUTO: 85.3 FL — SIGNIFICANT CHANGE UP (ref 80–100)
MONOCYTES # BLD AUTO: 0.29 K/UL — SIGNIFICANT CHANGE UP (ref 0–0.9)
MONOCYTES NFR BLD AUTO: 8.7 % — SIGNIFICANT CHANGE UP (ref 2–14)
NEUTROPHILS # BLD AUTO: 2.01 K/UL — SIGNIFICANT CHANGE UP (ref 1.8–7.4)
NEUTROPHILS NFR BLD AUTO: 60.2 % — SIGNIFICANT CHANGE UP (ref 43–77)
NRBC # BLD: 0 /100 WBCS — SIGNIFICANT CHANGE UP (ref 0–0)
PLATELET # BLD AUTO: 248 K/UL — SIGNIFICANT CHANGE UP (ref 150–400)
POTASSIUM SERPL-MCNC: 3.9 MMOL/L — SIGNIFICANT CHANGE UP (ref 3.5–5.3)
POTASSIUM SERPL-SCNC: 3.9 MMOL/L — SIGNIFICANT CHANGE UP (ref 3.5–5.3)
PROT SERPL-MCNC: 5.7 G/DL — LOW (ref 6–8.3)
RBC # BLD: 2.59 M/UL — LOW (ref 3.8–5.2)
RBC # FLD: 12.9 % — SIGNIFICANT CHANGE UP (ref 10.3–14.5)
SODIUM SERPL-SCNC: 143 MMOL/L — SIGNIFICANT CHANGE UP (ref 135–145)
WBC # BLD: 3.34 K/UL — LOW (ref 3.8–10.5)
WBC # FLD AUTO: 3.34 K/UL — LOW (ref 3.8–10.5)

## 2024-10-12 PROCEDURE — 99223 1ST HOSP IP/OBS HIGH 75: CPT

## 2024-10-12 PROCEDURE — 99232 SBSQ HOSP IP/OBS MODERATE 35: CPT | Mod: GC

## 2024-10-12 RX ORDER — PANTOPRAZOLE SODIUM 40 MG/1
40 TABLET, DELAYED RELEASE ORAL
Refills: 0 | Status: DISCONTINUED | OUTPATIENT
Start: 2024-10-12 | End: 2024-10-15

## 2024-10-12 RX ORDER — ENOXAPARIN SODIUM 150 MG/ML
30 INJECTION SUBCUTANEOUS EVERY 24 HOURS
Refills: 0 | Status: DISCONTINUED | OUTPATIENT
Start: 2024-10-12 | End: 2024-10-12

## 2024-10-12 RX ORDER — SODIUM CHLORIDE 0.9 % (FLUSH) 0.9 %
500 SYRINGE (ML) INJECTION ONCE
Refills: 0 | Status: COMPLETED | OUTPATIENT
Start: 2024-10-12 | End: 2024-10-12

## 2024-10-12 RX ORDER — HYOSCYAMINE SULFATE 0.125 MG
0.12 TABLET ORAL DAILY
Refills: 0 | Status: DISCONTINUED | OUTPATIENT
Start: 2024-10-12 | End: 2024-10-15

## 2024-10-12 RX ORDER — DULOXETINE HCL 20 MG
30 CAPSULE,DELAYED RELEASE (ENTERIC COATED) ORAL DAILY
Refills: 0 | Status: DISCONTINUED | OUTPATIENT
Start: 2024-10-12 | End: 2024-10-15

## 2024-10-12 RX ADMIN — Medication 100 MILLIGRAM(S): at 05:05

## 2024-10-12 RX ADMIN — GABAPENTIN 100 MILLIGRAM(S): 800 TABLET, FILM COATED ORAL at 05:04

## 2024-10-12 RX ADMIN — ATORVASTATIN CALCIUM 10 MILLIGRAM(S): 10 TABLET, FILM COATED ORAL at 21:22

## 2024-10-12 RX ADMIN — Medication 1 TABLET(S): at 08:56

## 2024-10-12 RX ADMIN — Medication 650 MILLIGRAM(S): at 14:24

## 2024-10-12 RX ADMIN — Medication 650 MILLIGRAM(S): at 14:54

## 2024-10-12 RX ADMIN — DIAZEPAM 5 MILLIGRAM(S): 10 TABLET ORAL at 22:25

## 2024-10-12 RX ADMIN — MORPHINE SULFATE 15 MILLIGRAM(S): 30 TABLET, FILM COATED, EXTENDED RELEASE ORAL at 05:03

## 2024-10-12 RX ADMIN — ENOXAPARIN SODIUM 30 MILLIGRAM(S): 150 INJECTION SUBCUTANEOUS at 05:04

## 2024-10-12 RX ADMIN — Medication 650 MILLIGRAM(S): at 22:20

## 2024-10-12 RX ADMIN — Medication 650 MILLIGRAM(S): at 21:20

## 2024-10-12 RX ADMIN — Medication 650 MILLIGRAM(S): at 05:03

## 2024-10-12 RX ADMIN — GABAPENTIN 100 MILLIGRAM(S): 800 TABLET, FILM COATED ORAL at 00:09

## 2024-10-12 RX ADMIN — Medication 4 MILLILITER(S): at 21:43

## 2024-10-12 RX ADMIN — Medication 1000 MILLILITER(S): at 20:43

## 2024-10-12 RX ADMIN — MULTI VITAMIN/MINERAL SUPPLEMENT WITH ASCORBIC ACID, NIACIN, PYRIDOXINE, PANTOTHENIC ACID, FOLIC ACID, RIBOFLAVIN, THIAMIN, BIOTIN, COBALAMIN AND ZINC. 1 TABLET(S): 60; 20; 12.5; 10; 10; 1.7; 1.5; 1; .3; .006 TABLET, COATED ORAL at 17:29

## 2024-10-12 RX ADMIN — Medication 1 TABLET(S): at 17:29

## 2024-10-12 RX ADMIN — DIAZEPAM 5 MILLIGRAM(S): 10 TABLET ORAL at 00:15

## 2024-10-12 RX ADMIN — Medication 500 MILLIGRAM(S): at 17:29

## 2024-10-12 RX ADMIN — MORPHINE SULFATE 15 MILLIGRAM(S): 30 TABLET, FILM COATED, EXTENDED RELEASE ORAL at 17:28

## 2024-10-12 NOTE — CONSULT NOTE ADULT - ASSESSMENT
68 year old female with PMH of spinal stenosis, osteoporosis, HLD, bronchiectasis, IgA/IgG deficiency who presented to [facility] on 10/2/24 with worsening of chronic low back pain,  subjective fevers and chills.  found to have a +UA, with a culture growing Group B strep and positive blood Cx also growing group B strep. Patient was started on CTX as per IDs recommendations. An MRI showed osteomyelitis in the lumbar spine. ID recommended 6 weeks of IV antibiotics. Picc line was placed for patient to continue home IV Abx for a total of 6 weeks. Patient was by PM&R and was admitted to [facility] on 10/11/24.     Acute on chronic low back pain  Osteomyelitis in the lumbar spine.   Group B strep bacteremia  Multilevel DJD  - CT lumbar: No evidence of an acute lumbar spine fracture. Multilevel degenerative changes greatest at L4-5 with severe spinal canal narrowing  - MRI lumbar spine - OM in L4-5 with  multilevel degenration  - 10/5 Repeat Bcx NGTD  - c/w IV CTX - 2 gram daily for total of 6 weeks  - start comprehensive rehab  - pain control per primary  - CBC, CMP & CRP to be checked q wkly while on IV antibiotics.   - Patient must follow up in ID office in about 2 wks    - Acute care team contacted spine surgeon  Dr. Lucas Garcia at [facility]. suggested no surgical intervention needed.  f/u spine surgery post-dc    Anemia  - h/h stable  - Monitor  - Keep Type and screen uptodate  - Total iron 27, transferrin 181, Iron sat 11%  - Folate wnl, B12>2000    IgG and IgA deficiency  hx of bronchietasis  - C/w saline duonebs PRN    Depression/Anxiety  - on Fetzima at home, non-fomulary  - On valium PRN  - c/w Duloxetine    DVT prophylaxis  - Lovenox    GO  - Full code    Thanks for allowing us to see this patient. Hospitalist service will continue to follow patient progress with you. please call with any question    d/w rehab team       68 year old female with PMH of spinal stenosis, osteoporosis, HLD, bronchiectasis, IgA/IgG deficiency who presented to [facility] on 10/2/24 with worsening of chronic low back pain,  subjective fevers and chills.  found to have a +UA, with a culture growing Group B strep and positive blood Cx also growing group B strep. Patient was started on CTX as per IDs recommendations. An MRI showed osteomyelitis in the lumbar spine. ID recommended 6 weeks of IV antibiotics. Picc line was placed for patient to continue home IV Abx for a total of 6 weeks. Patient was by PM&R and was admitted to [facility] on 10/11/24.     Acute on chronic low back pain  Osteomyelitis in the lumbar spine   Group B strep bacteremia/UTI  Multilevel DJD  - CT lumbar: No evidence of an acute lumbar spine fracture. Multilevel degenerative changes greatest at L4-5 with severe spinal canal narrowing  - MRI lumbar spine - OM in L4-5 with  multilevel degeneration  - renal sono: no hydro  - 10/5 Repeat Bcx NGTD  - c/w IV CTX - 2 gram daily for total of 6 weeks  - start comprehensive rehab  - pain control per primary  - CBC, CMP & CRP to be checked q wkly while on IV antibiotics.   - Patient must follow up in ID office in about 2 wks    - Acute care team contacted spine surgeon  Dr. Lucas Garcia at [facility]. suggested no surgical intervention needed.  f/u spine surgery post-dc    Anemia  - h/h stable  - Monitor  - Keep Type and screen uptodate  - Total iron 27, transferrin 181, Iron sat 11%  - Folate wnl, B12>2000    IgG and IgA deficiency  hx of bronchietasis  - C/w saline duonebs PRN    Depression/Anxiety  - on Fetzima at home, non-fomulary  - On valium PRN  - c/w Duloxetine    DVT prophylaxis  - Lovenox    GOC  - Full code    Thanks for allowing us to see this patient. Hospitalist service will continue to follow patient progress with you. please call with any question    d/w rehab team

## 2024-10-12 NOTE — CONSULT NOTE ADULT - SUBJECTIVE AND OBJECTIVE BOX
Dr. Bruno Hospitalist Progress Note  EBER DESAI [id number]    Patient is a 69y old  Female who presents with a chief complaint of Osteomyelitis, pain (11 Oct 2024 12:37)    HPI:  68 year old female with PMH of spinal stenosis, osteoporosis, HLD, bronchiectasis, IgA/IgG deficiency who presented to [facility] on 10/2/24 with worsening of chronic low back pain. Patient had subjective fevers and chills before presenting to the hospital. During her stay, patient was found to have a +UA, with a culture growing Group B strep and positive blood Cx also growing group B strep. Patient was started on CTX as per IDs recommendations. An MRI showed osteomyelitis in the lumbar spine. ID recommended 6 weeks of IV antibiotics. Picc line was placed for patient to continue home IV Abx for a total of 6 weeks. During her stay, patient's pain control regimen was optimized, she received multiple pain medications including dilaudid, oxyContin, gabapentin, and duloxetine. S/p IVF boluses for hypotension. Mag citrate for constipation. She was evaluated for admission to acute inpatient rehab and admitted to [facility] 10/11/24.  (11 Oct 2024 12:37)    Interval:   seen and examined  Chart reviewed  could not sleep at night for pain. now better with pain meds. pain better when moves than when rest. lower back going to to b/l Legs. no other symptoms  BM+      ROS:  denied fever/chills/CP/SOB/cough/palpitation/dizziness/abdominal pian/nausea/vomiting/diarrhoea/constipation/dysuria/leg or calf pain/headaches.all other ROS neg      ALLERGIES  Wheat (Diarrhea)  No Known Drug Allergies      MEDICATIONS  (STANDING):  acetaminophen     Tablet .. 650 milliGRAM(s) Oral every 8 hours  ascorbic acid 500 milliGRAM(s) Oral <User Schedule>  atorvastatin 10 milliGRAM(s) Oral at bedtime  cefTRIAXone   IVPB 2000 milliGRAM(s) IV Intermittent every 24 hours  DULoxetine 20 milliGRAM(s) Oral <User Schedule>  enoxaparin Injectable 30 milliGRAM(s) SubCutaneous every 24 hours  gabapentin 100 milliGRAM(s) Oral three times a day  lactobacillus acidophilus 1 Tablet(s) Oral two times a day with meals  morphine ER Tablet 15 milliGRAM(s) Oral every 12 hours  multivitamin 1 Tablet(s) Oral <User Schedule>  senna 2 Tablet(s) Oral at bedtime  sodium chloride 3%  Inhalation 4 milliLiter(s) Inhalation every 12 hours    MEDICATIONS  (PRN):  bisacodyl 5 milliGRAM(s) Oral at bedtime PRN Constipation  diazepam    Tablet 5 milliGRAM(s) Oral every 8 hours PRN breakthrough pain/muscle spasms  famotidine    Tablet 20 milliGRAM(s) Oral daily PRN GERD  HYDROmorphone   Tablet 2 milliGRAM(s) Oral every 6 hours PRN Severe Pain (7 - 10)  melatonin 3 milliGRAM(s) Oral at bedtime PRN Insomnia  polyethylene glycol 3350 17 Gram(s) Oral daily PRN Constipation        PAST MEDICAL & SURGICAL HISTORY  Sprain rotator cuff    Depression    ADHD (attention deficit hyperactivity disorder)    Anxiety    Mass on back    Bronchiectasis    Spinal stenosis    Lower extremity tendon tear    S/P trigger finger release        FUNCTIONAL HISTORY  Patient lives in a house with her spouse w/ 3-4 steps to enter. Bed and bath on first floor. 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 Per patient, her  is unable to provide support for her at this time.        T(C): 36.9 (10-12-24 @ 04:48), Max: 36.9 (10-11-24 @ 14:29)  HR: 68 (10-12-24 @ 04:48) (61 - 82)  BP: 99/56 (10-12-24 @ 04:48) (80/53 - 100/62)  RR: 16 (10-12-24 @ 04:48) (16 - 16)  SpO2: 98% (10-12-24 @ 04:48) (97% - 99%)    10-11-24 @ 07:01  -  10-12-24 @ 07:00  --------------------------------------------------------  IN: 0 mL / OUT: 1 mL / NET: -1 mL    CAPILLARY BLOOD GLUCOSE    Physical Exam:    GENERAL: Not in distress. Alert    HEENT: AT/NC. clear conjuctiva, MMM.   no pallor or icterus  CARDIOVASCULAR: RRR S1, S2. No murmur/rubs/gallop  LUNGS: BLAE+, no rales, no wheezing, no rhonchi.    ABDOMEN: ND. Soft,  NT, no guarding / rebound / rigidity. BS normoactive. No CVA tenderness.    BACK: No spine tenderness.  EXTREMITIES: no edema. no leg or calf TP.  SKIN: no rash  NEUROLOGIC: AAO*3.strength is symmetric, sensation intact, speech fluent.    PSYCHIATRIC: Calm.  No agitation.        Labs                        7.5    3.34  )-----------( 248      ( 12 Oct 2024 05:43 )             22.1     10-11    141  |  108  |  13  ----------------------------<  95  4.1   |  28  |  0.61    Ca    8.6      11 Oct 2024 06:14     Urinalysis Basic - ( 11 Oct 2024 06:14 )    Color: x / Appearance: x / SG: x / pH: x  Gluc: 95 mg/dL / Ketone: x  / Bili: x / Urobili: x   Blood: x / Protein: x / Nitrite: x   Leuk Esterase: x / RBC: x / WBC x   Sq Epi: x / Non Sq Epi: x / Bacteria: x      CT Lumbar spine 10/2/24- No evidence of an acute lumbar spine fracture. Multilevel degenerative   changes greatest at L4-5 with severe spinal canal narrowing is seen.  Findings are similar to prior MRI of November 2022.    MRI L spine 10/4/24-Nonspecific endplate edema/enhancement and high T2 disc signal at L4-5 as   described above which may represent discitis/ osteomyelitis with phlegmon   in the correct clinical setting. No loculated fluid collection in the   paraspinal or epidural spaces to suggest abscess collection. Correlate   clinically.    Multilevel degenerative changes resulting in multilevel spinal canal   stenosis and neural foraminal narrowing as described above. Severe spinal   canal stenosis with compression of the nerve roots and severe right,   moderate left neural foraminal narrowing at L4-5.    US kidney and bladder 10/4/24- 1.  Negative renal ultrasound.  2.  184 cc post void residual volume (PVR).    Echo 10/3/24   1. Left ventricular ejection fraction, by visual estimation, is 60 to   65%.   2. Normal global left ventricular systolic function.   3. Spectral Doppler shows impaired relaxation pattern of left   ventricular myocardial filling (Grade I diastolic dysfunction).   4. Normal right ventricular size and function.   5. The left atrium is normal in size.   6. The right atrium is normal in size.   7. There is no evidence of pericardial effusion.   8. Mild mitral valve regurgitation.   9. Mild-moderate tricuspid regurgitation.  10. The main pulmonary artery is normal in size.  11. LA volume Index is 19.5 ml/m² ml/m2.             [FreeTextEntry1] : - Labs drawn in office today\par - START: Sulfamethoxazole-Trimethoprim 800-160 MG Oral Tablet; Take 1 Tablet Twice Daily with Food\par - Pt to see ENT if symptoms persist or worsen

## 2024-10-12 NOTE — PROVIDER CONTACT NOTE (OTHER) - SITUATION
Incoming vitals taken   Bp 77/55 sitting, Bp retake 88/43 Patient asymptomatic, afebrile , HR normal range. patient stated she has history of hypotension.

## 2024-10-12 NOTE — PROVIDER CONTACT NOTE (OTHER) - ASSESSMENT
po fluids encourage, patient stated she concerned , she doesn't want to go to the restroom all night by drinking water.

## 2024-10-13 LAB
ALLERGY+IMMUNOLOGY DIAG STUDY NOTE: SIGNIFICANT CHANGE UP
BLD GP AB SCN SERPL QL: SIGNIFICANT CHANGE UP
DIR ANTIGLOB POLYSPECIFIC INTERPRETATION: SIGNIFICANT CHANGE UP
HCT VFR BLD CALC: 26.7 % — LOW (ref 34.5–45)
HGB BLD-MCNC: 8.7 G/DL — LOW (ref 11.5–15.5)
OB PNL STL: NEGATIVE — SIGNIFICANT CHANGE UP
SARS-COV-2 RNA SPEC QL NAA+PROBE: SIGNIFICANT CHANGE UP

## 2024-10-13 PROCEDURE — 99232 SBSQ HOSP IP/OBS MODERATE 35: CPT | Mod: GC

## 2024-10-13 PROCEDURE — 93010 ELECTROCARDIOGRAM REPORT: CPT

## 2024-10-13 PROCEDURE — 99233 SBSQ HOSP IP/OBS HIGH 50: CPT

## 2024-10-13 RX ORDER — ALTEPLASE 2.2 MG/2ML
2 INJECTION, POWDER, LYOPHILIZED, FOR SOLUTION INTRAVENOUS ONCE
Refills: 0 | Status: COMPLETED | OUTPATIENT
Start: 2024-10-13 | End: 2024-10-13

## 2024-10-13 RX ORDER — SODIUM CHLORIDE 0.9 % (FLUSH) 0.9 %
1000 SYRINGE (ML) INJECTION
Refills: 0 | Status: DISCONTINUED | OUTPATIENT
Start: 2024-10-13 | End: 2024-10-13

## 2024-10-13 RX ORDER — SODIUM CHLORIDE 0.9 % (FLUSH) 0.9 %
1000 SYRINGE (ML) INJECTION
Refills: 0 | Status: COMPLETED | OUTPATIENT
Start: 2024-10-13 | End: 2024-10-13

## 2024-10-13 RX ORDER — MIDODRINE HYDROCHLORIDE 5 MG/1
5 TABLET ORAL THREE TIMES A DAY
Refills: 0 | Status: DISCONTINUED | OUTPATIENT
Start: 2024-10-13 | End: 2024-10-15

## 2024-10-13 RX ORDER — CEFTRIAXONE SODIUM 1 G
2000 VIAL (EA) INJECTION EVERY 24 HOURS
Refills: 0 | Status: DISCONTINUED | OUTPATIENT
Start: 2024-10-14 | End: 2024-10-15

## 2024-10-13 RX ADMIN — MULTI VITAMIN/MINERAL SUPPLEMENT WITH ASCORBIC ACID, NIACIN, PYRIDOXINE, PANTOTHENIC ACID, FOLIC ACID, RIBOFLAVIN, THIAMIN, BIOTIN, COBALAMIN AND ZINC. 1 TABLET(S): 60; 20; 12.5; 10; 10; 1.7; 1.5; 1; .3; .006 TABLET, COATED ORAL at 18:27

## 2024-10-13 RX ADMIN — MORPHINE SULFATE 15 MILLIGRAM(S): 30 TABLET, FILM COATED, EXTENDED RELEASE ORAL at 05:38

## 2024-10-13 RX ADMIN — Medication 30 MILLIGRAM(S): at 12:27

## 2024-10-13 RX ADMIN — Medication 100 MILLIGRAM(S): at 05:15

## 2024-10-13 RX ADMIN — Medication 1 TABLET(S): at 08:39

## 2024-10-13 RX ADMIN — DIAZEPAM 5 MILLIGRAM(S): 10 TABLET ORAL at 23:14

## 2024-10-13 RX ADMIN — PANTOPRAZOLE SODIUM 40 MILLIGRAM(S): 40 TABLET, DELAYED RELEASE ORAL at 05:15

## 2024-10-13 RX ADMIN — Medication 650 MILLIGRAM(S): at 23:14

## 2024-10-13 RX ADMIN — Medication 1 TABLET(S): at 18:27

## 2024-10-13 RX ADMIN — Medication 20 MILLIGRAM(S): at 20:01

## 2024-10-13 RX ADMIN — Medication 500 MILLIGRAM(S): at 18:27

## 2024-10-13 RX ADMIN — Medication 4 MILLILITER(S): at 21:00

## 2024-10-13 RX ADMIN — HYDROMORPHONE HYDROCHLORIDE 2 MILLIGRAM(S): 1 INJECTION, SOLUTION INTRAMUSCULAR; INTRAVENOUS; SUBCUTANEOUS at 21:37

## 2024-10-13 RX ADMIN — Medication 650 MILLIGRAM(S): at 05:15

## 2024-10-13 RX ADMIN — Medication 75 MILLILITER(S): at 16:32

## 2024-10-13 RX ADMIN — Medication 650 MILLIGRAM(S): at 15:18

## 2024-10-14 ENCOUNTER — TRANSCRIPTION ENCOUNTER (OUTPATIENT)
Age: 69
End: 2024-10-14

## 2024-10-14 LAB
ALBUMIN SERPL ELPH-MCNC: 2.5 G/DL — LOW (ref 3.3–5)
ALP SERPL-CCNC: 63 U/L — SIGNIFICANT CHANGE UP (ref 40–120)
ALT FLD-CCNC: 38 U/L — SIGNIFICANT CHANGE UP (ref 10–45)
ANION GAP SERPL CALC-SCNC: 10 MMOL/L — SIGNIFICANT CHANGE UP (ref 5–17)
AST SERPL-CCNC: 32 U/L — SIGNIFICANT CHANGE UP (ref 10–40)
BASOPHILS # BLD AUTO: 0 K/UL — SIGNIFICANT CHANGE UP (ref 0–0.2)
BASOPHILS NFR BLD AUTO: 0 % — SIGNIFICANT CHANGE UP (ref 0–2)
BILIRUB SERPL-MCNC: 0.5 MG/DL — SIGNIFICANT CHANGE UP (ref 0.2–1.2)
BUN SERPL-MCNC: 16 MG/DL — SIGNIFICANT CHANGE UP (ref 7–23)
CALCIUM SERPL-MCNC: 8.5 MG/DL — SIGNIFICANT CHANGE UP (ref 8.4–10.5)
CHLORIDE SERPL-SCNC: 108 MMOL/L — SIGNIFICANT CHANGE UP (ref 96–108)
CO2 SERPL-SCNC: 23 MMOL/L — SIGNIFICANT CHANGE UP (ref 22–31)
CREAT SERPL-MCNC: 0.72 MG/DL — SIGNIFICANT CHANGE UP (ref 0.5–1.3)
EGFR: 91 ML/MIN/1.73M2 — SIGNIFICANT CHANGE UP
EOSINOPHIL # BLD AUTO: 0 K/UL — SIGNIFICANT CHANGE UP (ref 0–0.5)
EOSINOPHIL NFR BLD AUTO: 0 % — SIGNIFICANT CHANGE UP (ref 0–6)
GLUCOSE SERPL-MCNC: 82 MG/DL — SIGNIFICANT CHANGE UP (ref 70–99)
HCT VFR BLD CALC: 23.7 % — LOW (ref 34.5–45)
HGB BLD-MCNC: 7.9 G/DL — LOW (ref 11.5–15.5)
IMM GRANULOCYTES NFR BLD AUTO: 0.3 % — SIGNIFICANT CHANGE UP (ref 0–0.9)
LYMPHOCYTES # BLD AUTO: 1.3 K/UL — SIGNIFICANT CHANGE UP (ref 1–3.3)
LYMPHOCYTES # BLD AUTO: 36 % — SIGNIFICANT CHANGE UP (ref 13–44)
MCHC RBC-ENTMCNC: 29 PG — SIGNIFICANT CHANGE UP (ref 27–34)
MCHC RBC-ENTMCNC: 33.3 GM/DL — SIGNIFICANT CHANGE UP (ref 32–36)
MCV RBC AUTO: 87.1 FL — SIGNIFICANT CHANGE UP (ref 80–100)
MONOCYTES # BLD AUTO: 0.34 K/UL — SIGNIFICANT CHANGE UP (ref 0–0.9)
MONOCYTES NFR BLD AUTO: 9.4 % — SIGNIFICANT CHANGE UP (ref 2–14)
NEUTROPHILS # BLD AUTO: 1.96 K/UL — SIGNIFICANT CHANGE UP (ref 1.8–7.4)
NEUTROPHILS NFR BLD AUTO: 54.3 % — SIGNIFICANT CHANGE UP (ref 43–77)
NRBC # BLD: 0 /100 WBCS — SIGNIFICANT CHANGE UP (ref 0–0)
PLATELET # BLD AUTO: 248 K/UL — SIGNIFICANT CHANGE UP (ref 150–400)
POTASSIUM SERPL-MCNC: 4 MMOL/L — SIGNIFICANT CHANGE UP (ref 3.5–5.3)
POTASSIUM SERPL-SCNC: 4 MMOL/L — SIGNIFICANT CHANGE UP (ref 3.5–5.3)
PROT SERPL-MCNC: 5.7 G/DL — LOW (ref 6–8.3)
RBC # BLD: 2.72 M/UL — LOW (ref 3.8–5.2)
RBC # FLD: 12.7 % — SIGNIFICANT CHANGE UP (ref 10.3–14.5)
SODIUM SERPL-SCNC: 141 MMOL/L — SIGNIFICANT CHANGE UP (ref 135–145)
WBC # BLD: 3.61 K/UL — LOW (ref 3.8–10.5)
WBC # FLD AUTO: 3.61 K/UL — LOW (ref 3.8–10.5)

## 2024-10-14 PROCEDURE — 99232 SBSQ HOSP IP/OBS MODERATE 35: CPT | Mod: GC

## 2024-10-14 RX ORDER — MORPHINE SULFATE 30 MG/1
1 TABLET, FILM COATED, EXTENDED RELEASE ORAL
Qty: 5 | Refills: 0
Start: 2024-10-14 | End: 2024-10-18

## 2024-10-14 RX ORDER — SENNOSIDES 8.6 MG
2 TABLET ORAL
Qty: 0 | Refills: 0 | DISCHARGE
Start: 2024-10-14

## 2024-10-14 RX ORDER — ATORVASTATIN CALCIUM 10 MG/1
1 TABLET, FILM COATED ORAL
Qty: 0 | Refills: 0 | DISCHARGE
Start: 2024-10-14

## 2024-10-14 RX ORDER — 5-HYDROXYTRYPTOPHAN (5-HTP) 100 MG
1 TABLET,DISINTEGRATING ORAL
Qty: 0 | Refills: 0 | DISCHARGE
Start: 2024-10-14

## 2024-10-14 RX ORDER — MIDODRINE HYDROCHLORIDE 5 MG/1
1 TABLET ORAL
Qty: 20 | Refills: 0
Start: 2024-10-14 | End: 2024-10-23

## 2024-10-14 RX ORDER — ACETAMINOPHEN 325 MG
2 TABLET ORAL
Qty: 0 | Refills: 0 | DISCHARGE
Start: 2024-10-14

## 2024-10-14 RX ORDER — HYDROMORPHONE HYDROCHLORIDE 1 MG/ML
1 INJECTION, SOLUTION INTRAMUSCULAR; INTRAVENOUS; SUBCUTANEOUS
Qty: 0 | Refills: 0 | DISCHARGE
Start: 2024-10-14

## 2024-10-14 RX ORDER — FAMOTIDINE 40 MG
1 TABLET ORAL
Qty: 0 | Refills: 0 | DISCHARGE
Start: 2024-10-14

## 2024-10-14 RX ORDER — NALOXONE HYDROCHLORIDE 0.4 MG/ML
1 INJECTION, SOLUTION INTRAMUSCULAR; INTRAVENOUS; SUBCUTANEOUS
Qty: 1 | Refills: 0
Start: 2024-10-14 | End: 2024-10-14

## 2024-10-14 RX ORDER — MORPHINE SULFATE 30 MG/1
1 TABLET, FILM COATED, EXTENDED RELEASE ORAL
Qty: 0 | Refills: 0 | DISCHARGE
Start: 2024-10-14

## 2024-10-14 RX ORDER — MIDODRINE HYDROCHLORIDE 5 MG/1
1 TABLET ORAL
Qty: 0 | Refills: 0 | DISCHARGE
Start: 2024-10-14

## 2024-10-14 RX ORDER — CEFTRIAXONE SODIUM 1 G
2 VIAL (EA) INJECTION
Qty: 62 | Refills: 0
Start: 2024-10-14 | End: 2024-11-13

## 2024-10-14 RX ORDER — BISACODYL 5 MG/1
1 TABLET, COATED ORAL
Qty: 0 | Refills: 0 | DISCHARGE
Start: 2024-10-14

## 2024-10-14 RX ORDER — PANTOPRAZOLE SODIUM 40 MG/1
1 TABLET, DELAYED RELEASE ORAL
Qty: 0 | Refills: 0 | DISCHARGE
Start: 2024-10-14

## 2024-10-14 RX ORDER — LACTOBACILLUS ACIDOPHILUS 25MM CELL
1 CAPSULE ORAL
Qty: 0 | Refills: 0 | DISCHARGE
Start: 2024-10-14

## 2024-10-14 RX ORDER — HYOSCYAMINE SULFATE 0.125 MG
1 TABLET ORAL
Qty: 0 | Refills: 0 | DISCHARGE
Start: 2024-10-14

## 2024-10-14 RX ORDER — MULTI VITAMIN/MINERAL SUPPLEMENT WITH ASCORBIC ACID, NIACIN, PYRIDOXINE, PANTOTHENIC ACID, FOLIC ACID, RIBOFLAVIN, THIAMIN, BIOTIN, COBALAMIN AND ZINC. 60; 20; 12.5; 10; 10; 1.7; 1.5; 1; .3; .006 MG/1; MG/1; MG/1; MG/1; MG/1; MG/1; MG/1; MG/1; MG/1; MG/1
1 TABLET, COATED ORAL
Qty: 0 | Refills: 0 | DISCHARGE
Start: 2024-10-14

## 2024-10-14 RX ORDER — ALTEPLASE 2.2 MG/2ML
2 INJECTION, POWDER, LYOPHILIZED, FOR SOLUTION INTRAVENOUS ONCE
Refills: 0 | Status: COMPLETED | OUTPATIENT
Start: 2024-10-14 | End: 2024-10-14

## 2024-10-14 RX ORDER — DULOXETINE HCL 20 MG
1 CAPSULE,DELAYED RELEASE (ENTERIC COATED) ORAL
Qty: 0 | Refills: 0 | DISCHARGE
Start: 2024-10-14

## 2024-10-14 RX ADMIN — Medication 650 MILLIGRAM(S): at 05:14

## 2024-10-14 RX ADMIN — HYDROMORPHONE HYDROCHLORIDE 2 MILLIGRAM(S): 1 INJECTION, SOLUTION INTRAMUSCULAR; INTRAVENOUS; SUBCUTANEOUS at 05:19

## 2024-10-14 RX ADMIN — Medication 0.12 MILLIGRAM(S): at 02:12

## 2024-10-14 RX ADMIN — Medication 650 MILLIGRAM(S): at 23:53

## 2024-10-14 RX ADMIN — Medication 1 TABLET(S): at 18:03

## 2024-10-14 RX ADMIN — Medication 20 MILLIGRAM(S): at 08:20

## 2024-10-14 RX ADMIN — MORPHINE SULFATE 15 MILLIGRAM(S): 30 TABLET, FILM COATED, EXTENDED RELEASE ORAL at 18:15

## 2024-10-14 RX ADMIN — Medication 650 MILLIGRAM(S): at 13:28

## 2024-10-14 RX ADMIN — MULTI VITAMIN/MINERAL SUPPLEMENT WITH ASCORBIC ACID, NIACIN, PYRIDOXINE, PANTOTHENIC ACID, FOLIC ACID, RIBOFLAVIN, THIAMIN, BIOTIN, COBALAMIN AND ZINC. 1 TABLET(S): 60; 20; 12.5; 10; 10; 1.7; 1.5; 1; .3; .006 TABLET, COATED ORAL at 18:04

## 2024-10-14 RX ADMIN — Medication 100 MILLIGRAM(S): at 16:19

## 2024-10-14 RX ADMIN — Medication 4 MILLILITER(S): at 21:40

## 2024-10-14 RX ADMIN — ATORVASTATIN CALCIUM 10 MILLIGRAM(S): 10 TABLET, FILM COATED ORAL at 22:03

## 2024-10-14 RX ADMIN — Medication 1 TABLET(S): at 08:02

## 2024-10-14 RX ADMIN — ALTEPLASE 2 MILLIGRAM(S): 2.2 INJECTION, POWDER, LYOPHILIZED, FOR SOLUTION INTRAVENOUS at 14:57

## 2024-10-14 RX ADMIN — MIDODRINE HYDROCHLORIDE 5 MILLIGRAM(S): 5 TABLET ORAL at 15:12

## 2024-10-14 RX ADMIN — Medication 500 MILLIGRAM(S): at 18:03

## 2024-10-14 RX ADMIN — DIAZEPAM 5 MILLIGRAM(S): 10 TABLET ORAL at 22:36

## 2024-10-14 RX ADMIN — PANTOPRAZOLE SODIUM 40 MILLIGRAM(S): 40 TABLET, DELAYED RELEASE ORAL at 05:14

## 2024-10-14 RX ADMIN — Medication 650 MILLIGRAM(S): at 22:35

## 2024-10-14 RX ADMIN — MORPHINE SULFATE 15 MILLIGRAM(S): 30 TABLET, FILM COATED, EXTENDED RELEASE ORAL at 18:03

## 2024-10-14 NOTE — DISCHARGE NOTE PROVIDER - NSDCFUSCHEDAPPT_GEN_ALL_CORE_FT
Amelie Wheatley  [facility] Physician [facility]  PSYCHIATRY [address] [facility]  Scheduled Appointment: 10/30/2024    Girma Jhaveri  [facility]  ENDOCRIN [address] [place] [address]  Scheduled Appointment: 12/16/2024     Amelie Wheatley  [facility] Physician [facility]  PSYCHIATRY [address] [facility]  Scheduled Appointment: 10/30/2024    Tien Pantoja  [facility]  NEUROSURG [address] [facility]  Scheduled Appointment: 11/25/2024    Girma Jhaveri  [facility]  ENDOCRIN [address] [facility]  Scheduled Appointment: 12/16/2024

## 2024-10-14 NOTE — DIETITIAN INITIAL EVALUATION ADULT - NSICDXPASTSURGICALHX_GEN_ALL_CORE_FT
"[facility]   OB/GYN Clinic     CC: Return OB      Subjective:     Kyra is a 33 year old  at 36w3d  who presents for return OB visit. She reports feeling well. Denies vaginal bleeding or leaking, dysuria. +fetal movement. Some irregular contractions.      Objective:  /67 (BP Location: Right arm, Patient Position: Chair, Cuff Size: Adult Regular)   Pulse 96   Temp 97.6  F (36.4  C) (Tympanic)   Resp 16   Ht 1.575 m (5' 2\")   Wt 76.2 kg (167 lb 14.4 oz)   LMP 2020 (LMP Unknown)   Breastfeeding No   BMI 30.71 kg/m       Physical Exam:  Gen: Pleasant, talkative female in no apparent distress   Respiratory: breathing comfortably on room air   Cardiac: Warm and well-perfused.   GI: Abd soft and non-tender, gravid  MSK: Grossly normal movement of all four extremities  Psych: mood and affect bright   Lower extremity: edema not present      FH: 36  Doptones: 140s  SVE: /-3     Assessment/Plan:   33 year old  at 36w3d  who presents for follow-up OB visit.   1) New OB labs nl. Failed GCT, passed GTT. GBS collected.  2) Genetics testing: NIPT WNL, AFP nl   3) s/p L2 with MFM consult given hx of termination at 26w for microcephaly, on folic acid 1 mg daily  4) Medication review: no changes, continue prenatal vitamin   5) Immunizations: s/p flu and tdap   6) Marginal cord; s/p serial growth planned, resolved marginal cord     Return to clinic in 1 weeks.     Edith Hammond MD   2020 10:34 AM    " PAST SURGICAL HISTORY:  Lower extremity tendon tear hip 2002    S/P trigger finger release right thumb

## 2024-10-14 NOTE — PROGRESS NOTE ADULT - NS ATTEND AMEND GEN_ALL_CORE FT
+ LBP, controlled  + bm   Picc line lumen clogged -s/p cathflow   Dc planning home 10/15. Family training/picc.

## 2024-10-14 NOTE — DISCHARGE NOTE PROVIDER - DETAILS OF MALNUTRITION DIAGNOSIS/DIAGNOSES
This patient has been assessed with a concern for Malnutrition and was treated during this hospitalization for the following Nutrition diagnosis/diagnoses:     -  10/14/2024: Moderate protein-calorie malnutrition

## 2024-10-14 NOTE — DISCHARGE NOTE PROVIDER - NSDCACTIVITY_GEN_ALL_CORE
Sex allowed/Do not drive or operate machinery/Showering allowed/Do not make important decisions/Stairs allowed/Walking - Indoors allowed/No heavy lifting/straining/Walking - Outdoors allowed
Statement Selected

## 2024-10-14 NOTE — DIETITIAN INITIAL EVALUATION ADULT - NS FNS DIET ORDER
Regular Diet (IDDSI Level 7) w/ Thin Liquids (IDDSI Level 0)   Declines Nutrition Supplementation   Education Provided on Proper Nutrition

## 2024-10-14 NOTE — DISCHARGE NOTE NURSING/CASE MANAGEMENT/SOCIAL WORK - PATIENT PORTAL LINK FT
You can access the FollowMyHealth Patient Portal offered by [facility] by registering at the following website: [email or web address]. By joining [employer]’s FollowMyHealth portal, you will also be able to view your health information using other applications (apps) compatible with our system.

## 2024-10-14 NOTE — DISCHARGE NOTE PROVIDER - CARE PROVIDER_API CALL
Randy Zacarias  Infectious Disease  [address]  [place] [address]  Phone: [phone]  Fax: [phone]  Follow Up Time:     Shane Jansen  Internal Medicine  [address] [place] [address]  Phone: [phone]  Fax: [phone]  Follow Up Time:     Amelie Wheatley  Psychiatry  [address] [place] [address]  Phone: [phone]  Fax: [phone]  Follow Up Time:

## 2024-10-14 NOTE — DIETITIAN INITIAL EVALUATION ADULT - ORAL INTAKE PTA/DIET HISTORY
Patient Does Not Follow Diet @Home But Tries to Limit Sugar Intake  Consumes 3 Meals a Day   Usually Cooks For Self  Does Take Vitamin/Supplements @Home (Vitamin C, Multivitamin, Ginko, Magnesium, CoEnzyme Q10)

## 2024-10-14 NOTE — DIETITIAN INITIAL EVALUATION ADULT - FACTORS AFF FOOD INTAKE
States Good PO Intake/Appetite over last Week  Denies Recent Changes/Decrease in Meal Consumption (Per Patient)/none

## 2024-10-14 NOTE — DIETITIAN NUTRITION RISK NOTIFICATION - MALNUTRITION EVALUATION AS DEMONSTRATED BY (ADULTS > 20 YEARS OF AGE)
Weight loss.../Loss of subcutaneous fat.../Loss of muscle... Loss of subcutaneous fat.../Loss of muscle...

## 2024-10-14 NOTE — DIETITIAN INITIAL EVALUATION ADULT - OTHER INFO
Initial Nutrition Assessment   69yr Old Female   States Wheat Food Allergy/Intolerance  Tolerates Diet Well - No Chewing/Swallowing Complications (Per Patient)  Consumed % of 1st Meals (as Per Documentation)  No Pressure Ulcers (as Per Nursing Flow Sheets)  No Edema Noted (as Per Nursing Flow Sheets)  No Recent Vomiting/Diarrhea & Some Recent Nausea/Constipation (as Per Patient)

## 2024-10-14 NOTE — DISCHARGE NOTE PROVIDER - NSDCMRMEDTOKEN_GEN_ALL_CORE_FT
acetaminophen 325 mg oral tablet: 2 tab(s) orally every 8 hours  aluminum hydroxide-magnesium hydroxide 200 mg-200 mg/5 mL oral suspension: 30 milliliter(s) orally every 4 hours As needed Dyspepsia  ascorbic acid 500 mg oral tablet: 1 tab(s) orally once a day  atorvastatin 10 mg oral tablet: 1 tab(s) orally every other day  bisacodyl 5 mg oral delayed release tablet: 1 tab(s) orally once a day (at bedtime) As needed Constipation  cefTRIAXone: 2,000 milligram(s) intravenous every 24 hours  cefTRIAXone 2 g injection: 2 gram(s) intravenously once a day from 10/15 until   DULoxetine 20 mg oral delayed release capsule: 1 cap(s) orally once a day  famotidine 20 mg oral tablet: 1 tab(s) orally once a day As needed GERD  Fetzima 20 mg oral capsule, extended release: 1 cap(s) orally once a day  gabapentin 100 mg oral capsule: 1 cap(s) orally 3 times a day  HYDROmorphone 2 mg oral tablet: 1 tab(s) orally every 6 hours As needed Severe Pain (7 - 10)  hydrOXYzine hydrochloride 10 mg oral tablet: 1  orally once a day, As Needed  ipratropium-albuterol 0.5 mg-2.5 mg/3 mL inhalation solution: 3 milliliter(s) inhaled every 6 hours As needed Shortness of Breath  lactobacillus acidophilus oral capsule: 1 tab(s) orally 2 times a day  melatonin 3 mg oral tablet: 1 tab(s) orally once a day (at bedtime) As needed Insomnia  morphine 15 mg/8 to 12 hr oral tablet, extended release: 1 tab(s) orally every 12 hours  morphine 15 mg/8 to 12 hr oral tablet, extended release: 1 tab(s) orally every 12 hours  Multiple Vitamins oral tablet: 1 tab(s) orally once a day  ondansetron 4 mg oral tablet, disintegratin tab(s) orally 3 times a day (before meals)  polyethylene glycol 3350 oral powder for reconstitution: 17 gram(s) orally 2 times a day  senna leaf extract oral tablet: 2 tab(s) orally once a day (at bedtime)  sodium chloride 3% inhalation solution: 4 milliliter(s) inhaled every 12 hours  Vitamin C plus Zinc oral tablet, disintegratin tab(s) orally once a day   acetaminophen 325 mg oral tablet: 2 tab(s) orally every 8 hours  ascorbic acid 500 mg oral tablet: 1 tab(s) orally once a day  atorvastatin 10 mg oral tablet: 1 tab(s) orally once a day (at bedtime)  bisacodyl 5 mg oral delayed release tablet: 1 tab(s) orally once a day (at bedtime) As needed Constipation  CBC c diff, CMP, ESR, CRP weekly until : send results to Dr Keenan Zacarias -infectious disease  cefTRIAXone 2 g injection: 2 gram(s) intravenously once a day from 10/15 until   DULoxetine 30 mg oral delayed release capsule: 1 cap(s) orally once a day  famotidine 20 mg oral tablet: 1 tab(s) orally once a day As needed GERD  Fetzima 20 mg oral capsule, extended release: 1 cap(s) orally once a day  HYDROmorphone 2 mg oral tablet: 1 tab(s) orally every 6 hours As needed Severe Pain (7 - 10)  hyoscyamine 0.125 mg sublingual tablet: 1 application sublingual once a day  ibuprofen 400 mg oral tablet: 1 tab(s) orally 2 times a day As needed Severe Pain (7 - 10)  ipratropium-albuterol 0.5 mg-2.5 mg/3 mL inhalation solution: 3 milliliter(s) inhaled every 6 hours As needed Shortness of Breath  lactobacillus acidophilus oral capsule: 1 cap(s) orally 2 times a day  melatonin 3 mg oral tablet: 1 tab(s) orally once a day (at bedtime) As needed Insomnia  midodrine 5 mg oral tablet: 1 tab(s) orally 3 times a day As needed SBP&lt;90  morphine 15 mg/8 to 12 hr oral tablet, extended release: 1 tab(s) orally every 12 hours  Multiple Vitamins oral tablet: 1 tab(s) orally once a day  pantoprazole 40 mg oral delayed release tablet: 1 tab(s) orally once a day (before a meal)  polyethylene glycol 3350 oral powder for reconstitution: 17 gram(s) orally once a day As needed Constipation  senna leaf extract oral tablet: 2 tab(s) orally once a day (at bedtime)  Vitamin C plus Zinc oral tablet, disintegratin tab(s) orally once a day   acetaminophen 325 mg oral tablet: 2 tab(s) orally every 8 hours  ascorbic acid 500 mg oral tablet: 1 tab(s) orally once a day  atorvastatin 10 mg oral tablet: 1 tab(s) orally once a day (at bedtime)  bisacodyl 5 mg oral delayed release tablet: 1 tab(s) orally once a day (at bedtime) As needed Constipation  CBC c diff, CMP, ESR, CRP weekly until 11/14: send results to Dr Keenan Zacarias -infectious disease  cefTRIAXone 2 g injection: 2 gram(s) intravenously once a day from 10/15 until 11/14  famotidine 20 mg oral tablet: 1 tab(s) orally once a day As needed GERD  Fetzima 20 mg oral capsule, extended release: 1 cap(s) orally once a day  hyoscyamine 0.125 mg sublingual tablet: 1 application sublingual once a day  ipratropium-albuterol 0.5 mg-2.5 mg/3 mL inhalation solution: 3 milliliter(s) inhaled every 6 hours As needed Shortness of Breath  lactobacillus acidophilus oral capsule: 1 cap(s) orally 2 times a day  melatonin 3 mg oral tablet: 1 tab(s) orally once a day (at bedtime) As needed Insomnia  midodrine 5 mg oral tablet: 1 tab(s) orally 2 times a day as needed for SBP&lt;90  morphine 15 mg/8 to 12 hr oral tablet, extended release: 1 tab(s) orally once a day (at bedtime) as needed for  severe pain MDD: 1 tab  morphine 15 mg/8 to 12 hr oral tablet, extended release: 1 tab(s) orally every 12 hours  Multiple Vitamins oral tablet: 1 tab(s) orally once a day  naloxone 4 mg/0.1 mL nasal spray: 1 spray(s) intranasally once a day overdose narcotic  polyethylene glycol 3350 oral powder for reconstitution: 17 gram(s) orally once a day As needed Constipation  senna leaf extract oral tablet: 2 tab(s) orally once a day (at bedtime)   acetaminophen 325 mg oral tablet: 2 tab(s) orally every 8 hours  ascorbic acid 500 mg oral tablet: 1 tab(s) orally once a day  atorvastatin 10 mg oral tablet: 1 tab(s) orally once a day (at bedtime)  bisacodyl 5 mg oral delayed release tablet: 1 tab(s) orally once a day (at bedtime) As needed Constipation  CBC c diff, CMP, ESR, CRP weekly until 11/14: send results to Dr Keenan Zacarias -infectious disease  cefTRIAXone 2 g injection: 2 gram(s) intravenously once a day from 10/15 until 11/14  famotidine 20 mg oral tablet: 1 tab(s) orally once a day As needed GERD  Fetzima 20 mg oral capsule, extended release: 1 cap(s) orally once a day  gabapentin 300 mg oral capsule: 1 cap(s) orally once a day (at bedtime)  hyoscyamine 0.125 mg sublingual tablet: 1 application sublingual once a day  ipratropium-albuterol 0.5 mg-2.5 mg/3 mL inhalation solution: 3 milliliter(s) inhaled every 6 hours As needed Shortness of Breath  lactobacillus acidophilus oral capsule: 1 cap(s) orally 2 times a day  melatonin 3 mg oral tablet: 1 tab(s) orally once a day (at bedtime) As needed Insomnia  midodrine 5 mg oral tablet: 1 tab(s) orally 2 times a day as needed for SBP&lt;90  morphine 15 mg/8 to 12 hr oral tablet, extended release: 1 tab(s) orally once a day (at bedtime) as needed for  severe pain MDD: 1 tab  Multiple Vitamins oral tablet: 1 tab(s) orally once a day  naloxone 4 mg/0.1 mL nasal spray: 1 spray(s) intranasally once a day overdose narcotic  polyethylene glycol 3350 oral powder for reconstitution: 17 gram(s) orally once a day As needed Constipation  senna leaf extract oral tablet: 2 tab(s) orally once a day (at bedtime)   acetaminophen 325 mg oral tablet: 2 tab(s) orally every 8 hours  ascorbic acid 500 mg oral tablet: 1 tab(s) orally once a day  atorvastatin 10 mg oral tablet: 1 tab(s) orally once a day (at bedtime)  bisacodyl 5 mg oral delayed release tablet: 1 tab(s) orally once a day (at bedtime) As needed Constipation  CBC c diff, CMP, ESR, CRP weekly until 11/14: send results to Dr Keenan Zacarias -infectious disease  cefTRIAXone 2 g injection: 2 gram(s) intravenously once a day from 10/15 until 11/14  famotidine 20 mg oral tablet: 1 tab(s) orally once a day As needed GERD  Fetzima 20 mg oral capsule, extended release: 1 cap(s) orally once a day  gabapentin 300 mg oral capsule: 1 cap(s) orally once a day (at bedtime)  hyoscyamine 0.125 mg sublingual tablet: 1 application sublingual once a day  ipratropium-albuterol 0.5 mg-2.5 mg/3 mL inhalation solution: 3 milliliter(s) inhaled every 6 hours As needed Shortness of Breath  lactobacillus acidophilus oral capsule: 1 cap(s) orally 2 times a day  melatonin 3 mg oral tablet: 1 tab(s) orally once a day (at bedtime) As needed Insomnia  morphine 15 mg/8 to 12 hr oral tablet, extended release: 1 tab(s) orally 2 times a day as needed for  severe pain MDD: 2 tabs  Multiple Vitamins oral tablet: 1 tab(s) orally once a day  naloxone 4 mg/0.1 mL nasal spray: 1 spray(s) intranasally once a day overdose narcotic  polyethylene glycol 3350 oral powder for reconstitution: 17 gram(s) orally once a day As needed Constipation  senna leaf extract oral tablet: 2 tab(s) orally once a day (at bedtime)

## 2024-10-14 NOTE — DISCHARGE NOTE PROVIDER - HOSPITAL COURSE
68 year old female with PMH of spinal stenosis, osteoporosis, HLD, bronchiectasis, IgA/IgG deficiency who presented to [facility] on 10/2/24 with worsening of chronic low back pain. Patient had subjective fevers and chills before presenting to the hospital. During her stay, patient was found to have a +UA, with a culture growing Group B strep and positive blood Cx also growing group B strep. Patient was started on CTX as per IDs recommendations. An MRI showed osteomyelitis in the lumbar spine. ID recommended 6 weeks of IV antibiotics. Picc line was placed for patient to continue home IV Abx for a total of 6 weeks. During her stay, patient's pain control regimen was optimized, she received multiple pain medications including dilaudid, oxyContin, gabapentin, and duloxetine. S/p IVF boluses for hypotension. Mag citrate for constipation. She was evaluated for admission to acute inpatient rehab and admitted to [facility] 10/11/24.     + LBP, controlled  + bm   Picc line lumen clogged        68 year old female with PMH of spinal stenosis, osteoporosis, HLD, bronchiectasis, IgA/IgG deficiency who presented to [facility] on 10/2/24 with worsening of chronic low back pain. Patient had subjective fevers and chills before presenting to the hospital. During her stay, patient was found to have a +UA, with a culture growing Group B strep and positive blood Cx also growing group B strep. Patient was started on CTX as per IDs recommendations. An MRI showed osteomyelitis in the lumbar spine. ID recommended 6 weeks of IV antibiotics. Picc line was placed for patient to continue home IV Abx for a total of 6 weeks. During her stay, patient's pain control regimen was optimized, she received multiple pain medications including dilaudid, oxyContin, gabapentin, and duloxetine. S/p IVF boluses for hypotension. Mag citrate for constipation. She was evaluated for admission to acute inpatient rehab and admitted to [facility] 10/11.  At [facility] rehab patient completed comprehensive PT OT program and reached her rehab goals on 10/15. Antibiotics per ID plan, family training completed. Pain regimen adjusted. Cleared for dc home w/VNS.

## 2024-10-14 NOTE — DIETITIAN INITIAL EVALUATION ADULT - PERTINENT LABORATORY DATA
10-14    141  |  108  |  16  ----------------------------<  82  4.0   |  23  |  0.72    Ca    8.5      14 Oct 2024 05:30    TPro  5.7[L]  /  Alb  2.5[L]  /  TBili  0.5  /  DBili  x   /  AST  32  /  ALT  38  /  AlkPhos  63  10-14

## 2024-10-14 NOTE — DIETITIAN INITIAL EVALUATION ADULT - PERTINENT MEDS FT
MEDICATIONS  (STANDING):  acetaminophen     Tablet .. 650 milliGRAM(s) Oral every 8 hours  ascorbic acid 500 milliGRAM(s) Oral <User Schedule>  atorvastatin 10 milliGRAM(s) Oral at bedtime  cefTRIAXone   IVPB 2000 milliGRAM(s) IV Intermittent every 24 hours  DULoxetine 30 milliGRAM(s) Oral daily  lactobacillus acidophilus 1 Tablet(s) Oral two times a day with meals  morphine ER Tablet 15 milliGRAM(s) Oral every 12 hours  multivitamin 1 Tablet(s) Oral <User Schedule>  pantoprazole    Tablet 40 milliGRAM(s) Oral before breakfast  senna 2 Tablet(s) Oral at bedtime  sodium chloride 3%  Inhalation 4 milliLiter(s) Inhalation every 12 hours    MEDICATIONS  (PRN):  bisacodyl 5 milliGRAM(s) Oral at bedtime PRN Constipation  diazepam    Tablet 5 milliGRAM(s) Oral every 8 hours PRN breakthrough pain/muscle spasms  famotidine    Tablet 20 milliGRAM(s) Oral daily PRN GERD  HYDROmorphone   Tablet 2 milliGRAM(s) Oral every 6 hours PRN Severe Pain (7 - 10)  hyoscyamine SL 0.125 milliGRAM(s) SubLingual daily PRN Infant Colic Symptoms  melatonin 3 milliGRAM(s) Oral at bedtime PRN Insomnia  midodrine. 5 milliGRAM(s) Oral three times a day PRN SBP<90  polyethylene glycol 3350 17 Gram(s) Oral daily PRN Constipation

## 2024-10-14 NOTE — DISCHARGE NOTE NURSING/CASE MANAGEMENT/SOCIAL WORK - NSDCVIVACCINE_GEN_ALL_CORE_FT
No Vaccines Administered. Influenza, high-dose, trivalent, preservative free; 15-Oct-2024 12:18; Yolanda Cifuentes); [employer]; [id number] (Exp. Date: 01-Jun-2025); IntraMuscular; Deltoid Right.; 0.5 milliLiter(s); VIS (VIS Published: 06-Aug-2021, VIS Presented: 15-Oct-2024);

## 2024-10-14 NOTE — DISCHARGE NOTE PROVIDER - NSDCCPCAREPLAN_GEN_ALL_CORE_FT
PRINCIPAL DISCHARGE DIAGNOSIS  Diagnosis: Acute osteomyelitis  Assessment and Plan of Treatment: continue antibiotics per plan. Follow up ID in 1-2 weeks.

## 2024-10-15 VITALS
TEMPERATURE: 97 F | DIASTOLIC BLOOD PRESSURE: 62 MMHG | HEART RATE: 73 BPM | SYSTOLIC BLOOD PRESSURE: 95 MMHG | OXYGEN SATURATION: 97 % | RESPIRATION RATE: 17 BRPM

## 2024-10-15 PROCEDURE — 97165 OT EVAL LOW COMPLEX 30 MIN: CPT | Mod: GO

## 2024-10-15 PROCEDURE — 99232 SBSQ HOSP IP/OBS MODERATE 35: CPT

## 2024-10-15 PROCEDURE — 80053 COMPREHEN METABOLIC PANEL: CPT

## 2024-10-15 PROCEDURE — 82272 OCCULT BLD FECES 1-3 TESTS: CPT

## 2024-10-15 PROCEDURE — 93005 ELECTROCARDIOGRAM TRACING: CPT

## 2024-10-15 PROCEDURE — 86880 COOMBS TEST DIRECT: CPT

## 2024-10-15 PROCEDURE — 86900 BLOOD TYPING SEROLOGIC ABO: CPT

## 2024-10-15 PROCEDURE — 36415 COLL VENOUS BLD VENIPUNCTURE: CPT

## 2024-10-15 PROCEDURE — 85025 COMPLETE CBC W/AUTO DIFF WBC: CPT

## 2024-10-15 PROCEDURE — 94640 AIRWAY INHALATION TREATMENT: CPT

## 2024-10-15 PROCEDURE — 97161 PT EVAL LOW COMPLEX 20 MIN: CPT | Mod: GP

## 2024-10-15 PROCEDURE — 90662 IIV NO PRSV INCREASED AG IM: CPT

## 2024-10-15 PROCEDURE — 85018 HEMOGLOBIN: CPT

## 2024-10-15 PROCEDURE — 86850 RBC ANTIBODY SCREEN: CPT

## 2024-10-15 PROCEDURE — 86870 RBC ANTIBODY IDENTIFICATION: CPT

## 2024-10-15 PROCEDURE — 97110 THERAPEUTIC EXERCISES: CPT | Mod: GP

## 2024-10-15 PROCEDURE — 97530 THERAPEUTIC ACTIVITIES: CPT | Mod: GP

## 2024-10-15 PROCEDURE — 97116 GAIT TRAINING THERAPY: CPT | Mod: GP

## 2024-10-15 PROCEDURE — 87635 SARS-COV-2 COVID-19 AMP PRB: CPT

## 2024-10-15 PROCEDURE — 86901 BLOOD TYPING SEROLOGIC RH(D): CPT

## 2024-10-15 PROCEDURE — 85014 HEMATOCRIT: CPT

## 2024-10-15 PROCEDURE — 99239 HOSP IP/OBS DSCHRG MGMT >30: CPT | Mod: GC

## 2024-10-15 PROCEDURE — 97112 NEUROMUSCULAR REEDUCATION: CPT | Mod: GP

## 2024-10-15 PROCEDURE — 97535 SELF CARE MNGMENT TRAINING: CPT | Mod: GO

## 2024-10-15 RX ORDER — GABAPENTIN 800 MG/1
1 TABLET, FILM COATED ORAL
Qty: 0 | Refills: 0 | DISCHARGE
Start: 2024-10-15

## 2024-10-15 RX ORDER — INFLUENZA VIRUS VACCINE 15; 15; 15; 15 UG/.5ML; UG/.5ML; UG/.5ML; UG/.5ML
0.5 SUSPENSION INTRAMUSCULAR ONCE
Refills: 0 | Status: COMPLETED | OUTPATIENT
Start: 2024-10-15 | End: 2024-10-15

## 2024-10-15 RX ORDER — GABAPENTIN 800 MG/1
300 TABLET, FILM COATED ORAL AT BEDTIME
Refills: 0 | Status: DISCONTINUED | OUTPATIENT
Start: 2024-10-15 | End: 2024-10-15

## 2024-10-15 RX ORDER — INFLUENZA VIRUS VACCINE 15; 15; 15; 15 UG/.5ML; UG/.5ML; UG/.5ML; UG/.5ML
0.5 SUSPENSION INTRAMUSCULAR ONCE
Refills: 0 | Status: DISCONTINUED | OUTPATIENT
Start: 2024-10-15 | End: 2024-10-15

## 2024-10-15 RX ORDER — MORPHINE SULFATE 30 MG/1
1 TABLET, FILM COATED, EXTENDED RELEASE ORAL
Qty: 60 | Refills: 0
Start: 2024-10-15 | End: 2024-11-13

## 2024-10-15 RX ADMIN — Medication 20 MILLIGRAM(S): at 08:08

## 2024-10-15 RX ADMIN — Medication 4 MILLILITER(S): at 08:09

## 2024-10-15 RX ADMIN — MORPHINE SULFATE 15 MILLIGRAM(S): 30 TABLET, FILM COATED, EXTENDED RELEASE ORAL at 05:43

## 2024-10-15 RX ADMIN — Medication 1 TABLET(S): at 08:07

## 2024-10-15 RX ADMIN — MIDODRINE HYDROCHLORIDE 5 MILLIGRAM(S): 5 TABLET ORAL at 05:43

## 2024-10-15 RX ADMIN — Medication 650 MILLIGRAM(S): at 05:43

## 2024-10-15 RX ADMIN — INFLUENZA VIRUS VACCINE 0.5 MILLILITER(S): 15; 15; 15; 15 SUSPENSION INTRAMUSCULAR at 12:18

## 2024-10-15 RX ADMIN — PANTOPRAZOLE SODIUM 40 MILLIGRAM(S): 40 TABLET, DELAYED RELEASE ORAL at 05:43

## 2024-10-15 RX ADMIN — Medication 100 MILLIGRAM(S): at 14:17

## 2024-10-15 NOTE — PROGRESS NOTE ADULT - ASSESSMENT
68 year old female with PMH of spinal stenosis, osteoporosis, HLD, bronchiectasis, IgA/IgG deficiency who presented to [facility] on 10/2/24 with worsening of chronic low back pain,  subjective fevers and chills.  found to have a +UA, with a culture growing Group B strep and positive blood Cx also growing group B strep. Patient was started on CTX as per IDs recommendations. An MRI showed osteomyelitis in the lumbar spine. ID recommended 6 weeks of IV antibiotics. Picc line was placed for patient to continue home IV Abx for a total of 6 weeks. Patient was by PM&R and was admitted to [facility] on 10/11/24.     Acute on chronic low back pain  Osteomyelitis in the lumbar spine.   Group B strep bacteremia  Multilevel DJD  - CT lumbar: No evidence of an acute lumbar spine fracture. Multilevel degenerative changes greatest at L4-5 with severe spinal canal narrowing  - MRI lumbar spine - OM in L4-5 with  multilevel degenration  - 10/5 Repeat Bcx NGTD  - c/w IV CTX - 2 gram daily for total of 6 weeks  - start comprehensive rehab  - pain control per primary  - CBC, CMP & CRP to be checked q wkly while on IV antibiotics.   - Patient must follow up in ID office in about 2 wks    - Acute care team contacted spine surgeon  Dr. Lucas Garcia at [facility]. suggested no surgical intervention needed.  f/u spine surgery post-dc    Anemia  - h/h stable  - Monitor  - Keep Type and screen uptodate  - Total iron 27, transferrin 181, Iron sat 11%  - Folate wnl, B12>2000    IgG and IgA deficiency  hx of bronchietasis  - C/w saline duonebs PRN    Depression/Anxiety  - on Fetzima at home, non-fomulary  - On valium PRN  - c/w Duloxetine    DVT prophylaxis  - Lovenox    GOC  - Full code    Thanks for allowing us to see this patient. Hospitalist service will continue to follow patient progress with you. please call with any question      
68 year old female with PMH of spinal stenosis, osteoporosis, HLD, bronchiectasis, IgA/IgG deficiency who presented to [facility] on 10/2/24 with worsening of chronic low back pain. Patient had subjective fevers and chills before presenting to the hospital. During her stay, patient was found to have a +UA, with a culture growing Group B strep and positive blood Cx also growing group B strep. Patient was started on CTX as per IDs recommendations. An MRI showed osteomyelitis in the lumbar spine. ID recommended 6 weeks of IV antibiotics. Picc line was placed for patient to continue home IV Abx for a total of 6 weeks. During her stay, patient's pain control regimen was optimized, she received multiple pain medications including dilaudid, Contin, gabapentin, and duloxetine. She was evaluated for admission to acute inpatient rehab and admitted to [facility] 10/11/24.     #Osteomyelitis of lumbar spine now with Gait Instability, ADL impairments and Functional impairments  - Start Comprehensive Rehab Program of PT/OT  - CT lumbar: No evidence of an acute lumbar spine fracture. Multilevel degenerative changes greatest at L4-5 with severe spinal canal narrowing  - MRI lumbar spine - osteomyelitis  - 10/5 Repeat Bcx NGTD  - ID recs  long term IV abx (6 weeks), weekly CBC, CMP, CRP while on IV Abx, f/u with ID - ceftriaxone 2g daily       #Anemia -   ? due ot osteo   dc lovenox for now       #Pain control  - Tylenol PRN  -Continue standing Morphine 15mg BID  -Conitnue dilaudid 2mg q6h PRN  -Continue gabapentin 100mg TID  - on Valium for pain/anxiety  - adjust prn     #hypotension  - monitor standing BP, s/p IVF  - adjust pain regimen as able, outpt f/up pain management    #IgG and IgA deficiency  -Continue duonebs/saline nebs PRN  -Monitor respiratory status     #Depression/anxiety   - on Fetzima at home, non-fomulary  - On valium PRN  - c/w Duloxetine- BH appreciated, outpt follow up    #HLD  -Continue atorvastatin 10mg daily     #GI/Bowel Mgmt   - Continue Senna at bedtime   - Miralax BID  -Conitnue pepcid   -Lactobacillus for PPX during antibiotics     #hx UTI  - check PVR, Monitor UO, labs    #DVT  - Lovenox sq    #Skin:  - picc in place    FEN   - Diet - Regular diet     Precautions / PROPHYLAXIS:   - Falls  - ortho: Weight bearing status: WBAT    - Pressure injury/Skin:  OOB to Chair, PT/OT      
68 year old female with PMH of spinal stenosis, osteoporosis, HLD, bronchiectasis, IgA/IgG deficiency who presented to [facility] on 10/2/24 with worsening of chronic low back pain. Patient had subjective fevers and chills before presenting to the hospital. During her stay, patient was found to have a +UA, with a culture growing Group B strep and positive blood Cx also growing group B strep. Patient was started on CTX as per IDs recommendations. An MRI showed osteomyelitis in the lumbar spine. ID recommended 6 weeks of IV antibiotics. Picc line was placed for patient to continue home IV Abx for a total of 6 weeks. During her stay, patient's pain control regimen was optimized, she received multiple pain medications including dilaudid, Contin, gabapentin, and duloxetine. She was evaluated for admission to acute inpatient rehab and admitted to [facility] 10/11/24.     #Osteomyelitis of lumbar spine now with Gait Instability, ADL impairments and Functional impairments  - Comprehensive Rehab Program of PT/OT, family training  - CT lumbar: No evidence of an acute lumbar spine fracture. Multilevel degenerative changes greatest at L4-5 with severe spinal canal narrowing  - MRI lumbar spine - osteomyelitis  - 10/5 Repeat Bcx NGTD  - ID recs  long term IV abx (6 weeks), weekly CBC, CMP, CRP while on IV Abx, f/u with ID - ceftriaxone 2g daily   -PICc lumen clogged, peripheral line for now-pending exchange/spoke w/ICU      #Anemia -   ? due to osteo   dc lovenox for now, no overt bleeding, ambulating    #Pain control  - Tylenol PRN  -Continue standing Morphine 15mg BID  -Conitnue dilaudid 2mg q6h PRN  -Continue gabapentin 100mg TID  - on Valium for pain/anxiety  - adjust prn     #hypotension  - monitor standing BP, s/p IVF  - adjust pain regimen as able, outpt f/up pain management  - Midodrine prn    #IgG and IgA deficiency  -Continue duonebs/saline nebs PRN  -Monitor respiratory status     #Depression/anxiety   - on Fetzima at home, non-fomulary  - On valium PRN  - c/w Duloxetine- BH appreciated, outpt follow up    #HLD  -Continue atorvastatin 10mg daily     #GI/Bowel Mgmt   - Continue Senna at bedtime   - Miralax BID  -Conitnue pepcid   -Lactobacillus for PPX during antibiotics     #hx UTI  - voids freely    #Skin:  - picc in place    FEN   - Diet - Regular diet     Precautions / PROPHYLAXIS:   - Falls  - ortho: Weight bearing status: WBAT    - Pressure injury/Skin:  OOB to Chair, PT/OT      
68 year old female with PMH of spinal stenosis, osteoporosis, HLD, bronchiectasis, IgA/IgG deficiency who presented to [facility] on 10/2/24 with worsening of chronic low back pain. Patient had subjective fevers and chills before presenting to the hospital. During her stay, patient was found to have a +UA, with a culture growing Group B strep and positive blood Cx also growing group B strep. Patient was started on CTX as per IDs recommendations. An MRI showed osteomyelitis in the lumbar spine. ID recommended 6 weeks of IV antibiotics. Picc line was placed for patient to continue home IV Abx for a total of 6 weeks. During her stay, patient's pain control regimen was optimized, she received multiple pain medications including dilaudid, Contin, gabapentin, and duloxetine. She was evaluated for admission to acute inpatient rehab and admitted to [facility] 10/11/24.     #Osteomyelitis of lumbar spine now with Gait Instability, ADL impairments and Functional impairments  - Comprehensive Rehab Program of PT/OT, family training  - CT lumbar: No evidence of an acute lumbar spine fracture. Multilevel degenerative changes greatest at L4-5 with severe spinal canal narrowing  - MRI lumbar spine - osteomyelitis  - 10/5 Repeat Bcx NGTD  - ID recs  long term IV abx (6 weeks), weekly CBC, CMP, CRP while on IV Abx, f/u with ID - ceftriaxone 2g daily           #Pain control  - Tylenol PRN  -Continue standing Morphine 15mg BID  -Conitnue dilaudid 2mg q6h PRN  -Continue gabapentin 300 mg qhs   - on Valium for pain/anxiety  - adjust prn     #hypotension  - monitor standing BP, s/p IVF  - adjust pain regimen as able, outpt f/up pain management  - Midodrine dc'd     #IgG and IgA deficiency  -Continue duonebs/saline nebs PRN  -Monitor respiratory status     #Depression/anxiety   - on Fetzima at home, non-fomulary  - On valium PRN  - c/w Duloxetine-  appreciated, outpt follow up    #HLD  -Continue atorvastatin 10mg daily     #GI/Bowel Mgmt   - Continue Senna at bedtime   - Miralax BID  -Conitnue pepcid   -Lactobacillus for PPX during antibiotics     #hx UTI  - voids freely    #Skin:  - picc in place    FEN   - Diet - Regular diet     Precautions / PROPHYLAXIS:   - Falls  - ortho: Weight bearing status: WBAT    - Pressure injury/Skin:  OOB to Chair, PT/OT      
68 year old female with PMH of spinal stenosis, osteoporosis, HLD, bronchiectasis, IgA/IgG deficiency who presented to [facility] on 10/2/24 with worsening of chronic low back pain. Patient had subjective fevers and chills before presenting to the hospital. During her stay, patient was found to have a +UA, with a culture growing Group B strep and positive blood Cx also growing group B strep. Patient was started on CTX as per IDs recommendations. An MRI showed osteomyelitis in the lumbar spine. ID recommended 6 weeks of IV antibiotics. Picc line was placed for patient to continue home IV Abx for a total of 6 weeks. During her stay, patient's pain control regimen was optimized, she received multiple pain medications including dilaudid, Contin, gabapentin, and duloxetine. She was evaluated for admission to acute inpatient rehab and admitted to [facility] 10/11/24.     #Osteomyelitis of lumbar spine now with Gait Instability, ADL impairments and Functional impairments  - Start Comprehensive Rehab Program of PT/OT  - CT lumbar: No evidence of an acute lumbar spine fracture. Multilevel degenerative changes greatest at L4-5 with severe spinal canal narrowing  - MRI lumbar spine - osteomyelitis  - 10/5 Repeat Bcx NGTD  - ID recs  long term IV abx (6 weeks), weekly CBC, CMP, CRP while on IV Abx, f/u with ID - ceftriaxone 2g daily   -PICc lumen clogged, peripheral line for now      #Anemia -   ? due ot osteo   dc lovenox for now       #Pain control  - Tylenol PRN  -Continue standing Morphine 15mg BID  -Conitnue dilaudid 2mg q6h PRN  -Continue gabapentin 100mg TID  - on Valium for pain/anxiety  - adjust prn     #hypotension  - monitor standing BP, s/p IVF  - adjust pain regimen as able, outpt f/up pain management    #IgG and IgA deficiency  -Continue duonebs/saline nebs PRN  -Monitor respiratory status     #Depression/anxiety   - on Fetzima at home, non-fomulary  - On valium PRN  - c/w Duloxetine-  appreciated, outpt follow up    #HLD  -Continue atorvastatin 10mg daily     #GI/Bowel Mgmt   - Continue Senna at bedtime   - Miralax BID  -Conitnue pepcid   -Lactobacillus for PPX during antibiotics     #hx UTI  - check PVR, Monitor UO, labs    #DVT  - Lovenox sq    #Skin:  - picc in place    FEN   - Diet - Regular diet     Precautions / PROPHYLAXIS:   - Falls  - ortho: Weight bearing status: WBAT    - Pressure injury/Skin:  OOB to Chair, PT/OT      
68 year old female with PMH of spinal stenosis, osteoporosis, HLD, bronchiectasis, IgA/IgG deficiency who presented to [facility] on 10/2/24 with worsening of chronic low back pain,  subjective fevers and chills.  found to have a +UA, with a culture growing Group B strep and positive blood Cx also growing group B strep. Patient was started on CTX as per IDs recommendations. An MRI showed osteomyelitis in the lumbar spine. ID recommended 6 weeks of IV antibiotics. Picc line was placed for patient to continue home IV Abx for a total of 6 weeks. Patient was by PM&R and was admitted to [facility] on 10/11/24.     Hypotension  - likely due to dehydration and dilaudid  - s/p NS bolus 500 mool on 10/12. continue NS@75 mls/hr one liter only.  - encouraged PO hydration  - monitor VS including OH      Acute on chronic low back pain  Osteomyelitis in the lumbar spine   Group B strep bacteremia/UTI  Multilevel DJD  - CT lumbar: No evidence of an acute lumbar spine fracture. Multilevel degenerative changes greatest at L4-5 with severe spinal canal narrowing  - MRI lumbar spine - OM in L4-5 with  multilevel degeneration  - renal sono: no hydro  - 10/5 Repeat Bcx NGTD  - c/w IV CTX - 2 gram daily for total of 6 weeks  - start comprehensive rehab  - pain control per primary  - CBC, CMP & CRP to be checked q wkly while on IV antibiotics.   - Patient must follow up in ID office in about 2 wks    - Acute care team contacted spine surgeon  Dr. Lucas Garcia at [facility]. suggested no surgical intervention needed.  f/u spine surgery post-dc    Anemia  - h/h stable  - Monitor  - Keep Type and screen uptodate  - Total iron 27, transferrin 181, Iron sat 11%  - Folate wnl, B12>2000    IgG and IgA deficiency  hx of bronchiectasis  - C/w saline duonebs PRN    Depression/Anxiety  - on Fetzima at home, non-fomulary  - On valium PRN  - c/w Duloxetine    DVT prophylaxis  - Lovenox    GOC  - Full code    Thanks for allowing us to see this patient. Hospitalist service will continue to follow patient progress with you. please call with any question    d/w rehab team      
8 year old female with PMH of spinal stenosis, osteoporosis, HLD, bronchiectasis, IgA/IgG deficiency who presented to [facility] on 10/2/24 with worsening of chronic low back pain,  subjective fevers and chills.  found to have a +UA, with a culture growing Group B strep and positive blood Cx also growing group B strep. Patient was started on CTX as per IDs recommendations. An MRI showed osteomyelitis in the lumbar spine. ID recommended 6 weeks of IV antibiotics. Picc line was placed for patient to continue home IV Abx for a total of 6 weeks. Patient was by PM&R and was admitted to [facility] on 10/11/24.     Hypotension  - likely due to dehydration and dilaudid  -  S/P IV hydraion  - encouraged PO hydration  - monitor VS including OH  - patient is on midordine prn for low bp       Acute on chronic low back pain  Osteomyelitis in the lumbar spine   Group B strep bacteremia/UTI  Multilevel DJD  - CT lumbar: No evidence of an acute lumbar spine fracture. Multilevel degenerative changes greatest at L4-5 with severe spinal canal narrowing  - MRI lumbar spine - OM in L4-5 with  multilevel degeneration  - renal sono: no hydro  - 10/5 Repeat Bcx NGTD  - c/w IV CTX - 2 gram daily for total of 6 weeks  - start comprehensive rehab  - pain control per primary  - CBC, CMP & CRP to be checked q wkly while on IV antibiotics.   - Patient must follow up in ID office in about 2 wks    - Acute care team contacted spine surgeon  Dr. Lucas Garcia at [facility]. suggested no surgical intervention needed.  f/u spine surgery post-dc    Anemia  - h/h stable  - Monitor  - Keep Type and screen uptodate  - Total iron 27, transferrin 181, Iron sat 11%  - Folate wnl, B12>2000    IgG and IgA deficiency  hx of bronchiectasis  - C/w saline duonebs PRN    Depression/Anxiety  - on Fetzima at home, non-fomulary  - On valium PRN  - c/w Duloxetine    DVT prophylaxis  - Lovenox    GO  - Full code    Thanks for allowing us to see this patient. Hospitalist service will continue to follow patient progress with you. please call with any question    d/w rehab team

## 2024-10-15 NOTE — PROGRESS NOTE ADULT - REASON FOR ADMISSION
Osteomyelitis, pain

## 2024-10-15 NOTE — PROGRESS NOTE ADULT - NUTRITIONAL ASSESSMENT
This patient has been assessed with a concern for Malnutrition and has been determined to have a diagnosis/diagnoses of Moderate protein-calorie malnutrition.    This patient is being managed with:   Diet Regular-  Entered: Oct 11 2024  9:29PM  

## 2024-10-15 NOTE — PROGRESS NOTE ADULT - SUBJECTIVE AND OBJECTIVE BOX
HPI:  68 year old female with PMH of spinal stenosis, osteoporosis, HLD, bronchiectasis, IgA/IgG deficiency who presented to [facility] on 10/2/24 with worsening of chronic low back pain. Patient had subjective fevers and chills before presenting to the hospital. During her stay, patient was found to have a +UA, with a culture growing Group B strep and positive blood Cx also growing group B strep. Patient was started on CTX as per IDs recommendations. An MRI showed osteomyelitis in the lumbar spine. ID recommended 6 weeks of IV antibiotics. Picc line was placed for patient to continue home IV Abx for a total of 6 weeks. During her stay, patient's pain control regimen was optimized, she received multiple pain medications including dilaudid, oxyContin, gabapentin, and duloxetine. S/p IVF boluses for hypotension. Mag citrate for constipation. She was evaluated for admission to acute inpatient rehab and admitted to [facility] 10/11/24.       + LBP, controlled  + bm   Picc line working exchange  Dc planning         MEDICATIONS  (STANDING):  acetaminophen     Tablet .. 650 milliGRAM(s) Oral every 8 hours  ascorbic acid 500 milliGRAM(s) Oral <User Schedule>  atorvastatin 10 milliGRAM(s) Oral at bedtime  cefTRIAXone   IVPB 2000 milliGRAM(s) IV Intermittent every 24 hours  DULoxetine 30 milliGRAM(s) Oral daily  lactobacillus acidophilus 1 Tablet(s) Oral two times a day with meals  morphine ER Tablet 15 milliGRAM(s) Oral every 12 hours  multivitamin 1 Tablet(s) Oral <User Schedule>  pantoprazole    Tablet 40 milliGRAM(s) Oral before breakfast  senna 2 Tablet(s) Oral at bedtime  sodium chloride 3%  Inhalation 4 milliLiter(s) Inhalation every 12 hours    MEDICATIONS  (PRN):  bisacodyl 5 milliGRAM(s) Oral at bedtime PRN Constipation  diazepam    Tablet 5 milliGRAM(s) Oral every 8 hours PRN breakthrough pain/muscle spasms  famotidine    Tablet 20 milliGRAM(s) Oral daily PRN GERD  HYDROmorphone   Tablet 2 milliGRAM(s) Oral every 6 hours PRN Severe Pain (7 - 10)  hyoscyamine SL 0.125 milliGRAM(s) SubLingual daily PRN Infant Colic Symptoms  melatonin 3 milliGRAM(s) Oral at bedtime PRN Insomnia  midodrine. 5 milliGRAM(s) Oral three times a day PRN SBP<90  polyethylene glycol 3350 17 Gram(s) Oral daily PRN Constipation    Vital Signs Last 24 Hrs  T(C): 36.3 (15 Oct 2024 08:12), Max: 36.8 (14 Oct 2024 11:29)  T(F): 97.4 (15 Oct 2024 08:12), Max: 98.3 (14 Oct 2024 21:19)  HR: 73 (15 Oct 2024 08:12) (71 - 74)  BP: 95/62 (15 Oct 2024 08:12) (74/45 - 96/65)  BP(mean): --  RR: 17 (15 Oct 2024 08:12) (16 - 17)  SpO2: 97% (15 Oct 2024 08:12) (94% - 97%)    Parameters below as of 15 Oct 2024 08:12  Patient On (Oxygen Delivery Method): room air        General: NAD in chair                                HEENT: NC/AT, EOM I, Normal Conjunctivae  Cardio: RRR, Normal S1-S2                           Pulm: No Respiratory Distress,  Lungs CTAB                        Abdomen: ND/NT, Soft, BS+                                                MSK: No joint swelling, Full ROM                                         Ext: No C/C/E, No calf tenderness    Skin:  all skin intact                                                                 Wounds: none  Decubitus Ulcers: None Present   LEFT    UE: SF [5/5], EF [5/5], EE [5/5], WE [5/5],  [wnl]  RIGHT UE: SF [5/5], EF [5/5], EE [5/5], WE [5/5],  [wnl]  LEFT    LE:  HF [5/5], KE [5/5], DF [5/5], EHL [5/5],  PF [5/5]  RIGHT LE:  HF [5/5], KE [5/5], DF [5/5], EHL [5/5],  PF [5/5]    IDT today, plan dc home 10/15 VNS    Continue comprehensive acute rehab program consisting of 3hrs/day of OT/PT
68 year old female with PMH of spinal stenosis, osteoporosis, HLD, bronchiectasis, IgA/IgG deficiency who presented to [facility] on 10/2/24 with worsening of chronic low back pain. Patient had subjective fevers and chills before presenting to the hospital. During her stay, patient was found to have a +UA, with a culture growing Group B strep and positive blood Cx also growing group B strep. Patient was started on CTX as per IDs recommendations. An MRI showed osteomyelitis in the lumbar spine. ID recommended 6 weeks of IV antibiotics. Picc line was placed for patient to continue home IV Abx for a total of 6 weeks. During her stay, patient's pain control regimen was optimized, she received multiple pain medications including dilaudid, oxyContin, gabapentin, and duloxetine. S/p IVF boluses for hypotension. Mag citrate for constipation. She was evaluated for admission to acute inpatient rehab and admitted to [facility] 10/11/24.     + LBP, controlled  + bm   Picc line lumen clogged       Vital Signs Last 24 Hrs  T(C): 36.9 (13 Oct 2024 08:35), Max: 36.9 (12 Oct 2024 19:44)  T(F): 98.5 (13 Oct 2024 08:35), Max: 98.5 (13 Oct 2024 08:35)  HR: 74 (13 Oct 2024 18:23) (72 - 85)  BP: 88/57 (13 Oct 2024 18:23) (85/43 - 96/60)  BP(mean): --  RR: 16 (13 Oct 2024 18:23) (16 - 17)  SpO2: 98% (13 Oct 2024 18:23) (95% - 98%)    Parameters below as of 13 Oct 2024 18:23  Patient On (Oxygen Delivery Method): room air          General: NAD in chair                                HEENT: NC/AT, EOM I, Normal Conjunctivae  Cardio: RRR, Normal S1-S2                           Pulm: No Respiratory Distress,  Lungs CTAB                        Abdomen: ND/NT, Soft, BS+                                                MSK: No joint swelling, Full ROM                                         Ext: No C/C/E, No calf tenderness    Skin:  all skin intact                                                                 Wounds: none  Decubitus Ulcers: None Present   otor    LEFT    UE: SF [5/5], EF [5/5], EE [5/5], WE [5/5],  [wnl]  RIGHT UE: SF [5/5], EF [5/5], EE [5/5], WE [5/5],  [wnl]  LEFT    LE:  HF [5/5], KE [5/5], DF [5/5], EHL [5/5],  PF [5/5]  RIGHT LE:  HF [5/5], KE [5/5], DF [5/5], EHL [5/5],  PF [5/5]                            8.7    x     )-----------( x        ( 13 Oct 2024 05:33 )             26.7     10-12    143  |  108  |  9   ----------------------------<  103[H]  3.9   |  24  |  0.66    Ca    8.1[L]      12 Oct 2024 05:43    TPro  5.7[L]  /  Alb  2.4[L]  /  TBili  0.3  /  DBili  x   /  AST  25  /  ALT  35  /  AlkPhos  61  10-12      Urinalysis Basic - ( 12 Oct 2024 05:43 )    Color: x / Appearance: x / SG: x / pH: x  Gluc: 103 mg/dL / Ketone: x  / Bili: x / Urobili: x   Blood: x / Protein: x / Nitrite: x   Leuk Esterase: x / RBC: x / WBC x   Sq Epi: x / Non Sq Epi: x / Bacteria: x                MEDICATIONS   acetaminophen     Tablet .. 650 milliGRAM(s) every 8 hours  ascorbic acid 500 milliGRAM(s) <User Schedule>  atorvastatin 10 milliGRAM(s) at bedtime  bisacodyl 5 milliGRAM(s) at bedtime PRN  cefTRIAXone   IVPB 2000 milliGRAM(s) every 24 hours  diazepam    Tablet 5 milliGRAM(s) every 8 hours PRN  DULoxetine 30 milliGRAM(s) daily  famotidine    Tablet 20 milliGRAM(s) daily PRN  HYDROmorphone   Tablet 2 milliGRAM(s) every 6 hours PRN  lactobacillus acidophilus 1 Tablet(s) two times a day with meals  melatonin 3 milliGRAM(s) at bedtime PRN  morphine ER Tablet 15 milliGRAM(s) every 12 hours  multivitamin 1 Tablet(s) <User Schedule>  pantoprazole    Tablet 40 milliGRAM(s) before breakfast  polyethylene glycol 3350 17 Gram(s) daily PRN  senna 2 Tablet(s) at bedtime  sodium chloride 3%  Inhalation 4 milliLiter(s) every 12 hours      Vital Signs Last 24 Hrs  T(C): 36.9 (13 Oct 2024 08:35), Max: 36.9 (12 Oct 2024 19:44)  T(F): 98.5 (13 Oct 2024 08:35), Max: 98.5 (13 Oct 2024 08:35)  HR: 74 (13 Oct 2024 18:23) (72 - 85)  BP: 88/57 (13 Oct 2024 18:23) (85/43 - 96/60)  BP(mean): --  RR: 16 (13 Oct 2024 18:23) (16 - 17)  SpO2: 98% (13 Oct 2024 18:23) (95% - 98%)    Parameters below as of 13 Oct 2024 18:23  Patient On (Oxygen Delivery Method): room air        PHYSICAL EXAM  Constitutional - NAD, Comfortable  HEENT - NCAT, EOMI  Neck - Supple, No limited ROM  Chest - CTA bilaterally, No wheeze, No rhonchi, No crackles  Cardiovascular - RRR, S1S2, No murmurs  Abdomen - BS+, Soft, NTND  Extremities - No C/C/E, No calf tenderness   Neurologic Exam -                    Cognitive - Awake, Alert, AAO to self, place, date, year, situation     Communication - Fluent, No dysarthria, no aphasia     Cranial Nerves - CN 2-12 intact     Motor - No focal deficits                       Sensory - Intact to LT     Reflexes - DTR Intact, No primitive reflexive     Balance - WNL Static  Psychiatric - Mood stable, Affect WNL    --------------------------------------------------------------------  
Medicine Progress Note    Patient is a 69y old  Female who presents with a chief complaint of Osteomyelitis, pain (12 Oct 2024 14:17)      SUBJECTIVE / OVERNIGHT EVENTS:  seen and examined  Chart reviewed  overnight hypotensive s/p NS bolus. endorses not drinking enough water as she usually drinks at home for fear of going to restroom frequently.   Limb weakness improving with therapy  constipation  pain is controlled with meds    ROS:  denied fever/chills/CP/SOB/cough/palpitation/dizziness/abdominal pian/nausea/vomiting/diarrhoea/dysuria/leg or calf pain/headaches.all other ROS neg    MEDICATIONS  (STANDING):  acetaminophen     Tablet .. 650 milliGRAM(s) Oral every 8 hours  ascorbic acid 500 milliGRAM(s) Oral <User Schedule>  atorvastatin 10 milliGRAM(s) Oral at bedtime  cefTRIAXone   IVPB 2000 milliGRAM(s) IV Intermittent every 24 hours  DULoxetine 30 milliGRAM(s) Oral daily  lactobacillus acidophilus 1 Tablet(s) Oral two times a day with meals  morphine ER Tablet 15 milliGRAM(s) Oral every 12 hours  multivitamin 1 Tablet(s) Oral <User Schedule>  pantoprazole    Tablet 40 milliGRAM(s) Oral before breakfast  senna 2 Tablet(s) Oral at bedtime  sodium chloride 0.9%. 1000 milliLiter(s) (75 mL/Hr) IV Continuous <Continuous>  sodium chloride 3%  Inhalation 4 milliLiter(s) Inhalation every 12 hours    MEDICATIONS  (PRN):  bisacodyl 5 milliGRAM(s) Oral at bedtime PRN Constipation  diazepam    Tablet 5 milliGRAM(s) Oral every 8 hours PRN breakthrough pain/muscle spasms  famotidine    Tablet 20 milliGRAM(s) Oral daily PRN GERD  HYDROmorphone   Tablet 2 milliGRAM(s) Oral every 6 hours PRN Severe Pain (7 - 10)  hyoscyamine SL 0.125 milliGRAM(s) SubLingual daily PRN Infant Colic Symptoms  melatonin 3 milliGRAM(s) Oral at bedtime PRN Insomnia  midodrine. 5 milliGRAM(s) Oral three times a day PRN SBP<90  polyethylene glycol 3350 17 Gram(s) Oral daily PRN Constipation    CAPILLARY BLOOD GLUCOSE        I&O's Summary      PHYSICAL EXAM:  Vital Signs Last 24 Hrs  T(C): 36.9 (13 Oct 2024 08:35), Max: 36.9 (12 Oct 2024 19:44)  T(F): 98.5 (13 Oct 2024 08:35), Max: 98.5 (13 Oct 2024 08:35)  HR: 75 (13 Oct 2024 08:41) (72 - 85)  BP: 96/60 (13 Oct 2024 08:35) (85/43 - 96/60)  BP(mean): --  RR: 16 (13 Oct 2024 08:35) (16 - 17)  SpO2: 98% (13 Oct 2024 08:41) (95% - 98%)    Parameters below as of 13 Oct 2024 08:41  Patient On (Oxygen Delivery Method): room air      GENERAL: Not in distress. Alert    HEENT: AT/NC. clear conjuctiva, MM dry.   no pallor or icterus  CARDIOVASCULAR: RRR S1, S2. No murmur/rubs/gallop  LUNGS: BLAE+, no rales, no wheezing, no rhonchi.    ABDOMEN: ND. Soft,  NT, no guarding / rebound / rigidity. BS normoactive. No CVA tenderness.    BACK: No spine tenderness.  EXTREMITIES: no edema. no leg or calf TP.  SKIN: no rash  NEUROLOGIC: AAO*3 moves limbs, follows commands.   PSYCHIATRIC: Calm.  No agitation.    LABS:                        8.7    x     )-----------( x        ( 13 Oct 2024 05:33 )             26.7     10-12    143  |  108  |  9   ----------------------------<  103[H]  3.9   |  24  |  0.66    Ca    8.1[L]      12 Oct 2024 05:43    TPro  5.7[L]  /  Alb  2.4[L]  /  TBili  0.3  /  DBili  x   /  AST  25  /  ALT  35  /  AlkPhos  61  10-12          Urinalysis Basic - ( 12 Oct 2024 05:43 )    Color: x / Appearance: x / SG: x / pH: x  Gluc: 103 mg/dL / Ketone: x  / Bili: x / Urobili: x   Blood: x / Protein: x / Nitrite: x   Leuk Esterase: x / RBC: x / WBC x   Sq Epi: x / Non Sq Epi: x / Bacteria: x        COVID-19 PCR: NotDetec (13 Oct 2024 05:20)      RADIOLOGY & ADDITIONAL TESTS:  Imaging from Last 24 Hours:    Electrocardiogram/QTc Interval:    COORDINATION OF CARE:  Care Discussed with Consultants/Other Providers:  
 Dr. Bruno Hospitalist Progress Note  [place] [id number]    Patient is a 69y old  Female who presents with a chief complaint of Osteomyelitis, pain (11 Oct 2024 12:37)    HPI:  68 year old female with PMH of spinal stenosis, osteoporosis, HLD, bronchiectasis, IgA/IgG deficiency who presented to [facility] on 10/2/24 with worsening of chronic low back pain. Patient had subjective fevers and chills before presenting to the hospital. During her stay, patient was found to have a +UA, with a culture growing Group B strep and positive blood Cx also growing group B strep. Patient was started on CTX as per IDs recommendations. An MRI showed osteomyelitis in the lumbar spine. ID recommended 6 weeks of IV antibiotics. Picc line was placed for patient to continue home IV Abx for a total of 6 weeks. During her stay, patient's pain control regimen was optimized, she received multiple pain medications including dilaudid, oxyContin, gabapentin, and duloxetine. S/p IVF boluses for hypotension. Mag citrate for constipation. She was evaluated for admission to acute inpatient rehab and admitted to [facility] 10/11/24.  (11 Oct 2024 12:37)      ROS:  ALLERGIES  Wheat (Diarrhea)  No Known Drug Allergies      MEDICATIONS  (STANDING):  acetaminophen     Tablet .. 650 milliGRAM(s) Oral every 8 hours  ascorbic acid 500 milliGRAM(s) Oral <User Schedule>  atorvastatin 10 milliGRAM(s) Oral at bedtime  cefTRIAXone   IVPB 2000 milliGRAM(s) IV Intermittent every 24 hours  DULoxetine 20 milliGRAM(s) Oral <User Schedule>  enoxaparin Injectable 30 milliGRAM(s) SubCutaneous every 24 hours  gabapentin 100 milliGRAM(s) Oral three times a day  lactobacillus acidophilus 1 Tablet(s) Oral two times a day with meals  morphine ER Tablet 15 milliGRAM(s) Oral every 12 hours  multivitamin 1 Tablet(s) Oral <User Schedule>  senna 2 Tablet(s) Oral at bedtime  sodium chloride 3%  Inhalation 4 milliLiter(s) Inhalation every 12 hours    MEDICATIONS  (PRN):  bisacodyl 5 milliGRAM(s) Oral at bedtime PRN Constipation  diazepam    Tablet 5 milliGRAM(s) Oral every 8 hours PRN breakthrough pain/muscle spasms  famotidine    Tablet 20 milliGRAM(s) Oral daily PRN GERD  HYDROmorphone   Tablet 2 milliGRAM(s) Oral every 6 hours PRN Severe Pain (7 - 10)  melatonin 3 milliGRAM(s) Oral at bedtime PRN Insomnia  polyethylene glycol 3350 17 Gram(s) Oral daily PRN Constipation        PAST MEDICAL & SURGICAL HISTORY  Sprain rotator cuff    Depression    ADHD (attention deficit hyperactivity disorder)    Anxiety    Mass on back    Bronchiectasis    Spinal stenosis    Lower extremity tendon tear    S/P trigger finger release        FUNCTIONAL HISTORY  Patient lives in a house with her spouse w/ 3-4 steps to enter. Bed and bath on first floor. 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 Per patient, her  is unable to provide support for her at this time.        T(C): 36.9 (10-12-24 @ 04:48), Max: 36.9 (10-11-24 @ 14:29)  HR: 68 (10-12-24 @ 04:48) (61 - 82)  BP: 99/56 (10-12-24 @ 04:48) (80/53 - 100/62)  RR: 16 (10-12-24 @ 04:48) (16 - 16)  SpO2: 98% (10-12-24 @ 04:48) (97% - 99%)    10-11-24 @ 07:01  -  10-12-24 @ 07:00  --------------------------------------------------------  IN: 0 mL / OUT: 1 mL / NET: -1 mL    CAPILLARY BLOOD GLUCOSE          Physical Exam:      Labs                        7.5    3.34  )-----------( 248      ( 12 Oct 2024 05:43 )             22.1     10-11    141  |  108  |  13  ----------------------------<  95  4.1   |  28  |  0.61    Ca    8.6      11 Oct 2024 06:14     Urinalysis Basic - ( 11 Oct 2024 06:14 )    Color: x / Appearance: x / SG: x / pH: x  Gluc: 95 mg/dL / Ketone: x  / Bili: x / Urobili: x   Blood: x / Protein: x / Nitrite: x   Leuk Esterase: x / RBC: x / WBC x   Sq Epi: x / Non Sq Epi: x / Bacteria: x      CT Lumbar spine 10/2/24- No evidence of an acute lumbar spine fracture. Multilevel degenerative   changes greatest at L4-5 with severe spinal canal narrowing is seen.  Findings are similar to prior MRI of November 2022.    MRI L spine 10/4/24-Nonspecific endplate edema/enhancement and high T2 disc signal at L4-5 as   described above which may represent discitis/ osteomyelitis with phlegmon   in the correct clinical setting. No loculated fluid collection in the   paraspinal or epidural spaces to suggest abscess collection. Correlate   clinically.    Multilevel degenerative changes resulting in multilevel spinal canal   stenosis and neural foraminal narrowing as described above. Severe spinal   canal stenosis with compression of the nerve roots and severe right,   moderate left neural foraminal narrowing at L4-5.    US kidney and bladder 10/4/24- 1.  Negative renal ultrasound.  2.  184 cc post void residual volume (PVR).    Echo 10/3/24   1. Left ventricular ejection fraction, by visual estimation, is 60 to   65%.   2. Normal global left ventricular systolic function.   3. Spectral Doppler shows impaired relaxation pattern of left   ventricular myocardial filling (Grade I diastolic dysfunction).   4. Normal right ventricular size and function.   5. The left atrium is normal in size.   6. The right atrium is normal in size.   7. There is no evidence of pericardial effusion.   8. Mild mitral valve regurgitation.   9. Mild-moderate tricuspid regurgitation.  10. The main pulmonary artery is normal in size.  11. LA volume Index is 19.5 ml/m² ml/m2.            
68 year old female with PMH of spinal stenosis, osteoporosis, HLD, bronchiectasis, IgA/IgG deficiency who presented to [facility] on 10/2/24 with worsening of chronic low back pain. Patient had subjective fevers and chills before presenting to the hospital. During her stay, patient was found to have a +UA, with a culture growing Group B strep and positive blood Cx also growing group B strep. Patient was started on CTX as per IDs recommendations. An MRI showed osteomyelitis in the lumbar spine. ID recommended 6 weeks of IV antibiotics. Picc line was placed for patient to continue home IV Abx for a total of 6 weeks. During her stay, patient's pain control regimen was optimized, she received multiple pain medications including dilaudid, oxyContin, gabapentin, and duloxetine. S/p IVF boluses for hypotension. Mag citrate for constipation. She was evaluated for admission to acute inpatient rehab and admitted to [facility] 10/11/24.     + LBP, controlled  + bm       Physical Exam: VItals: T(C): 36.3 (10-11-24 @ 05:02), Max: 36.7 (10-10-24 @ 20:59)  HR: 76 (10-11-24 @ 10:15) (69 - 89)  BP: 100/62 (10-11-24 @ 10:15) (84/57 - 100/66)  RR: 16 (10-11-24 @ 10:15) (16 - 17)  SpO2: 99% (10-11-24 @ 10:00) (94% - 99%)    General: NAD in chair                                HEENT: NC/AT, EOM I, Normal Conjunctivae  Cardio: RRR, Normal S1-S2                           Pulm: No Respiratory Distress,  Lungs CTAB                        Abdomen: ND/NT, Soft, BS+                                                MSK: No joint swelling, Full ROM                                         Ext: No C/C/E, No calf tenderness    Skin:  all skin intact                                                                 Wounds: none  Decubitus Ulcers: None Present   otor    LEFT    UE: SF [5/5], EF [5/5], EE [5/5], WE [5/5],  [wnl]  RIGHT UE: SF [5/5], EF [5/5], EE [5/5], WE [5/5],  [wnl]  LEFT    LE:  HF [5/5], KE [5/5], DF [5/5], EHL [5/5],  PF [5/5]  RIGHT LE:  HF [5/5], KE [5/5], DF [5/5], EHL [5/5],  PF [5/5]          RECENT LABS/IMAGING  CBC Full  -  ( 12 Oct 2024 05:43 )  WBC Count : 3.34 K/uL  RBC Count : 2.59 M/uL  Hemoglobin : 7.5 g/dL  Hematocrit : 22.1 %  Platelet Count - Automated : 248 K/uL  Mean Cell Volume : 85.3 fl  Mean Cell Hemoglobin : 29.0 pg  Mean Cell Hemoglobin Concentration : 33.9 gm/dL  Auto Neutrophil # : 2.01 K/uL  Auto Lymphocyte # : 1.03 K/uL  Auto Monocyte # : 0.29 K/uL  Auto Eosinophil # : 0.00 K/uL  Auto Basophil # : 0.00 K/uL  Auto Neutrophil % : 60.2 %  Auto Lymphocyte % : 30.8 %  Auto Monocyte % : 8.7 %  Auto Eosinophil % : 0.0 %  Auto Basophil % : 0.0 %    10-12    143  |  108  |  9   ----------------------------<  103[H]  3.9   |  24  |  0.66    Ca    8.1[L]      12 Oct 2024 05:43    TPro  5.7[L]  /  Alb  2.4[L]  /  TBili  0.3  /  DBili  x   /  AST  25  /  ALT  35  /  AlkPhos  61  10-12    Urinalysis Basic - ( 12 Oct 2024 05:43 )    Color: x / Appearance: x / SG: x / pH: x  Gluc: 103 mg/dL / Ketone: x  / Bili: x / Urobili: x   Blood: x / Protein: x / Nitrite: x   Leuk Esterase: x / RBC: x / WBC x   Sq Epi: x / Non Sq Epi: x / Bacteria: x        VITALS  T(C): 37.4 (10-12-24 @ 08:53), Max: 37.4 (10-12-24 @ 08:53)  HR: 73 (10-12-24 @ 08:53) (61 - 82)  BP: 107/70 (10-12-24 @ 08:53) (80/53 - 107/70)  RR: 16 (10-12-24 @ 08:53) (16 - 16)  SpO2: 97% (10-12-24 @ 08:53) (97% - 98%)  Wt(kg): --    MEDICATIONS   acetaminophen     Tablet .. 650 milliGRAM(s) every 8 hours  ascorbic acid 500 milliGRAM(s) <User Schedule>  atorvastatin 10 milliGRAM(s) at bedtime  bisacodyl 5 milliGRAM(s) at bedtime PRN  cefTRIAXone   IVPB 2000 milliGRAM(s) every 24 hours  diazepam    Tablet 5 milliGRAM(s) every 8 hours PRN  DULoxetine 30 milliGRAM(s) daily  famotidine    Tablet 20 milliGRAM(s) daily PRN  HYDROmorphone   Tablet 2 milliGRAM(s) every 6 hours PRN  lactobacillus acidophilus 1 Tablet(s) two times a day with meals  melatonin 3 milliGRAM(s) at bedtime PRN  morphine ER Tablet 15 milliGRAM(s) every 12 hours  multivitamin 1 Tablet(s) <User Schedule>  pantoprazole    Tablet 40 milliGRAM(s) before breakfast  polyethylene glycol 3350 17 Gram(s) daily PRN  senna 2 Tablet(s) at bedtime  sodium chloride 3%  Inhalation 4 milliLiter(s) every 12 hours      PHYSICAL EXAM  Constitutional - NAD, Comfortable  HEENT - NCAT, EOMI  Neck - Supple, No limited ROM  Chest - CTA bilaterally, No wheeze, No rhonchi, No crackles  Cardiovascular - RRR, S1S2, No murmurs  Abdomen - BS+, Soft, NTND  Extremities - No C/C/E, No calf tenderness   Neurologic Exam -                    Cognitive - Awake, Alert, AAO to self, place, date, year, situation     Communication - Fluent, No dysarthria, no aphasia     Cranial Nerves - CN 2-12 intact     Motor - No focal deficits                       Sensory - Intact to LT     Reflexes - DTR Intact, No primitive reflexive     Balance - WNL Static  Psychiatric - Mood stable, Affect WNL    --------------------------------------------------------------------  
HPI:  68 year old female with PMH of spinal stenosis, osteoporosis, HLD, bronchiectasis, IgA/IgG deficiency who presented to [facility] on 10/2/24 with worsening of chronic low back pain. Patient had subjective fevers and chills before presenting to the hospital. During her stay, patient was found to have a +UA, with a culture growing Group B strep and positive blood Cx also growing group B strep. Patient was started on CTX as per IDs recommendations. An MRI showed osteomyelitis in the lumbar spine. ID recommended 6 weeks of IV antibiotics. Picc line was placed for patient to continue home IV Abx for a total of 6 weeks. During her stay, patient's pain control regimen was optimized, she received multiple pain medications including dilaudid, oxyContin, gabapentin, and duloxetine. S/p IVF boluses for hypotension. Mag citrate for constipation. She was evaluated for admission to acute inpatient rehab and admitted to [facility] 10/11/24.       + LBP, controlled  + bm   Picc line lumen clogged -awaiting exchange  Dc planning home 10/15. Family training/picc.  HA today        MEDICATIONS  (STANDING):  acetaminophen     Tablet .. 650 milliGRAM(s) Oral every 8 hours  ascorbic acid 500 milliGRAM(s) Oral <User Schedule>  atorvastatin 10 milliGRAM(s) Oral at bedtime  cefTRIAXone   IVPB 2000 milliGRAM(s) IV Intermittent every 24 hours  DULoxetine 30 milliGRAM(s) Oral daily  lactobacillus acidophilus 1 Tablet(s) Oral two times a day with meals  morphine ER Tablet 15 milliGRAM(s) Oral every 12 hours  multivitamin 1 Tablet(s) Oral <User Schedule>  pantoprazole    Tablet 40 milliGRAM(s) Oral before breakfast  senna 2 Tablet(s) Oral at bedtime  sodium chloride 3%  Inhalation 4 milliLiter(s) Inhalation every 12 hours    MEDICATIONS  (PRN):  bisacodyl 5 milliGRAM(s) Oral at bedtime PRN Constipation  diazepam    Tablet 5 milliGRAM(s) Oral every 8 hours PRN breakthrough pain/muscle spasms  famotidine    Tablet 20 milliGRAM(s) Oral daily PRN GERD  HYDROmorphone   Tablet 2 milliGRAM(s) Oral every 6 hours PRN Severe Pain (7 - 10)  hyoscyamine SL 0.125 milliGRAM(s) SubLingual daily PRN Infant Colic Symptoms  melatonin 3 milliGRAM(s) Oral at bedtime PRN Insomnia  midodrine. 5 milliGRAM(s) Oral three times a day PRN SBP<90  polyethylene glycol 3350 17 Gram(s) Oral daily PRN Constipation                            7.9    3.61  )-----------( 248      ( 14 Oct 2024 05:30 )             23.7     10-14    141  |  108  |  16  ----------------------------<  82  4.0   |  23  |  0.72    Ca    8.5      14 Oct 2024 05:30    TPro  5.7[L]  /  Alb  2.5[L]  /  TBili  0.5  /  DBili  x   /  AST  32  /  ALT  38  /  AlkPhos  63  10-14    Vital Signs Last 24 Hrs  T(C): 36.8 (14 Oct 2024 11:29), Max: 37 (13 Oct 2024 19:59)  T(F): 98.2 (14 Oct 2024 11:29), Max: 98.6 (13 Oct 2024 19:59)  HR: 72 (14 Oct 2024 11:29) (69 - 79)  BP: 87/54 (14 Oct 2024 11:29) (87/54 - 97/63)  BP(mean): --  RR: 16 (14 Oct 2024 11:29) (15 - 16)  SpO2: 95% (14 Oct 2024 11:29) (95% - 98%)    Parameters below as of 14 Oct 2024 11:29  Patient On (Oxygen Delivery Method): room air        General: NAD in chair                                HEENT: NC/AT, EOM I, Normal Conjunctivae  Cardio: RRR, Normal S1-S2                           Pulm: No Respiratory Distress,  Lungs CTAB                        Abdomen: ND/NT, Soft, BS+                                                MSK: No joint swelling, Full ROM                                         Ext: No C/C/E, No calf tenderness    Skin:  all skin intact                                                                 Wounds: none  Decubitus Ulcers: None Present   LEFT    UE: SF [5/5], EF [5/5], EE [5/5], WE [5/5],  [wnl]  RIGHT UE: SF [5/5], EF [5/5], EE [5/5], WE [5/5],  [wnl]  LEFT    LE:  HF [5/5], KE [5/5], DF [5/5], EHL [5/5],  PF [5/5]  RIGHT LE:  HF [5/5], KE [5/5], DF [5/5], EHL [5/5],  PF [5/5]    IDT today, plan dc home 10/15 VNS    Continue comprehensive acute rehab program consisting of 3hrs/day of OT/PT
PROGRESS NOTE:     Patient is a 69y old  Female who presents with a chief complaint of Osteomyelitis, pain (15 Oct 2024 11:02)      SUBJECTIVE / OVERNIGHT EVENTS: pt was seen and evaluated today  no complains offered  she was resting comfortably in bed     ADDITIONAL REVIEW OF SYSTEMS:  as per HPI    MEDICATIONS  (STANDING):  acetaminophen     Tablet .. 650 milliGRAM(s) Oral every 8 hours  ascorbic acid 500 milliGRAM(s) Oral <User Schedule>  atorvastatin 10 milliGRAM(s) Oral at bedtime  cefTRIAXone   IVPB 2000 milliGRAM(s) IV Intermittent every 24 hours  DULoxetine 30 milliGRAM(s) Oral daily  gabapentin 300 milliGRAM(s) Oral at bedtime  lactobacillus acidophilus 1 Tablet(s) Oral two times a day with meals  morphine ER Tablet 15 milliGRAM(s) Oral every 12 hours  multivitamin 1 Tablet(s) Oral <User Schedule>  pantoprazole    Tablet 40 milliGRAM(s) Oral before breakfast  senna 2 Tablet(s) Oral at bedtime  sodium chloride 3%  Inhalation 4 milliLiter(s) Inhalation every 12 hours    MEDICATIONS  (PRN):  bisacodyl 5 milliGRAM(s) Oral at bedtime PRN Constipation  diazepam    Tablet 5 milliGRAM(s) Oral every 8 hours PRN breakthrough pain/muscle spasms  famotidine    Tablet 20 milliGRAM(s) Oral daily PRN GERD  HYDROmorphone   Tablet 2 milliGRAM(s) Oral every 6 hours PRN Severe Pain (7 - 10)  hyoscyamine SL 0.125 milliGRAM(s) SubLingual daily PRN Infant Colic Symptoms  ibuprofen  Tablet. 400 milliGRAM(s) Oral two times a day PRN Severe Pain (7 - 10)  melatonin 3 milliGRAM(s) Oral at bedtime PRN Insomnia  midodrine. 5 milliGRAM(s) Oral three times a day PRN SBP<90  polyethylene glycol 3350 17 Gram(s) Oral daily PRN Constipation      CAPILLARY BLOOD GLUCOSE        I&O's Summary      PHYSICAL EXAM:  Vital Signs Last 24 Hrs  T(C): 36.3 (15 Oct 2024 08:12), Max: 36.8 (14 Oct 2024 21:19)  T(F): 97.4 (15 Oct 2024 08:12), Max: 98.3 (14 Oct 2024 21:19)  HR: 73 (15 Oct 2024 08:12) (71 - 74)  BP: 95/62 (15 Oct 2024 08:12) (74/45 - 96/65)  BP(mean): --  RR: 17 (15 Oct 2024 08:12) (16 - 17)  SpO2: 97% (15 Oct 2024 08:12) (94% - 97%)    Parameters below as of 15 Oct 2024 08:12  Patient On (Oxygen Delivery Method): room air      GENERAL: Not in distress. Alert    HEENT: AT/NC. clear conjuctiva, MM dry.   no pallor or icterus  CARDIOVASCULAR: RRR S1, S2. No murmur/rubs/gallop  LUNGS: BLAE+, no rales, no wheezing, no rhonchi.    ABDOMEN: ND. Soft,  NT, no guarding / rebound / rigidity. BS normoactive. No CVA tenderness.    BACK: No spine tenderness.  EXTREMITIES: no edema. no leg or calf TP.  SKIN: no rash  NEUROLOGIC: AAO*3 moves limbs, follows commands.   PSYCHIATRIC: Calm.  No agitation.    LABS:                        7.9    3.61  )-----------( 248      ( 14 Oct 2024 05:30 )             23.7     10-14    141  |  108  |  16  ----------------------------<  82  4.0   |  23  |  0.72    Ca    8.5      14 Oct 2024 05:30    TPro  5.7[L]  /  Alb  2.5[L]  /  TBili  0.5  /  DBili  x   /  AST  32  /  ALT  38  /  AlkPhos  63  10-14          Urinalysis Basic - ( 14 Oct 2024 05:30 )    Color: x / Appearance: x / SG: x / pH: x  Gluc: 82 mg/dL / Ketone: x  / Bili: x / Urobili: x   Blood: x / Protein: x / Nitrite: x   Leuk Esterase: x / RBC: x / WBC x   Sq Epi: x / Non Sq Epi: x / Bacteria: x          RADIOLOGY & ADDITIONAL TESTS:  Results Reviewed: yes  Imaging Personally Reviewed: yes  Electrocardiogram Personally Reviewed: yes    COORDINATION OF CARE:  Care Discussed with Consultants/Other Providers [Y/N]: yes  Prior or Outpatient Records Reviewed [Y/N]: yes

## 2024-10-25 ENCOUNTER — NON-APPOINTMENT (OUTPATIENT)
Age: 69
End: 2024-10-25

## 2024-10-25 ENCOUNTER — APPOINTMENT (OUTPATIENT)
Dept: PHYSICAL MEDICINE AND REHAB | Facility: CLINIC | Age: 69
End: 2024-10-25

## 2024-10-25 PROCEDURE — 99215 OFFICE O/P EST HI 40 MIN: CPT

## 2024-10-30 ENCOUNTER — APPOINTMENT (OUTPATIENT)
Dept: PSYCHIATRY | Facility: CLINIC | Age: 69
End: 2024-10-30

## 2024-10-30 ENCOUNTER — INPATIENT (INPATIENT)
Facility: HOSPITAL | Age: 69
LOS: 1 days | Discharge: HOME | DRG: 387 | End: 2024-11-01
Attending: STUDENT IN AN ORGANIZED HEALTH CARE EDUCATION/TRAINING PROGRAM | Admitting: FAMILY MEDICINE
Payer: COMMERCIAL

## 2024-10-30 VITALS
DIASTOLIC BLOOD PRESSURE: 65 MMHG | HEART RATE: 85 BPM | WEIGHT: 95.68 LBS | HEIGHT: 63.5 IN | TEMPERATURE: 98 F | SYSTOLIC BLOOD PRESSURE: 100 MMHG | RESPIRATION RATE: 16 BRPM

## 2024-10-30 DIAGNOSIS — K51.00 ULCERATIVE (CHRONIC) PANCOLITIS WITHOUT COMPLICATIONS: ICD-10-CM

## 2024-10-30 DIAGNOSIS — Z98.89 OTHER SPECIFIED POSTPROCEDURAL STATES: Chronic | ICD-10-CM

## 2024-10-30 LAB
ALBUMIN SERPL ELPH-MCNC: 3.2 G/DL — LOW (ref 3.3–5)
ALP SERPL-CCNC: 64 U/L — SIGNIFICANT CHANGE UP (ref 40–120)
ALT FLD-CCNC: 19 U/L — SIGNIFICANT CHANGE UP (ref 10–45)
ANION GAP SERPL CALC-SCNC: 8 MMOL/L — SIGNIFICANT CHANGE UP (ref 5–17)
ANISOCYTOSIS BLD QL: SLIGHT — SIGNIFICANT CHANGE UP
APPEARANCE UR: ABNORMAL
AST SERPL-CCNC: 13 U/L — SIGNIFICANT CHANGE UP (ref 10–40)
BACTERIA # UR AUTO: ABNORMAL /HPF
BASOPHILS # BLD AUTO: 0 K/UL — SIGNIFICANT CHANGE UP (ref 0–0.2)
BASOPHILS NFR BLD AUTO: 0 % — SIGNIFICANT CHANGE UP (ref 0–2)
BILIRUB SERPL-MCNC: 0.6 MG/DL — SIGNIFICANT CHANGE UP (ref 0.2–1.2)
BILIRUB UR-MCNC: ABNORMAL
BUN SERPL-MCNC: 7 MG/DL — SIGNIFICANT CHANGE UP (ref 7–23)
CALCIUM SERPL-MCNC: 9.4 MG/DL — SIGNIFICANT CHANGE UP (ref 8.4–10.5)
CHLORIDE SERPL-SCNC: 103 MMOL/L — SIGNIFICANT CHANGE UP (ref 96–108)
CO2 SERPL-SCNC: 30 MMOL/L — SIGNIFICANT CHANGE UP (ref 22–31)
COLOR SPEC: SIGNIFICANT CHANGE UP
COMMENT - URINE: SIGNIFICANT CHANGE UP
CREAT SERPL-MCNC: 0.56 MG/DL — SIGNIFICANT CHANGE UP (ref 0.5–1.3)
DIFF PNL FLD: ABNORMAL
EGFR: 99 ML/MIN/1.73M2 — SIGNIFICANT CHANGE UP
EOSINOPHIL # BLD AUTO: 0 K/UL — SIGNIFICANT CHANGE UP (ref 0–0.5)
EOSINOPHIL NFR BLD AUTO: 0 % — SIGNIFICANT CHANGE UP (ref 0–6)
EPI CELLS # UR: PRESENT
GI PCR PANEL: SIGNIFICANT CHANGE UP
GLUCOSE SERPL-MCNC: 90 MG/DL — SIGNIFICANT CHANGE UP (ref 70–99)
GLUCOSE UR QL: NEGATIVE MG/DL — SIGNIFICANT CHANGE UP
HCT VFR BLD CALC: 28.3 % — LOW (ref 34.5–45)
HGB BLD-MCNC: 9.5 G/DL — LOW (ref 11.5–15.5)
HYPOCHROMIA BLD QL: SIGNIFICANT CHANGE UP
KETONES UR-MCNC: NEGATIVE MG/DL — SIGNIFICANT CHANGE UP
LACTATE SERPL-SCNC: 1.3 MMOL/L — SIGNIFICANT CHANGE UP (ref 0.7–2)
LEUKOCYTE ESTERASE UR-ACNC: ABNORMAL
LIDOCAIN IGE QN: 350 U/L — HIGH (ref 16–77)
LYMPHOCYTES # BLD AUTO: 0.76 K/UL — LOW (ref 1–3.3)
LYMPHOCYTES # BLD AUTO: 29 % — SIGNIFICANT CHANGE UP (ref 13–44)
MANUAL SMEAR VERIFICATION: SIGNIFICANT CHANGE UP
MCHC RBC-ENTMCNC: 28.1 PG — SIGNIFICANT CHANGE UP (ref 27–34)
MCHC RBC-ENTMCNC: 33.6 G/DL — SIGNIFICANT CHANGE UP (ref 32–36)
MCV RBC AUTO: 83.7 FL — SIGNIFICANT CHANGE UP (ref 80–100)
MONOCYTES # BLD AUTO: 0.5 K/UL — SIGNIFICANT CHANGE UP (ref 0–0.9)
MONOCYTES NFR BLD AUTO: 19 % — HIGH (ref 2–14)
NEUTROPHILS # BLD AUTO: 1.36 K/UL — LOW (ref 1.8–7.4)
NEUTROPHILS NFR BLD AUTO: 32 % — LOW (ref 43–77)
NEUTS BAND # BLD: 20 % — HIGH (ref 0–8)
NITRITE UR-MCNC: NEGATIVE — SIGNIFICANT CHANGE UP
NRBC # BLD: 0 /100 WBCS — SIGNIFICANT CHANGE UP (ref 0–0)
PH UR: 6 — SIGNIFICANT CHANGE UP (ref 5–8)
PLAT MORPH BLD: NORMAL — SIGNIFICANT CHANGE UP
PLATELET # BLD AUTO: 258 K/UL — SIGNIFICANT CHANGE UP (ref 150–400)
POIKILOCYTOSIS BLD QL AUTO: SLIGHT — SIGNIFICANT CHANGE UP
POTASSIUM SERPL-MCNC: 4.1 MMOL/L — SIGNIFICANT CHANGE UP (ref 3.5–5.3)
POTASSIUM SERPL-SCNC: 4.1 MMOL/L — SIGNIFICANT CHANGE UP (ref 3.5–5.3)
PROT SERPL-MCNC: 6.7 G/DL — SIGNIFICANT CHANGE UP (ref 6–8.3)
PROT UR-MCNC: 30 MG/DL
RBC # BLD: 3.38 M/UL — LOW (ref 3.8–5.2)
RBC # FLD: 13.8 % — SIGNIFICANT CHANGE UP (ref 10.3–14.5)
RBC BLD AUTO: ABNORMAL
RBC CASTS # UR COMP ASSIST: 4 /HPF — SIGNIFICANT CHANGE UP (ref 0–4)
SODIUM SERPL-SCNC: 141 MMOL/L — SIGNIFICANT CHANGE UP (ref 135–145)
SP GR SPEC: 1.02 — SIGNIFICANT CHANGE UP (ref 1–1.03)
UROBILINOGEN FLD QL: 0.2 MG/DL — SIGNIFICANT CHANGE UP (ref 0.2–1)
WBC # BLD: 2.61 K/UL — LOW (ref 3.8–10.5)
WBC # FLD AUTO: 2.61 K/UL — LOW (ref 3.8–10.5)
WBC UR QL: 21 /HPF — HIGH (ref 0–5)

## 2024-10-30 PROCEDURE — 99222 1ST HOSP IP/OBS MODERATE 55: CPT

## 2024-10-30 PROCEDURE — 93010 ELECTROCARDIOGRAM REPORT: CPT

## 2024-10-30 PROCEDURE — 74177 CT ABD & PELVIS W/CONTRAST: CPT | Mod: 26,MC

## 2024-10-30 PROCEDURE — 99284 EMERGENCY DEPT VISIT MOD MDM: CPT

## 2024-10-30 PROCEDURE — 99285 EMERGENCY DEPT VISIT HI MDM: CPT

## 2024-10-30 PROCEDURE — 99223 1ST HOSP IP/OBS HIGH 75: CPT

## 2024-10-30 RX ORDER — GABAPENTIN 300 MG/1
300 CAPSULE ORAL AT BEDTIME
Refills: 0 | Status: DISCONTINUED | OUTPATIENT
Start: 2024-10-30 | End: 2024-11-01

## 2024-10-30 RX ORDER — LEVOMILNACIPRAN HYDROCHLORIDE 120 MG/1
20 CAPSULE, EXTENDED RELEASE ORAL DAILY
Refills: 0 | Status: DISCONTINUED | OUTPATIENT
Start: 2024-10-30 | End: 2024-11-01

## 2024-10-30 RX ORDER — LACTOBACILLUS ACIDOPHILUS 25MM CELL
1 CAPSULE ORAL
Refills: 0 | Status: DISCONTINUED | OUTPATIENT
Start: 2024-10-30 | End: 2024-11-01

## 2024-10-30 RX ORDER — ALBUTEROL 90 MCG
2 AEROSOL (GRAM) INHALATION EVERY 6 HOURS
Refills: 0 | Status: DISCONTINUED | OUTPATIENT
Start: 2024-10-30 | End: 2024-10-31

## 2024-10-30 RX ORDER — FAMOTIDINE 10 MG/ML
20 INJECTION INTRAVENOUS ONCE
Refills: 0 | Status: COMPLETED | OUTPATIENT
Start: 2024-10-30 | End: 2024-10-30

## 2024-10-30 RX ORDER — ACETAMINOPHEN 500 MG
650 TABLET ORAL EVERY 6 HOURS
Refills: 0 | Status: DISCONTINUED | OUTPATIENT
Start: 2024-10-30 | End: 2024-11-01

## 2024-10-30 RX ORDER — METOCLOPRAMIDE HCL 10 MG
10 TABLET ORAL ONCE
Refills: 0 | Status: COMPLETED | OUTPATIENT
Start: 2024-10-30 | End: 2024-10-30

## 2024-10-30 RX ORDER — SODIUM CHLORIDE 9 MG/ML
1000 INJECTION, SOLUTION INTRAMUSCULAR; INTRAVENOUS; SUBCUTANEOUS ONCE
Refills: 0 | Status: DISCONTINUED | OUTPATIENT
Start: 2024-10-30 | End: 2024-11-01

## 2024-10-30 RX ORDER — ONDANSETRON HYDROCHLORIDE 2 MG/ML
4 INJECTION, SOLUTION INTRAMUSCULAR; INTRAVENOUS ONCE
Refills: 0 | Status: COMPLETED | OUTPATIENT
Start: 2024-10-30 | End: 2024-10-30

## 2024-10-30 RX ORDER — ONDANSETRON HYDROCHLORIDE 2 MG/ML
4 INJECTION, SOLUTION INTRAMUSCULAR; INTRAVENOUS EVERY 8 HOURS
Refills: 0 | Status: DISCONTINUED | OUTPATIENT
Start: 2024-10-30 | End: 2024-11-01

## 2024-10-30 RX ORDER — VANCOMYCIN HYDROCHLORIDE 50 MG/ML
1000 KIT ORAL ONCE
Refills: 0 | Status: COMPLETED | OUTPATIENT
Start: 2024-10-30 | End: 2024-10-30

## 2024-10-30 RX ORDER — MELATONIN 5 MG
3 TABLET ORAL AT BEDTIME
Refills: 0 | Status: DISCONTINUED | OUTPATIENT
Start: 2024-10-30 | End: 2024-11-01

## 2024-10-30 RX ORDER — SODIUM CHLORIDE 9 MG/ML
1000 INJECTION, SOLUTION INTRAMUSCULAR; INTRAVENOUS; SUBCUTANEOUS ONCE
Refills: 0 | Status: COMPLETED | OUTPATIENT
Start: 2024-10-30 | End: 2024-10-30

## 2024-10-30 RX ORDER — VANCOMYCIN HYDROCHLORIDE 50 MG/ML
1000 KIT ORAL EVERY 24 HOURS
Refills: 0 | Status: DISCONTINUED | OUTPATIENT
Start: 2024-10-31 | End: 2024-11-01

## 2024-10-30 RX ORDER — PANTOPRAZOLE SODIUM 40 MG/1
40 TABLET, DELAYED RELEASE ORAL DAILY
Refills: 0 | Status: DISCONTINUED | OUTPATIENT
Start: 2024-10-30 | End: 2024-10-31

## 2024-10-30 RX ORDER — SODIUM CHLORIDE 9 MG/ML
1000 INJECTION, SOLUTION INTRAMUSCULAR; INTRAVENOUS; SUBCUTANEOUS
Refills: 0 | Status: DISCONTINUED | OUTPATIENT
Start: 2024-10-30 | End: 2024-11-01

## 2024-10-30 RX ORDER — ACETAMINOPHEN 500 MG
650 TABLET ORAL ONCE
Refills: 0 | Status: COMPLETED | OUTPATIENT
Start: 2024-10-30 | End: 2024-10-30

## 2024-10-30 RX ORDER — MAGNESIUM, ALUMINUM HYDROXIDE 200-200 MG
30 TABLET,CHEWABLE ORAL EVERY 4 HOURS
Refills: 0 | Status: DISCONTINUED | OUTPATIENT
Start: 2024-10-30 | End: 2024-11-01

## 2024-10-30 RX ORDER — MAGNESIUM, ALUMINUM HYDROXIDE 200-200 MG
30 TABLET,CHEWABLE ORAL ONCE
Refills: 0 | Status: COMPLETED | OUTPATIENT
Start: 2024-10-30 | End: 2024-10-30

## 2024-10-30 RX ORDER — MORPHINE SULFATE 30 MG/1
2 TABLET, EXTENDED RELEASE ORAL EVERY 4 HOURS
Refills: 0 | Status: DISCONTINUED | OUTPATIENT
Start: 2024-10-30 | End: 2024-11-01

## 2024-10-30 RX ORDER — VANCOMYCIN HYDROCHLORIDE 50 MG/ML
125 KIT ORAL EVERY 6 HOURS
Refills: 0 | Status: DISCONTINUED | OUTPATIENT
Start: 2024-10-30 | End: 2024-10-31

## 2024-10-30 RX ORDER — HYOSCYAMINE SULFATE 0.125 MG
0.12 TABLET,DISINTEGRATING ORAL ONCE
Refills: 0 | Status: DISCONTINUED | OUTPATIENT
Start: 2024-10-30 | End: 2024-10-30

## 2024-10-30 RX ADMIN — SODIUM CHLORIDE 1000 MILLILITER(S): 9 INJECTION, SOLUTION INTRAMUSCULAR; INTRAVENOUS; SUBCUTANEOUS at 13:51

## 2024-10-30 RX ADMIN — VANCOMYCIN HYDROCHLORIDE 125 MILLIGRAM(S): KIT at 18:36

## 2024-10-30 RX ADMIN — Medication 30 MILLILITER(S): at 11:23

## 2024-10-30 RX ADMIN — VANCOMYCIN HYDROCHLORIDE 125 MILLIGRAM(S): KIT at 13:31

## 2024-10-30 RX ADMIN — Medication 650 MILLIGRAM(S): at 15:16

## 2024-10-30 RX ADMIN — Medication 1 TABLET(S): at 18:36

## 2024-10-30 RX ADMIN — ONDANSETRON HYDROCHLORIDE 4 MILLIGRAM(S): 2 INJECTION, SOLUTION INTRAMUSCULAR; INTRAVENOUS at 11:26

## 2024-10-30 RX ADMIN — ONDANSETRON HYDROCHLORIDE 4 MILLIGRAM(S): 2 INJECTION, SOLUTION INTRAMUSCULAR; INTRAVENOUS at 19:55

## 2024-10-30 RX ADMIN — Medication 10 MILLIGRAM(S): at 11:53

## 2024-10-30 RX ADMIN — SODIUM CHLORIDE 100 MILLILITER(S): 9 INJECTION, SOLUTION INTRAMUSCULAR; INTRAVENOUS; SUBCUTANEOUS at 23:27

## 2024-10-30 RX ADMIN — FAMOTIDINE 100 MILLIGRAM(S): 10 INJECTION INTRAVENOUS at 11:23

## 2024-10-30 RX ADMIN — SODIUM CHLORIDE 1000 MILLILITER(S): 9 INJECTION, SOLUTION INTRAMUSCULAR; INTRAVENOUS; SUBCUTANEOUS at 11:23

## 2024-10-30 RX ADMIN — SODIUM CHLORIDE 1000 MILLILITER(S): 9 INJECTION, SOLUTION INTRAMUSCULAR; INTRAVENOUS; SUBCUTANEOUS at 13:50

## 2024-10-30 RX ADMIN — VANCOMYCIN HYDROCHLORIDE 250 MILLIGRAM(S): KIT at 11:24

## 2024-10-30 RX ADMIN — GABAPENTIN 300 MILLIGRAM(S): 300 CAPSULE ORAL at 23:27

## 2024-10-30 RX ADMIN — Medication 650 MILLIGRAM(S): at 16:18

## 2024-10-30 RX ADMIN — VANCOMYCIN HYDROCHLORIDE 1000 MILLIGRAM(S): KIT at 13:51

## 2024-10-30 NOTE — ED PROVIDER NOTE - PHYSICAL EXAMINATION
VITAL SIGNS: I have reviewed nursing notes and confirm.  CONSTITUTIONAL: well-appearing, non-toxic, NAD  SKIN: Warm dry, normal skin turgor  HEAD: NCAT  EYES: no scleral icterus  ENT:dry mucous membranes, normal pharynx   CARD: RRR, no murmurs, rubs or gallops  RESP: clear to ausculation b/l.  No rales, rhonchi, or wheezing.  ABD: soft, + BS, non-tender, non-distended, no rebound or guarding. No CVA tenderness  EXT: Full ROM  NEURO: normal motor. normal sensory.   PSYCH: Cooperative, appropriate.

## 2024-10-30 NOTE — ED ADULT TRIAGE NOTE - NS ED NURSE BANDS TYPE
CC:  Markos Rizo is here today for:   Chief Complaint   Patient presents with    Office Visit     FU L wrist  LOV: 12/14/23  Pt states that he has discomfort. He has numbness and tingling in both hands, L is worse. He takes Meloxicam daily.         Referring MD: Grant Garcia MD  PCP: Christiano Teixeira MD   Medications: medications verified and updated  Refills needed today?  NO  denies known Latex allergy or symptoms of Latex sensitivity.  Patient would like communication of their results via:    Cell Phone:   Telephone Information:   Mobile [phone]     Okay to leave a message containing results? Yes  Tobacco history: verified  Body mass index is 29.24 kg/m².               Name band;

## 2024-10-30 NOTE — H&P ADULT - HISTORY OF PRESENT ILLNESS
69-year-old female with past medical history of spinal stenosis, osteoporosis, hyperlipidemia, bronchiectasis, IgA IgG deficiency recent diagnosis and hospitalization +UA, with a culture growing Group B strep and positive blood Cx also growing group B strep, due to osteomyelitis and lumbar spine, 10/15/24, patient was started on IV antibiotics and discharged with PICC line with continuation of ceftriaxone 2 g daily until 11/14, today c/o nausea without vomiting episodes, reports acute diarrhea for  2 days ago but worsened yesterday with approximately 12 episodes of diarrhea in the past 24 hours but last stool being more formed,  denies any chest pain or shortness of breath.  Denies any other reported complaints,  unable to send C. difficile as stool is formed now, ova and parasites.        ed course-  leukopenia and  bandemia noted, GI consulted, ctap- + pancolitis   GI PCR sent c. diff unable to test due to no looses tool now   spoke with her infectious disease Dr. Zacarias, abtx changed to vancomycin po and IV

## 2024-10-30 NOTE — ED ADULT NURSE REASSESSMENT NOTE - NS ED NURSE REASSESS COMMENT FT1
pt reports feeling well taking clear liquids. VSS.  No diarrhea in the ED and no more episodes of stool since arrival in the ED with 'mushy stool' which was sent to lab.  liter of saline started per dr raygoza order

## 2024-10-30 NOTE — ED PROVIDER NOTE - CLINICAL SUMMARY MEDICAL DECISION MAKING FREE TEXT BOX
69-year-old female with past medical history of spinal stenosis, osteoporosis, hyperlipidemia, bronchiectasis, IgA IgG deficiency recent diagnosis and hospitalization for osteomyelitis and lumbar spine, patient was started on IV antibiotics and discharged with PICC line with continuation of ceftriaxone 2 g daily until 11/14, patient with reports of nausea without vomiting episodes, reports acute diarrhea, reports symptoms started 2 days ago but worsened yesterday with approximately 12 episodes of diarrhea in the past 24 hours but last stool being more formed, patient with mild diffuse abdominal discomfort, denies any chest pain or shortness of breath.  Denies any other reported complaints spoke with her infectious disease Dr. Zacarias, will check screening abdominal labs, GI PCR unable to send C. difficile as stool is formed, ova and parasites.  Will follow-up labs, infectious disease to evaluate patient in ED GI cocktail and reassess

## 2024-10-30 NOTE — H&P ADULT - NSHPLABSRESULTS_GEN_ALL_CORE
9.5    2.61  )-----------( 258      ( 30 Oct 2024 10:55 )             28.3       10-30    141  |  103  |  7   ----------------------------<  90  4.1   |  30  |  0.56    Ca    9.4      30 Oct 2024 10:55    TPro  6.7  /  Alb  3.2[L]  /  TBili  0.6  /  DBili  x   /  AST  13  /  ALT  19  /  AlkPhos  64  10-30        Lactate Trend  10-30 @ 12:40 Lactate:1.3       Labs reviewed:     CXR personally reviewed: napd    ECG reviewed and interpreted: sinus 70's    < from: CT Abdomen and Pelvis w/ IV Cont (10.30.24 @ 12:34) >    IMPRESSION:  Pancolitis, either infectious or inflammatory. Also consider portal   colopathy.    Hepatosplenomegaly. Splenic varices. Rule out portal hypertension.    < end of copied text >

## 2024-10-30 NOTE — H&P ADULT - NSHPPHYSICALEXAM_GEN_ALL_CORE
Vital Signs Last 24 Hrs  T(C): 37.2 (30 Oct 2024 13:27), Max: 37.2 (30 Oct 2024 13:27)  T(F): 99 (30 Oct 2024 13:27), Max: 99 (30 Oct 2024 13:27)  HR: 78 (30 Oct 2024 13:27) (78 - 85)  BP: 90/56 (30 Oct 2024 13:27) (90/56 - 100/65)  BP(mean): --  RR: 15 (30 Oct 2024 13:27) (15 - 16)  SpO2: 97% (30 Oct 2024 13:27) (97% - 97%)    Parameters below as of 30 Oct 2024 13:27  Patient On (Oxygen Delivery Method): room air    GENERAL- NAD, pale  EAR/NOSE/MOUTH/THROAT -  MMM  EYES- EVA, conjunctiva and Sclera clear  NECK- supple  RESPIRATORY-  clear to auscultation bilaterally  CARDIOVASCULAR - SIS2, RRR  GI - soft, diffuse tenderness to palpation,  BS present  EXTREMITIES- no pedal edema  NEUROLOGY- no gross focal deficits  PSYCHIATRY- AAO X 3

## 2024-10-30 NOTE — H&P ADULT - ASSESSMENT
69-year-old female with past medical history of spinal stenosis, osteoporosis, hyperlipidemia, bronchiectasis, IgA IgG deficiency recent diagnosis and hospitalization +UA, with a culture growing Group B strep and positive blood Cx also growing group B strep, due to osteomyelitis and lumbar spine, 10/15/24, patient was started on IV antibiotics and discharged with PICC line with continuation of ceftriaxone 2 g daily until 11/14, today c/o nausea without vomiting episodes, reports acute diarrhea for  2 days ago but worsened yesterday with approximately 12 episodes of diarrhea in the past 24 hours but last stool being more formed,  denies any chest pain or shortness of breath.  Denies any other reported complaints,  unable to send C. difficile as stool is formed now, ova and parasites.      #generalized abdominal pain , nausea, leukopenia, bandemia due to pancolitis, and high suspicion of c. diff  admit to medicine  clear diet  ivf  pain meds prn  ID consulted by ED- spoke with her infectious disease Dr. Zacarias, abtx changed to vancomycin po and IV  check screening abdominal labs, GI PCR, leukopenia and  bandemia noted,  GI consulted   am labs  BC sent  UA c/s  PPI    #s/p Osteomyelitis of lumbar spine   was on  ceftriaxone 2g daily until 11/14/24- changed to vanco per ID       #IgG and IgA deficiency  duonebs/saline nebs PRN      #Depression/anxiety   Fetzima     #HLD  -Continue atorvastatin       #DVT ppx  - PAS    # gi ppx- ppi

## 2024-10-31 ENCOUNTER — TRANSCRIPTION ENCOUNTER (OUTPATIENT)
Age: 69
End: 2024-10-31

## 2024-10-31 DIAGNOSIS — K51.00 ULCERATIVE (CHRONIC) PANCOLITIS WITHOUT COMPLICATIONS: ICD-10-CM

## 2024-10-31 LAB
ALBUMIN SERPL ELPH-MCNC: 2.8 G/DL — LOW (ref 3.3–5)
ALP SERPL-CCNC: 59 U/L — SIGNIFICANT CHANGE UP (ref 40–120)
ALT FLD-CCNC: 15 U/L — SIGNIFICANT CHANGE UP (ref 10–45)
ANION GAP SERPL CALC-SCNC: 6 MMOL/L — SIGNIFICANT CHANGE UP (ref 5–17)
ANISOCYTOSIS BLD QL: SLIGHT — SIGNIFICANT CHANGE UP
AST SERPL-CCNC: 11 U/L — SIGNIFICANT CHANGE UP (ref 10–40)
BASOPHILS # BLD AUTO: 0 K/UL — SIGNIFICANT CHANGE UP (ref 0–0.2)
BASOPHILS NFR BLD AUTO: 0 % — SIGNIFICANT CHANGE UP (ref 0–2)
BILIRUB SERPL-MCNC: 0.5 MG/DL — SIGNIFICANT CHANGE UP (ref 0.2–1.2)
BUN SERPL-MCNC: 2 MG/DL — LOW (ref 7–23)
C DIFF BY PCR RESULT: SIGNIFICANT CHANGE UP
CALCIUM SERPL-MCNC: 8 MG/DL — LOW (ref 8.4–10.5)
CHLORIDE SERPL-SCNC: 107 MMOL/L — SIGNIFICANT CHANGE UP (ref 96–108)
CO2 SERPL-SCNC: 27 MMOL/L — SIGNIFICANT CHANGE UP (ref 22–31)
CREAT SERPL-MCNC: 0.53 MG/DL — SIGNIFICANT CHANGE UP (ref 0.5–1.3)
EGFR: 101 ML/MIN/1.73M2 — SIGNIFICANT CHANGE UP
EOSINOPHIL # BLD AUTO: 0 K/UL — SIGNIFICANT CHANGE UP (ref 0–0.5)
EOSINOPHIL NFR BLD AUTO: 0 % — SIGNIFICANT CHANGE UP (ref 0–6)
GLUCOSE SERPL-MCNC: 85 MG/DL — SIGNIFICANT CHANGE UP (ref 70–99)
HCT VFR BLD CALC: 26.9 % — LOW (ref 34.5–45)
HGB BLD-MCNC: 8.8 G/DL — LOW (ref 11.5–15.5)
HUMAN ERYTHROCYTE ANTIGEN PANEL RESULT: SIGNIFICANT CHANGE UP
HYPOCHROMIA BLD QL: SLIGHT — SIGNIFICANT CHANGE UP
LYMPHOCYTES # BLD AUTO: 0.98 K/UL — LOW (ref 1–3.3)
LYMPHOCYTES # BLD AUTO: 35 % — SIGNIFICANT CHANGE UP (ref 13–44)
MANUAL SMEAR VERIFICATION: SIGNIFICANT CHANGE UP
MCHC RBC-ENTMCNC: 27.6 PG — SIGNIFICANT CHANGE UP (ref 27–34)
MCHC RBC-ENTMCNC: 32.7 G/DL — SIGNIFICANT CHANGE UP (ref 32–36)
MCV RBC AUTO: 84.3 FL — SIGNIFICANT CHANGE UP (ref 80–100)
MONOCYTES # BLD AUTO: 0.42 K/UL — SIGNIFICANT CHANGE UP (ref 0–0.9)
MONOCYTES NFR BLD AUTO: 15 % — HIGH (ref 2–14)
NEUTROPHILS # BLD AUTO: 1.41 K/UL — LOW (ref 1.8–7.4)
NEUTROPHILS NFR BLD AUTO: 34 % — LOW (ref 43–77)
NEUTS BAND # BLD: 16 % — HIGH (ref 0–8)
NRBC # BLD: 0 /100 WBCS — SIGNIFICANT CHANGE UP (ref 0–0)
PLAT MORPH BLD: NORMAL — SIGNIFICANT CHANGE UP
PLATELET # BLD AUTO: 273 K/UL — SIGNIFICANT CHANGE UP (ref 150–400)
POIKILOCYTOSIS BLD QL AUTO: SLIGHT — SIGNIFICANT CHANGE UP
POTASSIUM SERPL-MCNC: 3.2 MMOL/L — LOW (ref 3.5–5.3)
POTASSIUM SERPL-SCNC: 3.2 MMOL/L — LOW (ref 3.5–5.3)
PROT SERPL-MCNC: 5.9 G/DL — LOW (ref 6–8.3)
RBC # BLD: 3.19 M/UL — LOW (ref 3.8–5.2)
RBC # FLD: 13.8 % — SIGNIFICANT CHANGE UP (ref 10.3–14.5)
RBC BLD AUTO: ABNORMAL
SODIUM SERPL-SCNC: 140 MMOL/L — SIGNIFICANT CHANGE UP (ref 135–145)
WBC # BLD: 2.81 K/UL — LOW (ref 3.8–10.5)
WBC # FLD AUTO: 2.81 K/UL — LOW (ref 3.8–10.5)

## 2024-10-31 PROCEDURE — 99233 SBSQ HOSP IP/OBS HIGH 50: CPT | Mod: GC

## 2024-10-31 PROCEDURE — 99223 1ST HOSP IP/OBS HIGH 75: CPT

## 2024-10-31 RX ORDER — HYOSCYAMINE SULFATE 0.125 MG
0.12 TABLET,DISINTEGRATING ORAL EVERY 8 HOURS
Refills: 0 | Status: DISCONTINUED | OUTPATIENT
Start: 2024-10-31 | End: 2024-11-01

## 2024-10-31 RX ORDER — POTASSIUM CHLORIDE 10 MEQ
40 TABLET, EXTENDED RELEASE ORAL ONCE
Refills: 0 | Status: COMPLETED | OUTPATIENT
Start: 2024-10-31 | End: 2024-10-31

## 2024-10-31 RX ORDER — ACETAMINOPHEN 500 MG
650 TABLET ORAL ONCE
Refills: 0 | Status: COMPLETED | OUTPATIENT
Start: 2024-10-31 | End: 2024-10-31

## 2024-10-31 RX ORDER — SODIUM CHLORIDE 9 MG/ML
4 INJECTION, SOLUTION INTRAMUSCULAR; INTRAVENOUS; SUBCUTANEOUS EVERY 12 HOURS
Refills: 0 | Status: DISCONTINUED | OUTPATIENT
Start: 2024-10-31 | End: 2024-11-01

## 2024-10-31 RX ORDER — FAMOTIDINE 10 MG/ML
20 INJECTION INTRAVENOUS DAILY
Refills: 0 | Status: DISCONTINUED | OUTPATIENT
Start: 2024-10-31 | End: 2024-11-01

## 2024-10-31 RX ADMIN — VANCOMYCIN HYDROCHLORIDE 250 MILLIGRAM(S): KIT at 13:44

## 2024-10-31 RX ADMIN — Medication 10 MILLIGRAM(S): at 22:15

## 2024-10-31 RX ADMIN — ONDANSETRON HYDROCHLORIDE 4 MILLIGRAM(S): 2 INJECTION, SOLUTION INTRAMUSCULAR; INTRAVENOUS at 05:43

## 2024-10-31 RX ADMIN — Medication 30 MILLILITER(S): at 19:58

## 2024-10-31 RX ADMIN — VANCOMYCIN HYDROCHLORIDE 125 MILLIGRAM(S): KIT at 18:03

## 2024-10-31 RX ADMIN — SODIUM CHLORIDE 4 MILLILITER(S): 9 INJECTION, SOLUTION INTRAMUSCULAR; INTRAVENOUS; SUBCUTANEOUS at 20:58

## 2024-10-31 RX ADMIN — Medication 30 MILLILITER(S): at 15:40

## 2024-10-31 RX ADMIN — Medication 1 TABLET(S): at 08:37

## 2024-10-31 RX ADMIN — Medication 40 MILLIEQUIVALENT(S): at 15:40

## 2024-10-31 RX ADMIN — Medication 1 TABLET(S): at 13:44

## 2024-10-31 RX ADMIN — FAMOTIDINE 20 MILLIGRAM(S): 10 INJECTION INTRAVENOUS at 12:52

## 2024-10-31 RX ADMIN — Medication 650 MILLIGRAM(S): at 01:03

## 2024-10-31 RX ADMIN — GABAPENTIN 300 MILLIGRAM(S): 300 CAPSULE ORAL at 22:15

## 2024-10-31 RX ADMIN — Medication 260 MILLIGRAM(S): at 00:03

## 2024-10-31 RX ADMIN — Medication 260 MILLIGRAM(S): at 23:37

## 2024-10-31 RX ADMIN — SODIUM CHLORIDE 100 MILLILITER(S): 9 INJECTION, SOLUTION INTRAMUSCULAR; INTRAVENOUS; SUBCUTANEOUS at 23:38

## 2024-10-31 RX ADMIN — VANCOMYCIN HYDROCHLORIDE 125 MILLIGRAM(S): KIT at 12:51

## 2024-10-31 RX ADMIN — Medication 0.12 MILLIGRAM(S): at 16:45

## 2024-10-31 RX ADMIN — VANCOMYCIN HYDROCHLORIDE 125 MILLIGRAM(S): KIT at 00:03

## 2024-10-31 RX ADMIN — VANCOMYCIN HYDROCHLORIDE 125 MILLIGRAM(S): KIT at 05:43

## 2024-10-31 RX ADMIN — Medication 1 TABLET(S): at 18:03

## 2024-10-31 NOTE — DISCHARGE NOTE PROVIDER - NSDCCAREPROVSEEN_GEN_ALL_CORE_FT
Shereef, Ryne Munoz, Chiquita Zaragoza, Denise Rueda, Maday Wooten, Misti Stewart, Slava Yougn, Russ Zacarias, Randy CALERO

## 2024-10-31 NOTE — DISCHARGE NOTE PROVIDER - CARE PROVIDER_API CALL
Shane Jansen.  Internal Medicine  [address] [place] [address]  Phone: [phone]  Fax: [phone]  Follow Up Time:     Randy Zacarias  Infectious Disease  [address] [place] [address]  Phone: [phone]  Fax: [phone]  Follow Up Time:

## 2024-10-31 NOTE — DIETITIAN INITIAL EVALUATION ADULT - PERTINENT LABORATORY DATA
10-31    140  |  107  |  2[L]  ----------------------------<  85  3.2[L]   |  27  |  0.53    Ca    8.0[L]      31 Oct 2024 07:44    TPro  5.9[L]  /  Alb  2.8[L]  /  TBili  0.5  /  DBili  x   /  AST  11  /  ALT  15  /  AlkPhos  59  10-31

## 2024-10-31 NOTE — CONSULT NOTE ADULT - ASSESSMENT
69-year-old female with past medical history of spinal stenosis, osteoporosis, hyperlipidemia, bronchiectasis, IgA IgG deficiency recent diagnosis and hospitalization +UA, with a culture growing Group B strep and positive blood Cx also growing group B strep, due to osteomyelitis and lumbar spine, 10/15/24, patient was started on IV antibiotics and discharged with PICC line with continuation of ceftriaxone 2 g daily until 11/14, today c/o nausea without vomiting episodes, reports acute diarrhea for  2 days ago but worsened yesterday with approximately 12 episodes of diarrhea in the past 24 hours but last stool being more formed,  denies any chest pain or shortness of breath.  Denies any other reported complaints,  unable to send C. difficile as stool is formed now, ova and parasites.  ed course-  leukopenia and  bandemia noted, GI consulted, ctap- + pancolitis GI PCR sent c. diff unable to test due to no looses tool now spoke with her infectious disease Dr. Zacarias, abtx changed to vancomycin po and IV (30 Oct 2024 14:42)        GI Consult: Patient seen and examined at bedside. Denies abdominal pain, states only abdominal tenderness. Patient states she had 3 loose brown color BM's early this morning. denies any hematochezia/melena. denies N/V.   GI History: As per Patient her GI provider is Dr. Sherman, her last colonoscopy was 6-8 years ago and it was normal, last EGD was 3 years ago and she was diagnosed with GERD and have been taking famotidine 20mg daily for the past 3 years.   Patient appears clinically stable at present.  CT scan impression:  Pancolitis, either infectious or inflammatory. Also consider portal   colopathy.

## 2024-10-31 NOTE — DISCHARGE NOTE PROVIDER - ATTENDING DISCHARGE PHYSICAL EXAMINATION:
Pt arrives to ed via triage from home d.t co SOB and abdominal pain \"pancreas and liver\" post first chemo treatment last Thursday. Pt states having diarrhea that started today. Pt has a history of pancreatic and liver CA. Pt endorsing SOB x 3 days. PT denies CP.    A+Ox3, no murmurs, lungs CTA b/l, and soft/NT/ND, no lower extremity swelling

## 2024-10-31 NOTE — DISCHARGE NOTE PROVIDER - CARE PROVIDERS DIRECT ADDRESSES
[email or web address]

## 2024-10-31 NOTE — PROGRESS NOTE ADULT - ASSESSMENT
70 yo female with bronchiectasis who is admitted for evaluation of GI complaints while on CTX for group B strep bacteremia with radiographic concerns of spine seeding.  She c/o more frequent stools and bloating.  She has a low wbc and anemia, not much different than last month at onset of Rx.  CT scan noted, CDT negative, GI PCR negative, stool culture in progress.  Possible portal colopathy as she does not appear to have infectious colitis or ischemic colitis.  Unclear if we are seeing marrow suppression with CTX.  Suggest:  1 Vanco IV for now  2 PO vanco until evaluated by Dr Zacarias  3 Additional w/u per GI  4 She is anxious to go home, we may well try to discharge on 11/1 to complete Rx at home

## 2024-10-31 NOTE — PROGRESS NOTE ADULT - SUBJECTIVE AND OBJECTIVE BOX
HPI:  69-year-old female with past medical history of spinal stenosis, osteoporosis, hyperlipidemia, bronchiectasis, IgA IgG deficiency recent diagnosis and hospitalization +UA, with a culture growing Group B strep and positive blood Cx also growing group B strep, due to osteomyelitis and lumbar spine, 10/15/24, patient was started on IV antibiotics and discharged with PICC line with continuation of ceftriaxone 2 g daily until 11/14, today c/o nausea without vomiting episodes, reports acute diarrhea for  2 days ago but worsened yesterday with approximately 12 episodes of diarrhea in the past 24 hours but last stool being more formed,  denies any chest pain or shortness of breath.  Denies any other reported complaints,  unable to send C. difficile as stool is formed now, ova and parasites.        ed course-  leukopenia and  bandemia noted, GI consulted, ctap- + pancolitis   GI PCR sent c. diff unable to test due to no looses tool now   spoke with her infectious disease Dr. Zacarias, abtx changed to vancomycin po and IV    Overnight events: None  Interval Events: Patient was seen and examined at bedside. States that abdominal pain is improved, but would like to take hyoscyamine that she was prescribed by her GI doc in the past for some abdominal cramping that she has been experiencing. Still having episodes of stool that are loose, sometimes watery, other times yellow. Also would like to start a full liquid diet. No other acute complaints noted.     REVIEW OF SYSTEMS:  CONSTITUTIONAL: No weakness, fevers or chills  EYES/ENT: No visual changes;  No vertigo or throat pain   NECK: No pain or stiffness  RESPIRATORY: No cough, wheezing, hemoptysis; No shortness of breath  CARDIOVASCULAR: No chest pain or palpitations  GASTROINTESTINAL: +abdominal/epigastric pain. No nausea, vomiting, or hematemesis; No diarrhea or constipation. No melena or hematochezia.  GENITOURINARY: No dysuria, frequency or hematuria  NEUROLOGICAL: No numbness or weakness  SKIN: No itching, burning, rashes, or lesions   All other review of systems is negative unless indicated above.    OBJECTIVE:  Vital Signs Last 24 Hrs  T(C): 37 (31 Oct 2024 05:23), Max: 37.2 (30 Oct 2024 13:27)  T(F): 98.6 (31 Oct 2024 05:23), Max: 99 (30 Oct 2024 13:27)  HR: 76 (31 Oct 2024 09:01) (76 - 85)  BP: 95/63 (31 Oct 2024 05:23) (90/56 - 99/63)  RR: 16 (31 Oct 2024 05:23) (15 - 18)  SpO2: 97% (31 Oct 2024 09:01) (93% - 97%)    O2 Parameters below as of 31 Oct 2024 09:01  Patient On (Oxygen Delivery Method): room air      PHYSICAL EXAM:  General: NAD, laying in bed comfortably, awake and alert   ENT/Neck: Neck supple, No JVD, Gross hearing intact  Respiratory: CTA B/L, No wheezing, rales, rhonchi  CV: RRR, +S1/S2, -S3/S4, no murmurs, rubs or gallops  Abdominal: +minimal generalized abdominal tenderness on palpation. Soft, Nondistended, +bowel sounds x 4. No HSM  MSK: 5/5 strength UE/LE bilaterally  Extremities: No edema, 2+ peripheral pulses  Skin: +PICC line in left upper extremity, clean/dry/intact, no erythema or drainage noted. No Rashes, Hematoma, Ecchymosis    HOSPITAL MEDICATIONS:  MEDICATIONS  (STANDING):  atorvastatin 10 milliGRAM(s) Oral at bedtime  famotidine    Tablet 20 milliGRAM(s) Oral daily  gabapentin 300 milliGRAM(s) Oral at bedtime  lactobacillus acidophilus 1 Tablet(s) Oral three times a day with meals  levomilnacipran ER Capsule 20 milliGRAM(s) Oral daily  sodium chloride 0.9% Bolus 1000 milliLiter(s) IV Bolus once  sodium chloride 0.9%. 1000 milliLiter(s) (100 mL/Hr) IV Continuous <Continuous>  sodium chloride 3%  Inhalation 4 milliLiter(s) Inhalation every 12 hours  vancomycin    Solution 125 milliGRAM(s) Oral every 6 hours  vancomycin  IVPB 1000 milliGRAM(s) IV Intermittent every 24 hours    MEDICATIONS  (PRN):  acetaminophen     Tablet .. 650 milliGRAM(s) Oral every 6 hours PRN Temp greater or equal to 38C (100.4F), Mild Pain (1 - 3)  aluminum hydroxide/magnesium hydroxide/simethicone Suspension 30 milliLiter(s) Oral every 4 hours PRN Dyspepsia  hyoscyamine SL 0.125 milliGRAM(s) SubLingual every 8 hours PRN cramping  melatonin 3 milliGRAM(s) Oral at bedtime PRN Insomnia  morphine  - Injectable 2 milliGRAM(s) IV Push every 4 hours PRN Moderate Pain (4 - 6)  ondansetron Injectable 4 milliGRAM(s) IV Push every 8 hours PRN Nausea and/or Vomiting      LABS:                        8.8    2.81  )-----------( 273      ( 31 Oct 2024 07:44 )             26.9     Hgb Trend: 8.8<--, 9.5<--  10-31    140  |  107  |  2[L]  ----------------------------<  85  3.2[L]   |  27  |  0.53    Ca    8.0[L]      31 Oct 2024 07:44    TPro  5.9[L]  /  Alb  2.8[L]  /  TBili  0.5  /  DBili  x   /  AST  11  /  ALT  15  /  AlkPhos  59  10-31    Creatinine Trend: 0.53<--, 0.56<--, 0.72<--, 0.66<--, 0.61<--, 0.59<--    Urinalysis Basic - ( 31 Oct 2024 07:44 )    Color: x / Appearance: x / SG: x / pH: x  Gluc: 85 mg/dL / Ketone: x  / Bili: x / Urobili: x   Blood: x / Protein: x / Nitrite: x   Leuk Esterase: x / RBC: x / WBC x   Sq Epi: x / Non Sq Epi: x / Bacteria: x    MICROBIOLOGY:     Urinalysis with Rflx Culture (collected 30 Oct 2024 18:45)    IMAGING  CTAP with IV contrast 10/30/2024  IMPRESSION:  Pancolitis, either infectious or inflammatory. Also consider portal   colopathy.    Hepatosplenomegaly. Splenic varices. Rule out portal hypertension.     HPI:  69-year-old female with past medical history of spinal stenosis, osteoporosis, hyperlipidemia, bronchiectasis, IgA IgG deficiency recent diagnosis and hospitalization +UA, with a culture growing Group B strep and positive blood Cx also growing group B strep, due to osteomyelitis and lumbar spine, 10/15/24, patient was started on IV antibiotics and discharged with PICC line with continuation of ceftriaxone 2 g daily until 11/14, today c/o nausea without vomiting episodes, reports acute diarrhea for  2 days ago but worsened yesterday with approximately 12 episodes of diarrhea in the past 24 hours but last stool being more formed,  denies any chest pain or shortness of breath.  Denies any other reported complaints,  unable to send C. difficile as stool is formed now, ova and parasites.        ed course-  leukopenia and  bandemia noted, GI consulted, ctap- + pancolitis   GI PCR sent c. diff unable to test due to no looses tool now   spoke with her infectious disease Dr. Zacarias, abtx changed to vancomycin po and IV    Overnight events: None  Interval Events: Patient was seen and examined at bedside. States that abdominal pain is improved, but would like to take hyoscyamine that she was prescribed by her GI doc in the past for some abdominal cramping that she has been experiencing. Still having episodes of stool that are loose, sometimes watery, other times yellow. Also would like to advance her diet. No other acute complaints noted.     REVIEW OF SYSTEMS:  CONSTITUTIONAL: No weakness, fevers or chills  EYES/ENT: No visual changes;  No vertigo or throat pain   NECK: No pain or stiffness  RESPIRATORY: No cough, wheezing, hemoptysis; No shortness of breath  CARDIOVASCULAR: No chest pain or palpitations  GASTROINTESTINAL: +abdominal/epigastric pain. No nausea, vomiting, or hematemesis; No diarrhea or constipation. No melena or hematochezia.  GENITOURINARY: No dysuria, frequency or hematuria  NEUROLOGICAL: No numbness or weakness  SKIN: No itching, burning, rashes, or lesions   All other review of systems is negative unless indicated above.    OBJECTIVE:  Vital Signs Last 24 Hrs  T(C): 37 (31 Oct 2024 05:23), Max: 37.2 (30 Oct 2024 13:27)  T(F): 98.6 (31 Oct 2024 05:23), Max: 99 (30 Oct 2024 13:27)  HR: 76 (31 Oct 2024 09:01) (76 - 85)  BP: 95/63 (31 Oct 2024 05:23) (90/56 - 99/63)  RR: 16 (31 Oct 2024 05:23) (15 - 18)  SpO2: 97% (31 Oct 2024 09:01) (93% - 97%)    O2 Parameters below as of 31 Oct 2024 09:01  Patient On (Oxygen Delivery Method): room air      PHYSICAL EXAM:  General: NAD, laying in bed comfortably, awake and alert   ENT/Neck: Neck supple, No JVD, Gross hearing intact  Respiratory: CTA B/L, No wheezing, rales, rhonchi  CV: RRR, +S1/S2, -S3/S4, no murmurs, rubs or gallops  Abdominal: +minimal generalized abdominal tenderness on palpation. Soft, Nondistended, +bowel sounds x 4. No HSM  MSK: 5/5 strength UE/LE bilaterally  Extremities: No edema, 2+ peripheral pulses  Skin: +PICC line in left upper extremity, clean/dry/intact, no erythema or drainage noted. No Rashes, Hematoma, Ecchymosis    HOSPITAL MEDICATIONS:  MEDICATIONS  (STANDING):  atorvastatin 10 milliGRAM(s) Oral at bedtime  famotidine    Tablet 20 milliGRAM(s) Oral daily  gabapentin 300 milliGRAM(s) Oral at bedtime  lactobacillus acidophilus 1 Tablet(s) Oral three times a day with meals  levomilnacipran ER Capsule 20 milliGRAM(s) Oral daily  sodium chloride 0.9% Bolus 1000 milliLiter(s) IV Bolus once  sodium chloride 0.9%. 1000 milliLiter(s) (100 mL/Hr) IV Continuous <Continuous>  sodium chloride 3%  Inhalation 4 milliLiter(s) Inhalation every 12 hours  vancomycin    Solution 125 milliGRAM(s) Oral every 6 hours  vancomycin  IVPB 1000 milliGRAM(s) IV Intermittent every 24 hours    MEDICATIONS  (PRN):  acetaminophen     Tablet .. 650 milliGRAM(s) Oral every 6 hours PRN Temp greater or equal to 38C (100.4F), Mild Pain (1 - 3)  aluminum hydroxide/magnesium hydroxide/simethicone Suspension 30 milliLiter(s) Oral every 4 hours PRN Dyspepsia  hyoscyamine SL 0.125 milliGRAM(s) SubLingual every 8 hours PRN cramping  melatonin 3 milliGRAM(s) Oral at bedtime PRN Insomnia  morphine  - Injectable 2 milliGRAM(s) IV Push every 4 hours PRN Moderate Pain (4 - 6)  ondansetron Injectable 4 milliGRAM(s) IV Push every 8 hours PRN Nausea and/or Vomiting      LABS:                        8.8    2.81  )-----------( 273      ( 31 Oct 2024 07:44 )             26.9     Hgb Trend: 8.8<--, 9.5<--  10-31    140  |  107  |  2[L]  ----------------------------<  85  3.2[L]   |  27  |  0.53    Ca    8.0[L]      31 Oct 2024 07:44    TPro  5.9[L]  /  Alb  2.8[L]  /  TBili  0.5  /  DBili  x   /  AST  11  /  ALT  15  /  AlkPhos  59  10-31    Creatinine Trend: 0.53<--, 0.56<--, 0.72<--, 0.66<--, 0.61<--, 0.59<--    MICROBIOLOGY:     Urinalysis with Rflx Culture (collected 30 Oct 2024 18:45)    IMAGING  CTAP with IV contrast 10/30/2024  IMPRESSION:  Pancolitis, either infectious or inflammatory. Also consider portal   colopathy.    Hepatosplenomegaly. Splenic varices. Rule out portal hypertension.

## 2024-10-31 NOTE — CONSULT NOTE ADULT - PROBLEM SELECTOR RECOMMENDATION 9
-Monitor stools   -Pending stool culture results  -antibiotics per ID  -monitor hydration  -Diet as tolerated- suggest advancing diet to regular

## 2024-10-31 NOTE — DIETITIAN INITIAL EVALUATION ADULT - ORAL INTAKE PTA/DIET HISTORY
Pt seen at bedside this evening. Pt endorses adequate appetite PTA consuming 3 meals a day- cooks for herself. Reports decrease in appetite beginning Denzel 10/27 due to diarrhea and nausea. Pt noted with wheat allergy/intolerance.  lbs. CBW 95 lbs. Pt c/o persistent diarrhea.

## 2024-10-31 NOTE — DISCHARGE NOTE PROVIDER - DETAILS OF MALNUTRITION DIAGNOSIS/DIAGNOSES
This patient has been assessed with a concern for Malnutrition and was treated during this hospitalization for the following Nutrition diagnosis/diagnoses:     -  10/31/2024: Moderate protein-calorie malnutrition   -  10/31/2024: Underweight (BMI < 19)

## 2024-10-31 NOTE — DIETITIAN NUTRITION RISK NOTIFICATION - UPON NUTRITIONAL ASSESSMENT BY THE REGISTERED DIETITIAN YOUR PATIENT WAS DETERMINED TO MEET CRITERIA/HAS EVIDENCE OF THE FOLLOWING DIAGNOSIS:
CSS please assist with appointment. Pennie Vences, please advise on refill. Moderate protein-calorie malnutrition

## 2024-10-31 NOTE — DIETITIAN INITIAL EVALUATION ADULT - PERTINENT MEDS FT
MEDICATIONS  (STANDING):  atorvastatin 10 milliGRAM(s) Oral at bedtime  famotidine    Tablet 20 milliGRAM(s) Oral daily  gabapentin 300 milliGRAM(s) Oral at bedtime  lactobacillus acidophilus 1 Tablet(s) Oral three times a day with meals  levomilnacipran ER Capsule 20 milliGRAM(s) Oral daily  potassium chloride   Powder 40 milliEquivalent(s) Oral once  sodium chloride 0.9% Bolus 1000 milliLiter(s) IV Bolus once  sodium chloride 0.9%. 1000 milliLiter(s) (100 mL/Hr) IV Continuous <Continuous>  sodium chloride 3%  Inhalation 4 milliLiter(s) Inhalation every 12 hours  vancomycin    Solution 125 milliGRAM(s) Oral every 6 hours  vancomycin  IVPB 1000 milliGRAM(s) IV Intermittent every 24 hours    MEDICATIONS  (PRN):  acetaminophen     Tablet .. 650 milliGRAM(s) Oral every 6 hours PRN Temp greater or equal to 38C (100.4F), Mild Pain (1 - 3)  aluminum hydroxide/magnesium hydroxide/simethicone Suspension 30 milliLiter(s) Oral every 4 hours PRN Dyspepsia  hyoscyamine SL 0.125 milliGRAM(s) SubLingual every 8 hours PRN cramping  melatonin 3 milliGRAM(s) Oral at bedtime PRN Insomnia  morphine  - Injectable 2 milliGRAM(s) IV Push every 4 hours PRN Moderate Pain (4 - 6)  ondansetron Injectable 4 milliGRAM(s) IV Push every 8 hours PRN Nausea and/or Vomiting

## 2024-10-31 NOTE — DISCHARGE NOTE PROVIDER - NSTOBACCOUSAGEY/N_GEN_A_CS
Start of Shift

Pt is a 33 year old male admitted for Opiate/Benzo dependence, placed on 5 day Ativan and 5 
day Subutex taper. PMH: anxiety, depression, PTSD, panic disorder, insomnia, bipolar 
disorder, stomach ulcers, hx of seizure r/t withdrawal, asthma and sx on right wrist & 
rhinoplasty. Pt reports allergies to morphine and clindamycin, fall/seizure precautions, 
regular diet and full code. Pt has IV to right AC 22 g, site intact, no swelling/redness 
note  flushing well. Pt is on Levaquin for pneumonia. Pt's last COWS 9 & CIWA 7. Toradol 
30mg/1ml Inj. PRN, Tylenol 650mg PRN and Maalox 30ml x2 administered. Pt is on O2 via NC at 
2.5 LMP. Pt slept for 7 hours. Encouraged fluids to facilitate the detox process. Safety 
measures in place, call light within reach, side rails up x2, bed locked and in low 
position, will continue to monitor.
No
Patient/Caregiver provided printed discharge information.

## 2024-10-31 NOTE — DISCHARGE NOTE PROVIDER - NSDCFUADDAPPT_GEN_ALL_CORE_FT
APPTS ARE READY TO BE MADE: [x] YES    Best Family or Patient Contact (if needed):    Additional Information about above appointments (if needed):    1: PCP   2: Dr. Zacarias (ID)   3:     Other comments or requests:

## 2024-10-31 NOTE — DISCHARGE NOTE PROVIDER - NSDCCPCAREPLAN_GEN_ALL_CORE_FT
PRINCIPAL DISCHARGE DIAGNOSIS  Diagnosis: Pancolitis  Assessment and Plan of Treatment: You were admitted for abdominal pain, nausea, diarrhea.   You were diagnosed with diarrhea secondary to pancolitis.   You were treated with intravenous and oral vancomycin. Your C.diff and GI PCR studies were negative. Your blood cultures were negative.   You were prescribed the following new medications: IV vancomycin via PICC line until 11/14.   You will need to follow up with your primary care physician and infectious disease doctor. Please continue all home and prescription medications as prescribed.   -  WHAT YOU NEED TO KNOW:  Acute diarrhea starts quickly and lasts a short time, usually 1 to 3 days. It can last up to 2 weeks. You may not be able to control your diarrhea. Acute diarrhea usually stops on its own.   DISCHARGE INSTRUCTIONS:  Return to the emergency department if:   You feel confused.   Your heartbeat is faster than usual.   Your eyes look deeply sunken, or you have no tears when you cry.   You urinate less than usual, or your urine is dark yellow.   You have blood or mucus in your bowel movements.  You have severe abdominal pain.   You are unable to drink any liquids.   Contact your healthcare provider if:   Your symptoms do not get better with treatment.   You have a fever higher than 101.3°F (38.5°C).   You have trouble eating and drinking because you are vomiting.   Your diarrhea does not get better in 7 days.   You have questions or concerns about your condition or care.   Prevent acute diarrhea:   Wash your hands often. Use soap and water. Wash your hands before you eat or prepare food. Also wash your hands after you use the bathroom. Use an alcohol-based hand gel when soap and water are not available. Handwashing  Keep bathroom surfaces clean. This helps prevent the spread of germs that cause acute diarrhea.   Wash fruits and vegetables well before you eat them. This can help remove germs that cause diarrhea. If possible, remove the skin from fruits and vegetables, or cook them well before you eat them.        PRINCIPAL DISCHARGE DIAGNOSIS  Diagnosis: Pancolitis  Assessment and Plan of Treatment: You were admitted for abdominal pain, nausea, diarrhea.   You were diagnosed with diarrhea secondary to pancolitis.   You were treated with intravenous and oral vancomycin. Your C.diff and GI PCR studies were negative. Your blood cultures were negative.   You were prescribed the following new medications: 1 gram of IV vancomycin daily via PICC line until 11/14 and 125 mg of PO Vancomycin every 6 hours for 1 week.   You will need to follow up with your primary care physician and infectious disease doctor. Please continue all home and prescription medications as prescribed. Please wash your hands around other people.   -  WHAT YOU NEED TO KNOW:  Acute diarrhea starts quickly and lasts a short time, usually 1 to 3 days. It can last up to 2 weeks. You may not be able to control your diarrhea. Acute diarrhea usually stops on its own.   DISCHARGE INSTRUCTIONS:  Return to the emergency department if:   You feel confused.   Your heartbeat is faster than usual.   Your eyes look deeply sunken, or you have no tears when you cry.   You urinate less than usual, or your urine is dark yellow.   You have blood or mucus in your bowel movements.  You have severe abdominal pain.   You are unable to drink any liquids.   Contact your healthcare provider if:   Your symptoms do not get better with treatment.   You have a fever higher than 101.3°F (38.5°C).   You have trouble eating and drinking because you are vomiting.   Your diarrhea does not get better in 7 days.   You have questions or concerns about your condition or care.   Prevent acute diarrhea:   Wash your hands often. Use soap and water. Wash your hands before you eat or prepare food. Also wash your hands after you use the bathroom. Use an alcohol-based hand gel when soap and water are not available. Handwashing  Keep bathroom surfaces clean. This helps prevent the spread of germs that cause acute diarrhea.   Wash fruits and vegetables well before you eat them. This can help remove germs that cause diarrhea. If possible, remove the skin from fruits a      SECONDARY DISCHARGE DIAGNOSES  Diagnosis: Osteomyelitis of lumbar spine  Assessment and Plan of Treatment:     Diagnosis: History of bacteremia  Assessment and Plan of Treatment:     Diagnosis: Spinal stenosis  Assessment and Plan of Treatment:     Diagnosis: Osteoporosis  Assessment and Plan of Treatment:     Diagnosis: Hyperlipidemia  Assessment and Plan of Treatment:     Diagnosis: Bronchiectasis  Assessment and Plan of Treatment:     Diagnosis: IgA deficiency  Assessment and Plan of Treatment:

## 2024-10-31 NOTE — PROGRESS NOTE ADULT - ASSESSMENT
69-year-old female with past medical history of spinal stenosis, osteoporosis, hyperlipidemia, bronchiectasis, IgA IgG deficiency recent diagnosis and hospitalization +UA, with a culture growing Group B strep and positive blood Cx also growing group B strep, due to osteomyelitis and lumbar spine on 10/15/24. Started on IV antibiotics and discharged with PICC line with continuation of ceftriaxone 2 g daily until 11/14 presenting with abdominal pain, nausea and multiple episodes of diarrhea. Admitted for diarrhea, r/o C. diff vs. other inflammatory/infectious causes.    #Diarrhea, r/o C. diff  #Abdominal pain  #Nausea w/o vomiting  - admit to medicine  - In the ED, VSS  - WBC 2.61   - CTAP 10/30/2024 with Pancolitis, either infectious or inflammatory.  Hepatosplenomegaly. Splenic varices. Rule out portal hypertension  clear diet  ivf  pain meds prn  ID consulted by ED- spoke with her infectious disease Dr. Zacarias, abtx changed to vancomycin po and IV  check screening abdominal labs, GI PCR, leukopenia and  bandemia noted,  GI consulted   am labs  BC sent  UA c/s  PPI    #s/p Osteomyelitis of lumbar spine   was on  ceftriaxone 2g daily until 11/14/24- changed to vanco per ID       #IgG and IgA deficiency  duonebs/saline nebs PRN      #Depression/anxiety   Fetzima     #HLD  -Continue atorvastatin       #DVT ppx  - PAS    # gi ppx- ppi 69-year-old female with past medical history of spinal stenosis, osteoporosis, hyperlipidemia, bronchiectasis, IgA IgG deficiency recent diagnosis and hospitalization +UA, with a culture growing Group B strep and positive blood Cx also growing group B strep, due to osteomyelitis and lumbar spine on 10/15/24. Started on IV antibiotics and discharged with PICC line with continuation of ceftriaxone 2 g daily until 11/14 presenting with abdominal pain, nausea and multiple episodes of diarrhea. Admitted for diarrhea, r/o C. diff vs. other inflammatory/infectious causes.    #Diarrhea, r/o C. diff  #Abdominal pain  #Nausea w/o vomiting  - admit to medicine  - In the ED, VSS  - WBC 2.61   - CTAP 10/30/2024 with Pancolitis, either infectious or inflammatory. Hepatosplenomegaly. Splenic varices. Rule out portal hypertension.   - Clear liquid diet -> Full liquid diet    clear diet  ivf  pain meds prn  ID consulted by ED- spoke with her infectious disease Dr. Zacarias, abtx changed to vancomycin po and IV  check screening abdominal labs, GI PCR, leukopenia and  bandemia noted,  GI consulted   am labs  BC sent  UA c/s  PPI    #s/p Osteomyelitis of lumbar spine   was on  ceftriaxone 2g daily until 11/14/24- changed to vanco per ID       #IgG and IgA deficiency  duonebs/saline nebs PRN      #Depression/anxiety   Fetzima     #HLD  -Continue atorvastatin       #DVT ppx  - PAS    # gi ppx- ppi 69-year-old female with past medical history of spinal stenosis, osteoporosis, hyperlipidemia, bronchiectasis, IgA IgG deficiency recent diagnosis and hospitalization +UA, with a culture growing Group B strep and positive blood Cx also growing group B strep, due to osteomyelitis and lumbar spine on 10/15/24. Started on IV antibiotics and discharged with PICC line with continuation of ceftriaxone 2 g daily until 11/14 presenting with abdominal pain, nausea and multiple episodes of diarrhea. Admitted for diarrhea, r/o C. diff vs. other inflammatory/infectious causes.    #Diarrhea, r/o C. diff  #Abdominal pain  #Nausea w/o vomiting  - admit to medicine  - In the ED, VSS  - WBC 2.61 > 2.81   - s/p vanc IVPB  - CTAP 10/30/2024 with Pancolitis, either infectious or inflammatory. Hepatosplenomegaly. Splenic varices. Rule out portal hypertension.   - UA with trace blood, leuks, negative nitrites, many bacteria  - C diff negative   - stool GI PCR panel negative   - stool culture pending   - Clear liquid diet -> Full liquid diet  - pain meds prn  - hyoscyamine prn  - c/w famotidine 20mg   - c/w probiotic  - zofran prn   - ID recs appreciated: change ctx to IV vanc, po vanc for C.diff r/o, f/u CTAP  - GI recs appreciated: c/w abx, monitor hydration, advance to regular diet    #s/p Osteomyelitis of lumbar spine   - s/p ceftriaxone 2g daily until 11/14/24  - changed to vanco per ID   - c/w vanco on dc per Dr. Zacarias    #Hypokalemia  - K+ 3.2  - repleted   - monitor BMP    #IgG and IgA deficiency  - s/p albuterol   - c/w sodium chloride 3% inhalation    #Depression/anxiety   -c/w Fetzima     #HLD  -c/w atorvastatin 10mg    #DVT ppx  - encourage ambulation    #GI ppx  - famotidine 20mg po    #Diet  - transition from full to regular     *Case seen and discussed with Dr. Zaragoza.

## 2024-10-31 NOTE — DISCHARGE NOTE PROVIDER - NSDCMRMEDTOKEN_GEN_ALL_CORE_FT
acetaminophen 325 mg oral tablet: 2 tab(s) orally every 8 hours  atorvastatin 10 mg oral tablet: 1 tab(s) orally once a day (at bedtime)  cefTRIAXone 2 g injection: 2 gram(s) intravenously once a day from 10/15 until 11/14  famotidine 20 mg oral tablet: 1 tab(s) orally once a day As needed GERD  Fetzima 20 mg oral capsule, extended release: 1 cap(s) orally once a day  gabapentin 300 mg oral capsule: 1 cap(s) orally once a day (at bedtime)  ipratropium-albuterol 0.5 mg-2.5 mg/3 mL inhalation solution: 3 milliliter(s) inhaled every 6 hours As needed Shortness of Breath  lactobacillus acidophilus oral capsule: 1 cap(s) orally 2 times a day  morphine 15 mg/8 to 12 hr oral tablet, extended release: 1 tab(s) orally 2 times a day as needed for  severe pain MDD: 2 tabs  Multiple Vitamins oral tablet: 1 tab(s) orally once a day  naloxone 4 mg/0.1 mL nasal spray: 1 spray(s) intranasally once a day overdose narcotic   acetaminophen 325 mg oral tablet: 2 tab(s) orally every 8 hours  atorvastatin 10 mg oral tablet: 1 tab(s) orally once a day (at bedtime)  famotidine 20 mg oral tablet: 1 tab(s) orally once a day As needed GERD  Fetzima 20 mg oral capsule, extended release: 1 cap(s) orally once a day  gabapentin 300 mg oral capsule: 1 cap(s) orally once a day (at bedtime)  ipratropium-albuterol 0.5 mg-2.5 mg/3 mL inhalation solution: 3 milliliter(s) inhaled every 6 hours As needed Shortness of Breath  lactobacillus acidophilus oral capsule: 1 cap(s) orally 2 times a day  morphine 15 mg/8 to 12 hr oral tablet, extended release: 1 tab(s) orally 2 times a day as needed for  severe pain MDD: 2 tabs  Multiple Vitamins oral tablet: 1 tab(s) orally once a day  naloxone 4 mg/0.1 mL nasal spray: 1 spray(s) intranasally once a day overdose narcotic  vancomycin 1 g intravenous injection: 1 gram(s) intravenous every 24 hours  vancomycin 125 mg oral capsule: 1 cap(s) orally every 6 hours   acetaminophen 325 mg oral tablet: 2 tab(s) orally every 8 hours  atorvastatin 10 mg oral tablet: 1 tab(s) orally once a day (at bedtime)  famotidine 20 mg oral tablet: 1 tab(s) orally once a day As needed GERD  Fetzima 20 mg oral capsule, extended release: 1 cap(s) orally once a day  gabapentin 300 mg oral capsule: 1 cap(s) orally once a day (at bedtime)  ipratropium-albuterol 0.5 mg-2.5 mg/3 mL inhalation solution: 3 milliliter(s) inhaled every 6 hours As needed Shortness of Breath  lactobacillus acidophilus oral capsule: 1 cap(s) orally 2 times a day  morphine 15 mg/8 to 12 hr oral tablet, extended release: 1 tab(s) orally 2 times a day as needed for  severe pain MDD: 2 tabs  Multiple Vitamins oral tablet: 1 tab(s) orally once a day  naloxone 4 mg/0.1 mL nasal spray: 1 spray(s) intranasally once a day overdose narcotic  ondansetron 4 mg oral tablet, disintegratin tab(s) orally every 6 hours  vancomycin 1 g intravenous injection: 1 gram(s) intravenous every 24 hours  vancomycin 125 mg oral capsule: 1 cap(s) orally every 6 hours

## 2024-10-31 NOTE — DISCHARGE NOTE PROVIDER - HOSPITAL COURSE
Hospital Course  HPI:  69-year-old female with past medical history of spinal stenosis, osteoporosis, hyperlipidemia, bronchiectasis, IgA IgG deficiency recent diagnosis and hospitalization +UA, with a culture growing Group B strep and positive blood Cx also growing group B strep, due to osteomyelitis and lumbar spine, 10/15/24, patient was started on IV antibiotics and discharged with PICC line with continuation of ceftriaxone 2 g daily until 11/14, presented to the ED for nausea and multiple episodes of diarrhea consisting of intermittent loose and watery stool.     In the ED was noted to have leukopenia and  bandemia and GI was consulted,   CTAP + pancolitis   GI PCR sent c. diff unable to test due to no looses tool now   spoke with her infectious disease Dr. Zacarias, abtx changed to vancomycin po and IV       (30 Oct 2024 14:42)      You were admitted for   You were diagnosed with   You were treated with   You were prescribed the following new medications:    You will need to follow up with your primary care physician.    Source of Infection:  Antibiotic / Last Day:    Palliative Care / Advanced Care Planning  Code Status:  Patient/Family agreeable to Hospice/Palliative (Y/N)?  Summary of Goals of Care Conversation:    Discharging Provider:  Misti Rodriguez DO  Contact Info: Cell [phone]- Please call with any questions or concerns.     Outpatient Provider:    Signout given to  SNF Provider:   Hospital Course  HPI:  69-year-old female with past medical history of spinal stenosis, osteoporosis, hyperlipidemia, bronchiectasis, IgA IgG deficiency recent diagnosis and hospitalization +UA, with a culture growing Group B strep and positive blood Cx also growing group B strep, due to osteomyelitis and lumbar spine, 10/15/24, patient was started on IV antibiotics and discharged with PICC line with continuation of ceftriaxone 2 g daily until 11/14, presented to the ED for nausea and multiple episodes of diarrhea consisting of intermittent loose and watery stool.     In the ED was noted to have leukopenia and  bandemia and GI was consulted  CTAP with Pancolitis, either infectious or inflammatory. Also consider portal colopathy. Hepatosplenomegaly. Splenic varices. Rule out portal hypertension  Stool studies were sent.   Infectious disease was consulted who recommended to change IV ctx to IV vancomycin and to start po vanc.     Labs were significant for leukopenia, WBC at ~2. Remaining labs at baseline. UA with +trace blood, large leuks, many bacteria, and negative nitrites.   C diff PCR negative, GI PCR negative. Blood cultures with no growth at 24 hours.   Patient was seen by gastroenterology who recommended to continue antibiotics and advance diet from liquids to regular.  Patient was also seen by infectious disease who recommended to continue IV/po vancomycin and continue abx on dc.     Patient will need to continue IV vancomycin via PICC line until 11/14.    Patient is now hemodynamically stable and medically optimized for discharge to home with close outpatient follow-up with PCP and infectious disease.     You were admitted for abdominal pain, nausea, diarrhea.   You were diagnosed with diarrhea.   You were treated with intravenous and oral vancomycin. Your C.diff and GI PCR studies were negative. Your blood cultures were negative.   You were prescribed the following new medications: IV vancomycin via PICC line until 11/14.     You will need to follow up with your primary care physician and infectious disease doctor. Please continue all home and prescription medications as prescribed.     Source of Infection:  Antibiotic / Last Day:    Palliative Care / Advanced Care Planning  Code Status:  Patient/Family agreeable to Hospice/Palliative (Y/N)?  Summary of Goals of Care Conversation:    Discharging Provider:  Misti Rodriguez DO  Contact Info: Cell [phone]- Please call with any questions or concerns.     Outpatient Provider:    Signout given to  SNF Provider:   Hospital Course  HPI:  69-year-old female with past medical history of spinal stenosis, osteoporosis, hyperlipidemia, bronchiectasis, IgA IgG deficiency recent diagnosis and hospitalization +UA, with a culture growing Group B strep and positive blood Cx also growing group B strep, due to osteomyelitis and lumbar spine, 10/15/24, patient was started on IV antibiotics and discharged with PICC line with continuation of ceftriaxone 2 g daily until 11/14, presented to the ED for nausea and multiple episodes of diarrhea consisting of intermittent loose and watery stool.     In the ED was noted to have leukopenia and  bandemia and GI was consulted  CTAP with Pancolitis, either infectious or inflammatory. Also consider portal colopathy. Hepatosplenomegaly. Splenic varices. Rule out portal hypertension  Stool studies were sent.   Infectious disease was consulted who recommended to change IV ctx to IV vancomycin and to start po vanc.     Labs were significant for leukopenia, WBC at ~2. Remaining labs at baseline. UA with +trace blood, large leuks, many bacteria, and negative nitrites.   C diff PCR negative, GI PCR negative. Blood cultures with no growth at 24 hours.   Patient was seen by gastroenterology who recommended to continue antibiotics and advance diet from liquids to regular. Also recommended ruling out IBD with CRP and fecal calprotectin. No need for urgent endoscopic workup at this time.  Patient was also seen by infectious disease who recommended to continue IV/po vancomycin and continue abx on dc.     Patient will need to continue IV vancomycin via PICC line until 11/14.    Patient is now hemodynamically stable and medically optimized for discharge to home with close outpatient follow-up with PCP and infectious disease.     Discharging Provider:  Misti Rodriguez DO, Dr. Denise Zaragoza MD  Contact Info: Cell [phone]- Please call with any questions or concerns.     Vital Signs Last 24 Hrs  T(C): 36.6 (01 Nov 2024 07:01), Max: 36.6 (01 Nov 2024 07:01)  T(F): 97.9 (01 Nov 2024 07:01), Max: 97.9 (01 Nov 2024 07:01)  HR: 82 (01 Nov 2024 07:01) (77 - 82)  BP: 96/63 (01 Nov 2024 07:01) (94/62 - 106/68)  RR: 18 (01 Nov 2024 07:01) (16 - 18)  SpO2: 96% (01 Nov 2024 07:01) (96% - 99%)    Parameters below as of 01 Nov 2024 07:01  Patient On (Oxygen Delivery Method): room air    GENERAL: NAD, lying in bed comfortably  HEAD:  Atraumatic, Normocephalic  EYES: conjunctiva and sclera clear  ENT: Moist mucous membranes  NECK: Supple, No JVD  CHEST/LUNG: Clear to auscultation bilaterally; No rales, rhonchi, wheezing, or rubs. Unlabored respirations  HEART: Regular rate and rhythm; No murmurs, rubs, or gallops  ABDOMEN: +minimal generalized tenderness. Bowel Sounds x4; Soft, nondistended  EXTREMITIES:  2+ Peripheral Pulses, brisk capillary refill. No clubbing, cyanosis, or edema  NERVOUS SYSTEM:  A&Ox3, no focal deficits   SKIN: No rashes or lesions    LABS                        8.5    2.94  )-----------( 231      ( 01 Nov 2024 07:00 )             25.6     11-01    140  |  108  |  3[L]  ----------------------------<  94  4.3   |  29  |  0.59    Ca    8.7      01 Nov 2024 07:00  Phos  2.0     11-01  Mg     2.0     11-01    TPro  5.9[L]  /  Alb  2.7[L]  /  TBili  0.5  /  DBili  x   /  AST  12  /  ALT  14  /  AlkPhos  62  11-01         Hospital Course  HPI:  69-year-old female with past medical history of spinal stenosis, osteoporosis, hyperlipidemia, bronchiectasis, IgA IgG deficiency recent diagnosis and hospitalization +UA, with a culture growing Group B strep and positive blood Cx also growing group B strep, due to osteomyelitis and lumbar spine, 10/15/24, patient was started on IV antibiotics and discharged with PICC line with continuation of ceftriaxone 2 g daily until 11/14, presented to the ED for nausea and multiple episodes of diarrhea consisting of intermittent loose and watery stool.     In the ED was noted to have leukopenia and  bandemia and GI was consulted  CTAP with Pancolitis, either infectious or inflammatory. Also consider portal colopathy. Hepatosplenomegaly. Splenic varices. Rule out portal hypertension  Stool studies were sent.   Infectious disease was consulted who recommended to change IV ctx to IV vancomycin and to start po vanc.     Labs were significant for leukopenia, WBC at ~2. Remaining labs at baseline. UA with +trace blood, large leuks, many bacteria, and negative nitrites.   C diff PCR negative, GI PCR negative. Blood cultures with no growth at 24 hours.   Patient was seen by gastroenterology who recommended to continue antibiotics and advance diet from liquids to regular. Also recommended ruling out IBD with CRP and fecal calprotectin. No need for urgent endoscopic workup at this time.  Patient was also seen by infectious disease who recommended to continue IV/po vancomycin and continue abx on dc.     Patient will need to continue 1 gram of IV vancomycin daily via PICC line until 11/14. She will continue 125 mg of PO Vancomycin every 6 hours for 1 week.     Patient is now hemodynamically stable and medically optimized for discharge to home with close outpatient follow-up with PCP and infectious disease.     Discharging Provider:  Misti Rodriguez DO, Dr. Denise Zaragoza MD  Contact Info: Cell [phone]- Please call with any questions or concerns.   Notified PCP: Dr. Jansen     Vital Signs Last 24 Hrs  T(C): 36.6 (01 Nov 2024 07:01), Max: 36.6 (01 Nov 2024 07:01)  T(F): 97.9 (01 Nov 2024 07:01), Max: 97.9 (01 Nov 2024 07:01)  HR: 82 (01 Nov 2024 07:01) (77 - 82)  BP: 96/63 (01 Nov 2024 07:01) (94/62 - 106/68)  RR: 18 (01 Nov 2024 07:01) (16 - 18)  SpO2: 96% (01 Nov 2024 07:01) (96% - 99%)    Parameters below as of 01 Nov 2024 07:01  Patient On (Oxygen Delivery Method): room air    GENERAL: NAD, lying in bed comfortably  HEAD:  Atraumatic, Normocephalic  EYES: conjunctiva and sclera clear  ENT: Moist mucous membranes  NECK: Supple, No JVD  CHEST/LUNG: Clear to auscultation bilaterally; No rales, rhonchi, wheezing, or rubs. Unlabored respirations  HEART: Regular rate and rhythm; No murmurs, rubs, or gallops  ABDOMEN: +minimal generalized tenderness. Bowel Sounds x4; Soft, nondistended  EXTREMITIES:  2+ Peripheral Pulses, brisk capillary refill. No clubbing, cyanosis, or edema  NERVOUS SYSTEM:  A&Ox3, no focal deficits   SKIN: No rashes or lesions    LABS                        8.5    2.94  )-----------( 231      ( 01 Nov 2024 07:00 )             25.6     11-01    140  |  108  |  3[L]  ----------------------------<  94  4.3   |  29  |  0.59    Ca    8.7      01 Nov 2024 07:00  Phos  2.0     11-01  Mg     2.0     11-01    TPro  5.9[L]  /  Alb  2.7[L]  /  TBili  0.5  /  DBili  x   /  AST  12  /  ALT  14  /  AlkPhos  62  11-01

## 2024-10-31 NOTE — DISCHARGE NOTE PROVIDER - NSDCFUSCHEDAPPT_GEN_ALL_CORE_FT
Tien Pantoja  [facility]  NEUROSURG [address] [facility]  Scheduled Appointment: 11/25/2024    Girma Jhaveri  [facility]  ENDOCRIN [address] [facility]  Scheduled Appointment: 12/16/2024     Shane Jansen  [facility]  INTMED [address]  Scheduled Appointment: 11/08/2024    Kathy Escobedo  [facility]  FAMILYMED [address]  Scheduled Appointment: 11/12/2024    Tien Pantoja  [facility]  NEUROSURG [address] [facility]  Scheduled Appointment: 11/25/2024    Girma Jhaveri  [facility]  ENDOCRIN [address] [facility]  Scheduled Appointment: 12/16/2024

## 2024-10-31 NOTE — CONSULT NOTE ADULT - NS ATTEND AMEND GEN_ALL_CORE FT
68 y/o F with complicated medical course including bacteremia from osteomyelitis. Presents for nausea, vomiting and diarrhea. CT showing pancolitis. Unclear etiology but could be antibiotic related vs infectious. Recommend ruling out IBD with CRP and fecal calprotectin. No need for urgent endoscopic workup at this time.

## 2024-10-31 NOTE — CONSULT NOTE ADULT - SUBJECTIVE AND OBJECTIVE BOX
HPI:   Patient is a 69y female with anxiety, bronchiectasis,  who was here 4 weeks ago with severe back pain,  group b strept bacteremia , found to have lumbar spine om. She is now 4 weeks into ctx and over the past few days feeling nausea, diarrhea and now epigastric pain. She came to ED on my recommendation and found to have low wbc and bandemia and elevated lipase. The diarrhea is a bit better now than earlier today. She denies fever, vomiting , alcohol intake. No new meds except the ceftriaxone she has been on.     REVIEW OF SYSTEMS:  All other review of systems negative (Comprehensive ROS)    PAST MEDICAL & SURGICAL HISTORY:  Sprain rotator cuff  right shoulder      Depression      ADHD (attention deficit hyperactivity disorder)      Anxiety      Mass on back      Bronchiectasis      Spinal stenosis      Lower extremity tendon tear  hip 2002      S/P trigger finger release  right thumb          Allergies    Wheat (Diarrhea)  No Known Drug Allergies    Intolerances        Antimicrobials Day #  :26 abx now iv vanco and po vanco    Other Medications:  sodium chloride 0.9% Bolus 1000 milliLiter(s) IV Bolus once      FAMILY HISTORY:      SOCIAL HISTORY:  Smoking: [ ]Yes [ ]No  ETOH: [ ]Yes [ ]No  Drug Use: [ ]Yes [ ]No   [ ] Single[ ]    T(F): 97.7 (10-30-24 @ 09:50), Max: 97.7 (10-30-24 @ 09:50)  HR: 85 (10-30-24 @ 09:50)  BP: 100/65 (10-30-24 @ 09:50)  RR: 16 (10-30-24 @ 09:50)  SpO2: --  Wt(kg): --    PHYSICAL EXAM:  General: alert, no acute distress  Eyes:  anicteric, no conjunctival injection, no discharge  Oropharynx: no lesions or injection 	  Neck: supple, without adenopathy  Lungs: clear to auscultation  Heart: regular rate and rhythm; no murmur, rubs or gallops  Abdomen: soft, nondistended, epigastric tender, without mass or organomegaly  Skin: no lesions  Extremities: no clubbing, cyanosis, or edema  Neurologic: alert, oriented, moves all extremities    LAB RESULTS:                        9.5    2.61  )-----------( 258      ( 30 Oct 2024 10:55 )             28.3     10-30    141  |  103  |  7   ----------------------------<  90  4.1   |  30  |  0.56    Ca    9.4      30 Oct 2024 10:55    TPro  6.7  /  Alb  3.2[L]  /  TBili  0.6  /  DBili  x   /  AST  13  /  ALT  19  /  AlkPhos  64  10-30    LIVER FUNCTIONS - ( 30 Oct 2024 10:55 )  Alb: 3.2 g/dL / Pro: 6.7 g/dL / ALK PHOS: 64 U/L / ALT: 19 U/L / AST: 13 U/L / GGT: x           Urinalysis Basic - ( 30 Oct 2024 10:55 )    Color: x / Appearance: x / SG: x / pH: x  Gluc: 90 mg/dL / Ketone: x  / Bili: x / Urobili: x   Blood: x / Protein: x / Nitrite: x   Leuk Esterase: x / RBC: x / WBC x   Sq Epi: x / Non Sq Epi: x / Bacteria: x        MICROBIOLOGY:  RECENT CULTURES:        RADIOLOGY REVIEWED:  Nonspecific endplate edema/enhancement and high T2 disc signal at L4-5 as   described above which may represent discitis/ osteomyelitis with phlegmon   in the correct clinical setting. No loculated fluid collection in the   paraspinal or epidural spaces to suggest abscess collection. Correlate   clinically.    Multilevel degenerative changes resulting in multilevel spinal canal   stenosis and neural foraminal narrowing as described above. Severe spinal   canal stenosis with compression of the nerve roots and severe right,   moderate left neural foraminal narrowing at L4-5.    --- End of Report ---          Impression:    patient with bronchiectasis, anxiety, admitted a month ago with bad back pain, rigors, group b strept bacteremia with spine osteo. She has been on ctx for about 4 weeks and now comes in for nausea, diarrhea, epigastric pain. Found to have low wbc with bands and high lipase. She now had a semiformed stool but still concerned for cdif. I am also concerned for pancreatitis. Maybe from ctx rare but reported. I doubt new bacteremia since no rigors or fever but will do blood cx too. Her back is feeling much better so doubt uncontrolled infection in the spine. CTx induced bone marrow injury is possible too. Line infection, endocarditis all considerations       Recommendations:  Change antibiotics to iv vanco  will start po vanco, resend stool if diarrhea  ct a/p pends  blood cultures, lactate pend but will hold additional abx for now as she appears overall stable  stool studies  gi consult
INTERVAL HPI/OVERNIGHT EVENTS:  HPI: 69-year-old female with past medical history of spinal stenosis, osteoporosis, hyperlipidemia, bronchiectasis, IgA IgG deficiency recent diagnosis and hospitalization +UA, with a culture growing Group B strep and positive blood Cx also growing group B strep, due to osteomyelitis and lumbar spine, 10/15/24, patient was started on IV antibiotics and discharged with PICC line with continuation of ceftriaxone 2 g daily until , today c/o nausea without vomiting episodes, reports acute diarrhea for  2 days ago but worsened yesterday with approximately 12 episodes of diarrhea in the past 24 hours but last stool being more formed,  denies any chest pain or shortness of breath.  Denies any other reported complaints,  unable to send C. difficile as stool is formed now, ova and parasites.  ed course-  leukopenia and  bandemia noted, GI consulted, ctap- + pancolitis GI PCR sent c. diff unable to test due to no looses tool now spoke with her infectious disease Dr. Zacarias, abtx changed to vancomycin po and IV (30 Oct 2024 14:42)        GI Consult: Patient seen and examined at bedside. Denies abdominal pain, states only abdominal tenderness. Patient states she had 3 loose brown color BM's early this morning. denies any hematochezia/melena. denies N/V.   GI History: As per Patient her GI provider is Dr. Sherman, her last colonoscopy was 6-8 years ago and it was normal, last EGD was 3 years ago and she was diagnosed with GERD and have been taking famotidine 20mg daily for the past 3 years.     MEDICATIONS  (STANDING):  atorvastatin 10 milliGRAM(s) Oral at bedtime  gabapentin 300 milliGRAM(s) Oral at bedtime  lactobacillus acidophilus 1 Tablet(s) Oral three times a day with meals  levomilnacipran ER Capsule 20 milliGRAM(s) Oral daily  pantoprazole  Injectable 40 milliGRAM(s) IV Push daily  sodium chloride 0.9% Bolus 1000 milliLiter(s) IV Bolus once  sodium chloride 0.9%. 1000 milliLiter(s) (100 mL/Hr) IV Continuous <Continuous>  sodium chloride 3%  Inhalation 4 milliLiter(s) Inhalation every 12 hours  vancomycin    Solution 125 milliGRAM(s) Oral every 6 hours  vancomycin  IVPB 1000 milliGRAM(s) IV Intermittent every 24 hours    MEDICATIONS  (PRN):  acetaminophen     Tablet .. 650 milliGRAM(s) Oral every 6 hours PRN Temp greater or equal to 38C (100.4F), Mild Pain (1 - 3)  aluminum hydroxide/magnesium hydroxide/simethicone Suspension 30 milliLiter(s) Oral every 4 hours PRN Dyspepsia  melatonin 3 milliGRAM(s) Oral at bedtime PRN Insomnia  morphine  - Injectable 2 milliGRAM(s) IV Push every 4 hours PRN Moderate Pain (4 - 6)  ondansetron Injectable 4 milliGRAM(s) IV Push every 8 hours PRN Nausea and/or Vomiting      Allergies    Wheat (Diarrhea)  No Known Drug Allergies    Intolerances        PAST MEDICAL & SURGICAL HISTORY:  Sprain rotator cuff  right shoulder      Depression      ADHD (attention deficit hyperactivity disorder)      Anxiety      Mass on back      Bronchiectasis      Spinal stenosis      Lower extremity tendon tear  hip 2002      S/P trigger finger release  right thumb          REVIEW OF SYSTEMS    Negative unless indicated in HPI        PHYSICAL EXAM:   Vital Signs:  Vital Signs Last 24 Hrs  T(C): 37 (31 Oct 2024 05:23), Max: 37.2 (30 Oct 2024 13:27)  T(F): 98.6 (31 Oct 2024 05:23), Max: 99 (30 Oct 2024 13:27)  HR: 76 (31 Oct 2024 09:01) (76 - 85)  BP: 95/63 (31 Oct 2024 05:23) (90/56 - 100/65)  BP(mean): --  RR: 16 (31 Oct 2024 05:23) (15 - 18)  SpO2: 97% (31 Oct 2024 09:01) (93% - 97%)    Parameters below as of 31 Oct 2024 09:01  Patient On (Oxygen Delivery Method): room air      Daily Height in cm: 160.02 (30 Oct 2024 16:48)    Daily Weight in k.3 (31 Oct 2024 05:23)I&O's Summary      GENERAL:  Appears stated age, well-groomed, well-nourished, no distress  HEENT:  NC/AT,  conjunctivae clear and pink,   CHEST:  Full & symmetric excursion, no increased effort, breath sounds clear  HEART:  Regular rhythm,   ABDOMEN:  Soft, non-tender, non-distended, normoactive bowel sounds,   EXTEREMITIES:  no cyanosis, clubbing or edema  SKIN:  warm/dry  NEURO:  Alert, oriented      LABS:                        8.8    2.81  )-----------( 273      ( 31 Oct 2024 07:44 )             26.9     10-31    140  |  107  |  2[L]  ----------------------------<  85  3.2[L]   |  27  |  0.53    Ca    8.0[L]      31 Oct 2024 07:44    TPro  5.9[L]  /  Alb  2.8[L]  /  TBili  0.5  /  DBili  x   /  AST  11  /  ALT  15  /  AlkPhos  59  10-31      Urinalysis Basic - ( 31 Oct 2024 07:44 )    Color: x / Appearance: x / SG: x / pH: x  Gluc: 85 mg/dL / Ketone: x  / Bili: x / Urobili: x   Blood: x / Protein: x / Nitrite: x   Leuk Esterase: x / RBC: x / WBC x   Sq Epi: x / Non Sq Epi: x / Bacteria: x      amylase   Lipase: 350 U/L (10-30 @ 10:55)    RADIOLOGY & ADDITIONAL TESTS:  < from: CT Abdomen and Pelvis w/ IV Cont (10.30.24 @ 12:34) >  ACC: [id number] EXAM:  CT ABDOMEN AND PELVIS IC   ORDERED BY: DANY MAYS     PROCEDURE DATE:  10/30/2024          INTERPRETATION:  CLINICAL INFORMATION: 69 years  Female with abdominal   pain. History of IgA/IgG deficiency    COMPARISON: Noncontrast chest CT 2023 and CT lumbar spine 10/2/2024    CONTRAST/COMPLICATIONS:  IV Contrast: Omnipaque 350  90 cc administered   10 cc discarded  Oral Contrast: NONE  Complications: None reported at time of study completion    PROCEDURE:  CT of the Abdomen and Pelvis was performed.  Sagittal and coronal reformats were performed.    FINDINGS:  LOWER CHEST: Mild bibasilar dependent atelectasis.    LIVER: Mild hepatomegaly. Right lobe 18.8 cm sagittal. Mild steatosis.  BILE DUCTS: Normal caliber.  GALLBLADDER: Within normal limits.  SPLEEN: Moderate splenomegaly measuring 4.3 x 12.1 x 12.9 cm (TR x AP x   CC). 1.3 x 0.8 cm calcified splenic artery aneurysm, likely thrombosed.  PANCREAS: Within normal limits.  ADRENALS: Mildly thickened leftadrenal gland unchanged.  KIDNEYS/URETERS: Within normal limits.    BLADDER: Within normal limits.  REPRODUCTIVE ORGANS: Unremarkable uterus.    BOWEL: No bowel obstruction. Appendix is normal. Pancolonic wall   thickening with mucosal enhancement. Pericolonic fat stranding and small   volume fluid predominantly surrounding the rectosigmoid colon.  PERITONEUM/RETROPERITONEUM: 1.5 x 1.6 cm stable right retrocrural   hypodensity (2:39), either low-attenuation lymph node or prominent   cisterna chyli.  VESSELS: Mild atherosclerosis. Splenic varices.  LYMPH NODES: No lymphadenopathy.  ABDOMINAL WALL: Within normal limits.  BONES: Degenerative disc disease most at L2-3 and L4-5.    IMPRESSION:  Pancolitis, either infectious or inflammatory. Also consider portal   colopathy.    Hepatosplenomegaly. Splenic varices. Rule out portal hypertension.      --- End of Report ---    EDUARDO HAMMER MD; Attending Radiologist  This document has been electronically signed. Oct 30 2024  1:28PM    < end of copied text >

## 2024-10-31 NOTE — DIETITIAN INITIAL EVALUATION ADULT - ADD RECOMMEND
1) Advance diet to regular once medically feasible   2) Monitor daily PO intakes, GI tolerance, labs, weights, skin integrity, & BM regularity

## 2024-11-01 ENCOUNTER — TRANSCRIPTION ENCOUNTER (OUTPATIENT)
Age: 69
End: 2024-11-01

## 2024-11-01 VITALS
HEART RATE: 74 BPM | OXYGEN SATURATION: 98 % | RESPIRATION RATE: 18 BRPM | TEMPERATURE: 98 F | DIASTOLIC BLOOD PRESSURE: 67 MMHG | SYSTOLIC BLOOD PRESSURE: 96 MMHG

## 2024-11-01 LAB
ALBUMIN SERPL ELPH-MCNC: 2.7 G/DL — LOW (ref 3.3–5)
ALP SERPL-CCNC: 62 U/L — SIGNIFICANT CHANGE UP (ref 40–120)
ALT FLD-CCNC: 14 U/L — SIGNIFICANT CHANGE UP (ref 10–45)
ANION GAP SERPL CALC-SCNC: 3 MMOL/L — LOW (ref 5–17)
AST SERPL-CCNC: 12 U/L — SIGNIFICANT CHANGE UP (ref 10–40)
BILIRUB SERPL-MCNC: 0.5 MG/DL — SIGNIFICANT CHANGE UP (ref 0.2–1.2)
BUN SERPL-MCNC: 3 MG/DL — LOW (ref 7–23)
CALCIUM SERPL-MCNC: 8.7 MG/DL — SIGNIFICANT CHANGE UP (ref 8.4–10.5)
CHLORIDE SERPL-SCNC: 108 MMOL/L — SIGNIFICANT CHANGE UP (ref 96–108)
CO2 SERPL-SCNC: 29 MMOL/L — SIGNIFICANT CHANGE UP (ref 22–31)
CREAT SERPL-MCNC: 0.59 MG/DL — SIGNIFICANT CHANGE UP (ref 0.5–1.3)
CRP SERPL-MCNC: 33 MG/L — HIGH
CULTURE RESULTS: SIGNIFICANT CHANGE UP
EGFR: 98 ML/MIN/1.73M2 — SIGNIFICANT CHANGE UP
GLUCOSE SERPL-MCNC: 94 MG/DL — SIGNIFICANT CHANGE UP (ref 70–99)
HCT VFR BLD CALC: 25.6 % — LOW (ref 34.5–45)
HGB BLD-MCNC: 8.5 G/DL — LOW (ref 11.5–15.5)
MAGNESIUM SERPL-MCNC: 2 MG/DL — SIGNIFICANT CHANGE UP (ref 1.6–2.6)
MCHC RBC-ENTMCNC: 27.8 PG — SIGNIFICANT CHANGE UP (ref 27–34)
MCHC RBC-ENTMCNC: 33.2 G/DL — SIGNIFICANT CHANGE UP (ref 32–36)
MCV RBC AUTO: 83.7 FL — SIGNIFICANT CHANGE UP (ref 80–100)
NRBC # BLD: 0 /100 WBCS — SIGNIFICANT CHANGE UP (ref 0–0)
PHOSPHATE SERPL-MCNC: 2 MG/DL — LOW (ref 2.5–4.5)
PLATELET # BLD AUTO: 231 K/UL — SIGNIFICANT CHANGE UP (ref 150–400)
POTASSIUM SERPL-MCNC: 4.3 MMOL/L — SIGNIFICANT CHANGE UP (ref 3.5–5.3)
POTASSIUM SERPL-SCNC: 4.3 MMOL/L — SIGNIFICANT CHANGE UP (ref 3.5–5.3)
PROT SERPL-MCNC: 5.9 G/DL — LOW (ref 6–8.3)
RBC # BLD: 3.06 M/UL — LOW (ref 3.8–5.2)
RBC # FLD: 13.7 % — SIGNIFICANT CHANGE UP (ref 10.3–14.5)
SODIUM SERPL-SCNC: 140 MMOL/L — SIGNIFICANT CHANGE UP (ref 135–145)
SPECIMEN SOURCE: SIGNIFICANT CHANGE UP
WBC # BLD: 2.94 K/UL — LOW (ref 3.8–10.5)
WBC # FLD AUTO: 2.94 K/UL — LOW (ref 3.8–10.5)

## 2024-11-01 PROCEDURE — 87045 FECES CULTURE AEROBIC BACT: CPT

## 2024-11-01 PROCEDURE — 83735 ASSAY OF MAGNESIUM: CPT

## 2024-11-01 PROCEDURE — 85027 COMPLETE CBC AUTOMATED: CPT

## 2024-11-01 PROCEDURE — 84100 ASSAY OF PHOSPHORUS: CPT

## 2024-11-01 PROCEDURE — 83605 ASSAY OF LACTIC ACID: CPT

## 2024-11-01 PROCEDURE — 83690 ASSAY OF LIPASE: CPT

## 2024-11-01 PROCEDURE — 87046 STOOL CULTR AEROBIC BACT EA: CPT

## 2024-11-01 PROCEDURE — 96375 TX/PRO/DX INJ NEW DRUG ADDON: CPT

## 2024-11-01 PROCEDURE — 93005 ELECTROCARDIOGRAM TRACING: CPT

## 2024-11-01 PROCEDURE — 87086 URINE CULTURE/COLONY COUNT: CPT

## 2024-11-01 PROCEDURE — 94640 AIRWAY INHALATION TREATMENT: CPT

## 2024-11-01 PROCEDURE — 87040 BLOOD CULTURE FOR BACTERIA: CPT

## 2024-11-01 PROCEDURE — 87077 CULTURE AEROBIC IDENTIFY: CPT

## 2024-11-01 PROCEDURE — 96366 THER/PROPH/DIAG IV INF ADDON: CPT

## 2024-11-01 PROCEDURE — 81001 URINALYSIS AUTO W/SCOPE: CPT

## 2024-11-01 PROCEDURE — 83993 ASSAY FOR CALPROTECTIN FECAL: CPT

## 2024-11-01 PROCEDURE — 85025 COMPLETE CBC W/AUTO DIFF WBC: CPT

## 2024-11-01 PROCEDURE — 87177 OVA AND PARASITES SMEARS: CPT

## 2024-11-01 PROCEDURE — 96365 THER/PROPH/DIAG IV INF INIT: CPT

## 2024-11-01 PROCEDURE — 87507 IADNA-DNA/RNA PROBE TQ 12-25: CPT

## 2024-11-01 PROCEDURE — 80053 COMPREHEN METABOLIC PANEL: CPT

## 2024-11-01 PROCEDURE — 86140 C-REACTIVE PROTEIN: CPT

## 2024-11-01 PROCEDURE — 99239 HOSP IP/OBS DSCHRG MGMT >30: CPT | Mod: GC

## 2024-11-01 PROCEDURE — 99285 EMERGENCY DEPT VISIT HI MDM: CPT

## 2024-11-01 PROCEDURE — 99233 SBSQ HOSP IP/OBS HIGH 50: CPT

## 2024-11-01 PROCEDURE — 87493 C DIFF AMPLIFIED PROBE: CPT

## 2024-11-01 PROCEDURE — 36415 COLL VENOUS BLD VENIPUNCTURE: CPT

## 2024-11-01 PROCEDURE — 74177 CT ABD & PELVIS W/CONTRAST: CPT | Mod: MC

## 2024-11-01 RX ORDER — VANCOMYCIN HYDROCHLORIDE 50 MG/ML
1 KIT ORAL
Qty: 28 | Refills: 0
Start: 2024-11-01 | End: 2024-11-07

## 2024-11-01 RX ORDER — ONDANSETRON HYDROCHLORIDE 2 MG/ML
1 INJECTION, SOLUTION INTRAMUSCULAR; INTRAVENOUS
Qty: 20 | Refills: 0
Start: 2024-11-01 | End: 2024-11-05

## 2024-11-01 RX ORDER — VANCOMYCIN HYDROCHLORIDE 50 MG/ML
125 KIT ORAL EVERY 6 HOURS
Refills: 0 | Status: DISCONTINUED | OUTPATIENT
Start: 2024-11-01 | End: 2024-11-01

## 2024-11-01 RX ORDER — VANCOMYCIN HYDROCHLORIDE 50 MG/ML
1 KIT ORAL
Qty: 13 | Refills: 0
Start: 2024-11-01 | End: 2024-11-13

## 2024-11-01 RX ADMIN — Medication 1 TABLET(S): at 09:26

## 2024-11-01 RX ADMIN — FAMOTIDINE 20 MILLIGRAM(S): 10 INJECTION INTRAVENOUS at 11:27

## 2024-11-01 RX ADMIN — VANCOMYCIN HYDROCHLORIDE 125 MILLIGRAM(S): KIT at 11:27

## 2024-11-01 RX ADMIN — Medication 650 MILLIGRAM(S): at 00:37

## 2024-11-01 RX ADMIN — Medication 0.12 MILLIGRAM(S): at 01:26

## 2024-11-01 RX ADMIN — SODIUM CHLORIDE 4 MILLILITER(S): 9 INJECTION, SOLUTION INTRAMUSCULAR; INTRAVENOUS; SUBCUTANEOUS at 08:55

## 2024-11-01 RX ADMIN — Medication 1 TABLET(S): at 13:01

## 2024-11-01 RX ADMIN — VANCOMYCIN HYDROCHLORIDE 250 MILLIGRAM(S): KIT at 13:01

## 2024-11-01 NOTE — DISCHARGE NOTE NURSING/CASE MANAGEMENT/SOCIAL WORK - NSDCPEFALRISK_GEN_ALL_CORE
For information on Fall & Injury Prevention, visit: [email or web address] OR  [email or web address] OR  https://www.cdc.gov/steadi/patient.html

## 2024-11-01 NOTE — DISCHARGE NOTE NURSING/CASE MANAGEMENT/SOCIAL WORK - PATIENT PORTAL LINK FT
You can access the FollowMyHealth Patient Portal offered by [facility] by registering at the following website: [email or web address]. By joining [employer]’s FollowMyHealth portal, you will also be able to view your health information using other applications (apps) compatible with our system.

## 2024-11-01 NOTE — PROGRESS NOTE ADULT - SUBJECTIVE AND OBJECTIVE BOX
CC: f/u for  gbs spine infection, diarrhea/pancoligis  Patient reports    REVIEW OF SYSTEMS:  All other review of systems negative (Comprehensive ROS)    Antimicrobials Day #  :  vancomycin    Solution 125 milliGRAM(s) Oral every 6 hours  DAY 3/10  vancomycin  IVPB 1000 milliGRAM(s) IV Intermittent every 24 hours  DAY 28/42 abx iv    Other Medications Reviewed    T(F): 97.9 (11-01-24 @ 07:01), Max: 97.9 (11-01-24 @ 07:01)  HR: 82 (11-01-24 @ 07:01)  BP: 96/63 (11-01-24 @ 07:01)  RR: 18 (11-01-24 @ 07:01)  SpO2: 96% (11-01-24 @ 07:01)  Wt(kg): --    PHYSICAL EXAM:  General: alert, no acute distress  Eyes:  anicteric, no conjunctival injection, no discharge  Oropharynx: no lesions or injection 	  Neck: supple, without adenopathy  Lungs: clear to auscultation  Heart: regular rate and rhythm; no murmur, rubs or gallops  Abdomen: soft, mildly distended, nontender, without mass or organomegaly  Skin: no lesions  Extremities: no clubbing, cyanosis, or edema  Neurologic: alert, oriented, moves all extremities    LAB RESULTS:                        8.5    2.94  )-----------( 231      ( 01 Nov 2024 07:00 )             25.6     11-01    140  |  108  |  3[L]  ----------------------------<  94  4.3   |  29  |  0.59    Ca    8.7      01 Nov 2024 07:00  Phos  2.0     11-01  Mg     2.0     11-01    TPro  5.9[L]  /  Alb  2.7[L]  /  TBili  0.5  /  DBili  x   /  AST  12  /  ALT  14  /  AlkPhos  62  11-01    LIVER FUNCTIONS - ( 01 Nov 2024 07:00 )  Alb: 2.7 g/dL / Pro: 5.9 g/dL / ALK PHOS: 62 U/L / ALT: 14 U/L / AST: 12 U/L / GGT: x           Urinalysis Basic - ( 01 Nov 2024 07:00 )    Color: x / Appearance: x / SG: x / pH: x  Gluc: 94 mg/dL / Ketone: x  / Bili: x / Urobili: x   Blood: x / Protein: x / Nitrite: x   Leuk Esterase: x / RBC: x / WBC x   Sq Epi: x / Non Sq Epi: x / Bacteria: x      MICROBIOLOGY:  RECENT CULTURES:  10-30 @ 18:45 Clean Catch None     >=3 organisms. Probable collection contamination.      10-30 @ 12:40 .Blood BLOOD     No growth at 24 hours      10-30 @ 11:15 .Stool     No Protozoa seen by trichrome stain  No Helminths or Protozoa seen in formalin concentrate  performed by iodine stain  (routine O+P not evaluated for Microsporidia,  Cryptosporidia or Cyclospora)  One negative sample does not necessarily rule  out the presence of a parasitic infection.      10-30 @ 10:30 .Stool     No enteric pathogens to date: Final culture pending          RADIOLOGY REVIEWED:    < from: CT Abdomen and Pelvis w/ IV Cont (10.30.24 @ 12:34) >  TABATHA     PROCEDURE DATE:  10/30/2024          INTERPRETATION:  CLINICAL INFORMATION: 69 years  Female with abdominal   pain. History of IgA/IgG deficiency    COMPARISON: Noncontrast chest CT 12/1/2023 and CT lumbar spine 10/2/2024    CONTRAST/COMPLICATIONS:  IV Contrast: Omnipaque 350  90 cc administered   10 cc discarded  Oral Contrast: NONE  Complications: None reported at time of study completion    PROCEDURE:  CT of the Abdomen and Pelvis was performed.  Sagittal and coronal reformats were performed.    FINDINGS:  LOWER CHEST: Mild bibasilar dependent atelectasis.    LIVER: Mild hepatomegaly. Right lobe 18.8 cm sagittal. Mild steatosis.  BILE DUCTS: Normal caliber.  GALLBLADDER: Within normal limits.  SPLEEN: Moderate splenomegaly measuring 4.3 x 12.1 x 12.9 cm (TR x AP x   CC). 1.3 x 0.8 cm calcified splenic artery aneurysm, likely thrombosed.  PANCREAS: Within normal limits.  ADRENALS: Mildly thickened leftadrenal gland unchanged.  KIDNEYS/URETERS: Within normal limits.    BLADDER: Within normal limits.  REPRODUCTIVE ORGANS: Unremarkable uterus.    BOWEL: No bowel obstruction. Appendix is normal. Pancolonic wall   thickening with mucosal enhancement. Pericolonic fat stranding and small   volume fluid predominantly surrounding the rectosigmoid colon.  PERITONEUM/RETROPERITONEUM: 1.5 x 1.6 cm stable right retrocrural   hypodensity (2:39), either low-attenuation lymph node or prominent   cisterna chyli.  VESSELS: Mild atherosclerosis. Splenic varices.  LYMPH NODES: No lymphadenopathy.  ABDOMINAL WALL: Within normal limits.  BONES: Degenerative disc disease most at L2-3 and L4-5.    IMPRESSION:  Pancolitis, either infectious or inflammatory. Also consider portal   colopathy.    Hepatosplenomegaly. Splenic varices. Rule out portal hypertension.        --- End of Report ---              < end of copied text >            Assessment:  patient with gbs bacteremia and lumbar spine om, was on ctx but developed very bad diarrhea, pancolitis, cdif is negative but cannot totally exclude cdif. I changed her to iv vanco instead of ctx to limit risk of cdif too. SHe is to f/u with her gi Dr. Aure Sherman.   Plan:  iv vanco 1g daily for 14 d starting tomorrow  po vanco 125mg po capsule for 7 d  f/u in my office in next 2-3 weeks  f/u with gi and Dr. Medina  r/w pt, dr medina, dr law

## 2024-11-01 NOTE — PROGRESS NOTE ADULT - PROBLEM SELECTOR PLAN 1
Patient with diarrhea unclear etiology but could be antibiotic related vs infectious. Recommend ruling out IBD with CRP and fecal calprotectin. No need for urgent endoscopic workup at this time.  Pending Calprotectin stool results  Monitor stools   stool culture results GI PCR negative, C Diff. Negative  -antibiotics per ID  -monitor hydration  -Diet as tolerated- suggest advancing diet to regular. Patient with diarrhea unclear etiology but could be antibiotic related vs infectious  No need for urgent endoscopic workup at this time.  Pending Calprotectin stool results  Monitor stools   stool culture results GI PCR negative, C Diff. Negative  -antibiotics per ID  -monitor hydration  -Diet as tolerated- suggest advancing diet to regular.

## 2024-11-01 NOTE — PROGRESS NOTE ADULT - SUBJECTIVE AND OBJECTIVE BOX
INTERVAL HPI/OVERNIGHT EVENTS:  HPI: 69-year-old female with past medical history of spinal stenosis, osteoporosis, hyperlipidemia, bronchiectasis, IgA IgG deficiency recent diagnosis and hospitalization +UA, with a culture growing Group B strep and positive blood Cx also growing group B strep, due to osteomyelitis and lumbar spine, 10/15/24, patient was started on IV antibiotics and discharged with PICC line with continuation of ceftriaxone 2 g daily until 11/14, today c/o nausea without vomiting episodes, reports acute diarrhea for  2 days ago but worsened yesterday with approximately 12 episodes of diarrhea in the past 24 hours but last stool being more formed,  denies any chest pain or shortness of breath.  Denies any other reported complaints,  unable to send C. difficile as stool is formed now, ova and parasites.    ed course-  leukopenia and  bandemia noted, GI consulted, ctap- + pancolitis   GI PCR sent c. diff unable to test due to no looses tool now   spoke with her infectious disease Dr. Zacarias, abtx changed to vancomycin po and IV (30 Oct 2024 14:42)      GI Note: Patient seen and examined at bedside. As per patient she ate a cup of prunes yesterday evening and had 2 Loose stools. Denies any Hematochezia, melena. Denies N/V. Denies abdominal pain.        MEDICATIONS  (STANDING):  atorvastatin 10 milliGRAM(s) Oral at bedtime  famotidine    Tablet 20 milliGRAM(s) Oral daily  gabapentin 300 milliGRAM(s) Oral at bedtime  lactobacillus acidophilus 1 Tablet(s) Oral three times a day with meals  levomilnacipran ER Capsule 20 milliGRAM(s) Oral daily  sodium chloride 0.9% Bolus 1000 milliLiter(s) IV Bolus once  sodium chloride 0.9%. 1000 milliLiter(s) (100 mL/Hr) IV Continuous <Continuous>  sodium chloride 3%  Inhalation 4 milliLiter(s) Inhalation every 12 hours  vancomycin    Solution 125 milliGRAM(s) Oral every 6 hours  vancomycin  IVPB 1000 milliGRAM(s) IV Intermittent every 24 hours    MEDICATIONS  (PRN):  acetaminophen     Tablet .. 650 milliGRAM(s) Oral every 6 hours PRN Temp greater or equal to 38C (100.4F), Mild Pain (1 - 3)  aluminum hydroxide/magnesium hydroxide/simethicone Suspension 30 milliLiter(s) Oral every 4 hours PRN Dyspepsia  hyoscyamine SL 0.125 milliGRAM(s) SubLingual every 8 hours PRN cramping  melatonin 3 milliGRAM(s) Oral at bedtime PRN Insomnia  morphine  - Injectable 2 milliGRAM(s) IV Push every 4 hours PRN Moderate Pain (4 - 6)  ondansetron Injectable 4 milliGRAM(s) IV Push every 8 hours PRN Nausea and/or Vomiting      Allergies    Wheat (Diarrhea)  No Known Drug Allergies    Intolerances        PAST MEDICAL & SURGICAL HISTORY:  Sprain rotator cuff  right shoulder      Depression      ADHD (attention deficit hyperactivity disorder)      Anxiety      Mass on back      Bronchiectasis      Spinal stenosis      Lower extremity tendon tear  hip 2002      S/P trigger finger release  right thumb          REVIEW OF SYSTEMS    Negative unless indicated in HPI        PHYSICAL EXAM:   Vital Signs:  Vital Signs Last 24 Hrs  T(C): 36.6 (01 Nov 2024 07:01), Max: 36.6 (01 Nov 2024 07:01)  T(F): 97.9 (01 Nov 2024 07:01), Max: 97.9 (01 Nov 2024 07:01)  HR: 82 (01 Nov 2024 07:01) (77 - 82)  BP: 96/63 (01 Nov 2024 07:01) (94/62 - 106/68)  BP(mean): --  RR: 18 (01 Nov 2024 07:01) (16 - 18)  SpO2: 96% (01 Nov 2024 07:01) (96% - 99%)    Parameters below as of 01 Nov 2024 07:01  Patient On (Oxygen Delivery Method): room air      Daily     Daily I&O's Summary      GENERAL:  Appears stated age, well-groomed, well-nourished, no distress  HEENT:  NC/AT,  conjunctivae clear and pink,   CHEST:  Full & symmetric excursion, no increased effort, breath sounds clear  HEART:  Regular rhythm,   ABDOMEN:  Soft, non-tender, non-distended, normoactive bowel sounds,   EXTREMITIES  no cyanosis, clubbing or edema  SKIN:  warm/dry  NEURO:  Alert, oriented,      LABS:                        8.5    2.94  )-----------( 231      ( 01 Nov 2024 07:00 )             25.6     11-01    140  |  108  |  3[L]  ----------------------------<  94  4.3   |  29  |  0.59    Ca    8.7      01 Nov 2024 07:00  Phos  2.0     11-01  Mg     2.0     11-01    TPro  5.9[L]  /  Alb  2.7[L]  /  TBili  0.5  /  DBili  x   /  AST  12  /  ALT  14  /  AlkPhos  62  11-01      Urinalysis Basic - ( 01 Nov 2024 07:00 )    Color: x / Appearance: x / SG: x / pH: x  Gluc: 94 mg/dL / Ketone: x  / Bili: x / Urobili: x   Blood: x / Protein: x / Nitrite: x   Leuk Esterase: x / RBC: x / WBC x   Sq Epi: x / Non Sq Epi: x / Bacteria: x      amylase   Lipase: 350 U/L (10-30 @ 10:55)    RADIOLOGY & ADDITIONAL TESTS:   GI Follow up    Patient seen and examined at bedside. As per patient she ate a cup of prunes yesterday evening and had 2 Loose stools. Denies any Hematochezia, melena. Denies N/V. Denies abdominal pain.        MEDICATIONS  (STANDING):  atorvastatin 10 milliGRAM(s) Oral at bedtime  famotidine    Tablet 20 milliGRAM(s) Oral daily  gabapentin 300 milliGRAM(s) Oral at bedtime  lactobacillus acidophilus 1 Tablet(s) Oral three times a day with meals  levomilnacipran ER Capsule 20 milliGRAM(s) Oral daily  sodium chloride 0.9% Bolus 1000 milliLiter(s) IV Bolus once  sodium chloride 0.9%. 1000 milliLiter(s) (100 mL/Hr) IV Continuous <Continuous>  sodium chloride 3%  Inhalation 4 milliLiter(s) Inhalation every 12 hours  vancomycin    Solution 125 milliGRAM(s) Oral every 6 hours  vancomycin  IVPB 1000 milliGRAM(s) IV Intermittent every 24 hours    MEDICATIONS  (PRN):  acetaminophen     Tablet .. 650 milliGRAM(s) Oral every 6 hours PRN Temp greater or equal to 38C (100.4F), Mild Pain (1 - 3)  aluminum hydroxide/magnesium hydroxide/simethicone Suspension 30 milliLiter(s) Oral every 4 hours PRN Dyspepsia  hyoscyamine SL 0.125 milliGRAM(s) SubLingual every 8 hours PRN cramping  melatonin 3 milliGRAM(s) Oral at bedtime PRN Insomnia  morphine  - Injectable 2 milliGRAM(s) IV Push every 4 hours PRN Moderate Pain (4 - 6)  ondansetron Injectable 4 milliGRAM(s) IV Push every 8 hours PRN Nausea and/or Vomiting      Allergies    Wheat (Diarrhea)  No Known Drug Allergies    Intolerances          PHYSICAL EXAM:   Vital Signs:  Vital Signs Last 24 Hrs  T(C): 36.6 (01 Nov 2024 07:01), Max: 36.6 (01 Nov 2024 07:01)  T(F): 97.9 (01 Nov 2024 07:01), Max: 97.9 (01 Nov 2024 07:01)  HR: 82 (01 Nov 2024 07:01) (77 - 82)  BP: 96/63 (01 Nov 2024 07:01) (94/62 - 106/68)  BP(mean): --  RR: 18 (01 Nov 2024 07:01) (16 - 18)  SpO2: 96% (01 Nov 2024 07:01) (96% - 99%)    Parameters below as of 01 Nov 2024 07:01  Patient On (Oxygen Delivery Method): room air      Daily     Daily I&O's Summary      GENERAL:  NAD  HEENT:  NC/AT,  conjunctivae clear   CHEST:  Full & symmetric excursion, no increased effort, breath sounds clear  HEART:  Regular rhythm,   ABDOMEN:  Soft, non-tender, non-distended, normoactive bowel sounds,   EXTREMITIES  no edema  SKIN:  warm/dry  NEURO:  Alert, oriented      LABS:                        8.5    2.94  )-----------( 231      ( 01 Nov 2024 07:00 )             25.6     11-01    140  |  108  |  3[L]  ----------------------------<  94  4.3   |  29  |  0.59    Ca    8.7      01 Nov 2024 07:00  Phos  2.0     11-01  Mg     2.0     11-01    TPro  5.9[L]  /  Alb  2.7[L]  /  TBili  0.5  /  DBili  x   /  AST  12  /  ALT  14  /  AlkPhos  62  11-01

## 2024-11-01 NOTE — PROGRESS NOTE ADULT - ASSESSMENT
69-year-old female with past medical history of spinal stenosis, osteoporosis, hyperlipidemia, bronchiectasis, IgA IgG deficiency recent diagnosis and hospitalization +UA, with a culture growing Group B strep and positive blood Cx also growing group B strep, due to osteomyelitis and lumbar spine, 10/15/24, patient was started on IV antibiotics and discharged with PICC line with continuation of ceftriaxone 2 g daily until 11/14, today c/o nausea without vomiting episodes, reports acute diarrhea for  2 days ago but worsened yesterday with approximately 12 episodes of diarrhea in the past 24 hours but last stool being more formed,  denies any chest pain or shortness of breath.  Denies any other reported complaints,  unable to send C. difficile as stool is formed now, ova and parasites.    ed course-  leukopenia and  bandemia noted, GI consulted, ctap- + pancolitis   GI PCR sent c. diff unable to test due to no looses tool now   spoke with her infectious disease Dr. Zacarias, abtx changed to vancomycin po and IV (30 Oct 2024 14:42)      GI Note: Patient seen and examined at bedside. As per patient she ate a cup of prunes yesterday evening and had 2 Loose stools. Denies any Hematochezia, melena. Denies N/V. Denies abdominal pain.   69-year-old female with past medical history of spinal stenosis, osteoporosis, hyperlipidemia, bronchiectasis, IgA IgG deficiency recent diagnosis and hospitalization +UA, with a culture growing Group B strep and positive blood Cx also growing group B strep, due to osteomyelitis and lumbar spine, 10/15/24, patient was started on IV antibiotics and discharged with PICC line with continuation of ceftriaxone 2 g daily until 11/14, today c/o nausea without vomiting episodes, reports acute diarrhea for  2 days ago but worsened yesterday with approximately 12 episodes of diarrhea in the past 24 hours but last stool being more formed,  denies any chest pain or shortness of breath.  Denies any other reported complaints,  unable to send C. difficile as stool is formed now, ova and parasites.    ed course-  leukopenia and  bandemia noted, GI consulted, ctap- + pancolitis   GI PCR sent c. diff unable to test due to no looses tool now   spoke with her infectious disease Dr. Zacarias, abtx changed to vancomycin po and IV (30 Oct 2024 14:42)

## 2024-11-01 NOTE — PROGRESS NOTE ADULT - PROVIDER SPECIALTY LIST ADULT
Family Medicine
Family Medicine
Infectious Disease
Infectious Disease
Gastroenterology
I attest my time as attending is greater than 50% of the total combined time spent on qualifying patient care activities by the PA/NP and attending.

## 2024-11-01 NOTE — DISCHARGE NOTE NURSING/CASE MANAGEMENT/SOCIAL WORK - FINANCIAL ASSISTANCE
[facility] provides services at a reduced cost to those who are determined to be eligible through [facility]’s financial assistance program. Information regarding [facility]’s financial assistance program can be found by going to [email or web address] or by calling [phone].

## 2024-11-01 NOTE — DISCHARGE NOTE NURSING/CASE MANAGEMENT/SOCIAL WORK - NSDCVIVACCINE_GEN_ALL_CORE_FT
Influenza, high-dose, trivalent, preservative free; 15-Oct-2024 12:18; Yolanda Cifuentes); [employer]; [id number] (Exp. Date: 01-Jun-2025); IntraMuscular; Deltoid Right.; 0.5 milliLiter(s); VIS (VIS Published: 06-Aug-2021, VIS Presented: 15-Oct-2024);

## 2024-11-01 NOTE — PROGRESS NOTE ADULT - ATTENDING COMMENTS
Please see resident note for full details regarding the service.     PE: A+Ox3, no murmurs, lungs CTA b/l, and soft/NT/ND, no lower extremity swelling     Assessment:  - Abdominal pain secondary to pancolitis  - Leukopenia and bandemia   - Normocytic anemia   - Hypoalbuminemia   - Elevated lipase   - History of spinal stenosis, osteoporosis, hyperlipidemia, bronchiectasis, IgA IgG deficiency recent diagnosis and hospitalization +UA, with a culture growing Group B strep and positive blood Cx also growing group B strep, due to osteomyelitis and lumbar spine, 10/15/24, patient was started on IV antibiotics and discharged with PICC line with continuation of ceftriaxone 2 g daily until 11/14    Plan:   - Tolerating regular diet well   - Pain medications PRN   - C. diff negative, stool culture negative, GI PCR negative, BCx x 2 NGTD   - Spoke to Dr. Zacarias (ID) today - c/w IV Vancomycin 1 g for 2 weeks, c/w PO Vancomycin Q6H for 1 week, outpatient follow up recommended   - DVT ppx: SCDs  - Dispo: d/c home today     I spent a total of 50 minutes on the date of this encounter coordinating the patient's care, excluding resident teaching time. This includes reviewing results/imaging and discussions with specialists, nursing, case management/social work. Further tests, medications, and procedures have been ordered as indicated. Results and the plan of care were communicated to the patient and/or their family member. Supporting documentation was completed and added to the patient's chart.
Please see resident note for full details regarding the service.     PE: A+Ox3, no murmurs, lungs CTA b/l, and soft/NT/ND, no lower extremity swelling     Assessment:  - Abdominal pain secondary to pancolitis  - Leukopenia and bandemia   - Normocytic anemia   - Hypoalbuminemia   - Elevated lipase   - History of spinal stenosis, osteoporosis, hyperlipidemia, bronchiectasis, IgA IgG deficiency recent diagnosis and hospitalization +UA, with a culture growing Group B strep and positive blood Cx also growing group B strep, due to osteomyelitis and lumbar spine, 10/15/24, patient was started on IV antibiotics and discharged with PICC line with continuation of ceftriaxone 2 g daily until 11/14    Plan:   - CT abd/pelvis: Pancolitis, either infectious or inflammatory. Also consider portal colopathy. Hepatosplenomegaly. Splenic varices. Rule out portal hypertension.  - Continue with clear liquid diet -> advance as tolerated   - Pain medications PRN   - Follow up GI PCR, BCx x 2, UA, UCx, CT abd/pelvis    - ID - change antibiotics to IV Vancomycin, start PO vancomycin, resend stool if diarrhea   - GI consult   - DVT ppx: SCDs  - Dispo: ID follow up, BCx x 2. Anticipate d/c in 24-48 hours     I spent a total of 50 minutes on the date of this encounter coordinating the patient's care, excluding resident teaching time. This includes reviewing results/imaging and discussions with specialists, nursing, case management/social work. Further tests, medications, and procedures have been ordered as indicated. Results and the plan of care were communicated to the patient and/or their family member. Supporting documentation was completed and added to the patient's chart.

## 2024-11-01 NOTE — PROGRESS NOTE ADULT - NUTRITIONAL ASSESSMENT
This patient has been assessed with a concern for Malnutrition and has been determined to have a diagnosis/diagnoses of Moderate protein-calorie malnutrition and Underweight (BMI < 19).    This patient is being managed with:   Diet Regular-  Low Fat (LOWFAT)  Entered: Oct 31 2024  3:29PM  
This patient has been assessed with a concern for Malnutrition and has been determined to have a diagnosis/diagnoses of Moderate protein-calorie malnutrition and Underweight (BMI < 19).    This patient is being managed with:   Diet Regular-  Low Fat (LOWFAT)  Entered: Oct 31 2024  3:29PM

## 2024-11-01 NOTE — PROGRESS NOTE ADULT - SUBJECTIVE AND OBJECTIVE BOX
HPI:  69-year-old female with past medical history of spinal stenosis, osteoporosis, hyperlipidemia, bronchiectasis, IgA IgG deficiency recent diagnosis and hospitalization +UA, with a culture growing Group B strep and positive blood Cx also growing group B strep, due to osteomyelitis and lumbar spine, 10/15/24, patient was started on IV antibiotics and discharged with PICC line with continuation of ceftriaxone 2 g daily until 11/14, today c/o nausea without vomiting episodes, reports acute diarrhea for  2 days ago but worsened yesterday with approximately 12 episodes of diarrhea in the past 24 hours but last stool being more formed,  denies any chest pain or shortness of breath.  Denies any other reported complaints,  unable to send C. difficile as stool is formed now, ova and parasites.        ed course-  leukopenia and  bandemia noted, GI consulted, ctap- + pancolitis   GI PCR sent c. diff unable to test due to no looses tool now   spoke with her infectious disease Dr. Zacarias, abtx changed to vancomycin po and IV    Overnight events: None  Interval Events: Patient was seen and examined at bedside. States that abdominal pain is improved. No other complaints today.     REVIEW OF SYSTEMS:  CONSTITUTIONAL: No weakness, fevers or chills  EYES/ENT: No visual changes;  No vertigo or throat pain   NECK: No pain or stiffness  RESPIRATORY: No cough, wheezing, hemoptysis; No shortness of breath  CARDIOVASCULAR: No chest pain or palpitations  GASTROINTESTINAL: +abdominal/epigastric pain. No nausea, vomiting, or hematemesis; No diarrhea or constipation. No melena or hematochezia.  GENITOURINARY: No dysuria, frequency or hematuria  NEUROLOGICAL: No numbness or weakness  SKIN: No itching, burning, rashes, or lesions   All other review of systems is negative unless indicated above.    OBJECTIVE:  Vital Signs Last 24 Hrs  T(C): 36.6 (01 Nov 2024 12:32), Max: 36.6 (01 Nov 2024 07:01)  T(F): 97.9 (01 Nov 2024 12:32), Max: 97.9 (01 Nov 2024 07:01)  HR: 74 (01 Nov 2024 12:32) (74 - 82)  BP: 96/67 (01 Nov 2024 12:32) (96/63 - 106/68)  BP(mean): --  RR: 18 (01 Nov 2024 12:32) (18 - 18)  SpO2: 98% (01 Nov 2024 12:32) (96% - 99%)    Parameters below as of 01 Nov 2024 12:32  Patient On (Oxygen Delivery Method): room air    PHYSICAL EXAM:  General: NAD, laying in bed comfortably, awake and alert   ENT/Neck: Neck supple, No JVD, Gross hearing intact  Respiratory: CTA B/L, No wheezing, rales, rhonchi  CV: RRR, +S1/S2, -S3/S4, no murmurs, rubs or gallops  Abdominal: +minimal generalized abdominal tenderness on palpation. Soft, Nondistended, +bowel sounds x 4. No HSM  MSK: 5/5 strength UE/LE bilaterally  Extremities: No edema, 2+ peripheral pulses  Skin: +PICC line in left upper extremity, clean/dry/intact, no erythema or drainage noted. No Rashes, Hematoma, Ecchymosis    HOSPITAL MEDICATIONS:  MEDICATIONS  (STANDING):  atorvastatin 10 milliGRAM(s) Oral at bedtime  famotidine    Tablet 20 milliGRAM(s) Oral daily  gabapentin 300 milliGRAM(s) Oral at bedtime  lactobacillus acidophilus 1 Tablet(s) Oral three times a day with meals  levomilnacipran ER Capsule 20 milliGRAM(s) Oral daily  sodium chloride 0.9% Bolus 1000 milliLiter(s) IV Bolus once  sodium chloride 0.9%. 1000 milliLiter(s) (100 mL/Hr) IV Continuous <Continuous>  sodium chloride 3%  Inhalation 4 milliLiter(s) Inhalation every 12 hours  vancomycin    Solution 125 milliGRAM(s) Oral every 6 hours  vancomycin  IVPB 1000 milliGRAM(s) IV Intermittent every 24 hours    MEDICATIONS  (PRN):  acetaminophen     Tablet .. 650 milliGRAM(s) Oral every 6 hours PRN Temp greater or equal to 38C (100.4F), Mild Pain (1 - 3)  aluminum hydroxide/magnesium hydroxide/simethicone Suspension 30 milliLiter(s) Oral every 4 hours PRN Dyspepsia  hyoscyamine SL 0.125 milliGRAM(s) SubLingual every 8 hours PRN cramping  melatonin 3 milliGRAM(s) Oral at bedtime PRN Insomnia  morphine  - Injectable 2 milliGRAM(s) IV Push every 4 hours PRN Moderate Pain (4 - 6)  ondansetron Injectable 4 milliGRAM(s) IV Push every 8 hours PRN Nausea and/or Vomiting      LABS:                        8.5    2.94  )-----------( 231      ( 01 Nov 2024 07:00 )             25.6     11-01    140  |  108  |  3[L]  ----------------------------<  94  4.3   |  29  |  0.59    Ca    8.7      01 Nov 2024 07:00  Phos  2.0     11-01  Mg     2.0     11-01    TPro  5.9[L]  /  Alb  2.7[L]  /  TBili  0.5  /  DBili  x   /  AST  12  /  ALT  14  /  AlkPhos  62  11-01      MICROBIOLOGY:     Urinalysis with Rflx Culture (collected 30 Oct 2024 18:45)    IMAGING  CTAP with IV contrast 10/30/2024  IMPRESSION:  Pancolitis, either infectious or inflammatory. Also consider portal   colopathy.    Hepatosplenomegaly. Splenic varices. Rule out portal hypertension.

## 2024-11-04 ENCOUNTER — APPOINTMENT (OUTPATIENT)
Dept: FAMILY MEDICINE | Facility: CLINIC | Age: 69
End: 2024-11-04
Payer: COMMERCIAL

## 2024-11-04 VITALS
OXYGEN SATURATION: 93 % | HEIGHT: 63.5 IN | BODY MASS INDEX: 17.72 KG/M2 | WEIGHT: 101.25 LBS | HEART RATE: 73 BPM | SYSTOLIC BLOOD PRESSURE: 90 MMHG | RESPIRATION RATE: 18 BRPM | DIASTOLIC BLOOD PRESSURE: 76 MMHG | TEMPERATURE: 97 F

## 2024-11-04 DIAGNOSIS — Z09 ENCOUNTER FOR FOLLOW-UP EXAMINATION AFTER COMPLETED TREATMENT FOR CONDITIONS OTHER THAN MALIGNANT NEOPLASM: ICD-10-CM

## 2024-11-04 DIAGNOSIS — Z76.89 PERSONS ENCOUNTERING HEALTH SERVICES IN OTHER SPECIFIED CIRCUMSTANCES: ICD-10-CM

## 2024-11-04 PROCEDURE — 99496 TRANSJ CARE MGMT HIGH F2F 7D: CPT

## 2024-11-04 NOTE — PHYSICAL EXAM
[No Acute Distress] : no acute distress [No Respiratory Distress] : no respiratory distress  [Normal Rate] : normal rate  [Soft] : abdomen soft [Non Tender] : non-tender [de-identified] : no visible skin findings on area of sensitve skin

## 2024-11-04 NOTE — HISTORY OF PRESENT ILLNESS
[FreeTextEntry1] : CPE  [de-identified] : 70 yo F PMH IgA/IGg immunodeficiency, , bronchiectasis, Multilevel degenerative disc disease asthma osteoporosis anxiety, insomnia  presenting today as a new patient for hospital follow-up.    She was hospitalized for osteomyelitis, the hospitalized for pancolitis, she is on PO and IV Vancomycin, has a PICC line, to be continued till 11/14.  She has f/u with ID in 1 month and plans to call GI for f/u apt.   Denies current fevers/chills  She notes two findings on her hand that are tender that she had prior to hospitalization.  She feels patch of sensitive skin on back even in the hospital that she attributes to starting medication.    She follows with pain management (Dr. Wilkerson/Dr. Price), GYN, Neuro (Dr. Gonzalez), Psych (Dr. Adenike Wilson), Pulm (Dr. Benoit, ID (Dr. Ma).   social: lives with , granddaughter, and son who has autism, currently not working outside the home, rare smoking in college years

## 2024-11-04 NOTE — PHYSICAL EXAM
[No Acute Distress] : no acute distress [No Respiratory Distress] : no respiratory distress  [Normal Rate] : normal rate  [Soft] : abdomen soft [Non Tender] : non-tender [de-identified] : no visible skin findings on area of sensitve skin

## 2024-11-04 NOTE — ASSESSMENT
[FreeTextEntry1] : 68 yo F PMH IgA/IGg immunodeficiency, , bronchiectasis, Multilevel degenerative disc disease asthma osteoporosis anxiety, insomnia presenting today as a new patient for hospital follow-up.   Neftali Silva  Gastroenterology  [address] [facility] [address]  [place] [address]  Phone: [phone]  Fax: [phone]  Follow Up Time: 7-10 Days   Neftali Silva  Gastroenterology  [address] [facility] [facility], [address]  [place] [address]  Phone: [phone]  Fax: [phone]  Follow Up Time: 7-10 Days

## 2024-11-04 NOTE — HISTORY OF PRESENT ILLNESS
[FreeTextEntry1] : CPE  [de-identified] : 70 yo F PMH IgA/IGg immunodeficiency, , bronchiectasis, Multilevel degenerative disc disease asthma osteoporosis anxiety, insomnia  presenting today as a new patient for hospital follow-up.    She was hospitalized for osteomyelitis, the hospitalized for pancolitis, she is on PO and IV Vancomycin, has a PICC line, to be continued till 11/14.  She has f/u with ID in 1 month and plans to call GI for f/u apt.   Denies current fevers/chills  She notes two findings on her hand that are tender that she had prior to hospitalization.  She feels patch of sensitive skin on back even in the hospital that she attributes to starting medication.    She follows with pain management (Dr. Wilkerson/Dr. Price), GYN, Neuro (Dr. Gonzalez), Psych (Dr. Adenike Wilson), Pulm (Dr. Benoit, ID (Dr. Ma).   social: lives with , granddaughter, and son who has autism, currently not working outside the home, rare smoking in college years

## 2024-11-05 LAB — CALPROTECTIN STL-MCNT: 4590 UG/G — HIGH (ref 0–120)

## 2024-11-11 DIAGNOSIS — E78.5 HYPERLIPIDEMIA, UNSPECIFIED: ICD-10-CM

## 2024-11-12 ENCOUNTER — RX RENEWAL (OUTPATIENT)
Age: 69
End: 2024-11-12

## 2024-11-14 DIAGNOSIS — M25.551 PAIN IN RIGHT HIP: ICD-10-CM

## 2024-11-15 ENCOUNTER — APPOINTMENT (OUTPATIENT)
Dept: INTERNAL MEDICINE | Facility: CLINIC | Age: 69
End: 2024-11-15

## 2024-11-18 ENCOUNTER — APPOINTMENT (OUTPATIENT)
Dept: NEUROSURGERY | Facility: CLINIC | Age: 69
End: 2024-11-18
Payer: COMMERCIAL

## 2024-11-18 ENCOUNTER — APPOINTMENT (OUTPATIENT)
Dept: PSYCHIATRY | Facility: CLINIC | Age: 69
End: 2024-11-18
Payer: COMMERCIAL

## 2024-11-18 VITALS
HEIGHT: 63.5 IN | OXYGEN SATURATION: 96 % | BODY MASS INDEX: 17.67 KG/M2 | HEART RATE: 90 BPM | DIASTOLIC BLOOD PRESSURE: 68 MMHG | RESPIRATION RATE: 17 BRPM | SYSTOLIC BLOOD PRESSURE: 101 MMHG | WEIGHT: 101 LBS

## 2024-11-18 DIAGNOSIS — Z87.891 PERSONAL HISTORY OF NICOTINE DEPENDENCE: ICD-10-CM

## 2024-11-18 DIAGNOSIS — F51.05 ANXIETY DISORDER, UNSPECIFIED: ICD-10-CM

## 2024-11-18 DIAGNOSIS — M54.50 LOW BACK PAIN, UNSPECIFIED: ICD-10-CM

## 2024-11-18 DIAGNOSIS — F41.9 ANXIETY DISORDER, UNSPECIFIED: ICD-10-CM

## 2024-11-18 DIAGNOSIS — M48.07 SPINAL STENOSIS, LUMBOSACRAL REGION: ICD-10-CM

## 2024-11-18 DIAGNOSIS — F41.1 GENERALIZED ANXIETY DISORDER: ICD-10-CM

## 2024-11-18 PROCEDURE — 99215 OFFICE O/P EST HI 40 MIN: CPT

## 2024-11-18 PROCEDURE — 99214 OFFICE O/P EST MOD 30 MIN: CPT

## 2024-11-18 RX ORDER — HYOSCYAMINE SULFATE 0.12 MG/1
0.12 TABLET SUBLINGUAL AT BEDTIME
Refills: 0 | Status: ACTIVE | COMMUNITY
Start: 2024-11-18

## 2024-11-18 NOTE — HISTORY OF PRESENT ILLNESS
[FreeTextEntry1] : Pt taking is some supplement OTC for sleep- valerian root, passionflower, which she thinks is helping.  Temporarily stopped taking Trazodone. Pt reports that generic Donnatal as well as 300 mg of Gabapentin help for sleep.  Pt states " I live with a broken heart." Pt treatment outcome undermined by her complicated family dynamics.  Pt reports that she had stitches in August in her foot, had recently been hospitalized at [facility] for sepsis. Pt spouse also had a PPM placed around the same time. Pt discussed her family vacation to [facility] and may have picked up the infection there while on the cruise. Pt reports no change in appetite, back pain continues, pt has significant findings on MRI- ( which were discussed with the undersigned by PM&R. No change warranted in standing medication,

## 2024-11-18 NOTE — PLAN
[No] : No [Medication education provided] : Medication education provided. [Rationale for medication choices, possible risks/precautions, benefits, alternative treatment choices, and consequences of non-treatment discussed] : Rationale for medication choices, possible risks/precautions, benefits, alternative treatment choices, and consequences of non-treatment discussed with patient/family/caregiver  [FreeTextEntry4] : Meeting goals of care-Pt struggles with co-dependency but nay with 12 step and [place].  [FreeTextEntry5] : Continue Fetzima, supportive therapy regarding small victories she is able to have with her granddaughter and  recognition of her limitations in her circumstances, and to stay focused on her overall health.  Will be seeing Dr. Amador, addressing dental issues, addressing osteoporosis, and GI concerns.

## 2024-11-18 NOTE — PHYSICAL EXAM
[Well groomed] : well groomed [Appears younger than age] : appears younger than age [Cooperative] : cooperative [Euthymic] : euthymic [Full] : full [Clear] : clear [None] : none [None Reported] : none reported [Average] : average [WNL] : within normal limits [Circumstantial] : circumstantial [Tangential] : tangential [FreeTextEntry6] : aware she is digressing.

## 2024-11-18 NOTE — REASON FOR VISIT
[Patient] : Patient [Post-Hospitalization Visit] : This is a post-hospitalization visit [FreeTextEntry1] : Pt here for management of anxiety and mood disorder.

## 2024-11-18 NOTE — HISTORY OF PRESENT ILLNESS
[FreeTextEntry1] : Pt taking is some supplement OTC for sleep- valerian root, passionflower, which she thinks is helping.  Temporarily stopped taking Trazodone. Pt reports that generic Donnatal as well as 300 mg of Gabapentin help for sleep.  Pt states " I live with a broken heart." Pt treatment outcome undermined by her complicated family dynamics.  Pt reports that she had stitches in August in her foot, had recently been hospitalized at [place] for sepsis. Pt spouse also had a PPM placed around the same time. Pt discussed her family vacation to [facility] and may have picked up the infection there while on the cruise. Pt reports no change in appetite, back pain continues, pt has significant findings on MRI- ( which were discussed with the undersigned by PM&R. No change warranted in standing medication,

## 2024-11-20 PROBLEM — M54.50 LUMBAGO: Status: ACTIVE | Noted: 2018-04-16

## 2024-11-20 NOTE — ASSESSMENT
[FreeTextEntry1] : IMPRESSION: MRI LS 10/4/24 Nonspecific endplate edema/enhancement and high T2 disc signal at L4-5 as described above which may represent discitis/ osteomyelitis with phlegmon in the correct clinical setting. No loculated fluid collection in the paraspinal or epidural spaces to suggest abscess collection. Correlate clinically.  Multilevel degenerative changes resulting in multilevel spinal canal stenosis and neural foraminal narrowing as described above. Severe spinal canal stenosis with compression of the nerve roots and severe right, moderate left neural foraminal narrowing at L4-5.   PLAN: 1. Surgical intervention not recommended at this time.  2. Explained that radiographically there is evidence of lumbar spine stenosis however she does not have any lower back pain currently but has right hip pain.  3. Discussed with patient that the infection could have left some lumbar spine instability, and we suggest follow up lumbar spine x-ray ap/lat, flex/ext.   Pt referenced an unfavorable childhood experience with exposure to radiation and does not wish to pursue at this time.  States that she will call our office when she decides to complete the x-rays.

## 2024-11-20 NOTE — REVIEW OF SYSTEMS
[Abnormal Sensation] : an abnormal sensation [Negative] : Endocrine [de-identified] : right hip pain

## 2024-11-20 NOTE — HISTORY OF PRESENT ILLNESS
[FreeTextEntry1] : Neva Pandya is a 69 year old female who presented in May 2023 with c/o back pain is primarily on her right side to her buttocks with numbness and tingling in her leg.  Per chart review there was a tentative plan for L2-5 posterior decompression and instrumented fusion and patient will call when she is ready to schedule.  Pt  was hospitalized from 10/30/24 - 11/1/24 with +UA, with a culture growing Group B strep and positive blood Cx also growing group B strep, due to osteomyelitis and lumbar spine, 10/15/24, patient was started on IV antibiotics and discharged with PICC line with continuation of ceftriaxone 2 g daily until 11/14,and presents for a follow up visit.    Today she reports that she is feeling better and that PICC line was removed on 11/14.  She denies lower back pain but endorses right hip pain.  She denies numbness and tingling sensation in lower extremities  Explained that radiographically there is evidence of lumbar spine stenosis however she does not have any lower back pain currently but has right hip pain.  Discussed with patient that the infection could have left some lumbar spine instability, and we suggest follow up lumbar spine x-ray ap/lat, flex/ext.   Pt referenced an unfavorable childhood experience with exposure to radiation and does not wish to pursue at this time.  States that she will call our office when she decides to complete the x-rays.  States that she has planned PT sessions for the right hip.  She takes Gabapentin 300 mg daily

## 2024-11-20 NOTE — REVIEW OF SYSTEMS
[Abnormal Sensation] : an abnormal sensation [Negative] : Endocrine [de-identified] : right hip pain

## 2024-11-20 NOTE — PHYSICAL EXAM
[General Appearance - Alert] : alert [General Appearance - Well Nourished] : well nourished [General Appearance - Well-Appearing] : healthy appearing [Oriented To Time, Place, And Person] : oriented to person, place, and time [Motor Tone] : muscle tone was normal in all four extremities [Sensation Tactile Decrease] : light touch was intact [Sclera] : the sclera and conjunctiva were normal [Outer Ear] : the ears and nose were normal in appearance [Neck Appearance] : the appearance of the neck was normal [] : no respiratory distress [Respiration, Rhythm And Depth] : normal respiratory rhythm and effort [Exaggerated Use Of Accessory Muscles For Inspiration] : no accessory muscle use [Heart Rate And Rhythm] : heart rate was normal and rhythm regular [Abnormal Walk] : normal gait [Involuntary Movements] : no involuntary movements were seen [Skin Color & Pigmentation] : normal skin color and pigmentation

## 2024-11-20 NOTE — END OF VISIT
[Time Spent: ___ minutes] : I have spent [unfilled] minutes of time on the encounter which excludes teaching and separately reported services. [FreeTextEntry3] : I, Dr.Daniel Mensah, personally performed the evaluation and management (E/M) services for this established patient who presents today with (a) new problem(s)/exacerbation of (an) existing condition(s). That E/M includes conducting the clinically appropriate interval history &/or exam, assessing all new/exacerbated conditions, and establishing a new plan of care. Today, my RICKEY, Donna Sim DNP, was here to observe my evaluation and management service for this new problem/exacerbated condition and follow the plan of care established by me going forward.

## 2024-11-22 NOTE — CHART NOTE - NSCHARTNOTEFT_GEN_A_CORE
Patient was outreached but did not answer. A voicemail was left for the patient to return our call.
Reached out to Dr. Jansen (patient's PCP) via teams to sign out patient.
As per nurse pt was experiencing vague abdominal pain and 2 episodes of unwitnessed diarrhea. At bedside pt is resting, not in any acute pain. Pt states she is having "all over abdominal pain" pt states she usually takes 2 tablets of hyoscyamine at home, and at the hospital she has only been getting one dose. Pt given  Tylenol. Vancomycin solution dc'd as pt is negative for C. diff
As per nurse, pt experiencing vague abdominal pain that hasn't changed in acuity. Pt states she wants to take Hyoscyamine, as she was taking that at home for her abdominal symptoms. Pt given 650mg Tylenol IVPB

## 2024-12-07 DIAGNOSIS — R51.9 HEADACHE, UNSPECIFIED: ICD-10-CM

## 2024-12-07 RX ORDER — RIZATRIPTAN BENZOATE 5 MG/1
5 TABLET ORAL
Qty: 9 | Refills: 1 | Status: ACTIVE | COMMUNITY
Start: 2024-12-07 | End: 1900-01-01

## 2024-12-10 ENCOUNTER — APPOINTMENT (OUTPATIENT)
Dept: PHYSICAL MEDICINE AND REHAB | Facility: CLINIC | Age: 69
End: 2024-12-10
Payer: COMMERCIAL

## 2024-12-10 VITALS
SYSTOLIC BLOOD PRESSURE: 102 MMHG | OXYGEN SATURATION: 91 % | HEART RATE: 86 BPM | WEIGHT: 105 LBS | TEMPERATURE: 97.7 F | HEIGHT: 63.5 IN | BODY MASS INDEX: 18.38 KG/M2 | DIASTOLIC BLOOD PRESSURE: 67 MMHG

## 2024-12-10 DIAGNOSIS — M51.369: ICD-10-CM

## 2024-12-10 PROCEDURE — 99214 OFFICE O/P EST MOD 30 MIN: CPT

## 2024-12-10 NOTE — HISTORY OF PRESENT ILLNESS
[FreeTextEntry1] : Neva Pandya is a 69 year old female with h/o chronic LBP, hip pain, recent admission for OM   Pt was hospitalized from 10/30/24 - 11/1/24 with +UA, with a culture growing Group B strep and positive blood Cx also growing group B strep, due to osteomyelitis and lumbar spine, 10/15/24, patient was started on IV antibiotics and discharged with PICC line with continuation of ceftriaxone 2 g daily until 11/14,and presents for a follow up visit after dc from acute rehab  She reports continued back pain She is taking gabapentin 300 mg, she had stopped Morphine sulphate but reports significant pain in the evening   started PT , also doing yog a she reports good balance

## 2024-12-10 NOTE — PHYSICAL EXAM
[FreeTextEntry1] : General Appearance: Well developed, well nourished, in no acute distress. Skin: Inspection of the skin reveals no rashes or ulcerations. HEENT: The sclerae were anicteric and conjunctivae were pink and moist.  Chest: Normal chest expansion. Abdomen: Soft and non-tender with normal bowel sounds. Musculoskeletal: There is tenderness over lumbar paraspinals. Decreased range of motion of the lumbar spine. No gross bony deformities. Straight leg raise test is negative. Normal range of motion bilateral hips. Pravin/ALIN test is negative. Extremities: No cyanosis, clubbing or edema. Neurologic: Cranial nerves are intact The patient has normal muscle strength in bilateral upper and lower extremities. No expressive or receptive aphasia. Sensation is intact. Gait is steady. No Babinski, Santillan or clonus.

## 2024-12-10 NOTE — ASSESSMENT
[FreeTextEntry1] : Osteomyelitis- completed the  antibiotics - continue ID follow up  Pain - gabapentin, morphine sulphate  PT for strengthening LS muscles NSG eval done- nothing to do now  discussed exercise- sit to stand training    I spent a total of 40 minutes on this encounter including documentation, face to face time, care coordination and reviewing prior records from hospital, rehab and consultants

## 2024-12-16 ENCOUNTER — APPOINTMENT (OUTPATIENT)
Dept: PSYCHIATRY | Facility: CLINIC | Age: 69
End: 2024-12-16
Payer: COMMERCIAL

## 2024-12-16 ENCOUNTER — APPOINTMENT (OUTPATIENT)
Dept: ENDOCRINOLOGY | Facility: CLINIC | Age: 69
End: 2024-12-16

## 2024-12-16 DIAGNOSIS — F41.1 GENERALIZED ANXIETY DISORDER: ICD-10-CM

## 2024-12-16 DIAGNOSIS — F51.05 ANXIETY DISORDER, UNSPECIFIED: ICD-10-CM

## 2024-12-16 DIAGNOSIS — F41.9 ANXIETY DISORDER, UNSPECIFIED: ICD-10-CM

## 2024-12-16 PROCEDURE — 99214 OFFICE O/P EST MOD 30 MIN: CPT

## 2024-12-18 NOTE — PLAN
[No] : No [Medication education provided] : Medication education provided. [Rationale for medication choices, possible risks/precautions, benefits, alternative treatment choices, and consequences of non-treatment discussed] : Rationale for medication choices, possible risks/precautions, benefits, alternative treatment choices, and consequences of non-treatment discussed with patient/family/caregiver  [FreeTextEntry4] : Meeting goals of care, see HPI.   [FreeTextEntry5] : Will continue Fetzima, pt reluctant to continue Alprazolam nightly for insufficient sleep. Discussed alternatives including over the counter medication.  Pt at her baseline. Defer refill of Trazodone, unclear adherence at present.

## 2024-12-18 NOTE — PHYSICAL EXAM
[Well groomed] : well groomed [Appears younger than age] : appears younger than age [Cooperative] : cooperative [Euthymic] : euthymic [Full] : full [Clear] : clear [Circumstantial] : circumstantial [Tangential] : tangential [None] : none [None Reported] : none reported [Average] : average [WNL] : within normal limits [FreeTextEntry6] : aware she is digressing.

## 2024-12-18 NOTE — HISTORY OF PRESENT ILLNESS
[FreeTextEntry1] : Pt family dynamics continue to be problematic. Pt was able [place] which was a gain compared to last year.  Pt states that she took cough medicine which had helped for sleep. (Pt had URI sx).  Pt states she had stopped Alprazolam and stopped Belsomra, is fearful to take Ambien. Pt reports she uses Melatonin.  Pt will have early am awakening. Pt denied suicidal ideation, denied depression has sadness due to psychosocial stresses but was able to make treatment goal of positive response to an empathic statement made to her daughter as well as not getting pulled into a conflict /setting better boundaries.

## 2025-01-06 ENCOUNTER — APPOINTMENT (OUTPATIENT)
Dept: ENDOCRINOLOGY | Facility: CLINIC | Age: 70
End: 2025-01-06
Payer: COMMERCIAL

## 2025-01-06 VITALS
OXYGEN SATURATION: 99 % | WEIGHT: 103 LBS | HEART RATE: 89 BPM | DIASTOLIC BLOOD PRESSURE: 62 MMHG | BODY MASS INDEX: 17.96 KG/M2 | SYSTOLIC BLOOD PRESSURE: 118 MMHG

## 2025-01-06 DIAGNOSIS — M81.0 AGE-RELATED OSTEOPOROSIS W/OUT CURRENT PATHOLOGICAL FRACTURE: ICD-10-CM

## 2025-01-06 PROCEDURE — 96401 CHEMO ANTI-NEOPL SQ/IM: CPT

## 2025-01-06 PROCEDURE — 99214 OFFICE O/P EST MOD 30 MIN: CPT | Mod: 25

## 2025-01-06 RX ORDER — DENOSUMAB 60 MG/ML
60 INJECTION SUBCUTANEOUS
Qty: 1 | Refills: 0 | Status: COMPLETED | OUTPATIENT
Start: 2025-01-06

## 2025-01-06 RX ADMIN — DENOSUMAB 60 MG/ML: 60 INJECTION SUBCUTANEOUS at 00:00

## 2025-01-06 NOTE — HISTORY OF PRESENT ILLNESS
[FreeTextEntry1] : 01/06/2025  Pt returns for a follow up visit for osteoporosis. States she had a wound on her foot from an injury then became septic 9/2024 c/b osteomyelitis to spine and hip s/p antibiotics.  Pt is getting Prolia injection, tolerating well, reports no side effects. No ONJ or AFF.  No interval surgeries, fractures..  Hx: The patient may need future cervical spine surgery and was referred for management of low bone mass.  Patient has been told of low bone density osteoporosis or osteopenia for many years.  She had been treated in the past by Dr. Ebonie Cedeno.  She had tried Actonel in the past and did not tolerate due to upper GI symptoms.  She has received 2 doses of Prolia which she appears to be tolerating.  She has had a fracture of the foot but no classic osteoporosis related fractures. Prior medical history is notable for bronchiectasis. History of maternal hip fracture.  Coronavirus/COVID19/Healthy Living/Influenza Vaccination/Safe and Effective Use of Medications/Relapse Prevention

## 2025-01-06 NOTE — PHYSICAL EXAM
[Alert] : alert [No Acute Distress] : no acute distress [Normal Sclera/Conjunctiva] : normal sclera/conjunctiva [EOMI] : extra ocular movement intact [No Proptosis] : no proptosis [No Respiratory Distress] : no respiratory distress [No Accessory Muscle Use] : no accessory muscle use [Normal Rate] : heart rate was normal [Normal Gait] : normal gait [Normal Strength/Tone] : muscle strength and tone were normal [Oriented x3] : oriented to person, place, and time [No Tremors] : no tremors

## 2025-01-06 NOTE — ASSESSMENT
[FreeTextEntry1] : 68 y/o female present for follow up visit for osteoporosis.   The patient may need spine surgery.  She is currently on medication for osteoporosis Prolia with questionable decrease in single vertebral body L1. Pt started Prolia 11/2023, tolerating well. Patient advised that use of single vertebral bodies is statistically less reliable.  Hip bone density appears stable and moderate. Family history of hip fracture in mother but no other unusual risk factors for osteoporosis. I requested prior medical records be sent for review for better comparison of bone density over time. 2.5 osteoporosis prior report -2.5. Continue Prolia from [facility].  Prolia stock administered as dose from [facility] not arriving until 1/7/25. Will replace stock when Prolia arrives.  Recommended calcium 500 mg per day, vitamin D 1000 units per day, in addition to dietary intake.   F/u in 6 months and repeat BMD.

## 2025-01-10 ENCOUNTER — RX RENEWAL (OUTPATIENT)
Age: 70
End: 2025-01-10

## 2025-01-15 NOTE — PLAN
Procedure:  EGD    Relevant Problems   CARDIO   (+) Chronic deep vein thrombosis (DVT) of popliteal vein of right lower extremity (HCC)   (+) Essential hypertension   (+) Migraine without aura and without status migrainosus, not intractable   (+) Pulmonary embolism, other, unspecified chronicity, unspecified whether acute cor pulmonale present (HCC)   (+) Pulmonary hypertension (HCC)      GI/HEPATIC   (+) Chronic marginal ulcer with perforation (HCC)   (+) Reactive hypoglycemia      HEMATOLOGY   (+) Coagulopathy (HCC)   (+) Iron deficiency anemia secondary to inadequate dietary iron intake      NEURO/PSYCH   (+) Migraine without aura and without status migrainosus, not intractable      PULMONARY   (+) Asthma   (+) Chronic obstructive pulmonary disease, unspecified (HCC)   (+) Smoking        Physical Exam    Airway    Mallampati score: II         Dental    upper dentures    Cardiovascular  Rhythm: regular, Rate: normal, Cardiovascular exam normal    Pulmonary  Pulmonary exam normal Breath sounds clear to auscultation    Other Findings        Anesthesia Plan  ASA Score- 3     Anesthesia Type- general with ASA Monitors.         Additional Monitors:     Airway Plan:            Plan Factors-    Chart reviewed.    Patient summary reviewed.                  Induction-     Postoperative Plan-         Informed Consent- Anesthetic plan and risks discussed with patient.        NPO Status:  Vitals Value Taken Time   Date of last liquid 01/15/25 01/15/25 1132   Time of last liquid 0300 01/15/25 1132   Date of last solid 01/14/25 01/15/25 1132   Time of last solid 1900 01/15/25 1132         
[FreeTextEntry1] : #Establish care  -discussed medical history   #Hospital follow-up   -appreciate hospital DC note -she is having labs drawn at home, she will send results, if CBC/CMP not drawn then will provide script  -f/u with ID and GI     
[FreeTextEntry1] : #Establish care  -discussed medical history   #Hospital follow-up   -appreciate hospital DC note -she is having labs drawn at home, she will send results, if CBC/CMP not drawn then will provide script  -f/u with ID and GI

## 2025-01-20 NOTE — PATIENT PROFILE ADULT - PATIENT'S PREFERRED PRONOUN
Her/She Protecting airway.   EKG showing normal QRS and QTc.   Will check labs, 1:1, tox consult, psych consult, likely TBA.

## 2025-02-10 ENCOUNTER — APPOINTMENT (OUTPATIENT)
Dept: PSYCHIATRY | Facility: CLINIC | Age: 70
End: 2025-02-10
Payer: COMMERCIAL

## 2025-02-10 DIAGNOSIS — F51.05 ANXIETY DISORDER, UNSPECIFIED: ICD-10-CM

## 2025-02-10 DIAGNOSIS — F41.9 ANXIETY DISORDER, UNSPECIFIED: ICD-10-CM

## 2025-02-10 DIAGNOSIS — F41.1 GENERALIZED ANXIETY DISORDER: ICD-10-CM

## 2025-02-10 PROCEDURE — 99214 OFFICE O/P EST MOD 30 MIN: CPT

## 2025-02-10 NOTE — PLAN
[No] : No [Medication education provided] : Medication education provided. [Rationale for medication choices, possible risks/precautions, benefits, alternative treatment choices, and consequences of non-treatment discussed] : Rationale for medication choices, possible risks/precautions, benefits, alternative treatment choices, and consequences of non-treatment discussed with patient/family/caregiver  [FreeTextEntry4] : Meeting goals of care but still remains anxious and swaps out medication when they "poop out".  Continue Fetzima for depression, continue Gabapentin for pain, and will swap   out Alprazolam for Lorazepam for sleep.  [FreeTextEntry5] : I stop checked.  A	N	N	O	10/15/2024	10/15/2024	morphine sulf er 15 mg tablet	60	30	Tankeven Magda 	[id number]	Day	[facility] #[id number] A	N	N	B	09/30/2024	10/01/2024	diazepam 5 mg tablet	9	3	Sudarshan Loyd)	[id number]	Insurance	[facility] #[id number] A	N	N	O	10/01/2024	10/01/2024	hydromorphone 2 mg tablet	9	3	Cirilo Valenzuela	[id number]	Insurance	[facility] #[id number] A	N	N	B	08/13/2024	08/14/2024	diazepam 5 mg tablet	30	30	Leonides Ronquillo MD	[id number]	Insurance	[id number] A	Y	N	B	07/10/2024	07/11/2024	alprazolam 0.25 mg tablet	30	30	Melissa Redding MD	[id number]	Insurance	[id number]

## 2025-02-10 NOTE — HISTORY OF PRESENT ILLNESS
[FreeTextEntry1] : Pt spouse also snores which affects sleep. Pt reports she takes 1/2 of Alprazolam on the nights she has her granddaughter and if she is snoring.  Pt did not like Belsomra  per pt it kept her up all night and did not feel Trazodone was not effective.  Pt brought Melatonin as well. Psychoeducation provided regarding applying the things she learns in AA/[employer].  Pt went to [place].

## 2025-02-10 NOTE — REVIEW OF SYSTEMS
[Negative] : Allergic/Immunologic [FreeTextEntry9] : back pain [de-identified] : see interval history

## 2025-02-11 RX ORDER — LORAZEPAM 0.5 MG/1
0.5 TABLET ORAL DAILY
Qty: 10 | Refills: 0 | Status: ACTIVE | COMMUNITY
Start: 2025-02-10 | End: 1900-01-01

## 2025-02-26 NOTE — REASON FOR VISIT
[Home] : at home, [unfilled] , at the time of the visit. [Medical Office: ([facility])___] : at the medical office located in  [Telehealth (audio & video)] : This visit was provided via telehealth using real-time 2-way audio visual technology. [Follow-Up: _____] : a [unfilled] follow-up visit

## 2025-02-26 NOTE — REASON FOR VISIT
[Home] : at home, [unfilled] , at the time of the visit. [Medical Office: ([facility])___] : at the medical office located in  [Telehealth (audio & video)] : This visit was provided via telehealth using real-time 2-way audio visual technology. [Follow-Up: _____] : a [unfilled] follow-up visit

## 2025-02-26 NOTE — REASON FOR VISIT
[Home] : at home, [unfilled] , at the time of the visit. [Medical Office: ([facility])___] : at the medical office located in  [Telehealth (audio & video)] : This visit was provided via telehealth using real-time 2-way audio visual technology. [Follow-Up: _____] : a [unfilled] follow-up visit

## 2025-02-26 NOTE — REASON FOR VISIT
[Home] : at home, [unfilled] , at the time of the visit. [Medical Office: ([facility])___] : at the medical office located in  [Telehealth (audio & video)] : This visit was provided via telehealth using real-time 2-way audio visual technology. [Follow-Up: _____] : a [unfilled] follow-up visit

## 2025-02-27 ENCOUNTER — APPOINTMENT (OUTPATIENT)
Dept: NEUROSURGERY | Facility: CLINIC | Age: 70
End: 2025-02-27

## 2025-02-27 NOTE — ED ADULT TRIAGE NOTE - INTERNATIONAL TRAVEL
Writer received a call from patient stating he has been waiting for nurse Jazzmine.  Stated he called this morning and someone told him Jazzmine would be there in 30 minutes.     According in inbasket sent by Resource, SOC should be today instead of Friday.      Writer contacted pharmacy per direction of manager Magaly TRINIDAD. Spoke with Tam MARYCRUZ who instructed for pt to take morning dose and hold evening dose until nurse arrival.  Visit should not be earlier than 8pm. Would like visit tonight rather than tomorrow due to tomorrow being Friday.    Writer contacted pt and shared [facility]'s instructions with him and he understood.    No

## 2025-03-03 ENCOUNTER — APPOINTMENT (OUTPATIENT)
Dept: NEUROSURGERY | Facility: CLINIC | Age: 70
End: 2025-03-03
Payer: COMMERCIAL

## 2025-03-03 DIAGNOSIS — M48.07 SPINAL STENOSIS, LUMBOSACRAL REGION: ICD-10-CM

## 2025-03-03 DIAGNOSIS — Z87.891 PERSONAL HISTORY OF NICOTINE DEPENDENCE: ICD-10-CM

## 2025-03-03 PROCEDURE — 99213 OFFICE O/P EST LOW 20 MIN: CPT | Mod: 93

## 2025-03-03 NOTE — HISTORY OF PRESENT ILLNESS
[FreeTextEntry1] : Neva Pandya is a 69 year old female who presented in May 2023 with c/o back pain is primarily on her right side to her buttocks with numbness and tingling in her leg.  Per chart review there was a tentative plan for L2-5 posterior decompression and instrumented fusion and patient will call when she is ready to schedule.  Pt was hospitalized from 10/30/24 - 11/1/24 with +UA, with a culture growing Group B strep and positive blood Cx also growing group B strep, due to osteomyelitis and lumbar spine, 10/15/24, patient was started on IV antibiotics and discharged with PICC line with continuation of ceftriaxone 2 g daily until 11/14,and presents for a follow up visit.  Today she reports that she is feeling better and that PICC line was removed on 11/14. She denies lower back pain but endorses right hip pain. She denies numbness and tingling sensation in lower extremities  Explained that radiographically there is evidence of lumbar spine stenosis however she does not have any lower back pain currently but has right hip pain.  Discussed with patient that the infection could have left some lumbar spine instability, and we suggest follow up lumbar spine x-ray ap/lat, flex/ext. Pt referenced an unfavorable childhood experience with exposure to radiation and does not wish to pursue at this time. States that she will call our office when she decides to complete the x-rays.  States that she has planned PT sessions for the right hip. She takes Gabapentin 300 mg daily  Pt completed MRI lumbar spine w/wo on 2/10/25 at  that showed MR findings at L4-L5 suggestinc chronic infectious discitis and spinal osteomyelitis, moderately worse when compared to MR images from September 2024, though without evidence of sbscess formation.  Abnormalities at the anterior aspect of the L2-L3 disc level are favored to represent focal spondylosis rather thatn an additional level of infectious discitis.  They are stable compared to MR images dating back to December 2019.  Small listhesis noted  Pt is participating in a telehealth visit to discuss results of recent imaging.  Pt expressed that she would like to avoid surgery as long as possible and that she is currently under the care of an Endocrinologist for treatment of osteoporosis with Prolia. Pt states that she had bloodwork done that were negative for infection.  Takes Tylenol and Valium occasionally for back pain.

## 2025-03-03 NOTE — END OF VISIT
[FreeTextEntry3] : I, Dr.Daniel Mensah, personally performed the evaluation and management (E/M) services for this established patient who presents today with (a) new problem(s)/exacerbation of (an) existing condition(s). That E/M includes conducting the clinically appropriate interval history &/or exam, assessing all new/exacerbated conditions, and establishing a new plan of care. Today, my RICKEY, Donna Sim DNP, was here to observe my evaluation and management service for this new problem/exacerbated condition and follow the plan of care established by me going forward.

## 2025-03-03 NOTE — PHYSICAL EXAM
[General Appearance - Alert] : alert [General Appearance - Well-Appearing] : healthy appearing [Oriented To Time, Place, And Person] : oriented to person, place, and time [Person] : oriented to person [Place] : oriented to place [Time] : oriented to time [General Appearance - Well Nourished] : well nourished

## 2025-03-03 NOTE — ASSESSMENT
[FreeTextEntry1] : IMPRESSION: 69 year old female who presented in May 2023 with c/o back pain is primarily on her right side to her buttocks with numbness and tingling in her leg.  Pt completed MRI lumbar spine w/wo on 2/10/25 at  that showed MR findings at L4-L5 suggestinc chronic infectious discitis and spinal osteomyelitis, moderately worse when compared to MR images from September 2024, though without evidence of sbscess formation.  Abnormalities at the anterior aspect of the L2-L3 disc level are favored to represent focal spondylosis rather thatn an additional level of infectious discitis.  They are stable compared to MR images dating back to December 2019.  Small listhesis noted  Pt is participating in a telehealth visit to discuss results of recent imaging.   PLAN: Explained that the MRI looks reassuring from an infection point of view.  Pt states that she had bloodwork done that were negative for infection. Referral to pain management - Dr. Norris for a more holistic evaluation. Explained that there is no surgical indication and that it is not possible to predict her need for surgery.

## 2025-03-11 ENCOUNTER — APPOINTMENT (OUTPATIENT)
Dept: PSYCHIATRY | Facility: CLINIC | Age: 70
End: 2025-03-11
Payer: COMMERCIAL

## 2025-03-11 DIAGNOSIS — F51.05 ANXIETY DISORDER, UNSPECIFIED: ICD-10-CM

## 2025-03-11 DIAGNOSIS — F41.9 ANXIETY DISORDER, UNSPECIFIED: ICD-10-CM

## 2025-03-11 DIAGNOSIS — F41.1 GENERALIZED ANXIETY DISORDER: ICD-10-CM

## 2025-03-11 PROCEDURE — 99214 OFFICE O/P EST MOD 30 MIN: CPT

## 2025-03-13 NOTE — HISTORY OF PRESENT ILLNESS
[FreeTextEntry1] : Pt spouse also snores which affects sleep. Pt reports she takes 1/2 of Alprazolam on the nights she has her granddaughter and if she is snoring.  Pt brought Melatonin as well. Psychoeducation provided regarding applying the things she learns in AA/Al-Anon.  Pt without any new issues but did have minor resolution of a previous complaint in that all her grandchildren got to spend time together.  Reinforcement of self-care and boundaries, as well as enjoying and possibly scheduling more time away has been helpful.  Tolerating Fetzima iwthout incident, pt trial of Duloxetine not helpful, and pt did not tolerate it.  Will continue current management.

## 2025-03-13 NOTE — PLAN
[No] : No [Medication education provided] : Medication education provided. [Rationale for medication choices, possible risks/precautions, benefits, alternative treatment choices, and consequences of non-treatment discussed] : Rationale for medication choices, possible risks/precautions, benefits, alternative treatment choices, and consequences of non-treatment discussed with patient/family/caregiver  [FreeTextEntry4] : Meeting goals of care in terms of better response to sleep, however chronic stress limits outcome.  [FreeTextEntry5] : I stop checked.  A	Y	N	B	02/11/2025	02/15/2025	lorazepam 0.5 mg tablet	10	10	Melissa Redding MD	[id number]	[facility] #[id number] Continue Fetzima 20 mg daily.  Continue Prn Lorazepam which appears to work better than Alprazolam for sleep.

## 2025-03-13 NOTE — PLAN
[No] : No [Medication education provided] : Medication education provided. [Rationale for medication choices, possible risks/precautions, benefits, alternative treatment choices, and consequences of non-treatment discussed] : Rationale for medication choices, possible risks/precautions, benefits, alternative treatment choices, and consequences of non-treatment discussed with patient/family/caregiver  [FreeTextEntry4] : Meeting goals of care in terms of better response to sleep, however chronic stress limits outcome.  [FreeTextEntry5] : I stop checked.  A	Y	N	B	02/11/2025	02/15/2025	lorazepam 0.5 mg tablet	10	10	Melissa Redding MD	[id number]	[facility] #[id number] Continue Fetzima 20 mg daily.  Continue Prn Lorazepam which appears to work better than Alprazolam for sleep.

## 2025-05-07 ENCOUNTER — APPOINTMENT (OUTPATIENT)
Dept: PULMONOLOGY | Facility: CLINIC | Age: 70
End: 2025-05-07

## 2025-05-20 ENCOUNTER — APPOINTMENT (OUTPATIENT)
Dept: PSYCHIATRY | Facility: CLINIC | Age: 70
End: 2025-05-20
Payer: MEDICARE

## 2025-05-20 ENCOUNTER — APPOINTMENT (OUTPATIENT)
Dept: FAMILY MEDICINE | Facility: CLINIC | Age: 70
End: 2025-05-20
Payer: MEDICARE

## 2025-05-20 VITALS
DIASTOLIC BLOOD PRESSURE: 70 MMHG | WEIGHT: 102 LBS | OXYGEN SATURATION: 98 % | TEMPERATURE: 97.2 F | BODY MASS INDEX: 17.85 KG/M2 | RESPIRATION RATE: 16 BRPM | HEART RATE: 67 BPM | SYSTOLIC BLOOD PRESSURE: 100 MMHG | HEIGHT: 63.5 IN

## 2025-05-20 DIAGNOSIS — F41.9 ANXIETY DISORDER, UNSPECIFIED: ICD-10-CM

## 2025-05-20 DIAGNOSIS — L30.9 DERMATITIS, UNSPECIFIED: ICD-10-CM

## 2025-05-20 DIAGNOSIS — E78.5 HYPERLIPIDEMIA, UNSPECIFIED: ICD-10-CM

## 2025-05-20 DIAGNOSIS — F51.05 ANXIETY DISORDER, UNSPECIFIED: ICD-10-CM

## 2025-05-20 DIAGNOSIS — F41.1 GENERALIZED ANXIETY DISORDER: ICD-10-CM

## 2025-05-20 PROCEDURE — 99204 OFFICE O/P NEW MOD 45 MIN: CPT

## 2025-05-20 RX ORDER — TRIAMCINOLONE ACETONIDE 0.25 MG/G
0.03 OINTMENT TOPICAL TWICE DAILY
Qty: 1 | Refills: 0 | Status: ACTIVE | COMMUNITY
Start: 2025-05-20 | End: 1900-01-01

## 2025-05-20 NOTE — ASSESSMENT
Please assist with referral for Urology. Contact pt when done.   [FreeTextEntry1] : 70 yo F PMH IgA/IGg immunodeficiency, , bronchiectasis, Multilevel degenerative disc disease asthma osteoporosis anxiety, insomnia presenting today for follow-up.

## 2025-05-20 NOTE — HISTORY OF PRESENT ILLNESS
[FreeTextEntry1] : Pt reports upset at continued reported lack of cooperation with her grand daughters' family. Pt reports missing AA and big book meetings.  Pt without any new issues but did have minor resolution of a previous complaint in that all her grandchildren got to spend time together.  Reinforcement of self-care and boundaries, as well as enjoying and possibly scheduling more time away has been helpful.  Tolerating Fetzima without incident, pt trial of Duloxetine not helpful, and pt did not tolerate it.  Will continue current management.  Reinforced her continued need for boundaries as well as being assertive in assessing her own wants/needs/ aspirations.

## 2025-05-20 NOTE — PLAN
[FreeTextEntry1] : #Headaches  -reports no changes  -uses rizatriptan a few times a both, continue PRN   #HLD  -f/u CMP and lipid panel  -continue current regimen of atorvastatin 10 mg QD   #Dermatits  -trial of triamcinolone BID for 1 week on thigh, if does not resolve f/u with Derm   -she will f/u with pain management regarding gabapentin  -follow-up for CPE will discuss screenings/vaccinations further at that time

## 2025-05-20 NOTE — ASSESSMENT
[FreeTextEntry1] : 70 yo F PMH IgA/IGg immunodeficiency, , bronchiectasis, Multilevel degenerative disc disease asthma osteoporosis anxiety, insomnia presenting today for follow-up.

## 2025-05-20 NOTE — HISTORY OF PRESENT ILLNESS
[FreeTextEntry1] : follow-up   [de-identified] : 70 yo F PMH IgA/IGg immunodeficiency, , bronchiectasis, Multilevel degenerative disc disease asthma osteoporosis anxiety, insomnia  presenting today for follow-up.   She is wondering if she should still be on gabapentin -started with pain management.   She notes area on her L thigh that is pruritic at night - topical steroid helped a little.   She follows with pain management (Dr. Wilkerson/Dr. Price), GYN, Neuro (Dr. Gonzalez), Psych (Dr. Adenike Wilson), Pulm (Dr. Phillipsfeld, ID (Dr. Ma).    social: lives with , granddaughter, and son who has autism, currently not working outside the home, rare smoking in college years

## 2025-05-20 NOTE — PLAN
[No] : No [Medication education provided] : Medication education provided. [Rationale for medication choices, possible risks/precautions, benefits, alternative treatment choices, and consequences of non-treatment discussed] : Rationale for medication choices, possible risks/precautions, benefits, alternative treatment choices, and consequences of non-treatment discussed with patient/family/caregiver  [FreeTextEntry4] : Pt benefits from medication management and supportive counseling.  Will defer Lorazepam for now.  [FreeTextEntry5] : Psychoeducation provided regarding use of 2 benzodiazepines (Alprazolam and Lorazepam).  Pt prefers Alprazolam in that small doses can be used that are efficacious for sleep.  Pt encouraged to continue Gabapentin as well as continue Fetzima, PCP notes appreciated.  Will continue to monitor response to medication.

## 2025-06-10 ENCOUNTER — RX RENEWAL (OUTPATIENT)
Age: 70
End: 2025-06-10

## 2025-06-11 ENCOUNTER — APPOINTMENT (OUTPATIENT)
Dept: PULMONOLOGY | Facility: CLINIC | Age: 70
End: 2025-06-11
Payer: MEDICARE

## 2025-06-11 VITALS
SYSTOLIC BLOOD PRESSURE: 102 MMHG | BODY MASS INDEX: 18.2 KG/M2 | HEART RATE: 92 BPM | HEIGHT: 63.5 IN | DIASTOLIC BLOOD PRESSURE: 68 MMHG | WEIGHT: 104 LBS | OXYGEN SATURATION: 97 %

## 2025-06-11 PROBLEM — G47.09 OTHER INSOMNIA: Status: ACTIVE | Noted: 2025-06-11

## 2025-06-11 PROCEDURE — ZZZZZ: CPT

## 2025-06-11 PROCEDURE — 94727 GAS DIL/WSHOT DETER LNG VOL: CPT

## 2025-06-11 PROCEDURE — 94060 EVALUATION OF WHEEZING: CPT

## 2025-06-11 PROCEDURE — 94729 DIFFUSING CAPACITY: CPT

## 2025-06-11 PROCEDURE — 99204 OFFICE O/P NEW MOD 45 MIN: CPT | Mod: 25

## 2025-06-11 RX ORDER — RAMELTEON 8 MG/1
8 TABLET ORAL
Qty: 30 | Refills: 1 | Status: ACTIVE | COMMUNITY
Start: 2025-06-11 | End: 1900-01-01

## 2025-06-11 NOTE — HISTORY OF PRESENT ILLNESS
[Difficulty Maintaining Sleep] : difficulty maintaining sleep [TextBox_4] : 69 year old F presents for follow up for increased cough and congestion  Pt notes her anxiety causes SOB.  Pt notes difficulty staying asleep due to over thinking, she treats this with melatonin before falling asleep and again when she awakes though out the night Pt notes chest tightness, she treats with breathing exercises and nebulizer PRN.  Recently hospitalized for sepsis related to an unhealed wound and water exposure.  Incurred large medical bills during hospitalization as apparently she only had Medicare part a insurance at that time unbeknownst to her.

## 2025-06-11 NOTE — PHYSICAL EXAM
[No Acute Distress] : no acute distress [Normal Oropharynx] : normal oropharynx [No Neck Mass] : no neck mass [Normal Appearance] : normal appearance [Normal Rate/Rhythm] : normal rate/rhythm [Normal S1, S2] : normal s1, s2 [No Murmurs] : no murmurs [Clear to Auscultation Bilaterally] : clear to auscultation bilaterally [No Resp Distress] : no resp distress [No Abnormalities] : no abnormalities [Benign] : benign [Normal Gait] : normal gait [No Clubbing] : no clubbing [No Cyanosis] : no cyanosis [No Edema] : no edema [FROM] : FROM [Normal Color/ Pigmentation] : normal color/ pigmentation [No Focal Deficits] : no focal deficits [Oriented x3] : oriented x3 [Normal Affect] : normal affect

## 2025-06-11 NOTE — ADDENDUM
[FreeTextEntry1] : I, Siria Galindo, acted solely as a scribe for Dr. Kiet Benoit M.D. on this date 06/11/2025  All medical record entries made by the Scribe were at my, Dr. Kiet Benoit M.D., direction and personally dictated by me on 06/11/2025 I have reviewed the chart and agree that the record accurately reflects my personal performance of the history, physical exam, assessment and plan. I have also personally directed, reviewed, and agreed with the chart.

## 2025-06-11 NOTE — ASSESSMENT
[FreeTextEntry1] : PFT results reflect Asthma I discussed that Asthma and anxiety tend to go together Pt should use Albuterol PRN Based off Albuterol usage we will assess need for maintenance inhaler  Pt should take Rozerem to address difficulty maintaining sleep  I informed pt she can use Xanax prn

## 2025-06-11 NOTE — PHYSICAL EXAM
[Normal Oropharynx] : normal oropharynx [No Acute Distress] : no acute distress [No Neck Mass] : no neck mass [Normal Appearance] : normal appearance [Normal Rate/Rhythm] : normal rate/rhythm [Normal S1, S2] : normal s1, s2 [No Murmurs] : no murmurs [Clear to Auscultation Bilaterally] : clear to auscultation bilaterally [No Resp Distress] : no resp distress [No Abnormalities] : no abnormalities [Benign] : benign [Normal Gait] : normal gait [No Clubbing] : no clubbing [No Cyanosis] : no cyanosis [No Edema] : no edema [FROM] : FROM [Normal Color/ Pigmentation] : normal color/ pigmentation [No Focal Deficits] : no focal deficits [Oriented x3] : oriented x3 [Normal Affect] : normal affect

## 2025-06-11 NOTE — PROCEDURE
[FreeTextEntry1] : PFT demonstrates normal spirometry with positive bronchodilator response consistent with

## 2025-06-20 ENCOUNTER — LABORATORY RESULT (OUTPATIENT)
Age: 70
End: 2025-06-20

## 2025-06-30 ENCOUNTER — APPOINTMENT (OUTPATIENT)
Dept: PSYCHIATRY | Facility: CLINIC | Age: 70
End: 2025-06-30
Payer: MEDICARE

## 2025-06-30 PROCEDURE — 99214 OFFICE O/P EST MOD 30 MIN: CPT

## 2025-07-02 ENCOUNTER — APPOINTMENT (OUTPATIENT)
Dept: FAMILY MEDICINE | Facility: CLINIC | Age: 70
End: 2025-07-02
Payer: MEDICARE

## 2025-07-02 PROBLEM — E05.90 SUBCLINICAL HYPERTHYROIDISM: Status: ACTIVE | Noted: 2025-07-02

## 2025-07-02 PROCEDURE — 99214 OFFICE O/P EST MOD 30 MIN: CPT | Mod: 2W

## 2025-07-02 NOTE — PLAN
[FreeTextEntry1] : #Leukopenia  -neutropenia noted on CBC - has had previously on other CBCs as well -f/u with hematology, referral provided   #Subclincal hypothyroidism  -no overt symptoms  -f/u TSH T3 T4 in 6 months   #HLD  -lipid panel wnl  -takes statin every other day   -Discussed pain down thigh should be evaluated in person instead of TeleVideo - discussed office visit and ortho vs neuro

## 2025-07-02 NOTE — ASSESSMENT
[FreeTextEntry1] : 70 yo F PMH IgA/IGg immunodeficiency, bronchiectasis, Multilevel degenerative disc disease asthma osteoporosis anxiety, insomnia presenting today for follow-up/review lab work.

## 2025-07-02 NOTE — HISTORY OF PRESENT ILLNESS
[Home] : at home, [unfilled] , at the time of the visit. [Medical Office: ([facility])___] : at the medical office located in  [Telehealth (audio & video)] : This visit was provided via telehealth using real-time 2-way audio visual technology. [Verbal consent obtained from patient] : the patient, [unfilled] [FreeTextEntry1] : follow-up   [de-identified] : 68 yo F PMH IgA/IGg immunodeficiency, bronchiectasis, Multilevel degenerative disc disease asthma osteoporosis anxiety, insomnia presenting today for follow-up/review lab work.   She also notes, has not followed up with pain management, needs refill of gabapentin.  She was started on ramelteon by her pulmonologist for insomnia.  She has a tingling pain down her thigh - more recently for who she saw her chiropractor.

## 2025-07-28 ENCOUNTER — APPOINTMENT (OUTPATIENT)
Dept: ENDOCRINOLOGY | Facility: CLINIC | Age: 70
End: 2025-07-28
Payer: MEDICARE

## 2025-07-28 VITALS
OXYGEN SATURATION: 98 % | BODY MASS INDEX: 18.02 KG/M2 | HEART RATE: 88 BPM | SYSTOLIC BLOOD PRESSURE: 108 MMHG | HEIGHT: 63.5 IN | WEIGHT: 103 LBS | DIASTOLIC BLOOD PRESSURE: 60 MMHG

## 2025-07-28 DIAGNOSIS — E03.8 OTHER SPECIFIED HYPOTHYROIDISM: ICD-10-CM

## 2025-07-28 DIAGNOSIS — M81.0 AGE-RELATED OSTEOPOROSIS W/OUT CURRENT PATHOLOGICAL FRACTURE: ICD-10-CM

## 2025-07-28 PROCEDURE — ZZZZZ: CPT

## 2025-07-28 PROCEDURE — 99204 OFFICE O/P NEW MOD 45 MIN: CPT | Mod: 25

## 2025-07-28 PROCEDURE — 96372 THER/PROPH/DIAG INJ SC/IM: CPT

## 2025-07-28 PROCEDURE — 77080 DXA BONE DENSITY AXIAL: CPT | Mod: GA

## 2025-07-28 PROCEDURE — 77089 TBS DXA CAL W/I&R FX RISK: CPT | Mod: GA

## 2025-07-28 RX ORDER — DENOSUMAB 60 MG/ML
60 INJECTION SUBCUTANEOUS
Qty: 1 | Refills: 0 | Status: COMPLETED | OUTPATIENT
Start: 2025-07-28

## 2025-07-28 RX ADMIN — DENOSUMAB 0 MG/ML: 60 INJECTION SUBCUTANEOUS at 00:00

## 2025-07-28 NOTE — PHYSICAL EXAM
[Alert] : alert [No Acute Distress] : no acute distress [Normal Sclera/Conjunctiva] : normal sclera/conjunctiva [EOMI] : extra ocular movement intact [No Proptosis] : no proptosis [No Respiratory Distress] : no respiratory distress [No Accessory Muscle Use] : no accessory muscle use [Normal Rate] : heart rate was normal [Normal Gait] : normal gait [Normal Strength/Tone] : muscle strength and tone were normal [No Tremors] : no tremors [Oriented x3] : oriented to person, place, and time [de-identified] : right lower last molar implant clean

## 2025-07-28 NOTE — HISTORY OF PRESENT ILLNESS
[Risedronate (Actonel)] : Risedronate [Denosumab (Prolia)] : Denosumab [FreeTextEntry1] :  Pt returns for a follow-up visit for osteoporosis. No interval health changes. No major surgeries, hospitalizations, fractures or changes in medication. Up to date with dentist, had a right lower last molar implant 6 months ago. No major dental work planned.  Pt. 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   Hx: The patient may need future cervical spine surgery and was referred for management of low bone mass.  Patient has been told of low bone density osteoporosis or osteopenia for many years.  She had been treated in the past by Dr. Ebonie Cedeno.  She had tried Actonel in the past and did not tolerate due to upper GI symptoms.  She had received 2 doses of Prolia in 2022 with Dr Cedeno which she appears to be tolerating.  She has had a fracture of the foot but no classic osteoporosis related fractures. Prior medical history is notable for bronchiectasis. History of maternal hip fracture.

## 2025-07-28 NOTE — ASSESSMENT
[Denosumab Therapy] : Risks  and benefits of denosumab therapy were discussed with the patient including eczema, cellulitis, osteonecrosis of the jaw and atypical femur fractures [FreeTextEntry1] : 68 y/o female present for follow up visit for osteoporosis.   The patient may need spine surgery.  She is currently on medication for osteoporosis Prolia with questionable decrease in single vertebral body L1.  Pt started Prolia 11/2022, tolerating well. Patient advised that use of single vertebral bodies is statistically less reliable.  Hip bone density appears stable and moderate. Family history of hip fracture in mother but no other unusual risk factors for osteoporosis. I requested prior medical records be sent for review for better comparison of bone density over time. 2.5 osteoporosis prior report -2.5.  Bone density 07/2025 shows osteopenia in the total hip and radius, and osteoporosis in the femoral neck, spine not reported due to degenerative joint disease. BMD reviewed with pt. Continue Prolia from [facility].   Recommended calcium 500 mg per day, vitamin D 1000 units per day, in addition to dietary intake.   F/u in 6 months for Prolia

## 2025-07-28 NOTE — PROCEDURE
[FreeTextEntry1] : Bone Mineral Density: 07/28/2025 Indication: vs 2023 to assess response to medication, outside study Spine not reported due to DJD Total hip: -2.1 osteopenia, prior -1.8 Femoral neck: -2.5 osteoporosis, prior -2.2   Proximal radius:  -1.8 osteopenia, no prior TBS: 1.367 normal microarchitecture.

## 2025-07-28 NOTE — ASSESSMENT
[Denosumab Therapy] : Risks  and benefits of denosumab therapy were discussed with the patient including eczema, cellulitis, osteonecrosis of the jaw and atypical femur fractures [FreeTextEntry1] : 70 y/o female present for follow up visit for osteoporosis.   The patient may need spine surgery.  She is currently on medication for osteoporosis Prolia with questionable decrease in single vertebral body L1.  Pt started Prolia 11/2022, tolerating well. Patient advised that use of single vertebral bodies is statistically less reliable.  Hip bone density appears stable and moderate. Family history of hip fracture in mother but no other unusual risk factors for osteoporosis. I requested prior medical records be sent for review for better comparison of bone density over time. 2.5 osteoporosis prior report -2.5.  Bone density 07/2025 shows osteopenia in the total hip and radius, and osteoporosis in the femoral neck, spine not reported due to degenerative joint disease. BMD reviewed with pt. Continue Prolia from [facility].   Recommended calcium 500 mg per day, vitamin D 1000 units per day, in addition to dietary intake.   F/u in 6 months for Prolia

## 2025-07-28 NOTE — END OF VISIT
[FreeTextEntry3] : This note was written by Lcuía Shcaeffer on (07/28/2025) acting as a medical scribe for Dr. Acuna. This note was authored by the medical scribe for me. I have reviewed, edited, and revised the note as needed. I am in agreement with the exam findings, imaging findings, and treatment plan. Lv Acuna MD

## 2025-07-28 NOTE — PHYSICAL EXAM
[Alert] : alert [No Acute Distress] : no acute distress [Normal Sclera/Conjunctiva] : normal sclera/conjunctiva [EOMI] : extra ocular movement intact [No Proptosis] : no proptosis [No Respiratory Distress] : no respiratory distress [No Accessory Muscle Use] : no accessory muscle use [Normal Rate] : heart rate was normal [Normal Gait] : normal gait [Normal Strength/Tone] : muscle strength and tone were normal [No Tremors] : no tremors [Oriented x3] : oriented to person, place, and time [de-identified] : right lower last molar implant clean

## 2025-07-28 NOTE — END OF VISIT
[FreeTextEntry3] : This note was written by Lucía Schaeffer on (07/28/2025) acting as a medical scribe for Dr. Acuna. This note was authored by the medical scribe for me. I have reviewed, edited, and revised the note as needed. I am in agreement with the exam findings, imaging findings, and treatment plan. Lv Acuna MD

## 2025-07-29 ENCOUNTER — APPOINTMENT (OUTPATIENT)
Dept: PSYCHIATRY | Facility: CLINIC | Age: 70
End: 2025-07-29
Payer: MEDICARE

## 2025-07-29 DIAGNOSIS — F41.9 ANXIETY DISORDER, UNSPECIFIED: ICD-10-CM

## 2025-07-29 DIAGNOSIS — F41.1 GENERALIZED ANXIETY DISORDER: ICD-10-CM

## 2025-07-29 DIAGNOSIS — F51.05 ANXIETY DISORDER, UNSPECIFIED: ICD-10-CM

## 2025-07-29 PROCEDURE — 99214 OFFICE O/P EST MOD 30 MIN: CPT

## 2025-07-29 RX ORDER — GLUCOSAMINE/CHONDR SU A SOD 750-600 MG
TABLET ORAL
Refills: 0 | Status: ACTIVE | COMMUNITY

## 2025-07-29 RX ORDER — MULTIVITAMIN
TABLET ORAL
Refills: 0 | Status: ACTIVE | COMMUNITY

## 2025-07-29 RX ORDER — METHYLDOPA/HYDROCHLOROTHIAZIDE 250MG-25MG
TABLET ORAL
Refills: 0 | Status: ACTIVE | COMMUNITY

## 2025-07-29 NOTE — HISTORY OF PRESENT ILLNESS
[FreeTextEntry1] : Pt reports she can take Ramelteon 8 mg for sleep at times will take prn Alprazolam, will take 0.125 mg for middle insomnia.  Pt reports she has a low white count. Pt at times will take a passionflower OTC sleep supplement.

## 2025-07-29 NOTE — PHYSICAL EXAM
[Well groomed] : well groomed [Appears younger than age] : appears younger than age [Cooperative] : cooperative [Euthymic] : euthymic [Full] : full [Clear] : clear [Circumstantial] : circumstantial [Tangential] : tangential [None] : none [None Reported] : none reported [Average] : average [WNL] : within normal limits [FreeTextEntry6] : aware she is digressing.  [de-identified] : Pt most likely has some degree of ADHD herself in that her focus is often scattered and will bring up a topic mid-way , is circumstantial as a baseline.  May encourage pt to have neuropsych testing but in general would not be considered a candidate for a traditional stimulant due to anxiety.

## 2025-07-29 NOTE — PLAN
[No] : No [Medication education provided] : Medication education provided. [Rationale for medication choices, possible risks/precautions, benefits, alternative treatment choices, and consequences of non-treatment discussed] : Rationale for medication choices, possible risks/precautions, benefits, alternative treatment choices, and consequences of non-treatment discussed with patient/family/caregiver  [FreeTextEntry4] : Ramelteon helps somewhat but is not consistent, pt encouraged to stick with this long term, prescribed by her pulmonologist.  [FreeTextEntry5] : Continue Fetzima for anxiety 20 mg daily. Continue Alprazolam 0.125- 0.25 mg po q hs ( for middle insomnia).

## 2025-08-13 ENCOUNTER — APPOINTMENT (OUTPATIENT)
Dept: FAMILY MEDICINE | Facility: CLINIC | Age: 70
End: 2025-08-13

## 2025-08-26 ENCOUNTER — APPOINTMENT (OUTPATIENT)
Dept: PSYCHIATRY | Facility: CLINIC | Age: 70
End: 2025-08-26
Payer: MEDICARE

## 2025-08-26 PROCEDURE — 99214 OFFICE O/P EST MOD 30 MIN: CPT

## 2025-08-27 ENCOUNTER — NON-APPOINTMENT (OUTPATIENT)
Age: 70
End: 2025-08-27

## 2025-09-04 ENCOUNTER — APPOINTMENT (OUTPATIENT)
Dept: PSYCHIATRY | Facility: CLINIC | Age: 70
End: 2025-09-04
Payer: MEDICARE

## 2025-09-04 DIAGNOSIS — F51.05 ANXIETY DISORDER, UNSPECIFIED: ICD-10-CM

## 2025-09-04 DIAGNOSIS — F41.9 ANXIETY DISORDER, UNSPECIFIED: ICD-10-CM

## 2025-09-04 PROCEDURE — 99214 OFFICE O/P EST MOD 30 MIN: CPT

## 2025-09-10 ENCOUNTER — APPOINTMENT (OUTPATIENT)
Dept: PULMONOLOGY | Facility: CLINIC | Age: 70
End: 2025-09-10
Payer: MEDICARE

## 2025-09-10 DIAGNOSIS — D84.9 IMMUNODEFICIENCY, UNSPECIFIED: ICD-10-CM

## 2025-09-10 DIAGNOSIS — J47.9 BRONCHIECTASIS, UNCOMPLICATED: ICD-10-CM

## 2025-09-10 DIAGNOSIS — D80.2 SELECTIVE DEFICIENCY OF IMMUNOGLOBULIN A [IGA]: ICD-10-CM

## 2025-09-10 DIAGNOSIS — J45.909 UNSPECIFIED ASTHMA, UNCOMPLICATED: ICD-10-CM

## 2025-09-10 DIAGNOSIS — F41.1 GENERALIZED ANXIETY DISORDER: ICD-10-CM

## 2025-09-10 PROCEDURE — 94010 BREATHING CAPACITY TEST: CPT

## 2025-09-10 PROCEDURE — 99213 OFFICE O/P EST LOW 20 MIN: CPT | Mod: 25

## (undated) DEVICE — PACK MINOR

## (undated) DEVICE — SOL IRR POUR H2O 1500ML

## (undated) DEVICE — SUT POLYSORB 2-0 30" V-20 UNDYED

## (undated) DEVICE — CONTAINER SPECIMEN PET

## (undated) DEVICE — SUT PLAIN GUT 4-0 30" C-13

## (undated) DEVICE — DRAPE LIGHT HANDLE COVER FLEX GREEN

## (undated) DEVICE — PREP CHLORAPREP ORANGE 2PCT 26ML

## (undated) DEVICE — GLV 6.5 PROTEXIS

## (undated) DEVICE — BLANKET WARMER LOWER ADULT

## (undated) DEVICE — SOL IRR POUR NS 0.9% 500ML

## (undated) DEVICE — WRAP COMPRESSION CALF MED

## (undated) DEVICE — GLV 7.5 ULTRAFREE MAX

## (undated) DEVICE — SUT POLYSORB 3-0 30" V-20 UNDYED

## (undated) DEVICE — SUT MONOSOF 4-0 18" C-13

## (undated) DEVICE — POSITIONER FOAM HEAD CRADLE

## (undated) DEVICE — POSITIONER STRAP ARMBOARD VELCRO TS-30 12

## (undated) DEVICE — BLANKET WARMER UPPER ADULT

## (undated) DEVICE — SUT MONOCRYL 3-0 27" PS-2 UNDYED